# Patient Record
Sex: MALE | Race: WHITE | NOT HISPANIC OR LATINO | Employment: UNEMPLOYED | URBAN - METROPOLITAN AREA
[De-identification: names, ages, dates, MRNs, and addresses within clinical notes are randomized per-mention and may not be internally consistent; named-entity substitution may affect disease eponyms.]

---

## 2017-06-02 ENCOUNTER — LAB (OUTPATIENT)
Dept: LAB | Facility: HOSPITAL | Age: 67
End: 2017-06-02

## 2017-06-02 ENCOUNTER — TRANSCRIBE ORDERS (OUTPATIENT)
Dept: LAB | Facility: HOSPITAL | Age: 67
End: 2017-06-02

## 2017-06-02 DIAGNOSIS — N18.30 CHRONIC KIDNEY DISEASE, STAGE III (MODERATE) (HCC): ICD-10-CM

## 2017-06-02 DIAGNOSIS — I10 ESSENTIAL (PRIMARY) HYPERTENSION: ICD-10-CM

## 2017-06-02 DIAGNOSIS — E11.21 TYPE II DIABETES MELLITUS WITH NEPHROPATHY (HCC): Primary | ICD-10-CM

## 2017-06-02 DIAGNOSIS — E11.21 TYPE II DIABETES MELLITUS WITH NEPHROPATHY (HCC): ICD-10-CM

## 2017-06-02 DIAGNOSIS — R80.1 PERSISTENT PROTEINURIA, UNSPECIFIED: ICD-10-CM

## 2017-06-02 LAB
25(OH)D3 SERPL-MCNC: 31.3 NG/ML
ALBUMIN SERPL-MCNC: 4.4 G/DL (ref 3.2–4.8)
ANION GAP SERPL CALCULATED.3IONS-SCNC: 7 MMOL/L (ref 3–11)
ARTICHOKE IGE QN: 144 MG/DL (ref 0–130)
BASOPHILS # BLD AUTO: 0.07 10*3/MM3 (ref 0–0.2)
BASOPHILS NFR BLD AUTO: 0.8 % (ref 0–1)
BUN BLD-MCNC: 30 MG/DL (ref 9–23)
BUN/CREAT SERPL: 12 (ref 7–25)
CALCIUM SPEC-SCNC: 9.8 MG/DL (ref 8.7–10.4)
CHLORIDE SERPL-SCNC: 106 MMOL/L (ref 99–109)
CHOLEST SERPL-MCNC: 221 MG/DL (ref 0–200)
CO2 SERPL-SCNC: 25 MMOL/L (ref 20–31)
CREAT BLD-MCNC: 2.5 MG/DL (ref 0.6–1.3)
DEPRECATED RDW RBC AUTO: 40.4 FL (ref 37–54)
EOSINOPHIL # BLD AUTO: 0.37 10*3/MM3 (ref 0.1–0.3)
EOSINOPHIL NFR BLD AUTO: 4.5 % (ref 0–3)
ERYTHROCYTE [DISTWIDTH] IN BLOOD BY AUTOMATED COUNT: 12.8 % (ref 11.3–14.5)
GFR SERPL CREATININE-BSD FRML MDRD: 26 ML/MIN/1.73
GLUCOSE BLD-MCNC: 198 MG/DL (ref 70–100)
HBA1C MFR BLD: 8.7 % (ref 4.8–5.6)
HCT VFR BLD AUTO: 40.4 % (ref 38.9–50.9)
HDLC SERPL-MCNC: 44 MG/DL (ref 40–60)
HGB BLD-MCNC: 13.4 G/DL (ref 13.1–17.5)
IMM GRANULOCYTES # BLD: 0.04 10*3/MM3 (ref 0–0.03)
IMM GRANULOCYTES NFR BLD: 0.5 % (ref 0–0.6)
LYMPHOCYTES # BLD AUTO: 2.07 10*3/MM3 (ref 0.6–4.8)
LYMPHOCYTES NFR BLD AUTO: 25 % (ref 24–44)
MCH RBC QN AUTO: 28.8 PG (ref 27–31)
MCHC RBC AUTO-ENTMCNC: 33.2 G/DL (ref 32–36)
MCV RBC AUTO: 86.9 FL (ref 80–99)
MONOCYTES # BLD AUTO: 0.68 10*3/MM3 (ref 0–1)
MONOCYTES NFR BLD AUTO: 8.2 % (ref 0–12)
NEUTROPHILS # BLD AUTO: 5.04 10*3/MM3 (ref 1.5–8.3)
NEUTROPHILS NFR BLD AUTO: 61 % (ref 41–71)
PHOSPHATE SERPL-MCNC: 3.7 MG/DL (ref 2.4–5.1)
PLATELET # BLD AUTO: 203 10*3/MM3 (ref 150–450)
PMV BLD AUTO: 10.7 FL (ref 6–12)
POTASSIUM BLD-SCNC: 4.5 MMOL/L (ref 3.5–5.5)
RBC # BLD AUTO: 4.65 10*6/MM3 (ref 4.2–5.76)
SODIUM BLD-SCNC: 138 MMOL/L (ref 132–146)
TRIGL SERPL-MCNC: 212 MG/DL (ref 0–150)
TSH SERPL DL<=0.05 MIU/L-ACNC: 2.2 MIU/ML (ref 0.35–5.35)
URATE SERPL-MCNC: 6.5 MG/DL (ref 3.7–9.2)
WBC NRBC COR # BLD: 8.27 10*3/MM3 (ref 3.5–10.8)

## 2017-06-02 PROCEDURE — 84443 ASSAY THYROID STIM HORMONE: CPT | Performed by: INTERNAL MEDICINE

## 2017-06-02 PROCEDURE — 82306 VITAMIN D 25 HYDROXY: CPT | Performed by: INTERNAL MEDICINE

## 2017-06-02 PROCEDURE — 84156 ASSAY OF PROTEIN URINE: CPT | Performed by: INTERNAL MEDICINE

## 2017-06-02 PROCEDURE — 80069 RENAL FUNCTION PANEL: CPT | Performed by: INTERNAL MEDICINE

## 2017-06-02 PROCEDURE — 36415 COLL VENOUS BLD VENIPUNCTURE: CPT

## 2017-06-02 PROCEDURE — 82570 ASSAY OF URINE CREATININE: CPT | Performed by: INTERNAL MEDICINE

## 2017-06-02 PROCEDURE — 80061 LIPID PANEL: CPT | Performed by: INTERNAL MEDICINE

## 2017-06-02 PROCEDURE — 83036 HEMOGLOBIN GLYCOSYLATED A1C: CPT | Performed by: INTERNAL MEDICINE

## 2017-06-02 PROCEDURE — 85025 COMPLETE CBC W/AUTO DIFF WBC: CPT | Performed by: INTERNAL MEDICINE

## 2017-06-02 PROCEDURE — 83970 ASSAY OF PARATHORMONE: CPT | Performed by: INTERNAL MEDICINE

## 2017-06-02 PROCEDURE — 84550 ASSAY OF BLOOD/URIC ACID: CPT | Performed by: INTERNAL MEDICINE

## 2017-06-02 PROCEDURE — 82310 ASSAY OF CALCIUM: CPT | Performed by: INTERNAL MEDICINE

## 2017-06-04 LAB
CREAT 24H UR-MCNC: 81.9 MG/DL
PROT UR-MCNC: 402 MG/DL
PROT/CREAT UR: 4908 MG/G CREAT (ref 0–200)

## 2017-06-05 LAB
CALCIUM SERPL-MCNC: 9.5 MG/DL (ref 8.6–10.2)
INTACT PTH: NORMAL
PTH-INTACT SERPL-MCNC: 58 PG/ML (ref 15–65)

## 2017-09-12 ENCOUNTER — TRANSCRIBE ORDERS (OUTPATIENT)
Dept: LAB | Facility: HOSPITAL | Age: 67
End: 2017-09-12

## 2017-09-12 ENCOUNTER — LAB (OUTPATIENT)
Dept: LAB | Facility: HOSPITAL | Age: 67
End: 2017-09-12

## 2017-09-12 DIAGNOSIS — N18.30 CHRONIC KIDNEY DISEASE, STAGE III (MODERATE) (HCC): ICD-10-CM

## 2017-09-12 DIAGNOSIS — N18.30 CHRONIC KIDNEY DISEASE, STAGE III (MODERATE) (HCC): Primary | ICD-10-CM

## 2017-09-12 LAB
ALBUMIN SERPL-MCNC: 4.3 G/DL (ref 3.2–4.8)
ALBUMIN/GLOB SERPL: 1.3 G/DL (ref 1.5–2.5)
ALP SERPL-CCNC: 66 U/L (ref 25–100)
ALT SERPL W P-5'-P-CCNC: 18 U/L (ref 7–40)
ANION GAP SERPL CALCULATED.3IONS-SCNC: 13 MMOL/L (ref 3–11)
AST SERPL-CCNC: 18 U/L (ref 0–33)
BASOPHILS # BLD AUTO: 0.05 10*3/MM3 (ref 0–0.2)
BASOPHILS NFR BLD AUTO: 0.6 % (ref 0–1)
BILIRUB SERPL-MCNC: 0.3 MG/DL (ref 0.3–1.2)
BUN BLD-MCNC: 32 MG/DL (ref 9–23)
BUN/CREAT SERPL: 12.8 (ref 7–25)
CALCIUM SPEC-SCNC: 9.8 MG/DL (ref 8.7–10.4)
CHLORIDE SERPL-SCNC: 101 MMOL/L (ref 99–109)
CO2 SERPL-SCNC: 24 MMOL/L (ref 20–31)
CREAT BLD-MCNC: 2.5 MG/DL (ref 0.6–1.3)
DEPRECATED RDW RBC AUTO: 39.9 FL (ref 37–54)
EOSINOPHIL # BLD AUTO: 0.39 10*3/MM3 (ref 0–0.3)
EOSINOPHIL NFR BLD AUTO: 4.4 % (ref 0–3)
ERYTHROCYTE [DISTWIDTH] IN BLOOD BY AUTOMATED COUNT: 13.1 % (ref 11.3–14.5)
GFR SERPL CREATININE-BSD FRML MDRD: 26 ML/MIN/1.73
GLOBULIN UR ELPH-MCNC: 3.3 GM/DL
GLUCOSE BLD-MCNC: 84 MG/DL (ref 70–100)
HCT VFR BLD AUTO: 39.8 % (ref 38.9–50.9)
HGB BLD-MCNC: 13.2 G/DL (ref 13.1–17.5)
IMM GRANULOCYTES # BLD: 0.03 10*3/MM3 (ref 0–0.03)
IMM GRANULOCYTES NFR BLD: 0.3 % (ref 0–0.6)
LYMPHOCYTES # BLD AUTO: 2.43 10*3/MM3 (ref 0.6–4.8)
LYMPHOCYTES NFR BLD AUTO: 27.2 % (ref 24–44)
MCH RBC QN AUTO: 28.1 PG (ref 27–31)
MCHC RBC AUTO-ENTMCNC: 33.2 G/DL (ref 32–36)
MCV RBC AUTO: 84.9 FL (ref 80–99)
MONOCYTES # BLD AUTO: 0.93 10*3/MM3 (ref 0–1)
MONOCYTES NFR BLD AUTO: 10.4 % (ref 0–12)
NEUTROPHILS # BLD AUTO: 5.09 10*3/MM3 (ref 1.5–8.3)
NEUTROPHILS NFR BLD AUTO: 57.1 % (ref 41–71)
PLATELET # BLD AUTO: 218 10*3/MM3 (ref 150–450)
PMV BLD AUTO: 10.3 FL (ref 6–12)
POTASSIUM BLD-SCNC: 4 MMOL/L (ref 3.5–5.5)
PROT SERPL-MCNC: 7.6 G/DL (ref 5.7–8.2)
RBC # BLD AUTO: 4.69 10*6/MM3 (ref 4.2–5.76)
SODIUM BLD-SCNC: 138 MMOL/L (ref 132–146)
WBC NRBC COR # BLD: 8.92 10*3/MM3 (ref 3.5–10.8)

## 2017-09-12 PROCEDURE — 36415 COLL VENOUS BLD VENIPUNCTURE: CPT

## 2017-09-12 PROCEDURE — 85025 COMPLETE CBC W/AUTO DIFF WBC: CPT | Performed by: INTERNAL MEDICINE

## 2017-09-12 PROCEDURE — 80053 COMPREHEN METABOLIC PANEL: CPT | Performed by: INTERNAL MEDICINE

## 2017-09-25 ENCOUNTER — LAB (OUTPATIENT)
Dept: LAB | Facility: HOSPITAL | Age: 67
End: 2017-09-25

## 2017-09-25 ENCOUNTER — TRANSCRIBE ORDERS (OUTPATIENT)
Dept: LAB | Facility: HOSPITAL | Age: 67
End: 2017-09-25

## 2017-09-25 DIAGNOSIS — E11.21 DIABETIC GLOMERULOPATHY (HCC): ICD-10-CM

## 2017-09-25 DIAGNOSIS — R80.1 PERSISTENT PROTEINURIA: ICD-10-CM

## 2017-09-25 DIAGNOSIS — E11.21 DIABETIC GLOMERULOPATHY (HCC): Primary | ICD-10-CM

## 2017-09-25 DIAGNOSIS — N18.30 CHRONIC KIDNEY DISEASE, STAGE III (MODERATE) (HCC): ICD-10-CM

## 2017-09-25 DIAGNOSIS — I10 ESSENTIAL HYPERTENSION, MALIGNANT: ICD-10-CM

## 2017-09-25 LAB
25(OH)D3 SERPL-MCNC: 20.8 NG/ML
ARTICHOKE IGE QN: 130 MG/DL (ref 0–130)
CHOLEST SERPL-MCNC: 213 MG/DL (ref 0–200)
HBA1C MFR BLD: 8.7 % (ref 4.8–5.6)
HDLC SERPL-MCNC: 47 MG/DL (ref 40–60)
TRIGL SERPL-MCNC: 194 MG/DL (ref 0–150)
URATE SERPL-MCNC: 7.1 MG/DL (ref 3.7–9.2)

## 2017-09-25 PROCEDURE — 83036 HEMOGLOBIN GLYCOSYLATED A1C: CPT | Performed by: INTERNAL MEDICINE

## 2017-09-25 PROCEDURE — 82306 VITAMIN D 25 HYDROXY: CPT | Performed by: INTERNAL MEDICINE

## 2017-09-25 PROCEDURE — 80061 LIPID PANEL: CPT | Performed by: INTERNAL MEDICINE

## 2017-09-25 PROCEDURE — 84550 ASSAY OF BLOOD/URIC ACID: CPT | Performed by: INTERNAL MEDICINE

## 2017-09-25 PROCEDURE — 83970 ASSAY OF PARATHORMONE: CPT | Performed by: INTERNAL MEDICINE

## 2017-09-25 PROCEDURE — 36415 COLL VENOUS BLD VENIPUNCTURE: CPT

## 2017-09-25 PROCEDURE — 82310 ASSAY OF CALCIUM: CPT | Performed by: INTERNAL MEDICINE

## 2017-09-27 LAB
CALCIUM SERPL-MCNC: 9.2 MG/DL (ref 8.6–10.2)
INTACT PTH: ABNORMAL
PTH-INTACT SERPL-MCNC: 73 PG/ML (ref 15–65)

## 2018-02-12 ENCOUNTER — LAB (OUTPATIENT)
Dept: LAB | Facility: HOSPITAL | Age: 68
End: 2018-02-12

## 2018-02-12 ENCOUNTER — TRANSCRIBE ORDERS (OUTPATIENT)
Dept: LAB | Facility: HOSPITAL | Age: 68
End: 2018-02-12

## 2018-02-12 DIAGNOSIS — N18.4 CHRONIC KIDNEY DISEASE, STAGE IV (SEVERE) (HCC): Primary | ICD-10-CM

## 2018-02-12 DIAGNOSIS — N18.4 CHRONIC KIDNEY DISEASE, STAGE IV (SEVERE) (HCC): ICD-10-CM

## 2018-02-12 LAB
ALBUMIN SERPL-MCNC: 4.3 G/DL (ref 3.2–4.8)
ANION GAP SERPL CALCULATED.3IONS-SCNC: 5 MMOL/L (ref 3–11)
BUN BLD-MCNC: 41 MG/DL (ref 9–23)
BUN/CREAT SERPL: 13.7 (ref 7–25)
CALCIUM SPEC-SCNC: 9.2 MG/DL (ref 8.7–10.4)
CHLORIDE SERPL-SCNC: 103 MMOL/L (ref 99–109)
CO2 SERPL-SCNC: 24 MMOL/L (ref 20–31)
CREAT BLD-MCNC: 3 MG/DL (ref 0.6–1.3)
CREAT UR-MCNC: 77.9 MG/DL
GFR SERPL CREATININE-BSD FRML MDRD: 21 ML/MIN/1.73
GLUCOSE BLD-MCNC: 116 MG/DL (ref 70–100)
PHOSPHATE SERPL-MCNC: 3.9 MG/DL (ref 2.4–5.1)
POTASSIUM BLD-SCNC: 4.1 MMOL/L (ref 3.5–5.5)
PROT UR-MCNC: 280 MG/DL (ref 1–14)
SODIUM BLD-SCNC: 132 MMOL/L (ref 132–146)
URATE SERPL-MCNC: 7.1 MG/DL (ref 3.7–9.2)

## 2018-02-12 PROCEDURE — 82570 ASSAY OF URINE CREATININE: CPT | Performed by: INTERNAL MEDICINE

## 2018-02-12 PROCEDURE — 80069 RENAL FUNCTION PANEL: CPT

## 2018-02-12 PROCEDURE — 84156 ASSAY OF PROTEIN URINE: CPT

## 2018-02-12 PROCEDURE — 36415 COLL VENOUS BLD VENIPUNCTURE: CPT

## 2018-02-12 PROCEDURE — 84550 ASSAY OF BLOOD/URIC ACID: CPT

## 2018-04-25 ENCOUNTER — LAB (OUTPATIENT)
Dept: LAB | Facility: HOSPITAL | Age: 68
End: 2018-04-25

## 2018-04-25 ENCOUNTER — TRANSCRIBE ORDERS (OUTPATIENT)
Dept: LAB | Facility: HOSPITAL | Age: 68
End: 2018-04-25

## 2018-04-25 DIAGNOSIS — E55.9 AVITAMINOSIS D: ICD-10-CM

## 2018-04-25 DIAGNOSIS — N18.4 CHRONIC KIDNEY DISEASE, STAGE IV (SEVERE) (HCC): Primary | ICD-10-CM

## 2018-04-25 DIAGNOSIS — N18.4 CHRONIC KIDNEY DISEASE, STAGE IV (SEVERE) (HCC): ICD-10-CM

## 2018-04-25 LAB
ALBUMIN SERPL-MCNC: 4.2 G/DL (ref 3.2–4.8)
ANION GAP SERPL CALCULATED.3IONS-SCNC: 9 MMOL/L (ref 3–11)
BUN BLD-MCNC: 42 MG/DL (ref 9–23)
BUN/CREAT SERPL: 14 (ref 7–25)
CALCIUM SPEC-SCNC: 9.2 MG/DL (ref 8.7–10.4)
CHLORIDE SERPL-SCNC: 103 MMOL/L (ref 99–109)
CO2 SERPL-SCNC: 25 MMOL/L (ref 20–31)
CREAT BLD-MCNC: 3 MG/DL (ref 0.6–1.3)
GFR SERPL CREATININE-BSD FRML MDRD: 21 ML/MIN/1.73
GLUCOSE BLD-MCNC: 133 MG/DL (ref 70–100)
PHOSPHATE SERPL-MCNC: 4.2 MG/DL (ref 2.4–5.1)
POTASSIUM BLD-SCNC: 4.3 MMOL/L (ref 3.5–5.5)
PTH-INTACT SERPL-MCNC: 78.6 PG/ML (ref 11–80)
SODIUM BLD-SCNC: 137 MMOL/L (ref 132–146)

## 2018-04-25 PROCEDURE — 80069 RENAL FUNCTION PANEL: CPT

## 2018-04-25 PROCEDURE — 36415 COLL VENOUS BLD VENIPUNCTURE: CPT

## 2018-04-25 PROCEDURE — 83970 ASSAY OF PARATHORMONE: CPT

## 2018-04-25 PROCEDURE — 84550 ASSAY OF BLOOD/URIC ACID: CPT

## 2018-04-27 LAB — URATE SERPL-MCNC: 7.4 MG/DL (ref 3.7–9.2)

## 2018-09-04 ENCOUNTER — TRANSCRIBE ORDERS (OUTPATIENT)
Dept: LAB | Facility: HOSPITAL | Age: 68
End: 2018-09-04

## 2018-09-04 ENCOUNTER — LAB (OUTPATIENT)
Dept: LAB | Facility: HOSPITAL | Age: 68
End: 2018-09-04

## 2018-09-04 DIAGNOSIS — E13.8 DIABETES MELLITUS OF OTHER TYPE WITH COMPLICATION, UNSPECIFIED LONG TERM INSULIN USE STATUS: ICD-10-CM

## 2018-09-04 DIAGNOSIS — E78.5 HYPERLIPIDEMIA, UNSPECIFIED HYPERLIPIDEMIA TYPE: ICD-10-CM

## 2018-09-04 DIAGNOSIS — E78.5 HYPERLIPIDEMIA, UNSPECIFIED HYPERLIPIDEMIA TYPE: Primary | ICD-10-CM

## 2018-09-04 DIAGNOSIS — N18.4 CHRONIC KIDNEY DISEASE, STAGE IV (SEVERE) (HCC): ICD-10-CM

## 2018-09-04 DIAGNOSIS — E55.9 AVITAMINOSIS D: ICD-10-CM

## 2018-09-04 LAB
ALBUMIN SERPL-MCNC: 4.5 G/DL (ref 3.2–4.8)
ANION GAP SERPL CALCULATED.3IONS-SCNC: 12 MMOL/L (ref 3–11)
ARTICHOKE IGE QN: 196 MG/DL (ref 0–130)
BUN BLD-MCNC: 42 MG/DL (ref 9–23)
BUN/CREAT SERPL: 12.4 (ref 7–25)
CALCIUM SPEC-SCNC: 9 MG/DL (ref 8.7–10.4)
CHLORIDE SERPL-SCNC: 107 MMOL/L (ref 99–109)
CHOLEST SERPL-MCNC: 267 MG/DL (ref 0–200)
CO2 SERPL-SCNC: 20 MMOL/L (ref 20–31)
CREAT BLD-MCNC: 3.39 MG/DL (ref 0.6–1.3)
GFR SERPL CREATININE-BSD FRML MDRD: 18 ML/MIN/1.73
GLUCOSE BLD-MCNC: 102 MG/DL (ref 70–100)
HBA1C MFR BLD: 7.9 % (ref 4.8–5.6)
HDLC SERPL-MCNC: 49 MG/DL (ref 40–60)
PHOSPHATE SERPL-MCNC: 4 MG/DL (ref 2.4–5.1)
POTASSIUM BLD-SCNC: 4.3 MMOL/L (ref 3.5–5.5)
PTH-INTACT SERPL-MCNC: 106.7 PG/ML (ref 11–80)
SODIUM BLD-SCNC: 139 MMOL/L (ref 132–146)
TRIGL SERPL-MCNC: 181 MG/DL (ref 0–150)
URATE SERPL-MCNC: 7.8 MG/DL (ref 3.7–9.2)

## 2018-09-04 PROCEDURE — 80069 RENAL FUNCTION PANEL: CPT

## 2018-09-04 PROCEDURE — 80061 LIPID PANEL: CPT | Performed by: INTERNAL MEDICINE

## 2018-09-04 PROCEDURE — 84550 ASSAY OF BLOOD/URIC ACID: CPT

## 2018-09-04 PROCEDURE — 83036 HEMOGLOBIN GLYCOSYLATED A1C: CPT

## 2018-09-04 PROCEDURE — 36415 COLL VENOUS BLD VENIPUNCTURE: CPT | Performed by: INTERNAL MEDICINE

## 2018-09-04 PROCEDURE — 83970 ASSAY OF PARATHORMONE: CPT

## 2018-12-19 ENCOUNTER — LAB (OUTPATIENT)
Dept: LAB | Facility: HOSPITAL | Age: 68
End: 2018-12-19

## 2018-12-19 ENCOUNTER — TRANSCRIBE ORDERS (OUTPATIENT)
Dept: LAB | Facility: HOSPITAL | Age: 68
End: 2018-12-19

## 2018-12-19 DIAGNOSIS — E11.8 TYPE 2 DIABETES MELLITUS WITH COMPLICATION, UNSPECIFIED WHETHER LONG TERM INSULIN USE: ICD-10-CM

## 2018-12-19 DIAGNOSIS — E55.9 AVITAMINOSIS D: ICD-10-CM

## 2018-12-19 DIAGNOSIS — E78.5 HYPERLIPIDEMIA, UNSPECIFIED HYPERLIPIDEMIA TYPE: ICD-10-CM

## 2018-12-19 DIAGNOSIS — N18.4 CHRONIC KIDNEY DISEASE, STAGE IV (SEVERE) (HCC): ICD-10-CM

## 2018-12-19 DIAGNOSIS — N18.4 CHRONIC KIDNEY DISEASE, STAGE IV (SEVERE) (HCC): Primary | ICD-10-CM

## 2018-12-19 LAB
ALBUMIN SERPL-MCNC: 4.32 G/DL (ref 3.2–4.8)
ANION GAP SERPL CALCULATED.3IONS-SCNC: 11 MMOL/L (ref 3–11)
ARTICHOKE IGE QN: 228 MG/DL (ref 0–130)
BUN BLD-MCNC: 48 MG/DL (ref 9–23)
BUN/CREAT SERPL: 11.8 (ref 7–25)
CALCIUM SPEC-SCNC: 9.5 MG/DL (ref 8.7–10.4)
CHLORIDE SERPL-SCNC: 104 MMOL/L (ref 99–109)
CHOLEST SERPL-MCNC: 292 MG/DL (ref 0–200)
CO2 SERPL-SCNC: 24 MMOL/L (ref 20–31)
CREAT BLD-MCNC: 4.06 MG/DL (ref 0.6–1.3)
GFR SERPL CREATININE-BSD FRML MDRD: 15 ML/MIN/1.73
GLUCOSE BLD-MCNC: 106 MG/DL (ref 70–100)
HBA1C MFR BLD: 7.8 % (ref 4.8–5.6)
HDLC SERPL-MCNC: 51 MG/DL (ref 40–60)
PHOSPHATE SERPL-MCNC: 4.4 MG/DL (ref 2.4–5.1)
POTASSIUM BLD-SCNC: 4.2 MMOL/L (ref 3.5–5.5)
PTH-INTACT SERPL-MCNC: 93.6 PG/ML (ref 11–80)
SODIUM BLD-SCNC: 139 MMOL/L (ref 132–146)
TRIGL SERPL-MCNC: 200 MG/DL (ref 0–150)
URATE SERPL-MCNC: 6.9 MG/DL (ref 3.7–9.2)

## 2018-12-19 PROCEDURE — 83970 ASSAY OF PARATHORMONE: CPT

## 2018-12-19 PROCEDURE — 80061 LIPID PANEL: CPT

## 2018-12-19 PROCEDURE — 83036 HEMOGLOBIN GLYCOSYLATED A1C: CPT

## 2018-12-19 PROCEDURE — 80069 RENAL FUNCTION PANEL: CPT

## 2018-12-19 PROCEDURE — 36415 COLL VENOUS BLD VENIPUNCTURE: CPT

## 2018-12-19 PROCEDURE — 84550 ASSAY OF BLOOD/URIC ACID: CPT

## 2019-03-01 ENCOUNTER — TRANSCRIBE ORDERS (OUTPATIENT)
Dept: LAB | Facility: HOSPITAL | Age: 69
End: 2019-03-01

## 2019-03-01 ENCOUNTER — LAB (OUTPATIENT)
Dept: LAB | Facility: HOSPITAL | Age: 69
End: 2019-03-01

## 2019-03-01 DIAGNOSIS — N18.4 CHRONIC KIDNEY DISEASE, STAGE IV (SEVERE) (HCC): Primary | ICD-10-CM

## 2019-03-01 DIAGNOSIS — N18.4 CHRONIC KIDNEY DISEASE, STAGE IV (SEVERE) (HCC): ICD-10-CM

## 2019-03-01 LAB
ALBUMIN SERPL-MCNC: 4.57 G/DL (ref 3.2–4.8)
ANION GAP SERPL CALCULATED.3IONS-SCNC: 10 MMOL/L (ref 3–11)
BUN BLD-MCNC: 34 MG/DL (ref 9–23)
BUN/CREAT SERPL: 11.6 (ref 7–25)
CALCIUM SPEC-SCNC: 9.3 MG/DL (ref 8.7–10.4)
CHLORIDE SERPL-SCNC: 107 MMOL/L (ref 99–109)
CO2 SERPL-SCNC: 22 MMOL/L (ref 20–31)
CREAT BLD-MCNC: 2.92 MG/DL (ref 0.6–1.3)
DEPRECATED RDW RBC AUTO: 41.1 FL (ref 37–54)
ERYTHROCYTE [DISTWIDTH] IN BLOOD BY AUTOMATED COUNT: 13.3 % (ref 11.3–14.5)
GFR SERPL CREATININE-BSD FRML MDRD: 22 ML/MIN/1.73
GLUCOSE BLD-MCNC: 131 MG/DL (ref 70–100)
HCT VFR BLD AUTO: 41 % (ref 38.9–50.9)
HGB BLD-MCNC: 13.8 G/DL (ref 13.1–17.5)
MCH RBC QN AUTO: 28.6 PG (ref 27–31)
MCHC RBC AUTO-ENTMCNC: 33.7 G/DL (ref 32–36)
MCV RBC AUTO: 85.1 FL (ref 80–99)
PHOSPHATE SERPL-MCNC: 3.5 MG/DL (ref 2.4–5.1)
PLATELET # BLD AUTO: 235 10*3/MM3 (ref 150–450)
PMV BLD AUTO: 10.4 FL (ref 6–12)
POTASSIUM BLD-SCNC: 3.7 MMOL/L (ref 3.5–5.5)
RBC # BLD AUTO: 4.82 10*6/MM3 (ref 4.2–5.76)
SODIUM BLD-SCNC: 139 MMOL/L (ref 132–146)
WBC NRBC COR # BLD: 7.88 10*3/MM3 (ref 3.5–10.8)

## 2019-03-01 PROCEDURE — 85027 COMPLETE CBC AUTOMATED: CPT

## 2019-03-01 PROCEDURE — 83970 ASSAY OF PARATHORMONE: CPT

## 2019-03-01 PROCEDURE — 82310 ASSAY OF CALCIUM: CPT

## 2019-03-01 PROCEDURE — 80069 RENAL FUNCTION PANEL: CPT

## 2019-03-01 PROCEDURE — 36415 COLL VENOUS BLD VENIPUNCTURE: CPT

## 2019-03-01 PROCEDURE — 84156 ASSAY OF PROTEIN URINE: CPT

## 2019-03-01 PROCEDURE — 82570 ASSAY OF URINE CREATININE: CPT

## 2019-03-03 LAB
CREAT 24H UR-MCNC: 62.7 MG/DL
PROT UR-MCNC: 285.9 MG/DL
PROT/CREAT UR: 4560 MG/G CREAT (ref 0–200)

## 2019-03-05 LAB
CALCIUM SERPL-MCNC: 9.9 MG/DL (ref 8.6–10.2)
INTACT PTH: NORMAL
PTH-INTACT SERPL-MCNC: 53 PG/ML (ref 15–65)

## 2019-05-31 ENCOUNTER — LAB (OUTPATIENT)
Dept: LAB | Facility: HOSPITAL | Age: 69
End: 2019-05-31

## 2019-05-31 ENCOUNTER — TRANSCRIBE ORDERS (OUTPATIENT)
Dept: LAB | Facility: HOSPITAL | Age: 69
End: 2019-05-31

## 2019-05-31 DIAGNOSIS — N18.5 CHRONIC KIDNEY DISEASE (CKD) STAGE G5/A3, GLOMERULAR FILTRATION RATE (GFR) LESS THAN OR EQUAL TO 15 ML/MIN/1.73 SQUARE METER AND ALBUMINURIA CREATININE RATIO GREATER THAN 300 MG/G (HCC): ICD-10-CM

## 2019-05-31 DIAGNOSIS — N18.5 CHRONIC KIDNEY DISEASE (CKD) STAGE G5/A3, GLOMERULAR FILTRATION RATE (GFR) LESS THAN OR EQUAL TO 15 ML/MIN/1.73 SQUARE METER AND ALBUMINURIA CREATININE RATIO GREATER THAN 300 MG/G (HCC): Primary | ICD-10-CM

## 2019-05-31 LAB
25(OH)D3 SERPL-MCNC: 21.6 NG/ML (ref 30–100)
ALBUMIN SERPL-MCNC: 4.2 G/DL (ref 3.5–5.2)
ANION GAP SERPL CALCULATED.3IONS-SCNC: 15 MMOL/L
BUN BLD-MCNC: 37 MG/DL (ref 8–23)
BUN/CREAT SERPL: 11.3 (ref 7–25)
CALCIUM SPEC-SCNC: 9.8 MG/DL (ref 8.6–10.5)
CHLORIDE SERPL-SCNC: 100 MMOL/L (ref 98–107)
CHOLEST SERPL-MCNC: 262 MG/DL (ref 0–200)
CO2 SERPL-SCNC: 21 MMOL/L (ref 22–29)
CREAT BLD-MCNC: 3.27 MG/DL (ref 0.76–1.27)
CREAT UR-MCNC: 72.3 MG/DL
DEPRECATED RDW RBC AUTO: 43.7 FL (ref 37–54)
ERYTHROCYTE [DISTWIDTH] IN BLOOD BY AUTOMATED COUNT: 13.2 % (ref 12.3–15.4)
GFR SERPL CREATININE-BSD FRML MDRD: 19 ML/MIN/1.73
GLUCOSE BLD-MCNC: 99 MG/DL (ref 65–99)
HCT VFR BLD AUTO: 43.5 % (ref 37.5–51)
HDLC SERPL-MCNC: 40 MG/DL (ref 40–60)
HGB BLD-MCNC: 13.6 G/DL (ref 13–17.7)
LDLC SERPL CALC-MCNC: 182 MG/DL (ref 0–100)
LDLC/HDLC SERPL: 4.54 {RATIO}
MCH RBC QN AUTO: 28.5 PG (ref 26.6–33)
MCHC RBC AUTO-ENTMCNC: 31.3 G/DL (ref 31.5–35.7)
MCV RBC AUTO: 91 FL (ref 79–97)
PHOSPHATE SERPL-MCNC: 4 MG/DL (ref 2.5–4.5)
PLATELET # BLD AUTO: 193 10*3/MM3 (ref 140–450)
PMV BLD AUTO: 11.1 FL (ref 6–12)
POTASSIUM BLD-SCNC: 4 MMOL/L (ref 3.5–5.2)
PROT UR-MCNC: 281 MG/DL
PROT/CREAT UR: 3886.6 MG/G CREA (ref 0–200)
PTH-INTACT SERPL-MCNC: 59.3 PG/ML (ref 15–65)
RBC # BLD AUTO: 4.78 10*6/MM3 (ref 4.14–5.8)
SODIUM BLD-SCNC: 136 MMOL/L (ref 136–145)
TRIGL SERPL-MCNC: 202 MG/DL (ref 0–150)
URATE SERPL-MCNC: 7.1 MG/DL (ref 3.4–7)
VLDLC SERPL-MCNC: 40.4 MG/DL (ref 5–40)
WBC NRBC COR # BLD: 6.86 10*3/MM3 (ref 3.4–10.8)

## 2019-05-31 PROCEDURE — 80069 RENAL FUNCTION PANEL: CPT

## 2019-05-31 PROCEDURE — 82306 VITAMIN D 25 HYDROXY: CPT

## 2019-05-31 PROCEDURE — 85027 COMPLETE CBC AUTOMATED: CPT

## 2019-05-31 PROCEDURE — 83970 ASSAY OF PARATHORMONE: CPT

## 2019-05-31 PROCEDURE — 80061 LIPID PANEL: CPT

## 2019-05-31 PROCEDURE — 84156 ASSAY OF PROTEIN URINE: CPT

## 2019-05-31 PROCEDURE — 36415 COLL VENOUS BLD VENIPUNCTURE: CPT

## 2019-05-31 PROCEDURE — 84550 ASSAY OF BLOOD/URIC ACID: CPT

## 2019-05-31 PROCEDURE — 82570 ASSAY OF URINE CREATININE: CPT

## 2019-09-11 ENCOUNTER — TRANSCRIBE ORDERS (OUTPATIENT)
Dept: LAB | Facility: HOSPITAL | Age: 69
End: 2019-09-11

## 2019-09-11 ENCOUNTER — LAB (OUTPATIENT)
Dept: LAB | Facility: HOSPITAL | Age: 69
End: 2019-09-11

## 2019-09-11 DIAGNOSIS — N25.81 SECONDARY HYPERPARATHYROIDISM OF RENAL ORIGIN (HCC): ICD-10-CM

## 2019-09-11 DIAGNOSIS — N18.4 CHRONIC KIDNEY DISEASE, STAGE IV (SEVERE) (HCC): Primary | ICD-10-CM

## 2019-09-11 DIAGNOSIS — I12.9 HYPERTENSIVE KIDNEY DISEASE WITH CHRONIC KIDNEY DISEASE STAGE IV (HCC): ICD-10-CM

## 2019-09-11 DIAGNOSIS — N18.4 HYPERTENSIVE KIDNEY DISEASE WITH CHRONIC KIDNEY DISEASE STAGE IV (HCC): ICD-10-CM

## 2019-09-11 DIAGNOSIS — R53.83 OTHER FATIGUE: ICD-10-CM

## 2019-09-11 DIAGNOSIS — T69.9XXS EFFECT OF REDUCED TEMPERATURE, SEQUELA: ICD-10-CM

## 2019-09-11 DIAGNOSIS — N18.4 CHRONIC KIDNEY DISEASE, STAGE IV (SEVERE) (HCC): ICD-10-CM

## 2019-09-11 LAB
ALBUMIN SERPL-MCNC: 4.6 G/DL (ref 3.5–5.2)
ANION GAP SERPL CALCULATED.3IONS-SCNC: 11.9 MMOL/L (ref 5–15)
BUN BLD-MCNC: 31 MG/DL (ref 8–23)
BUN/CREAT SERPL: 8 (ref 7–25)
CALCIUM SPEC-SCNC: 9.8 MG/DL (ref 8.6–10.5)
CHLORIDE SERPL-SCNC: 101 MMOL/L (ref 98–107)
CO2 SERPL-SCNC: 22.1 MMOL/L (ref 22–29)
CREAT BLD-MCNC: 3.86 MG/DL (ref 0.76–1.27)
DEPRECATED RDW RBC AUTO: 42.4 FL (ref 37–54)
ERYTHROCYTE [DISTWIDTH] IN BLOOD BY AUTOMATED COUNT: 12.9 % (ref 12.3–15.4)
GFR SERPL CREATININE-BSD FRML MDRD: 16 ML/MIN/1.73
GLUCOSE BLD-MCNC: 125 MG/DL (ref 65–99)
HCT VFR BLD AUTO: 38.2 % (ref 37.5–51)
HGB BLD-MCNC: 12.2 G/DL (ref 13–17.7)
MCH RBC QN AUTO: 28.7 PG (ref 26.6–33)
MCHC RBC AUTO-ENTMCNC: 31.9 G/DL (ref 31.5–35.7)
MCV RBC AUTO: 89.9 FL (ref 79–97)
PHOSPHATE SERPL-MCNC: 3.5 MG/DL (ref 2.5–4.5)
PLATELET # BLD AUTO: 176 10*3/MM3 (ref 140–450)
PMV BLD AUTO: 11.3 FL (ref 6–12)
POTASSIUM BLD-SCNC: 4.4 MMOL/L (ref 3.5–5.2)
RBC # BLD AUTO: 4.25 10*6/MM3 (ref 4.14–5.8)
SODIUM BLD-SCNC: 135 MMOL/L (ref 136–145)
URATE SERPL-MCNC: 7.2 MG/DL (ref 3.4–7)
WBC NRBC COR # BLD: 7.1 10*3/MM3 (ref 3.4–10.8)

## 2019-09-11 PROCEDURE — 84550 ASSAY OF BLOOD/URIC ACID: CPT

## 2019-09-11 PROCEDURE — 80069 RENAL FUNCTION PANEL: CPT

## 2019-09-11 PROCEDURE — 85027 COMPLETE CBC AUTOMATED: CPT

## 2019-09-11 PROCEDURE — 36415 COLL VENOUS BLD VENIPUNCTURE: CPT

## 2019-11-19 ENCOUNTER — LAB (OUTPATIENT)
Dept: LAB | Facility: HOSPITAL | Age: 69
End: 2019-11-19

## 2019-11-19 ENCOUNTER — TRANSCRIBE ORDERS (OUTPATIENT)
Dept: LAB | Facility: HOSPITAL | Age: 69
End: 2019-11-19

## 2019-11-19 DIAGNOSIS — E78.5 HYPERLIPIDEMIA, UNSPECIFIED HYPERLIPIDEMIA TYPE: Primary | ICD-10-CM

## 2019-11-19 DIAGNOSIS — E78.5 HYPERLIPIDEMIA, UNSPECIFIED HYPERLIPIDEMIA TYPE: ICD-10-CM

## 2019-11-19 DIAGNOSIS — N18.4 CHRONIC KIDNEY DISEASE, STAGE IV (SEVERE) (HCC): Primary | ICD-10-CM

## 2019-11-19 DIAGNOSIS — N18.4 CHRONIC KIDNEY DISEASE, STAGE IV (SEVERE) (HCC): ICD-10-CM

## 2019-11-19 PROCEDURE — 80061 LIPID PANEL: CPT | Performed by: INTERNAL MEDICINE

## 2019-11-19 PROCEDURE — 36415 COLL VENOUS BLD VENIPUNCTURE: CPT

## 2019-11-19 PROCEDURE — 80076 HEPATIC FUNCTION PANEL: CPT

## 2019-11-19 PROCEDURE — 80048 BASIC METABOLIC PNL TOTAL CA: CPT

## 2019-11-19 PROCEDURE — 84100 ASSAY OF PHOSPHORUS: CPT

## 2019-11-19 PROCEDURE — 85027 COMPLETE CBC AUTOMATED: CPT

## 2019-11-19 PROCEDURE — 84550 ASSAY OF BLOOD/URIC ACID: CPT

## 2019-11-19 PROCEDURE — 83970 ASSAY OF PARATHORMONE: CPT

## 2019-11-20 LAB
ALBUMIN SERPL-MCNC: 4 G/DL (ref 3.5–5.2)
ALP SERPL-CCNC: 50 U/L (ref 39–117)
ALT SERPL W P-5'-P-CCNC: 15 U/L (ref 1–41)
ANION GAP SERPL CALCULATED.3IONS-SCNC: 15 MMOL/L (ref 5–15)
AST SERPL-CCNC: 16 U/L (ref 1–40)
BILIRUB CONJ SERPL-MCNC: <0.2 MG/DL (ref 0.2–0.3)
BILIRUB INDIRECT SERPL-MCNC: ABNORMAL MG/DL
BILIRUB SERPL-MCNC: 0.3 MG/DL (ref 0.2–1.2)
BUN BLD-MCNC: 40 MG/DL (ref 8–23)
BUN/CREAT SERPL: 9.7 (ref 7–25)
CALCIUM SPEC-SCNC: 9.6 MG/DL (ref 8.6–10.5)
CALCIUM SPEC-SCNC: 9.7 MG/DL (ref 8.6–10.5)
CHLORIDE SERPL-SCNC: 101 MMOL/L (ref 98–107)
CHOLEST SERPL-MCNC: 257 MG/DL (ref 0–200)
CO2 SERPL-SCNC: 21 MMOL/L (ref 22–29)
CREAT BLD-MCNC: 4.13 MG/DL (ref 0.76–1.27)
DEPRECATED RDW RBC AUTO: 39 FL (ref 37–54)
ERYTHROCYTE [DISTWIDTH] IN BLOOD BY AUTOMATED COUNT: 12.4 % (ref 12.3–15.4)
GFR SERPL CREATININE-BSD FRML MDRD: 15 ML/MIN/1.73
GLUCOSE BLD-MCNC: 122 MG/DL (ref 65–99)
HCT VFR BLD AUTO: 36.9 % (ref 37.5–51)
HDLC SERPL-MCNC: 42 MG/DL (ref 40–60)
HGB BLD-MCNC: 12.8 G/DL (ref 13–17.7)
LDLC SERPL CALC-MCNC: 169 MG/DL (ref 0–100)
LDLC/HDLC SERPL: 4.01 {RATIO}
MCH RBC QN AUTO: 29.7 PG (ref 26.6–33)
MCHC RBC AUTO-ENTMCNC: 34.7 G/DL (ref 31.5–35.7)
MCV RBC AUTO: 85.6 FL (ref 79–97)
PHOSPHATE SERPL-MCNC: 3.8 MG/DL (ref 2.5–4.5)
PLATELET # BLD AUTO: 225 10*3/MM3 (ref 140–450)
PMV BLD AUTO: 10.9 FL (ref 6–12)
POTASSIUM BLD-SCNC: 4.2 MMOL/L (ref 3.5–5.2)
PROT SERPL-MCNC: 8.4 G/DL (ref 6–8.5)
PTH-INTACT SERPL-MCNC: 62.8 PG/ML (ref 15–65)
RBC # BLD AUTO: 4.31 10*6/MM3 (ref 4.14–5.8)
SODIUM BLD-SCNC: 137 MMOL/L (ref 136–145)
TRIGL SERPL-MCNC: 232 MG/DL (ref 0–150)
URATE SERPL-MCNC: 6.8 MG/DL (ref 3.4–7)
VLDLC SERPL-MCNC: 46.4 MG/DL (ref 5–40)
WBC NRBC COR # BLD: 8.63 10*3/MM3 (ref 3.4–10.8)

## 2020-10-06 ENCOUNTER — LAB (OUTPATIENT)
Dept: LAB | Facility: HOSPITAL | Age: 70
End: 2020-10-06

## 2020-10-06 ENCOUNTER — TRANSCRIBE ORDERS (OUTPATIENT)
Dept: LAB | Facility: HOSPITAL | Age: 70
End: 2020-10-06

## 2020-10-06 DIAGNOSIS — N18.5 CHRONIC KIDNEY DISEASE, STAGE V (HCC): Primary | ICD-10-CM

## 2020-10-06 DIAGNOSIS — N18.5 CHRONIC KIDNEY DISEASE, STAGE V (HCC): ICD-10-CM

## 2020-10-06 PROCEDURE — 83970 ASSAY OF PARATHORMONE: CPT

## 2020-10-06 PROCEDURE — 85027 COMPLETE CBC AUTOMATED: CPT

## 2020-10-06 PROCEDURE — 84156 ASSAY OF PROTEIN URINE: CPT

## 2020-10-06 PROCEDURE — 84466 ASSAY OF TRANSFERRIN: CPT

## 2020-10-06 PROCEDURE — 84550 ASSAY OF BLOOD/URIC ACID: CPT

## 2020-10-06 PROCEDURE — 36415 COLL VENOUS BLD VENIPUNCTURE: CPT

## 2020-10-06 PROCEDURE — 83540 ASSAY OF IRON: CPT

## 2020-10-06 PROCEDURE — 80069 RENAL FUNCTION PANEL: CPT

## 2020-10-06 PROCEDURE — 82570 ASSAY OF URINE CREATININE: CPT

## 2020-10-06 PROCEDURE — 82310 ASSAY OF CALCIUM: CPT

## 2020-10-07 LAB
ALBUMIN SERPL-MCNC: 4.3 G/DL (ref 3.5–5.2)
ANION GAP SERPL CALCULATED.3IONS-SCNC: 13.2 MMOL/L (ref 5–15)
BUN SERPL-MCNC: 49 MG/DL (ref 8–23)
BUN/CREAT SERPL: 9.4 (ref 7–25)
CALCIUM SPEC-SCNC: 9 MG/DL (ref 8.6–10.5)
CALCIUM SPEC-SCNC: 9.1 MG/DL (ref 8.6–10.5)
CHLORIDE SERPL-SCNC: 105 MMOL/L (ref 98–107)
CO2 SERPL-SCNC: 18.8 MMOL/L (ref 22–29)
CREAT SERPL-MCNC: 5.23 MG/DL (ref 0.76–1.27)
CREAT UR-MCNC: 90.8 MG/DL
DEPRECATED RDW RBC AUTO: 42 FL (ref 37–54)
ERYTHROCYTE [DISTWIDTH] IN BLOOD BY AUTOMATED COUNT: 13.5 % (ref 12.3–15.4)
GFR SERPL CREATININE-BSD FRML MDRD: 11 ML/MIN/1.73
GFR SERPL CREATININE-BSD FRML MDRD: ABNORMAL ML/MIN/{1.73_M2}
GLUCOSE SERPL-MCNC: 144 MG/DL (ref 65–99)
HCT VFR BLD AUTO: 33.3 % (ref 37.5–51)
HGB BLD-MCNC: 11 G/DL (ref 13–17.7)
IRON 24H UR-MRATE: 56 MCG/DL (ref 59–158)
IRON SATN MFR SERPL: 17 % (ref 20–50)
MCH RBC QN AUTO: 28.4 PG (ref 26.6–33)
MCHC RBC AUTO-ENTMCNC: 33 G/DL (ref 31.5–35.7)
MCV RBC AUTO: 86 FL (ref 79–97)
PHOSPHATE SERPL-MCNC: 5.6 MG/DL (ref 2.5–4.5)
PLATELET # BLD AUTO: 191 10*3/MM3 (ref 140–450)
PMV BLD AUTO: 11.1 FL (ref 6–12)
POTASSIUM SERPL-SCNC: 4.4 MMOL/L (ref 3.5–5.2)
PROT UR-MCNC: 576 MG/DL
PROT/CREAT UR: 6343.6 MG/G CREA (ref 0–200)
PTH-INTACT SERPL-MCNC: 172 PG/ML (ref 15–65)
RBC # BLD AUTO: 3.87 10*6/MM3 (ref 4.14–5.8)
SODIUM SERPL-SCNC: 137 MMOL/L (ref 136–145)
TIBC SERPL-MCNC: 335 MCG/DL (ref 298–536)
TRANSFERRIN SERPL-MCNC: 225 MG/DL (ref 200–360)
URATE SERPL-MCNC: 6.8 MG/DL (ref 3.4–7)
WBC # BLD AUTO: 8.53 10*3/MM3 (ref 3.4–10.8)

## 2020-12-15 ENCOUNTER — LAB (OUTPATIENT)
Dept: LAB | Facility: HOSPITAL | Age: 70
End: 2020-12-15

## 2020-12-15 DIAGNOSIS — N18.5 CHRONIC KIDNEY DISEASE, STAGE V (HCC): Primary | ICD-10-CM

## 2020-12-15 LAB
ALBUMIN SERPL-MCNC: 4 G/DL (ref 3.5–5.2)
ANION GAP SERPL CALCULATED.3IONS-SCNC: 13.7 MMOL/L (ref 5–15)
BASOPHILS # BLD AUTO: 0.07 10*3/MM3 (ref 0–0.2)
BASOPHILS NFR BLD AUTO: 1 % (ref 0–1.5)
BUN SERPL-MCNC: 53 MG/DL (ref 8–23)
BUN/CREAT SERPL: 9.1 (ref 7–25)
CALCIUM SPEC-SCNC: 8.9 MG/DL (ref 8.6–10.5)
CHLORIDE SERPL-SCNC: 105 MMOL/L (ref 98–107)
CO2 SERPL-SCNC: 18.3 MMOL/L (ref 22–29)
CREAT SERPL-MCNC: 5.81 MG/DL (ref 0.76–1.27)
DEPRECATED RDW RBC AUTO: 43.4 FL (ref 37–54)
EOSINOPHIL # BLD AUTO: 0.5 10*3/MM3 (ref 0–0.4)
EOSINOPHIL NFR BLD AUTO: 7.2 % (ref 0.3–6.2)
ERYTHROCYTE [DISTWIDTH] IN BLOOD BY AUTOMATED COUNT: 14 % (ref 12.3–15.4)
GFR SERPL CREATININE-BSD FRML MDRD: 10 ML/MIN/1.73
GFR SERPL CREATININE-BSD FRML MDRD: ABNORMAL ML/MIN/{1.73_M2}
GLUCOSE SERPL-MCNC: 125 MG/DL (ref 65–99)
HCT VFR BLD AUTO: 31.3 % (ref 37.5–51)
HGB BLD-MCNC: 10.2 G/DL (ref 13–17.7)
IMM GRANULOCYTES # BLD AUTO: 0.03 10*3/MM3 (ref 0–0.05)
IMM GRANULOCYTES NFR BLD AUTO: 0.4 % (ref 0–0.5)
LYMPHOCYTES # BLD AUTO: 1.17 10*3/MM3 (ref 0.7–3.1)
LYMPHOCYTES NFR BLD AUTO: 16.8 % (ref 19.6–45.3)
MCH RBC QN AUTO: 28 PG (ref 26.6–33)
MCHC RBC AUTO-ENTMCNC: 32.6 G/DL (ref 31.5–35.7)
MCV RBC AUTO: 86 FL (ref 79–97)
MONOCYTES # BLD AUTO: 0.66 10*3/MM3 (ref 0.1–0.9)
MONOCYTES NFR BLD AUTO: 9.5 % (ref 5–12)
NEUTROPHILS NFR BLD AUTO: 4.55 10*3/MM3 (ref 1.7–7)
NEUTROPHILS NFR BLD AUTO: 65.1 % (ref 42.7–76)
NRBC BLD AUTO-RTO: 0 /100 WBC (ref 0–0.2)
PHOSPHATE SERPL-MCNC: 6.1 MG/DL (ref 2.5–4.5)
PLATELET # BLD AUTO: 167 10*3/MM3 (ref 140–450)
PMV BLD AUTO: 11.5 FL (ref 6–12)
POTASSIUM SERPL-SCNC: 4 MMOL/L (ref 3.5–5.2)
RBC # BLD AUTO: 3.64 10*6/MM3 (ref 4.14–5.8)
SODIUM SERPL-SCNC: 137 MMOL/L (ref 136–145)
URATE SERPL-MCNC: 8.7 MG/DL (ref 3.4–7)
WBC # BLD AUTO: 6.98 10*3/MM3 (ref 3.4–10.8)

## 2020-12-15 PROCEDURE — 80069 RENAL FUNCTION PANEL: CPT

## 2020-12-15 PROCEDURE — 84550 ASSAY OF BLOOD/URIC ACID: CPT

## 2020-12-15 PROCEDURE — 36415 COLL VENOUS BLD VENIPUNCTURE: CPT

## 2020-12-15 PROCEDURE — 85025 COMPLETE CBC W/AUTO DIFF WBC: CPT

## 2021-01-08 ENCOUNTER — LAB (OUTPATIENT)
Dept: LAB | Facility: HOSPITAL | Age: 71
End: 2021-01-08

## 2021-01-08 ENCOUNTER — OFFICE VISIT (OUTPATIENT)
Dept: SURGERY | Facility: CLINIC | Age: 71
End: 2021-01-08

## 2021-01-08 VITALS
DIASTOLIC BLOOD PRESSURE: 90 MMHG | WEIGHT: 172 LBS | BODY MASS INDEX: 24.08 KG/M2 | HEIGHT: 71 IN | SYSTOLIC BLOOD PRESSURE: 142 MMHG | TEMPERATURE: 98.7 F | HEART RATE: 87 BPM | OXYGEN SATURATION: 98 %

## 2021-01-08 DIAGNOSIS — Z01.818 PREOP TESTING: ICD-10-CM

## 2021-01-08 DIAGNOSIS — Z01.818 PREOP TESTING: Primary | ICD-10-CM

## 2021-01-08 DIAGNOSIS — N18.5 STAGE 5 CHRONIC KIDNEY DISEASE NOT ON CHRONIC DIALYSIS (HCC): Primary | ICD-10-CM

## 2021-01-08 PROCEDURE — C9803 HOPD COVID-19 SPEC COLLECT: HCPCS

## 2021-01-08 PROCEDURE — U0004 COV-19 TEST NON-CDC HGH THRU: HCPCS

## 2021-01-08 PROCEDURE — 99204 OFFICE O/P NEW MOD 45 MIN: CPT | Performed by: SURGERY

## 2021-01-08 RX ORDER — AMLODIPINE BESYLATE 5 MG/1
5 TABLET ORAL 2 TIMES DAILY
COMMUNITY
Start: 2020-12-17

## 2021-01-08 RX ORDER — CLONAZEPAM 0.5 MG/1
0.5 TABLET ORAL AS NEEDED
COMMUNITY
Start: 2020-10-13

## 2021-01-08 NOTE — H&P (VIEW-ONLY)
Patient: Erlin Savage    YOB: 1950    Date: 01/08/2021    Primary Care Provider: Dinorah Beasley APRN    Chief Complaint   Patient presents with   • Follow-up     eval peritoneal cath       SUBJECTIVE:    History of present illness: Patient with a 4 to 5-year history of onset of renal failure.  This has been worsening since then and now it is severe and close to requiring dialysis.  Apparently his diabetes and hypertension have worsened his renal failure during that time.  He has had associated swelling.  The following portions of the patient's history were reviewed and updated as appropriate: allergies, current medications, past family history, past medical history, past social history, past surgical history and problem list.      Review of Systems   Constitutional: Positive for fatigue. Negative for chills, fever and unexpected weight change.   HENT: Negative for trouble swallowing and voice change.    Eyes: Negative for visual disturbance.   Respiratory: Negative for apnea, cough, chest tightness, shortness of breath and wheezing.    Cardiovascular: Negative for chest pain, palpitations and leg swelling.   Gastrointestinal: Negative for abdominal distention, abdominal pain, anal bleeding, blood in stool, constipation, diarrhea, nausea, rectal pain and vomiting.   Endocrine: Negative for cold intolerance and heat intolerance.   Genitourinary: Negative for difficulty urinating, dysuria, flank pain and hematuria.   Musculoskeletal: Negative for back pain, gait problem and joint swelling.   Skin: Negative for color change, rash and wound.   Neurological: Negative for dizziness, syncope, speech difficulty, weakness, numbness and headaches.   Hematological: Negative for adenopathy. Does not bruise/bleed easily.   Psychiatric/Behavioral: Negative for confusion. The patient is not nervous/anxious.        Allergies:  Allergies   Allergen Reactions   • Coreg [Carvedilol] Itching   • Crestor [Rosuvastatin  Calcium]      Itching rash   • Lipitor [Atorvastatin]      And Crestor- generalized weakness and chest tightness   • Lisinopril      Cough /weakness, nauseas and dirrhea   • Livalo [Pitavastatin]      Chest tightness   • Morphine And Related    • Pravastatin      Chest , neck and arm discomfort   • Questran [Cholestyramine]    • Augmentin [Amoxicillin-Pot Clavulanate] Palpitations   • Biaxin [Clarithromycin] Palpitations       Medications:    Current Outpatient Medications:   •  amLODIPine (NORVASC) 5 MG tablet, Take 5 mg by mouth 2 (Two) Times a Day., Disp: , Rfl:   •  aspirin 81 MG tablet, Take 81 mg by mouth daily., Disp: , Rfl:   •  clonazePAM (KlonoPIN) 0.5 MG tablet, Take 0.5 mg by mouth As Needed., Disp: , Rfl:   •  hydrALAZINE (APRESOLINE) 50 MG tablet, Take 1 tablet by mouth 3 (Three) Times a Day., Disp: 90 tablet, Rfl: 3  •  insulin glargine (LANTUS) 100 UNIT/ML injection, Inject  under the skin daily., Disp: , Rfl:   •  insulin lispro (HumaLOG) 100 UNIT/ML injection, Inject  under the skin 3 (three) times a day before meals., Disp: , Rfl:   •  losartan (COZAAR) 100 MG tablet, Take 100 mg by mouth 2 (two) times a day., Disp: , Rfl:   •  nitroglycerin (NITROSTAT) 0.4 MG SL tablet, Place 0.4 mg under the tongue every 5 (five) minutes as needed for chest pain. Take no more than 3 doses in 15 minutes., Disp: , Rfl:     History:  Past Medical History:   Diagnosis Date   • Anxiety    • Anxiety disorder    • Back pain    • Chronic back pain     work related injury   • CKD (chronic kidney disease)     most recent creatinine 2.7 on 01/26/16   • Coronary artery disease    • Diabetes mellitus (CMS/[facility])     insulin dependent diabetes Onset 2010   • Dyslipidemia    • Hyperlipidemia    • Hypertension    • Ischemic heart disease    • Renal failure    • Stroke (CMS/HCC)     TIA / transient right vision loss age 42   • TIA (transient ischemic attack)    • Vertigo        Past Surgical History:   Procedure Laterality Date  "  • CARDIAC CATHETERIZATION     • CHOLECYSTECTOMY  2012   • CORONARY ARTERY BYPASS GRAFT         History reviewed. No pertinent family history.    Social History     Tobacco Use   • Smoking status: Former Smoker     Types: Cigarettes     Quit date: 1986     Years since quittin.4   • Smokeless tobacco: Never Used   Substance Use Topics   • Alcohol use: No   • Drug use: No        OBJECTIVE:    Vital Signs:   Vitals:    21 1004   BP: 142/90   Pulse: 87   Temp: 98.7 °F (37.1 °C)   SpO2: 98%   Weight: 78 kg (172 lb)   Height: 180.3 cm (71\")       Physical Exam:   General Appearance:    Alert, cooperative, in no acute distress   Head:    Normocephalic, without obvious abnormality, atraumatic   Eyes:            Normal.  No scleral icterus.  PERRLA    Lungs:     Clear to auscultation,respirations regular, even and                  unlabored    Heart:    Regular rhythm and normal rate, normal S1 and S2, no            murmur   Abdomen:     Normal bowel sounds, no masses, no organomegaly, soft        non-tender, non-distended, no guarding,    Extremities:   Moves all extremities well, no edema, no cyanosis, no             redness   Skin:   No bleeding, bruising or rash   Neurologic:   Normal without gross deficits.   Psychiatric: No evidence of depression or anxiety        Results Review:   None    Review of Systems was reviewed and confirmed as accurate as documented by the MA.    ASSESSMENT/PLAN:    1. Stage 5 chronic kidney disease not on chronic dialysis (CMS/HCC)        Patient with renal failure now worsening and requiring dialysis.  He wishes to proceed with peritoneal dialysis.  I explained to him the procedure as well as the risks of bleeding, infection as well as intestinal injury.  He also understand that these catheters may require repositioning if they are not draining well.  He wishes to proceed.      Electronically signed by Fadi Garner MD  21          "

## 2021-01-08 NOTE — PROGRESS NOTES
Patient: Erlin Savage    YOB: 1950    Date: 01/08/2021    Primary Care Provider: Dinorah Beasley APRN    Chief Complaint   Patient presents with   • Follow-up     eval peritoneal cath       SUBJECTIVE:    History of present illness: Patient with a 4 to 5-year history of onset of renal failure.  This has been worsening since then and now it is severe and close to requiring dialysis.  Apparently his diabetes and hypertension have worsened his renal failure during that time.  He has had associated swelling.  The following portions of the patient's history were reviewed and updated as appropriate: allergies, current medications, past family history, past medical history, past social history, past surgical history and problem list.      Review of Systems   Constitutional: Positive for fatigue. Negative for chills, fever and unexpected weight change.   HENT: Negative for trouble swallowing and voice change.    Eyes: Negative for visual disturbance.   Respiratory: Negative for apnea, cough, chest tightness, shortness of breath and wheezing.    Cardiovascular: Negative for chest pain, palpitations and leg swelling.   Gastrointestinal: Negative for abdominal distention, abdominal pain, anal bleeding, blood in stool, constipation, diarrhea, nausea, rectal pain and vomiting.   Endocrine: Negative for cold intolerance and heat intolerance.   Genitourinary: Negative for difficulty urinating, dysuria, flank pain and hematuria.   Musculoskeletal: Negative for back pain, gait problem and joint swelling.   Skin: Negative for color change, rash and wound.   Neurological: Negative for dizziness, syncope, speech difficulty, weakness, numbness and headaches.   Hematological: Negative for adenopathy. Does not bruise/bleed easily.   Psychiatric/Behavioral: Negative for confusion. The patient is not nervous/anxious.        Allergies:  Allergies   Allergen Reactions   • Coreg [Carvedilol] Itching   • Crestor [Rosuvastatin  Calcium]      Itching rash   • Lipitor [Atorvastatin]      And Crestor- generalized weakness and chest tightness   • Lisinopril      Cough /weakness, nauseas and dirrhea   • Livalo [Pitavastatin]      Chest tightness   • Morphine And Related    • Pravastatin      Chest , neck and arm discomfort   • Questran [Cholestyramine]    • Augmentin [Amoxicillin-Pot Clavulanate] Palpitations   • Biaxin [Clarithromycin] Palpitations       Medications:    Current Outpatient Medications:   •  amLODIPine (NORVASC) 5 MG tablet, Take 5 mg by mouth 2 (Two) Times a Day., Disp: , Rfl:   •  aspirin 81 MG tablet, Take 81 mg by mouth daily., Disp: , Rfl:   •  clonazePAM (KlonoPIN) 0.5 MG tablet, Take 0.5 mg by mouth As Needed., Disp: , Rfl:   •  hydrALAZINE (APRESOLINE) 50 MG tablet, Take 1 tablet by mouth 3 (Three) Times a Day., Disp: 90 tablet, Rfl: 3  •  insulin glargine (LANTUS) 100 UNIT/ML injection, Inject  under the skin daily., Disp: , Rfl:   •  insulin lispro (HumaLOG) 100 UNIT/ML injection, Inject  under the skin 3 (three) times a day before meals., Disp: , Rfl:   •  losartan (COZAAR) 100 MG tablet, Take 100 mg by mouth 2 (two) times a day., Disp: , Rfl:   •  nitroglycerin (NITROSTAT) 0.4 MG SL tablet, Place 0.4 mg under the tongue every 5 (five) minutes as needed for chest pain. Take no more than 3 doses in 15 minutes., Disp: , Rfl:     History:  Past Medical History:   Diagnosis Date   • Anxiety    • Anxiety disorder    • Back pain    • Chronic back pain     work related injury   • CKD (chronic kidney disease)     most recent creatinine 2.7 on 01/26/16   • Coronary artery disease    • Diabetes mellitus (CMS/[facility])     insulin dependent diabetes Onset 2010   • Dyslipidemia    • Hyperlipidemia    • Hypertension    • Ischemic heart disease    • Renal failure    • Stroke (CMS/HCC)     TIA / transient right vision loss age 42   • TIA (transient ischemic attack)    • Vertigo        Past Surgical History:   Procedure Laterality Date  "  • CARDIAC CATHETERIZATION     • CHOLECYSTECTOMY  2012   • CORONARY ARTERY BYPASS GRAFT         History reviewed. No pertinent family history.    Social History     Tobacco Use   • Smoking status: Former Smoker     Types: Cigarettes     Quit date: 1986     Years since quittin.4   • Smokeless tobacco: Never Used   Substance Use Topics   • Alcohol use: No   • Drug use: No        OBJECTIVE:    Vital Signs:   Vitals:    21 1004   BP: 142/90   Pulse: 87   Temp: 98.7 °F (37.1 °C)   SpO2: 98%   Weight: 78 kg (172 lb)   Height: 180.3 cm (71\")       Physical Exam:   General Appearance:    Alert, cooperative, in no acute distress   Head:    Normocephalic, without obvious abnormality, atraumatic   Eyes:            Normal.  No scleral icterus.  PERRLA    Lungs:     Clear to auscultation,respirations regular, even and                  unlabored    Heart:    Regular rhythm and normal rate, normal S1 and S2, no            murmur   Abdomen:     Normal bowel sounds, no masses, no organomegaly, soft        non-tender, non-distended, no guarding,    Extremities:   Moves all extremities well, no edema, no cyanosis, no             redness   Skin:   No bleeding, bruising or rash   Neurologic:   Normal without gross deficits.   Psychiatric: No evidence of depression or anxiety        Results Review:   None    Review of Systems was reviewed and confirmed as accurate as documented by the MA.    ASSESSMENT/PLAN:    1. Stage 5 chronic kidney disease not on chronic dialysis (CMS/HCC)        Patient with renal failure now worsening and requiring dialysis.  He wishes to proceed with peritoneal dialysis.  I explained to him the procedure as well as the risks of bleeding, infection as well as intestinal injury.  He also understand that these catheters may require repositioning if they are not draining well.  He wishes to proceed.      Electronically signed by Fadi Garner MD  21          "

## 2021-01-09 LAB — SARS-COV-2 RNA RESP QL NAA+PROBE: NOT DETECTED

## 2021-01-11 RX ORDER — MULTIPLE VITAMINS W/ MINERALS TAB 9MG-400MCG
1 TAB ORAL DAILY
COMMUNITY

## 2021-01-11 RX ORDER — MELATONIN
1000 DAILY
COMMUNITY

## 2021-01-11 RX ORDER — FERROUS SULFATE TAB EC 324 MG (65 MG FE EQUIVALENT) 324 (65 FE) MG
324 TABLET DELAYED RESPONSE ORAL
COMMUNITY

## 2021-01-11 RX ORDER — ASCORBIC ACID 500 MG
500 TABLET ORAL DAILY
COMMUNITY

## 2021-01-11 NOTE — PRE-PROCEDURE INSTRUCTIONS
PAT phone history completed with pt for upcoming procedure on 1/12/21, with Dr Garner.     PAT PASS GIVEN/REVIEWED WITH PT.  VERBALIZED UNDERSTANDING OF THE FOLLOWING:  DO NOT EAT, DRINK, SMOKE, USE SMOKELESS TOBACCO OR CHEW GUM AFTER MIDNIGHT THE NIGHT BEFORE SURGERY.  THIS ALSO INCLUDES HARD CANDIES AND MINTS.    DO NOT SHAVE THE AREA TO BE OPERATED ON AT LEAST 48 HOURS PRIOR TO THE PROCEDURE.  DO NOT WEAR MAKE UP OR NAIL POLISH.  DO NOT LEAVE IN ANY PIERCING OR WEAR JEWELRY THE DAY OF SURGERY.      DO NOT USE ADHESIVES IF YOU WEAR DENTURES.    DO NOT WEAR EYE CONTACTS; BRING IN YOUR GLASSES.    ONLY TAKE MEDICATION THE MORNING OF YOUR PROCEDURE IF INSTRUCTED BY YOUR SURGEON WITH ENOUGH WATER TO SWALLOW THE MEDICATION.  IF YOUR SURGEON DID NOT SPECIFY WHICH MEDICATIONS TO TAKE, YOU WILL NEED TO CALL THEIR OFFICE FOR FURTHER INSTRUCTIONS AND DO AS THEY INSTRUCT.    LEAVE ANYTHING YOU CONSIDER VALUABLE AT HOME.    YOU WILL NEED TO ARRANGE FOR SOMEONE TO DRIVE YOU HOME AFTER SURGERY.  IT IS RECOMMENDED THAT YOU DO NOT DRIVE, WORK, DRINK ALCOHOL OR MAKE MAJOR DECISIONS FOR AT LEAST 24 HOURS AFTER YOUR PROCEDURE IS COMPLETE.      THE DAY OF YOUR PROCEDURE, BRING IN THE FOLLOWING IF APPLICABLE:   PICTURE ID AND INSURANCE/MEDICARE OR MEDICAID CARDS/ANY CO-PAY THAT MAY BE DUE   COPY OF ADVANCED DIRECTIVE/LIVING WILL/POWER OR    CPAP/BIPAP/INHALERS   SKIN PREP SHEET   YOUR PREADMISSION TESTING PASS (IF NOT A PHONE HISTORY)           COVID self-quarantine instructions reviewed with the pt.  Verbalized understanding.

## 2021-01-11 NOTE — PAT
Phone history obtained with patient for upcoming procedure wit Dr rich for 1/12/21. During interview, patient reported Coronary artery disease, with 3 vessel  Bypass in 2012. Patient reports no chest pain for years, Dr. Celeste in [place] is his Cardiologist, but has not seen cardiologist in over a year.     Review of medical record revealed last cardiologist visit with a [facility] provider was 2016, at that time his cardiac status was stable.    RN consulted anesthesia services and spoke with Julio Ellison CRNA. RN discussed above noted information and scheduled procedure, CRNA verbalized patient was o for procedure.

## 2021-01-12 ENCOUNTER — ANESTHESIA EVENT (OUTPATIENT)
Dept: PERIOP | Facility: HOSPITAL | Age: 71
End: 2021-01-12

## 2021-01-12 ENCOUNTER — HOSPITAL ENCOUNTER (OUTPATIENT)
Facility: HOSPITAL | Age: 71
Setting detail: HOSPITAL OUTPATIENT SURGERY
Discharge: HOME | End: 2021-01-12
Attending: SURGERY | Admitting: SURGERY

## 2021-01-12 ENCOUNTER — ANESTHESIA (OUTPATIENT)
Dept: PERIOP | Facility: HOSPITAL | Age: 71
End: 2021-01-12

## 2021-01-12 VITALS
OXYGEN SATURATION: 96 % | SYSTOLIC BLOOD PRESSURE: 115 MMHG | HEART RATE: 74 BPM | WEIGHT: 178 LBS | TEMPERATURE: 97 F | DIASTOLIC BLOOD PRESSURE: 59 MMHG | RESPIRATION RATE: 16 BRPM | HEIGHT: 71 IN | BODY MASS INDEX: 24.92 KG/M2

## 2021-01-12 DIAGNOSIS — N18.5 STAGE 5 CHRONIC KIDNEY DISEASE NOT ON CHRONIC DIALYSIS (HCC): ICD-10-CM

## 2021-01-12 LAB — GLUCOSE BLDC GLUCOMTR-MCNC: 163 MG/DL (ref 70–130)

## 2021-01-12 PROCEDURE — 25010000002 FENTANYL CITRATE (PF) 100 MCG/2ML SOLUTION: Performed by: NURSE ANESTHETIST, CERTIFIED REGISTERED

## 2021-01-12 PROCEDURE — 25010000003 CEFAZOLIN SODIUM-DEXTROSE 2-3 GM-%(50ML) RECONSTITUTED SOLUTION: Performed by: SURGERY

## 2021-01-12 PROCEDURE — 25010000002 MIDAZOLAM PER 1MG: Performed by: NURSE ANESTHETIST, CERTIFIED REGISTERED

## 2021-01-12 PROCEDURE — 49324 LAP INSERT TUNNEL IP CATH: CPT | Performed by: SURGERY

## 2021-01-12 PROCEDURE — C1750 CATH, HEMODIALYSIS,LONG-TERM: HCPCS | Performed by: SURGERY

## 2021-01-12 PROCEDURE — 82962 GLUCOSE BLOOD TEST: CPT

## 2021-01-12 PROCEDURE — 25010000002 ONDANSETRON PER 1 MG: Performed by: NURSE ANESTHETIST, CERTIFIED REGISTERED

## 2021-01-12 PROCEDURE — 25010000002 DEXAMETHASONE PER 1 MG: Performed by: NURSE ANESTHETIST, CERTIFIED REGISTERED

## 2021-01-12 PROCEDURE — 25010000002 PROPOFOL 10 MG/ML EMULSION: Performed by: NURSE ANESTHETIST, CERTIFIED REGISTERED

## 2021-01-12 PROCEDURE — 25010000003 LIDOCAINE 1 % SOLUTION: Performed by: SURGERY

## 2021-01-12 DEVICE — KT CATH DIAL PERITONEAL CURL 2CUFF 62CM: Type: IMPLANTABLE DEVICE | Site: ABDOMEN | Status: FUNCTIONAL

## 2021-01-12 RX ORDER — PROPOFOL 10 MG/ML
VIAL (ML) INTRAVENOUS AS NEEDED
Status: DISCONTINUED | OUTPATIENT
Start: 2021-01-12 | End: 2021-01-12 | Stop reason: SURG

## 2021-01-12 RX ORDER — FENTANYL CITRATE 50 UG/ML
INJECTION, SOLUTION INTRAMUSCULAR; INTRAVENOUS AS NEEDED
Status: DISCONTINUED | OUTPATIENT
Start: 2021-01-12 | End: 2021-01-12 | Stop reason: SURG

## 2021-01-12 RX ORDER — ONDANSETRON 2 MG/ML
INJECTION INTRAMUSCULAR; INTRAVENOUS AS NEEDED
Status: DISCONTINUED | OUTPATIENT
Start: 2021-01-12 | End: 2021-01-12 | Stop reason: SURG

## 2021-01-12 RX ORDER — LIDOCAINE HYDROCHLORIDE 20 MG/ML
INJECTION, SOLUTION INTRAVENOUS AS NEEDED
Status: DISCONTINUED | OUTPATIENT
Start: 2021-01-12 | End: 2021-01-12 | Stop reason: SURG

## 2021-01-12 RX ORDER — SODIUM CHLORIDE 0.9 % (FLUSH) 0.9 %
10 SYRINGE (ML) INJECTION AS NEEDED
Status: DISCONTINUED | OUTPATIENT
Start: 2021-01-12 | End: 2021-01-12 | Stop reason: HOSPADM

## 2021-01-12 RX ORDER — SODIUM CHLORIDE 9 MG/ML
500 INJECTION, SOLUTION INTRAVENOUS CONTINUOUS
Status: DISCONTINUED | OUTPATIENT
Start: 2021-01-12 | End: 2021-01-12 | Stop reason: HOSPADM

## 2021-01-12 RX ORDER — BUPIVACAINE HYDROCHLORIDE 5 MG/ML
INJECTION, SOLUTION EPIDURAL; INTRACAUDAL AS NEEDED
Status: DISCONTINUED | OUTPATIENT
Start: 2021-01-12 | End: 2021-01-12 | Stop reason: HOSPADM

## 2021-01-12 RX ORDER — HYDROCODONE BITARTRATE AND ACETAMINOPHEN 5; 325 MG/1; MG/1
2 TABLET ORAL EVERY 4 HOURS PRN
Qty: 8 TABLET | Refills: 0 | Status: SHIPPED | OUTPATIENT
Start: 2021-01-12

## 2021-01-12 RX ORDER — DEXAMETHASONE SODIUM PHOSPHATE 4 MG/ML
INJECTION, SOLUTION INTRA-ARTICULAR; INTRALESIONAL; INTRAMUSCULAR; INTRAVENOUS; SOFT TISSUE AS NEEDED
Status: DISCONTINUED | OUTPATIENT
Start: 2021-01-12 | End: 2021-01-12 | Stop reason: SURG

## 2021-01-12 RX ORDER — LIDOCAINE HYDROCHLORIDE 10 MG/ML
INJECTION, SOLUTION INFILTRATION; PERINEURAL AS NEEDED
Status: DISCONTINUED | OUTPATIENT
Start: 2021-01-12 | End: 2021-01-12 | Stop reason: HOSPADM

## 2021-01-12 RX ORDER — CEFAZOLIN SODIUM 2 G/50ML
2 SOLUTION INTRAVENOUS ONCE
Status: COMPLETED | OUTPATIENT
Start: 2021-01-12 | End: 2021-01-12

## 2021-01-12 RX ORDER — KETAMINE HCL IN NACL, ISO-OSM 100MG/10ML
SYRINGE (ML) INJECTION AS NEEDED
Status: DISCONTINUED | OUTPATIENT
Start: 2021-01-12 | End: 2021-01-12 | Stop reason: SURG

## 2021-01-12 RX ORDER — MIDAZOLAM HYDROCHLORIDE 2 MG/2ML
INJECTION, SOLUTION INTRAMUSCULAR; INTRAVENOUS AS NEEDED
Status: DISCONTINUED | OUTPATIENT
Start: 2021-01-12 | End: 2021-01-12 | Stop reason: SURG

## 2021-01-12 RX ADMIN — LIDOCAINE HYDROCHLORIDE 60 MG: 20 INJECTION, SOLUTION INTRAVENOUS at 13:34

## 2021-01-12 RX ADMIN — SODIUM CHLORIDE 500 ML: 9 INJECTION, SOLUTION INTRAVENOUS at 12:00

## 2021-01-12 RX ADMIN — FENTANYL CITRATE 50 MCG: 50 INJECTION INTRAMUSCULAR; INTRAVENOUS at 13:30

## 2021-01-12 RX ADMIN — PROPOFOL 100 MCG/KG/MIN: 10 INJECTION, EMULSION INTRAVENOUS at 13:34

## 2021-01-12 RX ADMIN — MIDAZOLAM HYDROCHLORIDE 2 MG: 1 INJECTION, SOLUTION INTRAMUSCULAR; INTRAVENOUS at 13:30

## 2021-01-12 RX ADMIN — CEFAZOLIN SODIUM 2 G: 2 SOLUTION INTRAVENOUS at 13:35

## 2021-01-12 RX ADMIN — DEXAMETHASONE SODIUM PHOSPHATE 4 MG: 4 INJECTION, SOLUTION INTRAMUSCULAR; INTRAVENOUS at 13:37

## 2021-01-12 RX ADMIN — Medication 10 MG: at 13:44

## 2021-01-12 RX ADMIN — Medication 20 MG: at 13:30

## 2021-01-12 RX ADMIN — PROPOFOL 50 MG: 10 INJECTION, EMULSION INTRAVENOUS at 13:44

## 2021-01-12 RX ADMIN — ONDANSETRON 4 MG: 2 INJECTION INTRAMUSCULAR; INTRAVENOUS at 13:37

## 2021-01-12 RX ADMIN — FENTANYL CITRATE 50 MCG: 50 INJECTION INTRAMUSCULAR; INTRAVENOUS at 13:44

## 2021-01-12 NOTE — DISCHARGE INSTRUCTIONS
Please follow all post op instructions and follow up appointment time from your physician's office included in your discharge packet.    REST TODAY    No pushing, pulling, tugging,  heavy lifting, or strenuous activity.  No major decision making, driving, or drinking alcoholic beverages for 24 hours. ( due to the medications you have  received)  Always use good hand hygiene/washing techniques.  NO driving while taking pain medications.    * if you have an incision:  Check your incision area every day for signs of infection.   Check for:  * more redness, swelling, or pain  *more fluid or blood  *warmth  *pus or bad smell    To assist you in voiding:  Drink plenty of fluids  Listen to running water while attempting to void.    If you are unable to urinate and you have an uncomfortable urge to void or it has been   6 hours since you were discharged, return to the Emergency Room

## 2021-01-12 NOTE — ANESTHESIA PREPROCEDURE EVALUATION
Anesthesia Evaluation     Patient summary reviewed and Nursing notes reviewed   no history of anesthetic complications:  NPO Solid Status: > 8 hours  NPO Liquid Status: > 8 hours           Airway   Mallampati: II  TM distance: >3 FB  Neck ROM: full  Dental - normal exam     Pulmonary - normal exam   (+) a smoker (quit in 1986) Former,   Cardiovascular - normal exam    (+) hypertension well controlled 2 medications or greater, CAD, CABG, hyperlipidemia,       Neuro/Psych  (+) TIA, dizziness/light headedness (vertigo ), psychiatric history Anxiety,     GI/Hepatic/Renal/Endo    (+)   renal disease ESRD, diabetes mellitus type 2 well controlled using insulin,     Musculoskeletal     (+) back pain,   Abdominal    Substance History      OB/GYN          Other - negative ROS                       Anesthesia Plan    ASA 4     MAC   (Risks and benefits discussed including risk of aspiration, recall and dental damage. All patient questions answered. Will continue with POC.)  intravenous induction     Anesthetic plan, all risks, benefits, and alternatives have been provided, discussed and informed consent has been obtained with: patient.    Plan discussed with CRNA.

## 2021-01-12 NOTE — OP NOTE
.PATIENT:    Erlin Savage    DATE OF SURGERY:  1/12/2021    PHYSICIAN:    Fadi Garner MD    REFERRING PHYSICIAN:  Fadi Garner MD    YOB: 1950    PREOPERATIVE DIAGNOSIS:  Renal failure    POSTOPERATIVE DIAGNOSIS:  Renal failure    PROCEDURE:  Peritoneal dialysis catheter placement laparoscopically    EBL: Less than 30 mL    Specimen: None    OPERATIVE PROCEDURE:  The patient was taken to the operating room in the normal manner.  An appropriate timeout was performed by the nursing staff intraoperatively prior to the incision.  Preoperative IV antibiotics were given.  The patient was prepped and draped in a normal sterile fashion after being placed in the supine position.     Infraumbilical incision was made.  5 mm Optiview was placed in the peritoneal cavity.  Through this guidewire is placed and over this introducer sheath was advanced.  Through this the catheter was then advanced into the pelvis.  I reintroduced the trocar and the catheter was  laying nicely on top of the small bowel.  First cuff was then anchored in place to the fascia.  Ports were removed and the catheter was tunneled to the port site.  The distal cuff was 2 cm proximal to this exit site.  The catheter flushed nicely.  The wound was closed with interrupted deep dermal Vicryl closure and the skin was closed with a running subcuticular PDS closure.  Dressing was applied.  There was scant blood loss throughout the case and no evidence of bleeding at conclusion.  Patient tolerated the procedure well and was transferred in stable condition to recovery.          Fadi Garner MD  1/12/2021  14:10 EST

## 2021-01-12 NOTE — ANESTHESIA POSTPROCEDURE EVALUATION
Patient: Erlin Savage    Procedure Summary     Date: 01/12/21 Room / Location:  [facility]    Anesthesia Start: 1325 Anesthesia Stop: 1418    Procedure: LAPAROSCOPIC  PERITONEAL DIALYSIS CATHETER PLACEMENT (N/A ) Diagnosis:       Stage 5 chronic kidney disease not on chronic dialysis (CMS/HCC)      (Stage 5 chronic kidney disease not on chronic dialysis (CMS/HCC) [N18.5])    Surgeon: Fadi Garner MD Provider: Argelia Amaro CRNA    Anesthesia Type: MAC ASA Status: 4          Anesthesia Type: MAC    Vitals  Vitals Value Taken Time   BP 96/47 01/12/21 1417   Temp 97 °F (36.1 °C) 01/12/21 1417   Pulse 61 01/12/21 1417   Resp 16 01/12/21 1417   SpO2 92 % 01/12/21 1417           Post Anesthesia Care and Evaluation    Patient location during evaluation: PHASE II  Patient participation: complete - patient participated  Level of consciousness: awake and alert  Pain score: 0  Pain management: satisfactory to patient  Airway patency: patent  Anesthetic complications: No anesthetic complications  PONV Status: none  Cardiovascular status: acceptable and stable  Respiratory status: acceptable  Hydration status: acceptable

## 2021-01-14 ENCOUNTER — LAB (OUTPATIENT)
Dept: LAB | Facility: HOSPITAL | Age: 71
End: 2021-01-14

## 2021-01-14 DIAGNOSIS — Z00.00 ROUTINE GENERAL MEDICAL EXAMINATION AT A HEALTH CARE FACILITY: ICD-10-CM

## 2021-01-14 DIAGNOSIS — N18.5 CHRONIC KIDNEY DISEASE, STAGE V (HCC): Primary | ICD-10-CM

## 2021-01-14 LAB
HBV CORE IGM SERPL QL IA: NORMAL
HBV SURFACE AB SER RIA-ACNC: NORMAL
HBV SURFACE AG SERPL QL IA: NORMAL

## 2021-01-14 PROCEDURE — 86705 HEP B CORE ANTIBODY IGM: CPT

## 2021-01-14 PROCEDURE — 86706 HEP B SURFACE ANTIBODY: CPT

## 2021-01-14 PROCEDURE — 87340 HEPATITIS B SURFACE AG IA: CPT

## 2021-01-14 PROCEDURE — 36415 COLL VENOUS BLD VENIPUNCTURE: CPT

## 2023-11-06 ENCOUNTER — TELEPHONE (OUTPATIENT)
Dept: FAMILY MEDICINE CLINIC | Facility: CLINIC | Age: 73
End: 2023-11-06

## 2023-11-06 ENCOUNTER — OFFICE VISIT (OUTPATIENT)
Dept: FAMILY MEDICINE CLINIC | Facility: CLINIC | Age: 73
End: 2023-11-06
Payer: MEDICARE

## 2023-11-06 VITALS
OXYGEN SATURATION: 98 % | SYSTOLIC BLOOD PRESSURE: 138 MMHG | HEIGHT: 71 IN | RESPIRATION RATE: 24 BRPM | HEART RATE: 88 BPM | BODY MASS INDEX: 24.56 KG/M2 | WEIGHT: 175.4 LBS | TEMPERATURE: 97.7 F | DIASTOLIC BLOOD PRESSURE: 80 MMHG

## 2023-11-06 DIAGNOSIS — D50.8 OTHER IRON DEFICIENCY ANEMIA: ICD-10-CM

## 2023-11-06 DIAGNOSIS — M79.2 NEURALGIA: Primary | ICD-10-CM

## 2023-11-06 DIAGNOSIS — Z99.2 TYPE 2 DIABETES MELLITUS WITH CHRONIC KIDNEY DISEASE ON CHRONIC DIALYSIS, WITH LONG-TERM CURRENT USE OF INSULIN: ICD-10-CM

## 2023-11-06 DIAGNOSIS — N18.6 TYPE 2 DIABETES MELLITUS WITH CHRONIC KIDNEY DISEASE ON CHRONIC DIALYSIS, WITH LONG-TERM CURRENT USE OF INSULIN: ICD-10-CM

## 2023-11-06 DIAGNOSIS — N18.6 STAGE 5 CHRONIC KIDNEY DISEASE ON CHRONIC DIALYSIS: ICD-10-CM

## 2023-11-06 DIAGNOSIS — Z79.4 TYPE 2 DIABETES MELLITUS WITH CHRONIC KIDNEY DISEASE ON CHRONIC DIALYSIS, WITH LONG-TERM CURRENT USE OF INSULIN: ICD-10-CM

## 2023-11-06 DIAGNOSIS — E11.22 TYPE 2 DIABETES MELLITUS WITH CHRONIC KIDNEY DISEASE ON CHRONIC DIALYSIS, WITH LONG-TERM CURRENT USE OF INSULIN: ICD-10-CM

## 2023-11-06 DIAGNOSIS — Z99.2 STAGE 5 CHRONIC KIDNEY DISEASE ON CHRONIC DIALYSIS: ICD-10-CM

## 2023-11-06 PROCEDURE — 3075F SYST BP GE 130 - 139MM HG: CPT | Performed by: FAMILY MEDICINE

## 2023-11-06 PROCEDURE — 99204 OFFICE O/P NEW MOD 45 MIN: CPT | Performed by: FAMILY MEDICINE

## 2023-11-06 PROCEDURE — 3079F DIAST BP 80-89 MM HG: CPT | Performed by: FAMILY MEDICINE

## 2023-11-06 RX ORDER — OXYCODONE HYDROCHLORIDE 5 MG/1
5 TABLET ORAL EVERY 6 HOURS PRN
COMMUNITY

## 2023-11-06 RX ORDER — ASPIRIN 81 MG/1
81 TABLET ORAL DAILY
COMMUNITY

## 2023-11-06 RX ORDER — TRIAMCINOLONE ACETONIDE 1 MG/G
CREAM TOPICAL
COMMUNITY
Start: 2023-10-31

## 2023-11-06 RX ORDER — ROSUVASTATIN CALCIUM 20 MG/1
1 TABLET, COATED ORAL EVERY OTHER DAY
COMMUNITY
Start: 2023-09-06

## 2023-11-06 RX ORDER — BUDESONIDE AND FORMOTEROL FUMARATE DIHYDRATE 160; 4.5 UG/1; UG/1
2 AEROSOL RESPIRATORY (INHALATION)
COMMUNITY

## 2023-11-06 RX ORDER — SEVELAMER CARBONATE 800 MG/1
800 TABLET, FILM COATED ORAL
COMMUNITY
Start: 2023-11-03 | End: 2024-11-02

## 2023-11-06 RX ORDER — ALBUTEROL SULFATE 90 UG/1
2 AEROSOL, METERED RESPIRATORY (INHALATION) 4 TIMES DAILY
COMMUNITY
Start: 2023-09-06

## 2023-11-06 RX ORDER — GABAPENTIN 100 MG/1
100 CAPSULE ORAL DAILY
Qty: 30 CAPSULE | Refills: 0 | Status: SHIPPED | OUTPATIENT
Start: 2023-11-06 | End: 2023-11-07

## 2023-11-06 RX ORDER — LOSARTAN POTASSIUM 100 MG/1
100 TABLET ORAL DAILY
COMMUNITY
Start: 2023-05-22

## 2023-11-06 RX ORDER — SODIUM BICARBONATE 650 MG/1
1300 TABLET ORAL 3 TIMES DAILY
COMMUNITY
Start: 2023-05-22

## 2023-11-06 RX ORDER — AMOXICILLIN AND CLAVULANATE POTASSIUM 875; 125 MG/1; MG/1
1 TABLET, FILM COATED ORAL EVERY 12 HOURS SCHEDULED
COMMUNITY
Start: 2023-10-26

## 2023-11-06 RX ORDER — PANTOPRAZOLE SODIUM 40 MG/1
40 TABLET, DELAYED RELEASE ORAL DAILY
COMMUNITY

## 2023-11-06 RX ORDER — TORSEMIDE 100 MG/1
100 TABLET ORAL DAILY
COMMUNITY

## 2023-11-06 RX ORDER — CALCITRIOL 0.5 UG/1
0.5 CAPSULE, LIQUID FILLED ORAL DAILY
COMMUNITY

## 2023-11-06 RX ORDER — CLONAZEPAM 0.5 MG/1
1 TABLET ORAL DAILY
COMMUNITY
Start: 2023-02-16

## 2023-11-06 RX ORDER — FOLIC ACID/VIT B COMPLEX AND C 0.8 MG
1 TABLET ORAL DAILY
COMMUNITY

## 2023-11-06 RX ORDER — RANOLAZINE 500 MG/1
500 TABLET, EXTENDED RELEASE ORAL DAILY
COMMUNITY
Start: 2023-10-30

## 2023-11-06 NOTE — TELEPHONE ENCOUNTER
Caller: [facility]    Relationship: Home Health    Best call back number: [phone]     What was the call regarding: SULEMAN FROM [facility] WOULD LIKE TO GET VERBAL ORDERS FOR START OF CARE FOR THE PATIENT.

## 2023-11-06 NOTE — PROGRESS NOTES
"Chief Complaint   Patient presents with    NP / establish PCP      Last seen by a Dinorah Beasley in [place].     Congestive Heart Failure     en    end stage renal disease     Back Pain     Low back pain for the past 2 months. (Discuss neuropathy) was told he may need to see a neurologist.        Subjective      Erlin Savage is a 73 y.o. who presents for pain in his lower back that he describes as burning, tingling, itching.  Pain is in the midline of the lower back beginning at L5 and radiates down into the rectum and anteriorly to the scrotum.  There is no rash.  Discomfort has been present for 5 months and patient was told he had neuralgia from shingles.  Patient has had 3 prior episodes of shingles so is aware of what the rash appears like.  He was previously placed on gabapentin 300 mg 3 times daily which caused delirium and sedation and his wife ultimately chose to stop this.  He has been using a low-dose oxycodone 5 mg tablet which is cut into quarters at night periodically.    Patient has had multiple hospitalizations in the last 2 weeks first for a non-STEMI and second for hyperkalemia requiring additional peritoneal dialysis.  Patient plans on establishing care with Dr. Novoa.  Additional specialists are listed below.    Nephrologist--Dr. Novoa  Pulmonologist--Dr. Moya  Cardiologist--Dr. Celeste  Dermatolimilena--DR. Harmon    In addition to the above patient has a diagnosis of diabetes.  He admits to an affinity for sweets.  He takes his medicines as prescribed.  Last A1c was 7.3 in September of this year.      The following portions of the patient's history were reviewed and updated as appropriate: allergies, current medications, past family history, past medical history, past social history, past surgical history, and problem list.    Review of Systems    Objective   Vital Signs:  /80   Pulse 88   Temp 97.7 °F (36.5 °C)   Resp 24   Ht 180.3 cm (71\")   Wt 79.6 kg (175 lb 6.4 oz)   SpO2 98% " Comment: on 3L/min  BMI 24.46 kg/m²     BMI is within normal parameters. No other follow-up for BMI required.        Physical Exam  Vitals reviewed.   Constitutional:       Appearance: Normal appearance.   Cardiovascular:      Rate and Rhythm: Normal rate and regular rhythm.      Pulses: Normal pulses.      Heart sounds: Normal heart sounds.   Pulmonary:      Effort: Pulmonary effort is normal.      Breath sounds: Normal breath sounds.   Skin:     General: Skin is warm and dry.      Findings: No bruising, erythema or rash.      Comments: Patient has exquisite sensitivity to light touch across the lumbosacral area in the midline and at the top of the gluteal cleft.  There is no rash or dry skin   Neurological:      Mental Status: He is alert.          Result Review                     Assessment and Plan  Diagnoses and all orders for this visit:    1. Neuralgia (Primary)  -     gabapentin (NEURONTIN) 100 MG capsule; Take 1 capsule by mouth Daily.  Dispense: 30 capsule; Refill: 0    2. Stage 5 chronic kidney disease on chronic dialysis  Overview:  Added automatically from request for surgery [id number]      3. Type 2 diabetes mellitus with chronic kidney disease on chronic dialysis, with long-term current use of insulin  Overview:  insulin dependent diabetes Onset 2010      4. Other iron deficiency anemia    Plan  1.  Patient will start gabapentin 100 mg daily.  Wife will monitor closely for recurrence of side effects.  If this is ineffective we will transition to other options which may include TCAs, Lidoderm patches, duloxetine, tramadol or a combination of these.  Plan was explained to the patient.  Patient's stage V kidney disease does complicate dosing of neuromodulating agents  2.  Diabetes is at reasonable control considering his age and comorbidities.  We will reassess A1c at follow-up  3.  Patient will be establishing care with Dr. Noova.  We will perform iron studies at follow-up if needed    I spent 55  minutes caring for Erlin on this date of service. This time includes time spent by me in the following activities:preparing for the visit, reviewing tests, performing a medically appropriate examination and/or evaluation , counseling and educating the patient/family/caregiver, ordering medications, tests, or procedures, and documenting information in the medical record    Follow Up  Return in about 4 weeks (around 12/4/2023) for Next scheduled follow up for Neuralgia.  Patient was given instructions and counseling regarding his condition or for health maintenance advice. Please see specific information pulled into the AVS if appropriate.

## 2023-11-07 ENCOUNTER — TELEPHONE (OUTPATIENT)
Dept: FAMILY MEDICINE CLINIC | Facility: CLINIC | Age: 73
End: 2023-11-07
Payer: MEDICARE

## 2023-11-07 DIAGNOSIS — M79.2 NEURALGIA: Primary | ICD-10-CM

## 2023-11-07 DIAGNOSIS — M79.2 NEURALGIA: ICD-10-CM

## 2023-11-07 RX ORDER — NORTRIPTYLINE HYDROCHLORIDE 10 MG/1
10 CAPSULE ORAL NIGHTLY
Qty: 30 CAPSULE | Refills: 0 | Status: SHIPPED | OUTPATIENT
Start: 2023-11-07 | End: 2023-11-07 | Stop reason: SDUPTHER

## 2023-11-07 RX ORDER — NORTRIPTYLINE HYDROCHLORIDE 10 MG/1
10 CAPSULE ORAL NIGHTLY
Qty: 30 CAPSULE | Refills: 0 | Status: SHIPPED | OUTPATIENT
Start: 2023-11-07

## 2023-11-07 NOTE — TELEPHONE ENCOUNTER
Medication was sent to [facility] in [place]. They need it sent to [facility] in [place].

## 2023-11-07 NOTE — TELEPHONE ENCOUNTER
Princess Cardona (Friend) called in regards to patient. Stated he was having trouble breathing last night and believes it is the gabapentin. Wants to know if Yoel suggests another medication or alternative. Please call him at [phone]

## 2023-11-13 ENCOUNTER — TELEPHONE (OUTPATIENT)
Dept: FAMILY MEDICINE CLINIC | Facility: CLINIC | Age: 73
End: 2023-11-13
Payer: MEDICARE

## 2023-11-13 NOTE — TELEPHONE ENCOUNTER
Caller: Erlin Savage    Relationship: Self    Best call back number     Which medication are you concerned about: nortriptyline (Pamelor) 10 MG capsule     Who prescribed you this medication:DR HART    When did you start taking this medication:  A WEEK AGO, MEDICATION FILLED ON 000059    What are your concerns: PATIENT WAS GIVEN THIS MEDICATION FOR ITCHING, HOWEVER IT IS CAUSING HIM SEVERE JOINT PAIN    PLEASE CALL PATIENT TO ADVISE    [facility] IS HIS [facility]REFERRED [facility]

## 2023-11-13 NOTE — TELEPHONE ENCOUNTER
I would like him to stop taking the medication for 5 days.  After 5 days without medicine he can restart the medication.  I am having him do this because joint pains is not a listed side effect of this medication.  If the medicine is causing his joint pains they should improve while he is off medicine and return or worsen when he restarts the medication.  If his joint pains return after restarting the medication we will move onto another medication

## 2023-11-14 ENCOUNTER — TELEPHONE (OUTPATIENT)
Dept: FAMILY MEDICINE CLINIC | Facility: CLINIC | Age: 73
End: 2023-11-14
Payer: MEDICARE

## 2023-11-14 DIAGNOSIS — N18.6 TYPE 2 DIABETES MELLITUS WITH CHRONIC KIDNEY DISEASE ON CHRONIC DIALYSIS, WITH LONG-TERM CURRENT USE OF INSULIN: Primary | ICD-10-CM

## 2023-11-14 DIAGNOSIS — Z99.2 TYPE 2 DIABETES MELLITUS WITH CHRONIC KIDNEY DISEASE ON CHRONIC DIALYSIS, WITH LONG-TERM CURRENT USE OF INSULIN: Primary | ICD-10-CM

## 2023-11-14 DIAGNOSIS — Z79.4 TYPE 2 DIABETES MELLITUS WITH CHRONIC KIDNEY DISEASE ON CHRONIC DIALYSIS, WITH LONG-TERM CURRENT USE OF INSULIN: Primary | ICD-10-CM

## 2023-11-14 DIAGNOSIS — E11.22 TYPE 2 DIABETES MELLITUS WITH CHRONIC KIDNEY DISEASE ON CHRONIC DIALYSIS, WITH LONG-TERM CURRENT USE OF INSULIN: Primary | ICD-10-CM

## 2023-11-14 NOTE — TELEPHONE ENCOUNTER
Caller: Erlin Savage    Relationship: Self    Best call back number: [phone]     Requested Prescriptions:   Requested Prescriptions      No prescriptions requested or ordered in this encounter    [facility] TEST STRIPS    Pharmacy where request should be sent: [facility] [address] [place] [address] [phone] [facility] [phone] FX     Last office visit with prescribing clinician: 11/6/2023   Last telemedicine visit with prescribing clinician: Visit date not found   Next office visit with prescribing clinician: 12/5/2023     Additional details provided by patient: PATIENT HAS REQUESTED TEST STRIPS THAT ARE NOT ON HIS MEDICATION LIST.    Does the patient have less than a 3 day supply:  [x] Yes  [] No    Would you like a call back once the refill request has been completed: [x] Yes [] No    If the office needs to give you a call back, can they leave a voicemail: [x] Yes [] No    Cleo Banks Rep   11/14/23 16:03 EST

## 2023-11-14 NOTE — TELEPHONE ENCOUNTER
Caller: DulcePrincess    Relationship: Emergency Contact    Best call back number: [phone]     What medication are you requesting: SLIDING SCALE INSULIN PEN     What are your current symptoms: DIABETES     How long have you been experiencing symptoms:     Have you had these symptoms before:    [x] Yes  [] No    Have you been treated for these symptoms before:   [x] Yes  [] No    If a prescription is needed, what is your preferred pharmacy and phone number: [address] [phone] [facility] [phone]      Additional notes: PRINCESS STATES WHILE BEING SEEN BY THE NEPHROLOGIST THE PROVIDER SUGGESTED THE PATIENT REACH OUT TO HIS PCP AND REQUEST A SLIDING SCALE INSULIN PEN.

## 2023-11-16 ENCOUNTER — OFFICE VISIT (OUTPATIENT)
Dept: FAMILY MEDICINE CLINIC | Facility: CLINIC | Age: 73
End: 2023-11-16
Payer: MEDICARE

## 2023-11-16 VITALS
BODY MASS INDEX: 24.64 KG/M2 | HEART RATE: 97 BPM | WEIGHT: 176 LBS | RESPIRATION RATE: 20 BRPM | HEIGHT: 71 IN | TEMPERATURE: 97.8 F | SYSTOLIC BLOOD PRESSURE: 120 MMHG | DIASTOLIC BLOOD PRESSURE: 72 MMHG | OXYGEN SATURATION: 98 %

## 2023-11-16 DIAGNOSIS — M79.2 NEURALGIA: ICD-10-CM

## 2023-11-16 DIAGNOSIS — R39.11 BENIGN PROSTATIC HYPERPLASIA WITH URINARY HESITANCY: ICD-10-CM

## 2023-11-16 DIAGNOSIS — E11.22 TYPE 2 DIABETES MELLITUS WITH CHRONIC KIDNEY DISEASE ON CHRONIC DIALYSIS, WITH LONG-TERM CURRENT USE OF INSULIN: Primary | ICD-10-CM

## 2023-11-16 DIAGNOSIS — Z99.2 TYPE 2 DIABETES MELLITUS WITH CHRONIC KIDNEY DISEASE ON CHRONIC DIALYSIS, WITH LONG-TERM CURRENT USE OF INSULIN: Primary | ICD-10-CM

## 2023-11-16 DIAGNOSIS — Z79.4 TYPE 2 DIABETES MELLITUS WITH CHRONIC KIDNEY DISEASE ON CHRONIC DIALYSIS, WITH LONG-TERM CURRENT USE OF INSULIN: Primary | ICD-10-CM

## 2023-11-16 DIAGNOSIS — N18.6 TYPE 2 DIABETES MELLITUS WITH CHRONIC KIDNEY DISEASE ON CHRONIC DIALYSIS, WITH LONG-TERM CURRENT USE OF INSULIN: Primary | ICD-10-CM

## 2023-11-16 DIAGNOSIS — N40.1 BENIGN PROSTATIC HYPERPLASIA WITH URINARY HESITANCY: ICD-10-CM

## 2023-11-16 LAB
EXPIRATION DATE: ABNORMAL
GLUCOSE BLDC GLUCOMTR-MCNC: 268 MG/DL (ref 70–130)
HBA1C MFR BLD: 7.4 % (ref 4.5–5.7)
Lab: ABNORMAL

## 2023-11-16 RX ORDER — CALCIUM CITRATE/VITAMIN D3 200MG-6.25
1 TABLET ORAL
COMMUNITY
End: 2023-11-16

## 2023-11-16 RX ORDER — INSULIN ASPART 100 [IU]/ML
12 INJECTION, SOLUTION INTRAVENOUS; SUBCUTANEOUS
COMMUNITY
Start: 2023-11-16 | End: 2023-11-17 | Stop reason: ALTCHOICE

## 2023-11-16 RX ORDER — CALCIUM CITRATE/VITAMIN D3 200MG-6.25
TABLET ORAL
Qty: 400 EACH | Refills: 3 | Status: SHIPPED | OUTPATIENT
Start: 2023-11-16

## 2023-11-16 RX ORDER — INSULIN LISPRO 100 [IU]/ML
12 INJECTION, SOLUTION INTRAVENOUS; SUBCUTANEOUS
Qty: 9 ML | Refills: 5 | Status: SHIPPED | OUTPATIENT
Start: 2023-11-16 | End: 2023-11-16 | Stop reason: CLARIF

## 2023-11-16 RX ORDER — DUTASTERIDE 0.5 MG/1
0.5 CAPSULE, LIQUID FILLED ORAL DAILY
Qty: 30 CAPSULE | Refills: 5 | Status: SHIPPED | OUTPATIENT
Start: 2023-11-16

## 2023-11-16 NOTE — PROGRESS NOTES
"Chief Complaint   Patient presents with    FU to discuss insulin       Subjective      Erlin Savage is a 73 y.o. who presents for DM with hyperglycemia/hypoglycemia complicated by ESRD on dialysis. He would like to start using his short acting insulin again due to rising blood sugars. His blood sugar has reached as high as 300.    In addition he has urinary hesitancy. Flomax did not work and Dutasteride was suggested by previous physician.    Neuropathic pain of back. Gabapentin 100 mg caused side effects. He thinks Nortriptyline caused wrist joint pain and stiffness.     The following portions of the patient's history were reviewed and updated as appropriate: allergies, current medications, past family history, past medical history, past social history, past surgical history, and problem list.    Review of Systems    Objective   Vital Signs:  /72   Pulse 97   Temp 97.8 °F (36.6 °C)   Resp 20   Ht 180.3 cm (70.98\")   Wt 79.8 kg (176 lb)   SpO2 98% Comment: 3 L/min  BMI 24.56 kg/m²     BMI is within normal parameters. No other follow-up for BMI required.        Physical Exam  Vitals reviewed.   Constitutional:       Appearance: Normal appearance.   Neurological:      Mental Status: He is alert.          Result Review   The following data was reviewed by: Joe Diallo MD on 11/16/2023:  Most Recent A1C          11/16/2023    11:44   HGBA1C Most Recent   Hemoglobin A1C 7.4      Data reviewed : POC glucose of 268               Assessment and Plan  Diagnoses and all orders for this visit:    1. Type 2 diabetes mellitus with chronic kidney disease on chronic dialysis, with long-term current use of insulin (Primary)  Comments:  Restart Short acting insulin utilizing sliding scale he has at home. Contact office for [facility]  Overview:  insulin dependent diabetes Onset 2010    Orders:  -     POC Glycosylated Hemoglobin (Hb A1C)  -     POC Glucose  -     glucose blood (True Metrix Blood Glucose Test) test " strip; Test five times daily  Dispense: 400 each; Refill: 3  -     Insulin Lispro, 1 Unit Dial, (HumaLOG KwikPen) 100 UNIT/ML solution pen-injector; Inject 12 Units under the skin into the appropriate area as directed 3 (Three) Times a Day Before Meals.  Dispense: 9 mL; Refill: 5  -     Insulin Pen Needle 32G X 6 MM misc; Use 1 Needle 3 (Three) Times a Day Before Meals.  Dispense: 100 each; Refill: 11    2. Benign prostatic hyperplasia with urinary hesitancy  Comments:  Start Proscar 5 mg daily  Orders:  -     dutasteride (Avodart) 0.5 MG capsule; Take 1 capsule by mouth Daily.  Dispense: 30 capsule; Refill: 5    3. Neuralgia  Comments:  Will proceed with repeat trial of Nortryp. if side effects recur will try Amitryp            Follow Up  No follow-ups on file.  Patient was given instructions and counseling regarding his condition or for health maintenance advice. Please see specific information pulled into the AVS if appropriate.

## 2023-11-17 ENCOUNTER — TELEPHONE (OUTPATIENT)
Dept: FAMILY MEDICINE CLINIC | Facility: CLINIC | Age: 73
End: 2023-11-17
Payer: MEDICARE

## 2023-11-17 RX ORDER — INSULIN LISPRO 100 [IU]/ML
12 INJECTION, SOLUTION INTRAVENOUS; SUBCUTANEOUS
Qty: 15 ML | Refills: 5 | Status: SHIPPED | OUTPATIENT
Start: 2023-11-17

## 2023-11-17 NOTE — TELEPHONE ENCOUNTER
Patient went to [facility], [facility] and [facility] and all said the Novalog was on backorder and unavailable. They all 3 have the Humalog, can we send in the Humalog or something else to [facility]?

## 2023-11-20 ENCOUNTER — TELEPHONE (OUTPATIENT)
Dept: FAMILY MEDICINE CLINIC | Facility: CLINIC | Age: 73
End: 2023-11-20
Payer: MEDICARE

## 2023-11-20 RX ORDER — AMITRIPTYLINE HYDROCHLORIDE 10 MG/1
10 TABLET, FILM COATED ORAL NIGHTLY
Qty: 30 TABLET | Refills: 0 | Status: SHIPPED | OUTPATIENT
Start: 2023-11-20

## 2023-11-28 DIAGNOSIS — E11.22 TYPE 2 DIABETES MELLITUS WITH CHRONIC KIDNEY DISEASE ON CHRONIC DIALYSIS, WITH LONG-TERM CURRENT USE OF INSULIN: ICD-10-CM

## 2023-11-28 DIAGNOSIS — Z79.4 TYPE 2 DIABETES MELLITUS WITH CHRONIC KIDNEY DISEASE ON CHRONIC DIALYSIS, WITH LONG-TERM CURRENT USE OF INSULIN: ICD-10-CM

## 2023-11-28 DIAGNOSIS — N18.6 TYPE 2 DIABETES MELLITUS WITH CHRONIC KIDNEY DISEASE ON CHRONIC DIALYSIS, WITH LONG-TERM CURRENT USE OF INSULIN: ICD-10-CM

## 2023-11-28 DIAGNOSIS — Z99.2 TYPE 2 DIABETES MELLITUS WITH CHRONIC KIDNEY DISEASE ON CHRONIC DIALYSIS, WITH LONG-TERM CURRENT USE OF INSULIN: ICD-10-CM

## 2023-11-28 NOTE — TELEPHONE ENCOUNTER
Caller: [facility] [address] [place] [address] CORBIN [facility] [phone] [facility] [phone] FX    Relationship: Pharmacy        Requested Prescriptions:   Requested Prescriptions     Pending Prescriptions Disp Refills    glucose blood (True Metrix Blood Glucose Test) test strip 400 each 3     Sig: Test five times daily        Pharmacy where request should be sent:  [facility]        Last office visit with prescribing clinician: 11/16/2023   Last telemedicine visit with prescribing clinician: Visit date not found   Next office visit with prescribing clinician: 12/5/2023     Additional details provided by patient: STATED THAT PT INSURANCE MEDICARE WILL NOT COVER FOR 5X A DAY FOR TEST STRIPS, PT IS CURRENTLY OUT OF TEST STRIPS AND REQUEST TEST STRIPS FOR 3X A DAY TO BE SENT IN TO PHARMACY(INSURANCE WILL COVER). ALSO GATO AT PHARMACY STATED THAT SHE WILL SUBMIT OVER TO OFFICE A FORM TO REQUEST TEST STRIPS FOR 5X A DAY TO BE COVERED    Does the patient have less than a 3 day supply:  [x] Yes  [] No    Would you like a call back once the refill request has been completed: [] Yes [x] No    If the office needs to give you a call back, can they leave a voicemail: [] Yes [x] No    Cleo Blanchard Rep   11/28/23 13:35 EST

## 2023-11-29 DIAGNOSIS — Z99.2 TYPE 2 DIABETES MELLITUS WITH CHRONIC KIDNEY DISEASE ON CHRONIC DIALYSIS, WITH LONG-TERM CURRENT USE OF INSULIN: ICD-10-CM

## 2023-11-29 DIAGNOSIS — N18.6 TYPE 2 DIABETES MELLITUS WITH CHRONIC KIDNEY DISEASE ON CHRONIC DIALYSIS, WITH LONG-TERM CURRENT USE OF INSULIN: ICD-10-CM

## 2023-11-29 DIAGNOSIS — E11.22 TYPE 2 DIABETES MELLITUS WITH CHRONIC KIDNEY DISEASE ON CHRONIC DIALYSIS, WITH LONG-TERM CURRENT USE OF INSULIN: ICD-10-CM

## 2023-11-29 DIAGNOSIS — Z79.4 TYPE 2 DIABETES MELLITUS WITH CHRONIC KIDNEY DISEASE ON CHRONIC DIALYSIS, WITH LONG-TERM CURRENT USE OF INSULIN: ICD-10-CM

## 2023-11-29 RX ORDER — CALCIUM CITRATE/VITAMIN D3 200MG-6.25
TABLET ORAL
Qty: 400 EACH | Refills: 3 | OUTPATIENT
Start: 2023-11-29

## 2023-11-29 RX ORDER — CALCIUM CITRATE/VITAMIN D3 200MG-6.25
TABLET ORAL
Qty: 100 EACH | Refills: 6 | Status: SHIPPED | OUTPATIENT
Start: 2023-11-29

## 2023-12-05 ENCOUNTER — OFFICE VISIT (OUTPATIENT)
Dept: FAMILY MEDICINE CLINIC | Facility: CLINIC | Age: 73
End: 2023-12-05
Payer: MEDICARE

## 2023-12-05 VITALS
DIASTOLIC BLOOD PRESSURE: 76 MMHG | SYSTOLIC BLOOD PRESSURE: 154 MMHG | TEMPERATURE: 97.1 F | OXYGEN SATURATION: 99 % | WEIGHT: 182.6 LBS | RESPIRATION RATE: 22 BRPM | BODY MASS INDEX: 25.56 KG/M2 | HEART RATE: 84 BPM | HEIGHT: 71 IN

## 2023-12-05 DIAGNOSIS — Z79.4 TYPE 2 DIABETES MELLITUS WITH CHRONIC KIDNEY DISEASE ON CHRONIC DIALYSIS, WITH LONG-TERM CURRENT USE OF INSULIN: Primary | ICD-10-CM

## 2023-12-05 DIAGNOSIS — Z99.2 TYPE 2 DIABETES MELLITUS WITH CHRONIC KIDNEY DISEASE ON CHRONIC DIALYSIS, WITH LONG-TERM CURRENT USE OF INSULIN: Primary | ICD-10-CM

## 2023-12-05 DIAGNOSIS — M79.2 NEURALGIA: ICD-10-CM

## 2023-12-05 DIAGNOSIS — I25.10 CORONARY ARTERY DISEASE INVOLVING NATIVE CORONARY ARTERY OF NATIVE HEART WITHOUT ANGINA PECTORIS: ICD-10-CM

## 2023-12-05 DIAGNOSIS — N18.6 TYPE 2 DIABETES MELLITUS WITH CHRONIC KIDNEY DISEASE ON CHRONIC DIALYSIS, WITH LONG-TERM CURRENT USE OF INSULIN: Primary | ICD-10-CM

## 2023-12-05 DIAGNOSIS — E11.22 TYPE 2 DIABETES MELLITUS WITH CHRONIC KIDNEY DISEASE ON CHRONIC DIALYSIS, WITH LONG-TERM CURRENT USE OF INSULIN: Primary | ICD-10-CM

## 2023-12-05 PROCEDURE — 3077F SYST BP >= 140 MM HG: CPT | Performed by: FAMILY MEDICINE

## 2023-12-05 PROCEDURE — 99214 OFFICE O/P EST MOD 30 MIN: CPT | Performed by: FAMILY MEDICINE

## 2023-12-05 PROCEDURE — 3051F HG A1C>EQUAL 7.0%<8.0%: CPT | Performed by: FAMILY MEDICINE

## 2023-12-05 PROCEDURE — 3078F DIAST BP <80 MM HG: CPT | Performed by: FAMILY MEDICINE

## 2023-12-05 RX ORDER — LACTULOSE 10 G/15ML
10 SOLUTION ORAL 2 TIMES DAILY
COMMUNITY
Start: 2023-11-20

## 2023-12-05 RX ORDER — VIT B COMP NO.3/FOLIC/C/BIOTIN 1 MG-60 MG
1 TABLET ORAL DAILY
COMMUNITY
Start: 2023-10-11

## 2023-12-05 RX ORDER — AMITRIPTYLINE HYDROCHLORIDE 10 MG/1
20 TABLET, FILM COATED ORAL NIGHTLY
Qty: 60 TABLET | Refills: 2 | Status: SHIPPED | OUTPATIENT
Start: 2023-12-05

## 2023-12-05 RX ORDER — INSULIN LISPRO 100 [IU]/ML
12 INJECTION, SOLUTION INTRAVENOUS; SUBCUTANEOUS
Qty: 15 ML | Refills: 5 | Status: SHIPPED | OUTPATIENT
Start: 2023-12-05 | End: 2023-12-06

## 2023-12-05 RX ORDER — SUCROFERRIC OXYHYDROXIDE 500 MG/1
1 TABLET, CHEWABLE ORAL 3 TIMES DAILY
COMMUNITY
Start: 2023-11-29

## 2023-12-05 RX ORDER — RANOLAZINE 500 MG/1
500 TABLET, EXTENDED RELEASE ORAL DAILY
Qty: 30 TABLET | Refills: 2 | Status: SHIPPED | OUTPATIENT
Start: 2023-12-05 | End: 2023-12-11

## 2023-12-05 NOTE — PROGRESS NOTES
"Chief Complaint   Patient presents with    4 wk f/u     DM       Subjective      Erlin Savage is a 73 y.o. who presents for diabetes and neuralgia.    Diabetes.  Patient has been using his sliding scale insulin to try and control blood sugars.  He also takes Lantus, 35 to 40 units each morning.  There has been some difficulty with the pharmacy obtaining his insulin.  Lowest recorded glucose over the last month is 76    Neuralgia.  We have been cycling through medicines to try to help the nerve related pain in his tailbone that radiates through to the scrotum.  He is tolerating amitriptyline 10 mg without side effects although has not noticed any impact on the nerve related pain.    Coronary artery disease.  At the end of the visit patient's wife has requested a refill on his Ranexa which he takes for chronic angina.  Medicine remains effective    The following portions of the patient's history were reviewed and updated as appropriate: allergies, current medications, past family history, past medical history, past social history, past surgical history, and problem list.    Review of Systems    Objective   Vital Signs:  /76   Pulse 84   Temp 97.1 °F (36.2 °C)   Resp 22   Ht 180.3 cm (71\")   Wt 82.8 kg (182 lb 9.6 oz)   SpO2 99%   BMI 25.47 kg/m²               Physical Exam  Vitals reviewed.   Constitutional:       Appearance: He is ill-appearing.   Neurological:      Mental Status: He is alert.          Result Review                We will     Assessment and Plan  Diagnoses and all orders for this visit:    1. Type 2 diabetes mellitus with chronic kidney disease on chronic dialysis, with long-term current use of insulin (Primary)  Overview:  insulin dependent diabetes Onset 2010    Assessment & Plan:  1. Humalog resent to pharmacy  2. Sliding scale written for patient  3. CGM will be ordered from [employer]     Patient continues on basal bolus regimen of insulin with 5 insulin injections per day.  " Hypoglycemia has been present with lowest known glucose of 76 1 morning    Orders:  -     Discontinue: Insulin Lispro, 1 Unit Dial, (HumaLOG KwikPen) 100 UNIT/ML solution pen-injector; Inject 12 Units under the skin into the appropriate area as directed 3 (Three) Times a Day Before Meals.  Dispense: 15 mL; Refill: 5    2. Neuralgia  Comments:  Unchanged.  Increase amitriptyline to 20 mg daily.  If no improvement transition to duloxetine  Overview:  October 2023, intolerant of gabapentin  November 2023, intolerant of nortriptyline (arthralgias)    Orders:  -     amitriptyline (ELAVIL) 10 MG tablet; Take 2 tablets by mouth Every Night.  Dispense: 60 tablet; Refill: 2    3. Coronary artery disease involving native coronary artery of native heart without angina pectoris  Comments:  Stable.  Continue current medicines.  Refill Ranexa.  Cardiologist is Dr. Celeste  Overview:  CABG x 3 by Dr. Padilla, 03/18/2012,   Cardiac catheterization for NSTEMI (12/30/2015):  3/3 grafts patent, culprit lesion occlusion of distal branches at 1st OM medical therapy recommended    Orders:  -     ranolazine (RANEXA) 500 MG 12 hr tablet; Take 1 tablet by mouth Daily.  Dispense: 30 tablet; Refill: 2            Follow Up  Return in about 6 weeks (around 1/16/2024) for Recheck.  Patient was given instructions and counseling regarding his condition or for health maintenance advice. Please see specific information pulled into the AVS if appropriate.

## 2023-12-06 RX ORDER — INSULIN ASPART 100 [IU]/ML
12 INJECTION, SOLUTION INTRAVENOUS; SUBCUTANEOUS
Qty: 15 ML | Refills: 5 | Status: SHIPPED | OUTPATIENT
Start: 2023-12-06

## 2023-12-07 PROBLEM — Z79.4 LONG TERM (CURRENT) USE OF INSULIN: Status: ACTIVE | Noted: 2023-12-07

## 2023-12-07 NOTE — ASSESSMENT & PLAN NOTE
1. Humalog resent to pharmacy  2. Sliding scale written for patient  3. CGM will be ordered from [facility] [facility]     Patient continues on basal bolus regimen of insulin with 5 insulin injections per day.  Hypoglycemia has been present with lowest known glucose of 76 1 morning

## 2023-12-08 NOTE — TELEPHONE ENCOUNTER
Caller: Princess Cardona    Relationship: Emergency Contact    Best call back number: [phone]     Requested Prescriptions:   Requested Prescriptions     Pending Prescriptions Disp Refills    losartan (COZAAR) 100 MG tablet       Sig: Take 1 tablet by mouth Daily.        Pharmacy where request should be sent: [facility] [address] [place] [address] [phone] [facility] [phone]      Last office visit with prescribing clinician: 12/5/2023   Last telemedicine visit with prescribing clinician: Visit date not found   Next office visit with prescribing clinician: 1/16/2024       Does the patient have less than a 3 day supply:  [x] Yes  [] No    Would you like a call back once the refill request has been completed: [] Yes [x] No    If the office needs to give you a call back, can they leave a voicemail: [] Yes [x] No    Cleo Juarez Rep   12/08/23 09:00 EST

## 2023-12-11 ENCOUNTER — TELEPHONE (OUTPATIENT)
Dept: FAMILY MEDICINE CLINIC | Facility: CLINIC | Age: 73
End: 2023-12-11
Payer: MEDICARE

## 2023-12-11 RX ORDER — LOSARTAN POTASSIUM 100 MG/1
100 TABLET ORAL DAILY
Qty: 90 TABLET | Refills: 0 | Status: SHIPPED | OUTPATIENT
Start: 2023-12-11

## 2023-12-13 ENCOUNTER — TELEPHONE (OUTPATIENT)
Dept: FAMILY MEDICINE CLINIC | Facility: CLINIC | Age: 73
End: 2023-12-13
Payer: MEDICARE

## 2023-12-13 NOTE — TELEPHONE ENCOUNTER
Hub staff attempted to follow warm transfer process and was unsuccessful     Caller: Erlin Savage    Relationship to patient: Self    Best call back number: [phone]    UPDATE ON ORDERS GLUCOSE METER

## 2023-12-13 NOTE — TELEPHONE ENCOUNTER
Informed pt this was faxed to [facility] with notes on 12-7-23 gave him their number [phone] to see if there is anything they need on his side of things.

## 2023-12-27 ENCOUNTER — READMISSION MANAGEMENT (OUTPATIENT)
Dept: CALL CENTER | Facility: HOSPITAL | Age: 73
End: 2023-12-27
Payer: MEDICARE

## 2023-12-27 NOTE — OUTREACH NOTE
Prep Survey      Flowsheet Row Responses   [facility] facility patient discharged from? Non-BH   Is LACE score < 7 ? Non-BH Discharge   Eligibility [facility]   Date of Admission 12/22/23   Date of Discharge 12/26/23   Discharge Disposition Home or Self Care   Discharge diagnosis Abdominal pain, generalized   Does the patient have one of the following disease processes/diagnoses(primary or secondary)? Other   Prep survey completed? Yes            Irena VASQUEZ - Registered Nurse

## 2023-12-28 ENCOUNTER — TRANSITIONAL CARE MANAGEMENT TELEPHONE ENCOUNTER (OUTPATIENT)
Dept: CALL CENTER | Facility: HOSPITAL | Age: 73
End: 2023-12-28
Payer: MEDICARE

## 2023-12-28 NOTE — OUTREACH NOTE
Call Center TCM Note      Flowsheet Row Responses   [facility] patient discharged from? Non-   Does the patient have one of the following disease processes/diagnoses(primary or secondary)? Other   TCM attempt successful? Yes   Call start time 1220   Revoked Reason Other  [Patient is leaving appt and does not feel like talking.]   Call end time 1220   Discharge diagnosis Abdominal pain, generalized   Call end time 1220            Tyler Odell RN    12/28/2023, 12:20 EST

## 2024-01-04 ENCOUNTER — READMISSION MANAGEMENT (OUTPATIENT)
Dept: CALL CENTER | Facility: HOSPITAL | Age: 74
End: 2024-01-04
Payer: MEDICARE

## 2024-01-04 NOTE — OUTREACH NOTE
Prep Survey      Flowsheet Row Responses   [facility] facility patient discharged from? Non-BH   Is LACE score < 7 ? Non-BH Discharge   Eligibility [facility]   Date of Admission 12/31/23   Date of Discharge 01/03/24   Discharge Disposition Home or Self Care   Discharge diagnosis Generalized abdominal pain   Does the patient have one of the following disease processes/diagnoses(primary or secondary)? Other   Prep survey completed? Yes            Krystle MILLER - Registered Nurse

## 2024-01-05 ENCOUNTER — TRANSITIONAL CARE MANAGEMENT TELEPHONE ENCOUNTER (OUTPATIENT)
Dept: CALL CENTER | Facility: HOSPITAL | Age: 74
End: 2024-01-05
Payer: MEDICARE

## 2024-01-05 NOTE — OUTREACH NOTE
Call Center TCM Note      Flowsheet Row Responses   [facility] patient discharged from? Non-  []   Does the patient have one of the following disease processes/diagnoses(primary or secondary)? Other   TCM attempt successful? No   Unsuccessful attempts Attempt 2            Melissa Celaya RN    1/5/2024, 16:12 EST

## 2024-01-05 NOTE — OUTREACH NOTE
Call Center TCM Note      Flowsheet Row Responses   [facility] facility patient discharged from? Non-  []   Does the patient have one of the following disease processes/diagnoses(primary or secondary)? Other   TCM attempt successful? No   Unsuccessful attempts Attempt 1            Melissa Celaya RN    1/5/2024, 13:13 EST

## 2024-01-06 ENCOUNTER — TRANSITIONAL CARE MANAGEMENT TELEPHONE ENCOUNTER (OUTPATIENT)
Dept: CALL CENTER | Facility: HOSPITAL | Age: 74
End: 2024-01-06
Payer: MEDICARE

## 2024-01-06 NOTE — OUTREACH NOTE
Call Center TCM Note      Flowsheet Row Responses   [facility] patient discharged from? Non-  [[place]]   Does the patient have one of the following disease processes/diagnoses(primary or secondary)? Other   TCM attempt successful? Yes   Call start time 1032   Call end time 1033   Discharge diagnosis Generalized abdominal pain- peritonitis   Person spoke with today (if not patient) and relationship patient   Meds reviewed with patient/caregiver? Yes   Does the patient have all medications ordered at discharge? Yes   Prescription comments new abx.   Is the patient taking all medications as directed (includes completed medication regime)? Yes   Comments 1/16/2024  4:30 PM  OFFICE VISIT 15 min [facility] FAMILY MEDICINE Joe Diallo MD   Does the patient have an appointment with their PCP within 7-14 days of discharge? Yes   Has home health visited the patient within 72 hours of discharge? N/A   Psychosocial issues? No   What is the patient's perception of their health status since discharge? Improving   Is the patient/caregiver able to teach back signs and symptoms related to disease process for when to call PCP? Yes   Is the patient/caregiver able to teach back signs and symptoms related to disease process for when to call 911? Yes   Is the patient/caregiver able to teach back the hierarchy of who to call/visit for symptoms/problems? PCP, Specialist, Home health nurse, Urgent Care, ED, 911 Yes   TCM call completed? Yes   Wrap up additional comments Patient states he is doing well. on abx for peritonitis. no concerns or questions noted.   Call end time 1033   Would this patient benefit from a Referral to Amb Social Work? No   Is the patient interested in additional calls from an ambulatory ? No            Luanne Cornejo RN    1/6/2024, 10:34 EST

## 2024-01-24 ENCOUNTER — TELEPHONE (OUTPATIENT)
Dept: FAMILY MEDICINE CLINIC | Facility: CLINIC | Age: 74
End: 2024-01-24
Payer: MEDICARE

## 2024-01-24 NOTE — TELEPHONE ENCOUNTER
Caller: Princess Cardona    Relationship to patient: Emergency Contact    Best call back number: [phone]     Chief complaint: HOSPITAL FOLLOW  UP    Type of visit: HOSPITAL FOLLOW UP    Requested date: ANY     If rescheduling, when is the original appointment: 01/25/23     Additional notes:THE PATIENT HAS A CONFLICTING APPOINTMENT AND NEEDS TO RESCHEDULE THIS APPOINTMENT TO ANOTHER DAY. DISCHARGE DATE WAS 01/03/24.

## 2024-01-29 ENCOUNTER — OFFICE VISIT (OUTPATIENT)
Dept: FAMILY MEDICINE CLINIC | Facility: CLINIC | Age: 74
End: 2024-01-29
Payer: MEDICARE

## 2024-01-29 VITALS
HEART RATE: 96 BPM | HEIGHT: 71 IN | SYSTOLIC BLOOD PRESSURE: 130 MMHG | BODY MASS INDEX: 23.44 KG/M2 | DIASTOLIC BLOOD PRESSURE: 68 MMHG | WEIGHT: 167.4 LBS | RESPIRATION RATE: 20 BRPM | OXYGEN SATURATION: 94 % | TEMPERATURE: 97.3 F

## 2024-01-29 DIAGNOSIS — Z99.2 STAGE 5 CHRONIC KIDNEY DISEASE ON CHRONIC DIALYSIS: ICD-10-CM

## 2024-01-29 DIAGNOSIS — N18.6 STAGE 5 CHRONIC KIDNEY DISEASE ON CHRONIC DIALYSIS: ICD-10-CM

## 2024-01-29 DIAGNOSIS — M25.50 POLYARTHRALGIA: Primary | ICD-10-CM

## 2024-01-29 LAB
EXPIRATION DATE: NORMAL
FLUAV AG UPPER RESP QL IA.RAPID: NOT DETECTED
FLUBV AG UPPER RESP QL IA.RAPID: NOT DETECTED
INTERNAL CONTROL: NORMAL
Lab: NORMAL
SARS-COV-2 AG UPPER RESP QL IA.RAPID: NOT DETECTED

## 2024-01-29 RX ORDER — OXYCODONE HYDROCHLORIDE 5 MG/1
5 TABLET ORAL EVERY 8 HOURS PRN
Qty: 30 TABLET | Refills: 0 | Status: SHIPPED | OUTPATIENT
Start: 2024-01-29

## 2024-01-29 NOTE — PROGRESS NOTES
"Chief Complaint   Patient presents with    FU on [facility] discharge / 1-3-24 peritonitis     Generalized Body Aches     Started 3 days ago     L side facial / ear pain      Started a week ago        Subjective      Erlin Savage is a 73 y.o. who presents for 3 days of multiple joint pains with at least 1 swollen joint.  Patient is currently being treated for pneumonia with Augmentin.  Her record indicates an  intolerance to Augmentin causing palpitations.  Patient denies chest pains.  Joints involved include the left TMJ, left wrist, right elbow, left hip, right knee.  He has resorted to wearing a brace on the left wrist.  The knee pain is made it difficult to ambulate.    The following portions of the patient's history were reviewed and updated as appropriate: allergies, current medications, past family history, past medical history, past social history, past surgical history, and problem list.    Review of Systems    Objective   Vital Signs:  /68   Pulse 96   Temp 97.3 °F (36.3 °C)   Resp 20   Ht 180.3 cm (71\")   Wt 75.9 kg (167 lb 6.4 oz)   SpO2 94%   BMI 23.35 kg/m²     BMI is within normal parameters. No other follow-up for BMI required.        Physical Exam  Vitals reviewed.   Constitutional:       Appearance: Normal appearance.   Cardiovascular:      Rate and Rhythm: Normal rate and regular rhythm.      Pulses: Normal pulses.      Heart sounds: Normal heart sounds.   Pulmonary:      Effort: Pulmonary effort is normal.      Breath sounds: Normal breath sounds.   Musculoskeletal:      Comments: Patient has tenderness and swelling without erythema over the left TMJ.  Patient has tenderness to palpation of the right elbow joint.  Patient has tenderness to palpation with small effusion of the right knee joint without overlying erythema.  Patient has tenderness to palpation of the left wrist joint.   Neurological:      Mental Status: He is alert.          Result Review                     Assessment and " Plan  Diagnoses and all orders for this visit:    1. Polyarthralgia (Primary)  -     oxyCODONE (ROXICODONE) 5 MG immediate release tablet; Take 1 tablet by mouth Every 8 (Eight) Hours As Needed for Severe Pain. for pain  Dispense: 30 tablet; Refill: 0    2. Stage 5 chronic kidney disease on chronic dialysis  Overview:  Added automatically from request for surgery [id number]      Plan: Patient is experiencing side effects of medications I believe  polyarthralgias are due to his amoxicillin and the Augmentin.  Patient been on antibiotics for several months.  He has completed 4 days of Augmentin for the presumed right lower lobe pneumonia.  I have asked him to take 1 more dose of antibiotic and he may stop the medication.  Patient was also given a refill on oxycodone which she has been prescribed intermittently for various pains due to his inability to use NSAIDs because of his end-stage renal disease        Follow Up  No follow-ups on file.  Patient was given instructions and counseling regarding his condition or for health maintenance advice. Please see specific information pulled into the AVS if appropriate.

## 2024-01-31 ENCOUNTER — TELEPHONE (OUTPATIENT)
Dept: FAMILY MEDICINE CLINIC | Facility: CLINIC | Age: 74
End: 2024-01-31
Payer: MEDICARE

## 2024-01-31 NOTE — TELEPHONE ENCOUNTER
Caller: ASH-DR COVARRUBIAS    Relationship: Other    Best call back number: [phone]     What is the best time to reach you: ANYTIME    Who are you requesting to speak with (clinical staff, provider,  specific staff member): DR HART    Do you know the name of the person who called: [facility]    What was the call regarding: DR COVARRUBIAS (THE PATIENT'S NEPHROLOGIST) WOULD LIKE TO DISCUSS SOME MEDICATION CHANGES FOR THE PATIENT. PLEASE FOLLOW UP AS SOON AS POSSIBLE.    Is it okay if the provider responds through MyChart: NO

## 2024-02-01 RX ORDER — TRAZODONE HYDROCHLORIDE 50 MG/1
50 TABLET ORAL NIGHTLY
Start: 2024-02-01

## 2024-02-01 NOTE — TELEPHONE ENCOUNTER
Returned call to Dr. Gaming. He placed the patient on Trazodone for sleep and wanted to make sure this did not cause a problem with patient's other medications. Also, because Mr. Savage is a dialysis patient he can take Motrin for pain, 200-400 mg daily to start.

## 2024-02-21 ENCOUNTER — READMISSION MANAGEMENT (OUTPATIENT)
Dept: CALL CENTER | Facility: HOSPITAL | Age: 74
End: 2024-02-21
Payer: MEDICARE

## 2024-02-21 NOTE — OUTREACH NOTE
Prep Survey      Flowsheet Row Responses   S[facility] facility patient discharged from? Non-BH   Is LACE score < 7 ? Non-BH Discharge   Eligibility [facility]   H[facility]   Date of Admission 02/17/24   Date of Discharge 02/20/24   Discharge Disposition Home or Self Care   Discharge diagnosis NSTEMI (non-ST elevated myocardial infarction), aflutter   Does the patient have one of the following disease processes/diagnoses(primary or secondary)? Acute MI (STEMI,NSTEMI)   Prep survey completed? Yes            Irena VASQUEZ - Registered Nurse

## 2024-02-22 ENCOUNTER — TRANSITIONAL CARE MANAGEMENT TELEPHONE ENCOUNTER (OUTPATIENT)
Dept: CALL CENTER | Facility: HOSPITAL | Age: 74
End: 2024-02-22
Payer: MEDICARE

## 2024-02-22 NOTE — OUTREACH NOTE
Call Center TCM Note      Flowsheet Row Responses   [facility] facility patient discharged from? Non-BH   Does the patient have one of the following disease processes/diagnoses(primary or secondary)? Acute MI (STEMI,NSTEMI)   TCM attempt successful? No   Unsuccessful attempts Attempt 1   Call Status Left message            Christi Calvert RN    2/22/2024, 09:58 EST

## 2024-02-22 NOTE — OUTREACH NOTE
Call Center TCM Note      Flowsheet Row Responses   [facility], [place] operated by [facility] patient discharged from? Non-   Does the patient have one of the following disease processes/diagnoses(primary or secondary)? Acute MI (STEMI,NSTEMI)   TCM attempt successful? Yes   Call start time 1240   Call end time 1317   Discharge diagnosis NSTEMI (non-ST elevated myocardial infarction), aflutter   Meds reviewed with patient/caregiver? Yes   Is the patient having any side effects they believe may be caused by any medication additions or changes? Yes   Side effects comments  Isosorbide causing facial flushing/LE swelling   Does the patient have all medications ordered at discharge? Yes   Is the patient taking all medications as directed (includes completed medication regime)? No   What is preventing the patient from taking all medications as directed? Side effects   Medication comments Holding Isosorbide today r/t facial swelling/LE sweilling until Dr Celeste/DARREN Estrada calls pt back   Comments Needs cardiology fu appt   Does the patient have an appointment with their PCP within 7-14 days of discharge? Yes  [2/27/2024 11:15 AM]   Psychosocial issues? No   Did the patient receive a copy of their discharge instructions? Yes   Nursing interventions Reviewed instructions with patient   What is the patient's perception of their health status since discharge? Improving   Is the patient/caregiver able to teach back signs and symptoms related to disease process for when to call PCP? Yes   Is the patient/caregiver able to teach back signs and symptoms related to disease process for when to call 911? Yes   Is the patient/caregiver able to teach back the hierarchy of who to call/visit for symptoms/problems? PCP, Specialist, Home health nurse, Urgent Care, ED, 911 Yes   If the patient is a current smoker, are they able to teach back resources for cessation? Not a smoker   TCM call completed? Yes   Wrap up additional comments Pt denies any chest pain/SOB.  Reviewed AVS/meds with pt,  pt is holding Isosorbide r/t facial swelling/LE swelling. Pt is requesting to be put back on ramipril as this helps the swelling in legs greatly. This RN called Dr Celeste, cardiology office and spoke to DARREN Estrada about the 4 new BP medications, Isosorbide, metoprolol, hydralazine, amiodarone that [facility] added per [facility]. Reported to DARREN Estrada that pt is having LE swelling/facial flushing possibly r/t isosorbide so pt is deciding to hold until he speaks with Dr Celeste, cardiology, and that pt would like to be put back on ramipril as this has helped greatly with LE swelling. Pt has dialysis M, W, F. Pt does have a [facility] hospital fu appt with PCP on 2/27/24.   Call end time 1317   Would this patient benefit from a Referral to [facility] Social Work? No   Is the patient interested in additional calls from an ambulatory ? No            Christi Calvert RN    2/22/2024, 13:37 EST

## 2024-02-24 ENCOUNTER — APPOINTMENT (OUTPATIENT)
Dept: GENERAL RADIOLOGY | Facility: HOSPITAL | Age: 74
End: 2024-02-24
Payer: MEDICARE

## 2024-02-24 ENCOUNTER — HOSPITAL ENCOUNTER (INPATIENT)
Facility: HOSPITAL | Age: 74
LOS: 3 days | Discharge: HOME | End: 2024-02-27
Attending: STUDENT IN AN ORGANIZED HEALTH CARE EDUCATION/TRAINING PROGRAM | Admitting: INTERNAL MEDICINE
Payer: MEDICARE

## 2024-02-24 DIAGNOSIS — I10 ESSENTIAL HYPERTENSION: Chronic | ICD-10-CM

## 2024-02-24 DIAGNOSIS — I21.4 NSTEMI (NON-ST ELEVATED MYOCARDIAL INFARCTION): Chronic | ICD-10-CM

## 2024-02-24 DIAGNOSIS — Z99.2 ESRD ON DIALYSIS: ICD-10-CM

## 2024-02-24 DIAGNOSIS — I12.9 HYPERTENSIVE CHRONIC KIDNEY DISEASE WITH STAGE 1 THROUGH STAGE 4 CHRONIC KIDNEY DISEASE, OR UNSPECIFIED CHRONIC KIDNEY DISEASE: ICD-10-CM

## 2024-02-24 DIAGNOSIS — E11.65 HYPERGLYCEMIA DUE TO DIABETES MELLITUS: ICD-10-CM

## 2024-02-24 DIAGNOSIS — I27.21 SECONDARY PULMONARY ARTERIAL HYPERTENSION: ICD-10-CM

## 2024-02-24 DIAGNOSIS — Z99.81 ON HOME OXYGEN THERAPY: ICD-10-CM

## 2024-02-24 DIAGNOSIS — J90 PLEURAL EFFUSION: ICD-10-CM

## 2024-02-24 DIAGNOSIS — K65.9 PERITONITIS, UNSPECIFIED: ICD-10-CM

## 2024-02-24 DIAGNOSIS — J81.0 ACUTE PULMONARY EDEMA: ICD-10-CM

## 2024-02-24 DIAGNOSIS — N18.6 ESRD (END STAGE RENAL DISEASE): Chronic | ICD-10-CM

## 2024-02-24 DIAGNOSIS — E87.70 HYPERVOLEMIA, UNSPECIFIED HYPERVOLEMIA TYPE: Primary | ICD-10-CM

## 2024-02-24 DIAGNOSIS — I25.9 ISCHEMIC HEART DISEASE: ICD-10-CM

## 2024-02-24 DIAGNOSIS — D53.9 MACROCYTIC ANEMIA: ICD-10-CM

## 2024-02-24 DIAGNOSIS — I25.10 CORONARY ARTERY DISEASE INVOLVING NATIVE CORONARY ARTERY OF NATIVE HEART WITHOUT ANGINA PECTORIS: ICD-10-CM

## 2024-02-24 DIAGNOSIS — R06.02 SHORTNESS OF BREATH: ICD-10-CM

## 2024-02-24 DIAGNOSIS — E78.5 HYPERLIPIDEMIA LDL GOAL <70: ICD-10-CM

## 2024-02-24 DIAGNOSIS — I48.92 ATRIAL FIBRILLATION AND FLUTTER: ICD-10-CM

## 2024-02-24 DIAGNOSIS — G45.9 TIA (TRANSIENT ISCHEMIC ATTACK): ICD-10-CM

## 2024-02-24 DIAGNOSIS — N18.6 ESRD ON DIALYSIS: ICD-10-CM

## 2024-02-24 DIAGNOSIS — E43 SEVERE MALNUTRITION: ICD-10-CM

## 2024-02-24 DIAGNOSIS — I48.91 ATRIAL FIBRILLATION AND FLUTTER: ICD-10-CM

## 2024-02-24 DIAGNOSIS — R79.89 ELEVATED TROPONIN: ICD-10-CM

## 2024-02-24 DIAGNOSIS — D69.6 THROMBOCYTOPENIA: ICD-10-CM

## 2024-02-24 PROBLEM — D50.9 IRON DEFICIENCY ANEMIA: Status: ACTIVE | Noted: 2021-02-02

## 2024-02-24 PROBLEM — E11.22 TYPE 2 DIABETES MELLITUS WITH DIABETIC CHRONIC KIDNEY DISEASE: Status: ACTIVE | Noted: 2023-11-01

## 2024-02-24 PROBLEM — J44.9 COPD (CHRONIC OBSTRUCTIVE PULMONARY DISEASE): Status: ACTIVE | Noted: 2024-02-24

## 2024-02-24 PROBLEM — I34.0 MITRAL VALVE REGURGITATION: Status: ACTIVE | Noted: 2023-08-22

## 2024-02-24 LAB
ALBUMIN SERPL-MCNC: 3.6 G/DL (ref 3.5–5.2)
ALBUMIN/GLOB SERPL: 1.3 G/DL
ALP SERPL-CCNC: 84 U/L (ref 39–117)
ALT SERPL W P-5'-P-CCNC: 19 U/L (ref 1–41)
ANION GAP SERPL CALCULATED.3IONS-SCNC: 15 MMOL/L (ref 5–15)
AST SERPL-CCNC: 16 U/L (ref 1–40)
BASOPHILS # BLD AUTO: 0.05 10*3/MM3 (ref 0–0.2)
BASOPHILS NFR BLD AUTO: 0.5 % (ref 0–1.5)
BILIRUB SERPL-MCNC: 0.4 MG/DL (ref 0–1.2)
BUN SERPL-MCNC: 35 MG/DL (ref 8–23)
BUN/CREAT SERPL: 5.3 (ref 7–25)
CALCIUM SPEC-SCNC: 9.5 MG/DL (ref 8.6–10.5)
CHLORIDE SERPL-SCNC: 94 MMOL/L (ref 98–107)
CHOLEST SERPL-MCNC: 145 MG/DL (ref 0–200)
CO2 SERPL-SCNC: 23 MMOL/L (ref 22–29)
CREAT SERPL-MCNC: 6.59 MG/DL (ref 0.76–1.27)
DEPRECATED RDW RBC AUTO: 76.2 FL (ref 37–54)
EGFRCR SERPLBLD CKD-EPI 2021: 8.4 ML/MIN/1.73
EOSINOPHIL # BLD AUTO: 0.24 10*3/MM3 (ref 0–0.4)
EOSINOPHIL NFR BLD AUTO: 2.4 % (ref 0.3–6.2)
ERYTHROCYTE [DISTWIDTH] IN BLOOD BY AUTOMATED COUNT: 21.2 % (ref 12.3–15.4)
FLUAV SUBTYP SPEC NAA+PROBE: NOT DETECTED
FLUBV RNA ISLT QL NAA+PROBE: NOT DETECTED
GEN 5 2HR TROPONIN T REFLEX: 403 NG/L
GLOBULIN UR ELPH-MCNC: 2.7 GM/DL
GLUCOSE BLDC GLUCOMTR-MCNC: 248 MG/DL (ref 70–130)
GLUCOSE SERPL-MCNC: 323 MG/DL (ref 65–99)
HBA1C MFR BLD: 7.5 % (ref 4.8–5.6)
HCT VFR BLD AUTO: 31.9 % (ref 37.5–51)
HDLC SERPL-MCNC: 49 MG/DL (ref 40–60)
HGB BLD-MCNC: 9.9 G/DL (ref 13–17.7)
HOLD SPECIMEN: NORMAL
IMM GRANULOCYTES # BLD AUTO: 0.15 10*3/MM3 (ref 0–0.05)
IMM GRANULOCYTES NFR BLD AUTO: 1.5 % (ref 0–0.5)
LDLC SERPL CALC-MCNC: 76 MG/DL (ref 0–100)
LDLC/HDLC SERPL: 1.51 {RATIO}
LYMPHOCYTES # BLD AUTO: 0.81 10*3/MM3 (ref 0.7–3.1)
LYMPHOCYTES NFR BLD AUTO: 8 % (ref 19.6–45.3)
MACROCYTES BLD QL SMEAR: NORMAL
MAGNESIUM SERPL-MCNC: 2.1 MG/DL (ref 1.6–2.4)
MCH RBC QN AUTO: 31.9 PG (ref 26.6–33)
MCHC RBC AUTO-ENTMCNC: 31 G/DL (ref 31.5–35.7)
MCV RBC AUTO: 102.9 FL (ref 79–97)
MONOCYTES # BLD AUTO: 0.99 10*3/MM3 (ref 0.1–0.9)
MONOCYTES NFR BLD AUTO: 9.7 % (ref 5–12)
NEUTROPHILS NFR BLD AUTO: 7.94 10*3/MM3 (ref 1.7–7)
NEUTROPHILS NFR BLD AUTO: 77.9 % (ref 42.7–76)
NRBC BLD AUTO-RTO: 0.4 /100 WBC (ref 0–0.2)
NT-PROBNP SERPL-MCNC: ABNORMAL PG/ML (ref 0–900)
PLAT MORPH BLD: NORMAL
PLATELET # BLD AUTO: 130 10*3/MM3 (ref 140–450)
PMV BLD AUTO: 12.1 FL (ref 6–12)
POTASSIUM SERPL-SCNC: 4.3 MMOL/L (ref 3.5–5.2)
PROT SERPL-MCNC: 6.3 G/DL (ref 6–8.5)
RBC # BLD AUTO: 3.1 10*6/MM3 (ref 4.14–5.8)
SARS-COV-2 RNA RESP QL NAA+PROBE: NOT DETECTED
SODIUM SERPL-SCNC: 132 MMOL/L (ref 136–145)
TRIGL SERPL-MCNC: 109 MG/DL (ref 0–150)
TROPONIN T DELTA: -12 NG/L
TROPONIN T SERPL HS-MCNC: 415 NG/L
TSH SERPL DL<=0.05 MIU/L-ACNC: 3.33 UIU/ML (ref 0.27–4.2)
VLDLC SERPL-MCNC: 20 MG/DL (ref 5–40)
WBC MORPH BLD: NORMAL
WBC NRBC COR # BLD AUTO: 10.18 10*3/MM3 (ref 3.4–10.8)
WHOLE BLOOD HOLD COAG: NORMAL
WHOLE BLOOD HOLD SPECIMEN: NORMAL

## 2024-02-24 PROCEDURE — 73620 X-RAY EXAM OF FOOT: CPT

## 2024-02-24 PROCEDURE — 25010000002 FUROSEMIDE PER 20 MG: Performed by: PHYSICIAN ASSISTANT

## 2024-02-24 PROCEDURE — 80061 LIPID PANEL: CPT | Performed by: NURSE PRACTITIONER

## 2024-02-24 PROCEDURE — 99223 1ST HOSP IP/OBS HIGH 75: CPT | Performed by: INTERNAL MEDICINE

## 2024-02-24 PROCEDURE — 85025 COMPLETE CBC W/AUTO DIFF WBC: CPT | Performed by: STUDENT IN AN ORGANIZED HEALTH CARE EDUCATION/TRAINING PROGRAM

## 2024-02-24 PROCEDURE — 71045 X-RAY EXAM CHEST 1 VIEW: CPT

## 2024-02-24 PROCEDURE — 85007 BL SMEAR W/DIFF WBC COUNT: CPT | Performed by: STUDENT IN AN ORGANIZED HEALTH CARE EDUCATION/TRAINING PROGRAM

## 2024-02-24 PROCEDURE — 84443 ASSAY THYROID STIM HORMONE: CPT | Performed by: NURSE PRACTITIONER

## 2024-02-24 PROCEDURE — 99285 EMERGENCY DEPT VISIT HI MDM: CPT

## 2024-02-24 PROCEDURE — 63710000001 INSULIN DETEMIR PER 5 UNITS: Performed by: NURSE PRACTITIONER

## 2024-02-24 PROCEDURE — 87636 SARSCOV2 & INF A&B AMP PRB: CPT | Performed by: PHYSICIAN ASSISTANT

## 2024-02-24 PROCEDURE — 83735 ASSAY OF MAGNESIUM: CPT | Performed by: STUDENT IN AN ORGANIZED HEALTH CARE EDUCATION/TRAINING PROGRAM

## 2024-02-24 PROCEDURE — 83880 ASSAY OF NATRIURETIC PEPTIDE: CPT | Performed by: STUDENT IN AN ORGANIZED HEALTH CARE EDUCATION/TRAINING PROGRAM

## 2024-02-24 PROCEDURE — 84484 ASSAY OF TROPONIN QUANT: CPT | Performed by: STUDENT IN AN ORGANIZED HEALTH CARE EDUCATION/TRAINING PROGRAM

## 2024-02-24 PROCEDURE — 80053 COMPREHEN METABOLIC PANEL: CPT | Performed by: STUDENT IN AN ORGANIZED HEALTH CARE EDUCATION/TRAINING PROGRAM

## 2024-02-24 PROCEDURE — 82948 REAGENT STRIP/BLOOD GLUCOSE: CPT

## 2024-02-24 PROCEDURE — G0316 PR PROLONG INPT EVAL ADD15 M: HCPCS | Performed by: INTERNAL MEDICINE

## 2024-02-24 PROCEDURE — 83036 HEMOGLOBIN GLYCOSYLATED A1C: CPT | Performed by: NURSE PRACTITIONER

## 2024-02-24 PROCEDURE — 93005 ELECTROCARDIOGRAM TRACING: CPT | Performed by: STUDENT IN AN ORGANIZED HEALTH CARE EDUCATION/TRAINING PROGRAM

## 2024-02-24 PROCEDURE — 36415 COLL VENOUS BLD VENIPUNCTURE: CPT

## 2024-02-24 RX ORDER — CLONAZEPAM 0.5 MG/1
0.5 TABLET ORAL AS NEEDED
Status: DISCONTINUED | OUTPATIENT
Start: 2024-02-24 | End: 2024-02-24

## 2024-02-24 RX ORDER — CALCIUM ACETATE 667 MG/1
667 CAPSULE ORAL
Status: DISCONTINUED | OUTPATIENT
Start: 2024-02-25 | End: 2024-02-27 | Stop reason: HOSPADM

## 2024-02-24 RX ORDER — AMOXICILLIN 250 MG
2 CAPSULE ORAL 2 TIMES DAILY
Status: DISCONTINUED | OUTPATIENT
Start: 2024-02-24 | End: 2024-02-27 | Stop reason: HOSPADM

## 2024-02-24 RX ORDER — ACETAMINOPHEN 160 MG/5ML
650 SOLUTION ORAL EVERY 4 HOURS PRN
Status: DISCONTINUED | OUTPATIENT
Start: 2024-02-24 | End: 2024-02-27 | Stop reason: HOSPADM

## 2024-02-24 RX ORDER — SODIUM CHLORIDE 0.9 % (FLUSH) 0.9 %
10 SYRINGE (ML) INJECTION EVERY 12 HOURS SCHEDULED
Status: DISCONTINUED | OUTPATIENT
Start: 2024-02-24 | End: 2024-02-27 | Stop reason: HOSPADM

## 2024-02-24 RX ORDER — NICOTINE POLACRILEX 4 MG
15 LOZENGE BUCCAL
Status: DISCONTINUED | OUTPATIENT
Start: 2024-02-24 | End: 2024-02-27 | Stop reason: HOSPADM

## 2024-02-24 RX ORDER — ASPIRIN 81 MG/1
81 TABLET ORAL DAILY
Status: DISCONTINUED | OUTPATIENT
Start: 2024-02-25 | End: 2024-02-27 | Stop reason: HOSPADM

## 2024-02-24 RX ORDER — IBUPROFEN 600 MG/1
1 TABLET ORAL
Status: DISCONTINUED | OUTPATIENT
Start: 2024-02-24 | End: 2024-02-27 | Stop reason: HOSPADM

## 2024-02-24 RX ORDER — ACETAMINOPHEN 650 MG/1
650 SUPPOSITORY RECTAL EVERY 4 HOURS PRN
Status: DISCONTINUED | OUTPATIENT
Start: 2024-02-24 | End: 2024-02-27 | Stop reason: HOSPADM

## 2024-02-24 RX ORDER — BISACODYL 10 MG
10 SUPPOSITORY, RECTAL RECTAL DAILY PRN
Status: DISCONTINUED | OUTPATIENT
Start: 2024-02-24 | End: 2024-02-27 | Stop reason: HOSPADM

## 2024-02-24 RX ORDER — DEXTROSE MONOHYDRATE 25 G/50ML
25 INJECTION, SOLUTION INTRAVENOUS
Status: DISCONTINUED | OUTPATIENT
Start: 2024-02-24 | End: 2024-02-27 | Stop reason: HOSPADM

## 2024-02-24 RX ORDER — BISACODYL 5 MG/1
5 TABLET, DELAYED RELEASE ORAL DAILY PRN
Status: DISCONTINUED | OUTPATIENT
Start: 2024-02-24 | End: 2024-02-27 | Stop reason: HOSPADM

## 2024-02-24 RX ORDER — PANTOPRAZOLE SODIUM 40 MG/1
40 TABLET, DELAYED RELEASE ORAL DAILY
Status: DISCONTINUED | OUTPATIENT
Start: 2024-02-25 | End: 2024-02-27 | Stop reason: HOSPADM

## 2024-02-24 RX ORDER — SODIUM CHLORIDE 9 MG/ML
40 INJECTION, SOLUTION INTRAVENOUS AS NEEDED
Status: DISCONTINUED | OUTPATIENT
Start: 2024-02-24 | End: 2024-02-27 | Stop reason: HOSPADM

## 2024-02-24 RX ORDER — TORSEMIDE 100 MG/1
100 TABLET ORAL DAILY
Status: DISCONTINUED | OUTPATIENT
Start: 2024-02-25 | End: 2024-02-27 | Stop reason: HOSPADM

## 2024-02-24 RX ORDER — METOPROLOL SUCCINATE 25 MG/1
25 TABLET, EXTENDED RELEASE ORAL 2 TIMES DAILY
COMMUNITY

## 2024-02-24 RX ORDER — ACETAMINOPHEN 325 MG/1
650 TABLET ORAL EVERY 4 HOURS PRN
Status: DISCONTINUED | OUTPATIENT
Start: 2024-02-24 | End: 2024-02-27 | Stop reason: HOSPADM

## 2024-02-24 RX ORDER — POLYETHYLENE GLYCOL 3350 17 G/17G
17 POWDER, FOR SOLUTION ORAL DAILY PRN
Status: DISCONTINUED | OUTPATIENT
Start: 2024-02-24 | End: 2024-02-27 | Stop reason: HOSPADM

## 2024-02-24 RX ORDER — SODIUM CHLORIDE 0.9 % (FLUSH) 0.9 %
10 SYRINGE (ML) INJECTION AS NEEDED
Status: DISCONTINUED | OUTPATIENT
Start: 2024-02-24 | End: 2024-02-27 | Stop reason: HOSPADM

## 2024-02-24 RX ORDER — CLONAZEPAM 0.5 MG/1
0.5 TABLET ORAL DAILY PRN
Status: DISCONTINUED | OUTPATIENT
Start: 2024-02-24 | End: 2024-02-27 | Stop reason: HOSPADM

## 2024-02-24 RX ORDER — CHOLECALCIFEROL (VITAMIN D3) 125 MCG
5 CAPSULE ORAL NIGHTLY PRN
Status: DISCONTINUED | OUTPATIENT
Start: 2024-02-24 | End: 2024-02-27 | Stop reason: HOSPADM

## 2024-02-24 RX ORDER — MELATONIN
1000 DAILY
Status: DISCONTINUED | OUTPATIENT
Start: 2024-02-25 | End: 2024-02-27 | Stop reason: HOSPADM

## 2024-02-24 RX ORDER — HYDRALAZINE HYDROCHLORIDE 10 MG/1
10 TABLET, FILM COATED ORAL 3 TIMES DAILY
COMMUNITY

## 2024-02-24 RX ORDER — FINASTERIDE 5 MG/1
5 TABLET, FILM COATED ORAL DAILY
Status: DISCONTINUED | OUTPATIENT
Start: 2024-02-25 | End: 2024-02-27 | Stop reason: HOSPADM

## 2024-02-24 RX ORDER — CALCITRIOL 0.25 UG/1
0.5 CAPSULE, LIQUID FILLED ORAL DAILY
Status: DISCONTINUED | OUTPATIENT
Start: 2024-02-25 | End: 2024-02-27 | Stop reason: HOSPADM

## 2024-02-24 RX ORDER — ONDANSETRON 2 MG/ML
4 INJECTION INTRAMUSCULAR; INTRAVENOUS EVERY 6 HOURS PRN
Status: DISCONTINUED | OUTPATIENT
Start: 2024-02-24 | End: 2024-02-27 | Stop reason: HOSPADM

## 2024-02-24 RX ORDER — METOPROLOL SUCCINATE 25 MG/1
25 TABLET, EXTENDED RELEASE ORAL 2 TIMES DAILY
Status: DISCONTINUED | OUTPATIENT
Start: 2024-02-24 | End: 2024-02-27 | Stop reason: HOSPADM

## 2024-02-24 RX ORDER — FOLIC ACID/VIT B COMPLEX AND C 0.8 MG
1 TABLET ORAL DAILY
Status: DISCONTINUED | OUTPATIENT
Start: 2024-02-25 | End: 2024-02-27 | Stop reason: HOSPADM

## 2024-02-24 RX ORDER — NITROGLYCERIN 0.4 MG/1
0.4 TABLET SUBLINGUAL
Status: DISCONTINUED | OUTPATIENT
Start: 2024-02-24 | End: 2024-02-27 | Stop reason: HOSPADM

## 2024-02-24 RX ORDER — FUROSEMIDE 10 MG/ML
40 INJECTION INTRAMUSCULAR; INTRAVENOUS ONCE
Status: COMPLETED | OUTPATIENT
Start: 2024-02-24 | End: 2024-02-24

## 2024-02-24 RX ORDER — AMIODARONE HYDROCHLORIDE 200 MG/1
200 TABLET ORAL DAILY
Status: DISCONTINUED | OUTPATIENT
Start: 2024-02-25 | End: 2024-02-27 | Stop reason: HOSPADM

## 2024-02-24 RX ORDER — INSULIN LISPRO 100 [IU]/ML
2-9 INJECTION, SOLUTION INTRAVENOUS; SUBCUTANEOUS
Status: DISCONTINUED | OUTPATIENT
Start: 2024-02-25 | End: 2024-02-27 | Stop reason: HOSPADM

## 2024-02-24 RX ORDER — AMIODARONE HYDROCHLORIDE 200 MG/1
200 TABLET ORAL DAILY
COMMUNITY

## 2024-02-24 RX ADMIN — METOPROLOL SUCCINATE 25 MG: 25 TABLET, EXTENDED RELEASE ORAL at 23:07

## 2024-02-24 RX ADMIN — APIXABAN 5 MG: 5 TABLET, FILM COATED ORAL at 23:06

## 2024-02-24 RX ADMIN — INSULIN DETEMIR 15 UNITS: 100 INJECTION, SOLUTION SUBCUTANEOUS at 23:07

## 2024-02-24 RX ADMIN — FUROSEMIDE 40 MG: 10 INJECTION, SOLUTION INTRAMUSCULAR; INTRAVENOUS at 19:49

## 2024-02-24 RX ADMIN — Medication 10 ML: at 23:08

## 2024-02-24 RX ADMIN — CLONAZEPAM 0.5 MG: 0.5 TABLET ORAL at 23:15

## 2024-02-24 NOTE — Clinical Note
Level of Care: Telemetry [5]   Diagnosis: Volume overload [143760]   Admitting Physician: DAVID CHARLES [[id number]]   refused physical by ED MD

## 2024-02-24 NOTE — ED PROVIDER NOTES
Subjective   History of Present Illness  Pt is a 74 yo male presenting to ED with complaints of SOB and swelling. PMHx significant for CAD hx of CABG, HTN, HLD, DM (insulin), CVA, CKD and Anxiety. Pt with hx of infected peritoneal dialysis cath and recently had Left AVF and dialysis cath placed at [facility] 2-16-24. During procedure he developed hypotension and Afib with RVR. Pt sent to [facility] for cardiology consult and was cardioverted 2-19 to NSR. Pt started on hemodialysis on 2-16-24 per records. Patient still receiving IV Vancomycin on dialysis days Monday, Wednesday and Friday for reported 2 more weeks. Pt came to  ED due to worsening SOB, dry cough and edema to arms and legs. He denies fever, CP, N/V/D or abdominal pain. He does still procedure some urine about 1-2 times daily. He recently was started on Amlodipine, Metoprolol, Hydralazin and Eliquis with last admission. He quit using tobacco in 1986 and denies ETOH or drug use.    History provided by:  Patient, medical records and spouse      Review of Systems   Constitutional:  Positive for fatigue. Negative for fever.   Respiratory:  Positive for cough and shortness of breath.    Cardiovascular:  Positive for leg swelling. Negative for chest pain.   Gastrointestinal:  Negative for abdominal pain, diarrhea, nausea and vomiting.   Genitourinary:  Negative for flank pain.   Musculoskeletal:  Negative for back pain.   Neurological:  Positive for weakness (generalized). Negative for dizziness and headaches.   Psychiatric/Behavioral:  Negative for confusion.        Past Medical History:   Diagnosis Date    Anxiety     Anxiety disorder     Back pain     Chronic back pain     work related injury    CKD (chronic kidney disease)     most recent creatinine 2.7 on 01/26/16    Coronary artery disease     Diabetes mellitus     insulin dependent diabetes Onset 2010    Dyslipidemia     Hyperlipidemia     Hypertension     Ischemic heart disease     Renal failure  "    Stroke     TIA / transient right vision loss age 42    TIA (transient ischemic attack)     Vertigo        Allergies   Allergen Reactions    Doxycycline Other (See Comments)     Joint pain, tongue swelling    \"Body locks up\"    Levofloxacin Shortness Of Breath    Gabapentin Confusion    Augmentin [Amoxicillin-Pot Clavulanate] Palpitations    Biaxin [Clarithromycin] Palpitations    Coreg [Carvedilol] Itching    Crestor [Rosuvastatin Calcium] Itching     Itching rash    Iodine Rash     \"pineda skin\"    Lipitor [Atorvastatin] Itching     And Crestor- generalized weakness and chest tightness    Lisinopril Cough     Cough /weakness, nauseas and dirrhea    Livalo [Pitavastatin] Unknown - Low Severity     Chest tightness    Nortriptyline Hcl Other (See Comments)     Arthralgias    Pravastatin Unknown - Low Severity     Chest , neck and arm discomfort    Questran [Cholestyramine] Unknown - Low Severity       Past Surgical History:   Procedure Laterality Date    APPENDECTOMY      CARDIAC CATHETERIZATION      CHOLECYSTECTOMY  2012    COLONOSCOPY      CORONARY ARTERY BYPASS GRAFT      ENDOSCOPY         No family history on file.    Social History     Socioeconomic History    Marital status: Single   Tobacco Use    Smoking status: Former     Packs/day: 2.00     Years: 35.00     Additional pack years: 0.00     Total pack years: 70.00     Types: Cigarettes     Quit date: 1986     Years since quittin.5    Smokeless tobacco: Never   Vaping Use    Vaping Use: Never used   Substance and Sexual Activity    Alcohol use: No    Drug use: No    Sexual activity: Defer           Objective   Physical Exam  Vitals and nursing note reviewed.   Constitutional:       General: He is not in acute distress.     Comments: Ill appearing     HENT:      Head: Atraumatic.   Cardiovascular:      Rate and Rhythm: Normal rate.   Pulmonary:      Effort: Pulmonary effort is normal. No tachypnea.      Breath sounds: Normal breath sounds. No " decreased breath sounds.   Abdominal:      Palpations: Abdomen is soft.      Tenderness: There is no abdominal tenderness.   Musculoskeletal:         General: Normal range of motion.      Cervical back: Normal range of motion.      Right lower leg: Edema present.      Left lower leg: Edema present.   Skin:     General: Skin is warm.   Neurological:      Mental Status: He is alert.   Psychiatric:         Mood and Affect: Mood normal.         Behavior: Behavior normal.         Procedures           ED Course      Recent Results (from the past 24 hour(s))   ECG 12 Lead ED Triage Standing Order; SOA    Collection Time: 02/24/24  5:18 PM   Result Value Ref Range    QT Interval 384 ms    QTC Interval 451 ms   Comprehensive Metabolic Panel    Collection Time: 02/24/24  5:27 PM    Specimen: Blood   Result Value Ref Range    Glucose 323 (H) 65 - 99 mg/dL    BUN 35 (H) 8 - 23 mg/dL    Creatinine 6.59 (H) 0.76 - 1.27 mg/dL    Sodium 132 (L) 136 - 145 mmol/L    Potassium 4.3 3.5 - 5.2 mmol/L    Chloride 94 (L) 98 - 107 mmol/L    CO2 23.0 22.0 - 29.0 mmol/L    Calcium 9.5 8.6 - 10.5 mg/dL    Total Protein 6.3 6.0 - 8.5 g/dL    Albumin 3.6 3.5 - 5.2 g/dL    ALT (SGPT) 19 1 - 41 U/L    AST (SGOT) 16 1 - 40 U/L    Alkaline Phosphatase 84 39 - 117 U/L    Total Bilirubin 0.4 0.0 - 1.2 mg/dL    Globulin 2.7 gm/dL    A/G Ratio 1.3 g/dL    BUN/Creatinine Ratio 5.3 (L) 7.0 - 25.0    Anion Gap 15.0 5.0 - 15.0 mmol/L    eGFR 8.4 (L) >60.0 mL/min/1.73   BNP    Collection Time: 02/24/24  5:27 PM    Specimen: Blood   Result Value Ref Range    proBNP >70,000.0 (H) 0.0 - 900.0 pg/mL   Single High Sensitivity Troponin T    Collection Time: 02/24/24  5:27 PM    Specimen: Blood   Result Value Ref Range    HS Troponin T 415 (C) <22 ng/L   Green Top (Gel)    Collection Time: 02/24/24  5:27 PM   Result Value Ref Range    Extra Tube Hold for add-ons.    Lavender Top    Collection Time: 02/24/24  5:27 PM   Result Value Ref Range    Extra Tube hold for  add-on    Gold Top - SST    Collection Time: 02/24/24  5:27 PM   Result Value Ref Range    Extra Tube Hold for add-ons.    Light Blue Top    Collection Time: 02/24/24  5:27 PM   Result Value Ref Range    Extra Tube Hold for add-ons.    CBC Auto Differential    Collection Time: 02/24/24  5:27 PM    Specimen: Blood   Result Value Ref Range    WBC 10.18 3.40 - 10.80 10*3/mm3    RBC 3.10 (L) 4.14 - 5.80 10*6/mm3    Hemoglobin 9.9 (L) 13.0 - 17.7 g/dL    Hematocrit 31.9 (L) 37.5 - 51.0 %    .9 (H) 79.0 - 97.0 fL    MCH 31.9 26.6 - 33.0 pg    MCHC 31.0 (L) 31.5 - 35.7 g/dL    RDW 21.2 (H) 12.3 - 15.4 %    RDW-SD 76.2 (H) 37.0 - 54.0 fl    MPV 12.1 (H) 6.0 - 12.0 fL    Platelets 130 (L) 140 - 450 10*3/mm3    Neutrophil % 77.9 (H) 42.7 - 76.0 %    Lymphocyte % 8.0 (L) 19.6 - 45.3 %    Monocyte % 9.7 5.0 - 12.0 %    Eosinophil % 2.4 0.3 - 6.2 %    Basophil % 0.5 0.0 - 1.5 %    Immature Grans % 1.5 (H) 0.0 - 0.5 %    Neutrophils, Absolute 7.94 (H) 1.70 - 7.00 10*3/mm3    Lymphocytes, Absolute 0.81 0.70 - 3.10 10*3/mm3    Monocytes, Absolute 0.99 (H) 0.10 - 0.90 10*3/mm3    Eosinophils, Absolute 0.24 0.00 - 0.40 10*3/mm3    Basophils, Absolute 0.05 0.00 - 0.20 10*3/mm3    Immature Grans, Absolute 0.15 (H) 0.00 - 0.05 10*3/mm3    nRBC 0.4 (H) 0.0 - 0.2 /100 WBC   Scan Slide    Collection Time: 02/24/24  5:27 PM    Specimen: Blood   Result Value Ref Range    Macrocytes Slight/1+ None Seen    WBC Morphology Normal Normal    Platelet Morphology Normal Normal   Magnesium    Collection Time: 02/24/24  5:27 PM    Specimen: Blood   Result Value Ref Range    Magnesium 2.1 1.6 - 2.4 mg/dL   COVID-19 and FLU A/B PCR, 1 HR TAT - Swab, Nasopharynx    Collection Time: 02/24/24  5:28 PM    Specimen: Nasopharynx; Swab   Result Value Ref Range    COVID19 Not Detected Not Detected - Ref. Range    Influenza A PCR Not Detected Not Detected    Influenza B PCR Not Detected Not Detected     Note: In addition to lab results from this visit,  the labs listed above may include labs taken at another facility or during a different encounter within the last 24 hours. Please correlate lab times with ED admission and discharge times for further clarification of the services performed during this visit.    XR Chest 1 View   Final Result   Impression:   Mild pulmonary edema and small bilateral pleural effusions.         Electronically Signed: Kobe Phoenix MD     2/24/2024 5:21 PM EST     Workstation ID: [id number]        Vitals:    02/24/24 1729 02/24/24 1730 02/24/24 1829 02/24/24 1830   BP:    148/79   BP Location:       Patient Position:       Pulse: 83   82   Resp:  18     Temp:       TempSrc:       SpO2: 100%  100%    Weight:       Height:         Medications   sodium chloride 0.9 % flush 10 mL (has no administration in time range)   furosemide (LASIX) injection 40 mg (has no administration in time range)     ECG/EMG Results (last 24 hours)       Procedure Component Value Units Date/Time    ECG 12 Lead ED Triage Standing Order; SOA [[id number]] Collected: 02/24/24 1718     Updated: 02/24/24 1719     QT Interval 384 ms      QTC Interval 451 ms     Narrative:      Test Reason : ED Triage Standing Order~  Blood Pressure :   */*   mmHG  Vent. Rate :  83 BPM     Atrial Rate :  83 BPM     P-R Int : 162 ms          QRS Dur :  96 ms      QT Int : 384 ms       P-R-T Axes :  28  63 212 degrees     QTc Int : 451 ms    Normal sinus rhythm  Low voltage QRS  Cannot rule out Anterior infarct , age undetermined  T wave abnormality, consider inferolateral ischemia  Abnormal ECG  When compared with ECG of 30-DEC-2015 03:26,  Minimal criteria for Anterior infarct are now present    Referred By: EDMD           Confirmed By:           ECG 12 Lead ED Triage Standing Order; SOA   Preliminary Result   Test Reason : ED Triage Standing Order~   Blood Pressure :   */*   mmHG   Vent. Rate :  83 BPM     Atrial Rate :  83 BPM      P-R Int : 162 ms          QRS Dur :  96 ms       QT Int :  384 ms       P-R-T Axes :  28  63 212 degrees      QTc Int : 451 ms      Normal sinus rhythm   Low voltage QRS   Cannot rule out Anterior infarct , age undetermined   T wave abnormality, consider inferolateral ischemia   Abnormal ECG   When compared with ECG of 30-DEC-2015 03:26,   Minimal criteria for Anterior infarct are now present      Referred By: EDMD           Confirmed By:                                                  Medical Decision Making  Pt is a 74 yo male presenting to ED with complaints of SOB and edema. Labs in ED notable for WBC 10.18, Cr 6.59, Glucose 323, K 4.3, Na 132, HS Trop 418 and BNP 70,000. EKG NSR. CXR with pulmonary edema and pleural effusions. Discussed results and tx plan with patient and family. Agreeable with plan for admission. Ordered dose of Bumex but shortage so changed to Lasix in ED since he still does procedure urine 1-2 times daily.     Discussed patient with Dr. Rosas who is agreeable with ED course and tx plan.   Discussed admission with hospitalist Dr. Rowe.     DDx  Pulm edema, Pneumonia, Electrolyte abnormality, Resp distress, ACS, NSTEMI      Problems Addressed:  Acute pulmonary edema: complicated acute illness or injury  ESRD on dialysis: complicated acute illness or injury  Hyperglycemia due to diabetes mellitus: complicated acute illness or injury  Hypervolemia, unspecified hypervolemia type: complicated acute illness or injury  Pleural effusion: complicated acute illness or injury    Amount and/or Complexity of Data Reviewed  Independent Historian: spouse  External Data Reviewed: notes.     Details: Reviewed previous non ED visits including prior labs, imaging, available notes, medications, allergies and surgical hx.     Labs: ordered. Decision-making details documented in ED Course.  Radiology: ordered. Decision-making details documented in ED Course.  ECG/medicine tests: ordered. Decision-making details documented in ED Course.    Risk  Prescription drug  management.  Decision regarding hospitalization.        Final diagnoses:   Hypervolemia, unspecified hypervolemia type   Acute pulmonary edema   Pleural effusion   ESRD on dialysis   Hyperglycemia due to diabetes mellitus       ED Disposition  ED Disposition       ED Disposition   Decision to Admit    Condition   --    Comment   Level of Care: Telemetry [5]   Diagnosis: Volume overload [000930]   Admitting Physician: DAVID CHARLES [[id number]]   Certification: I Certify That Inpatient Hospital Services Are Medically Necessary For Greater Than 2 Midnights                 No follow-up provider specified.       Medication List      No changes were made to your prescriptions during this visit.            Malorie Jacobson PA  02/24/24 1945

## 2024-02-25 ENCOUNTER — APPOINTMENT (OUTPATIENT)
Dept: NEPHROLOGY | Facility: HOSPITAL | Age: 74
End: 2024-02-25
Payer: MEDICARE

## 2024-02-25 LAB
ANION GAP SERPL CALCULATED.3IONS-SCNC: 15 MMOL/L (ref 5–15)
BASOPHILS # BLD AUTO: 0.06 10*3/MM3 (ref 0–0.2)
BASOPHILS NFR BLD AUTO: 0.6 % (ref 0–1.5)
BUN SERPL-MCNC: 41 MG/DL (ref 8–23)
BUN/CREAT SERPL: 5.6 (ref 7–25)
CALCIUM SPEC-SCNC: 9.4 MG/DL (ref 8.6–10.5)
CHLORIDE SERPL-SCNC: 98 MMOL/L (ref 98–107)
CO2 SERPL-SCNC: 23 MMOL/L (ref 22–29)
CREAT SERPL-MCNC: 7.28 MG/DL (ref 0.76–1.27)
DEPRECATED RDW RBC AUTO: 74.8 FL (ref 37–54)
EGFRCR SERPLBLD CKD-EPI 2021: 7.4 ML/MIN/1.73
EOSINOPHIL # BLD AUTO: 0.29 10*3/MM3 (ref 0–0.4)
EOSINOPHIL NFR BLD AUTO: 3 % (ref 0.3–6.2)
ERYTHROCYTE [DISTWIDTH] IN BLOOD BY AUTOMATED COUNT: 20.9 % (ref 12.3–15.4)
GLUCOSE BLDC GLUCOMTR-MCNC: 119 MG/DL (ref 70–130)
GLUCOSE BLDC GLUCOMTR-MCNC: 133 MG/DL (ref 70–130)
GLUCOSE SERPL-MCNC: 154 MG/DL (ref 65–99)
HCT VFR BLD AUTO: 30.9 % (ref 37.5–51)
HGB BLD-MCNC: 9.3 G/DL (ref 13–17.7)
IMM GRANULOCYTES # BLD AUTO: 0.14 10*3/MM3 (ref 0–0.05)
IMM GRANULOCYTES NFR BLD AUTO: 1.4 % (ref 0–0.5)
LYMPHOCYTES # BLD AUTO: 1.01 10*3/MM3 (ref 0.7–3.1)
LYMPHOCYTES NFR BLD AUTO: 10.3 % (ref 19.6–45.3)
MAGNESIUM SERPL-MCNC: 2.1 MG/DL (ref 1.6–2.4)
MCH RBC QN AUTO: 30.5 PG (ref 26.6–33)
MCHC RBC AUTO-ENTMCNC: 30.1 G/DL (ref 31.5–35.7)
MCV RBC AUTO: 101.3 FL (ref 79–97)
MONOCYTES # BLD AUTO: 1.13 10*3/MM3 (ref 0.1–0.9)
MONOCYTES NFR BLD AUTO: 11.6 % (ref 5–12)
NEUTROPHILS NFR BLD AUTO: 7.14 10*3/MM3 (ref 1.7–7)
NEUTROPHILS NFR BLD AUTO: 73.1 % (ref 42.7–76)
NRBC BLD AUTO-RTO: 0.3 /100 WBC (ref 0–0.2)
PHOSPHATE SERPL-MCNC: 7.7 MG/DL (ref 2.5–4.5)
PLATELET # BLD AUTO: 135 10*3/MM3 (ref 140–450)
PMV BLD AUTO: 11.3 FL (ref 6–12)
POTASSIUM SERPL-SCNC: 4.4 MMOL/L (ref 3.5–5.2)
RBC # BLD AUTO: 3.05 10*6/MM3 (ref 4.14–5.8)
SODIUM SERPL-SCNC: 136 MMOL/L (ref 136–145)
VANCOMYCIN SERPL-MCNC: 14.7 MCG/ML (ref 5–40)
WBC NRBC COR # BLD AUTO: 9.77 10*3/MM3 (ref 3.4–10.8)

## 2024-02-25 PROCEDURE — 99232 SBSQ HOSP IP/OBS MODERATE 35: CPT | Performed by: INTERNAL MEDICINE

## 2024-02-25 PROCEDURE — 84100 ASSAY OF PHOSPHORUS: CPT | Performed by: NURSE PRACTITIONER

## 2024-02-25 PROCEDURE — 97165 OT EVAL LOW COMPLEX 30 MIN: CPT

## 2024-02-25 PROCEDURE — 5A1D70Z PERFORMANCE OF URINARY FILTRATION, INTERMITTENT, LESS THAN 6 HOURS PER DAY: ICD-10-PCS | Performed by: INTERNAL MEDICINE

## 2024-02-25 PROCEDURE — 63710000001 INSULIN DETEMIR PER 5 UNITS: Performed by: NURSE PRACTITIONER

## 2024-02-25 PROCEDURE — 80202 ASSAY OF VANCOMYCIN: CPT | Performed by: NURSE PRACTITIONER

## 2024-02-25 PROCEDURE — 92610 EVALUATE SWALLOWING FUNCTION: CPT

## 2024-02-25 PROCEDURE — 80048 BASIC METABOLIC PNL TOTAL CA: CPT | Performed by: INTERNAL MEDICINE

## 2024-02-25 PROCEDURE — 25010000002 ONDANSETRON PER 1 MG: Performed by: NURSE PRACTITIONER

## 2024-02-25 PROCEDURE — 25010000002 HEPARIN (PORCINE) PER 1000 UNITS: Performed by: INTERNAL MEDICINE

## 2024-02-25 PROCEDURE — 82948 REAGENT STRIP/BLOOD GLUCOSE: CPT

## 2024-02-25 PROCEDURE — 97161 PT EVAL LOW COMPLEX 20 MIN: CPT | Performed by: PHYSICAL THERAPIST

## 2024-02-25 PROCEDURE — 85025 COMPLETE CBC W/AUTO DIFF WBC: CPT | Performed by: NURSE PRACTITIONER

## 2024-02-25 PROCEDURE — 83735 ASSAY OF MAGNESIUM: CPT | Performed by: NURSE PRACTITIONER

## 2024-02-25 RX ORDER — ALBUMIN (HUMAN) 12.5 G/50ML
12.5 SOLUTION INTRAVENOUS AS NEEDED
Status: CANCELLED | OUTPATIENT
Start: 2024-02-25 | End: 2024-02-26

## 2024-02-25 RX ORDER — HEPARIN SODIUM 1000 [USP'U]/ML
2000 INJECTION, SOLUTION INTRAVENOUS; SUBCUTANEOUS AS NEEDED
Status: DISCONTINUED | OUTPATIENT
Start: 2024-02-25 | End: 2024-02-27 | Stop reason: HOSPADM

## 2024-02-25 RX ORDER — DIPHENHYDRAMINE HCL 25 MG
25 CAPSULE ORAL EVERY 6 HOURS PRN
COMMUNITY

## 2024-02-25 RX ORDER — CETIRIZINE HYDROCHLORIDE 10 MG/1
10 TABLET ORAL DAILY
Status: DISCONTINUED | OUTPATIENT
Start: 2024-02-25 | End: 2024-02-27 | Stop reason: HOSPADM

## 2024-02-25 RX ADMIN — CLONAZEPAM 0.5 MG: 0.5 TABLET ORAL at 21:50

## 2024-02-25 RX ADMIN — HEPARIN SODIUM 2000 UNITS: 1000 INJECTION INTRAVENOUS; SUBCUTANEOUS at 20:56

## 2024-02-25 RX ADMIN — AMIODARONE HYDROCHLORIDE 200 MG: 200 TABLET ORAL at 09:54

## 2024-02-25 RX ADMIN — TORSEMIDE 100 MG: 100 TABLET ORAL at 09:54

## 2024-02-25 RX ADMIN — ONDANSETRON 4 MG: 2 INJECTION INTRAMUSCULAR; INTRAVENOUS at 21:50

## 2024-02-25 RX ADMIN — APIXABAN 5 MG: 5 TABLET, FILM COATED ORAL at 09:53

## 2024-02-25 RX ADMIN — PANTOPRAZOLE SODIUM 40 MG: 40 TABLET, DELAYED RELEASE ORAL at 09:53

## 2024-02-25 RX ADMIN — Medication 1000 UNITS: at 09:54

## 2024-02-25 RX ADMIN — DOCUSATE SODIUM 50MG AND SENNOSIDES 8.6MG 2 TABLET: 8.6; 5 TABLET, FILM COATED ORAL at 09:53

## 2024-02-25 RX ADMIN — CALCIUM ACETATE 667 MG: 667 CAPSULE ORAL at 09:54

## 2024-02-25 RX ADMIN — Medication 10 ML: at 21:45

## 2024-02-25 RX ADMIN — Medication 1 TABLET: at 09:53

## 2024-02-25 RX ADMIN — ASPIRIN 81 MG: 81 TABLET, COATED ORAL at 09:53

## 2024-02-25 RX ADMIN — Medication 5 MG: at 21:50

## 2024-02-25 RX ADMIN — CETIRIZINE HYDROCHLORIDE 10 MG: 10 TABLET, FILM COATED ORAL at 02:02

## 2024-02-25 RX ADMIN — APIXABAN 5 MG: 5 TABLET, FILM COATED ORAL at 21:43

## 2024-02-25 RX ADMIN — METOPROLOL SUCCINATE 25 MG: 25 TABLET, EXTENDED RELEASE ORAL at 09:53

## 2024-02-25 RX ADMIN — METOPROLOL SUCCINATE 25 MG: 25 TABLET, EXTENDED RELEASE ORAL at 21:43

## 2024-02-25 RX ADMIN — INSULIN DETEMIR 15 UNITS: 100 INJECTION, SOLUTION SUBCUTANEOUS at 21:43

## 2024-02-25 RX ADMIN — ACETAMINOPHEN 650 MG: 325 TABLET ORAL at 07:29

## 2024-02-25 RX ADMIN — INSULIN DETEMIR 15 UNITS: 100 INJECTION, SOLUTION SUBCUTANEOUS at 09:52

## 2024-02-25 RX ADMIN — FINASTERIDE 5 MG: 5 TABLET, FILM COATED ORAL at 09:53

## 2024-02-25 RX ADMIN — CALCITRIOL CAPSULES 0.25 MCG 0.5 MCG: 0.25 CAPSULE ORAL at 09:54

## 2024-02-25 RX ADMIN — ACETAMINOPHEN 650 MG: 325 TABLET ORAL at 21:50

## 2024-02-25 RX ADMIN — Medication 1 APPLICATION: at 09:55

## 2024-02-25 RX ADMIN — CALCIUM ACETATE 667 MG: 667 CAPSULE ORAL at 12:10

## 2024-02-25 NOTE — THERAPY EVALUATION
Acute Care - Speech Language Pathology   Swallow Initial Evaluation [facility]  Clinical Swallow Evaluation       Patient Name: Erlin Savage  : 1950  MRN: [id number]  Today's Date: 2024               Admit Date: 2024    Visit Dx:     ICD-10-CM ICD-9-CM   1. Hypervolemia, unspecified hypervolemia type  E87.70 276.69   2. Acute pulmonary edema  J81.0 518.4   3. Pleural effusion  J90 511.9   4. ESRD on dialysis  N18.6 585.6    Z99.2 V45.11   5. Hyperglycemia due to diabetes mellitus  E11.65 250.02     Patient Active Problem List   Diagnosis    Coronary artery disease involving native coronary artery of native heart without angina pectoris    Essential hypertension    CKD (chronic kidney disease)    Hyperlipidemia LDL goal <70    Type 2 diabetes mellitus with kidney complication, with long-term current use of insulin    TIA (transient ischemic attack)    Ischemic heart disease    Chronic back pain    Dyslipidemia    Anxiety disorder    Stage 5 chronic kidney disease on chronic dialysis    Neuralgia    Long term (current) use of insulin    Volume overload    Secondary pulmonary arterial hypertension    Peritonitis, unspecified    ESRD (end stage renal disease)    Iron deficiency anemia    Mitral valve regurgitation    Shortness of breath    Hypertensive chronic kidney disease with stage 1 through stage 4 chronic kidney disease, or unspecified chronic kidney disease    CAD (coronary artery disease)    NSTEMI (non-ST elevated myocardial infarction)    Hyperlipidemia, unspecified    Type 2 diabetes mellitus with diabetic chronic kidney disease    Elevated troponin    Atrial fibrillation and flutter    COPD (chronic obstructive pulmonary disease)    On home oxygen therapy    Macrocytic anemia    Thrombocytopenia     Past Medical History:   Diagnosis Date    Anxiety     Anxiety disorder     Back pain     Chronic back pain     work related injury    CKD (chronic kidney disease)     most recent creatinine  2.7 on 01/26/16    Coronary artery disease     Diabetes mellitus     insulin dependent diabetes Onset 2010    Dyslipidemia     Hyperlipidemia     Hypertension     Ischemic heart disease     Renal failure     Stroke     TIA / transient right vision loss age 42    TIA (transient ischemic attack)     Vertigo      Past Surgical History:   Procedure Laterality Date    APPENDECTOMY      CARDIAC CATHETERIZATION      CHOLECYSTECTOMY  03/2012    COLONOSCOPY      CORONARY ARTERY BYPASS GRAFT      ENDOSCOPY         SLP Recommendation and Plan  SLP Swallowing Diagnosis: swallow WFL/no suspected pharyngeal impairment, suspected esophageal dysphagia (02/25/24 1500)  SLP Diet Recommendation: regular textures, thin liquids (02/25/24 1500)  Recommended Precautions and Strategies: upright posture during/after eating, general aspiration precautions, alternate between small bites of food and sips of liquid (small meals, slow rate) (02/25/24 1500)  SLP Rec. for Method of Medication Administration: as tolerated (02/25/24 1500)     Monitor for Signs of Aspiration: yes, notify SLP if any concerns (02/25/24 1500)  Recommended Diagnostics: No further SLP services recommended (02/25/24 1500)  Swallow Criteria for Skilled Therapeutic Interventions Met: no problems identified which require skilled intervention (02/25/24 1500)  Anticipated Discharge Disposition (SLP): home (02/25/24 1500)     Therapy Frequency (Swallow): evaluation only (02/25/24 1500)     Oral Care Recommendations: Oral Care BID/PRN (02/25/24 1500)  Demonstrates Need for Referral to Another Service: gastroenterology (02/25/24 1500)                                     Plan of Care Reviewed With: patient      SWALLOW EVALUATION (last 72 hours)       SLP Adult Swallow Evaluation       Row Name 02/25/24 1500                   General Information    Current Method of Nutrition regular textures;thin liquids  -GA        Prior Level of Function-Communication WFL  -GA        Prior Level  "of Function-Swallowing no diet consistency restrictions  -GA           Oral Motor Structure and Function    Dentition Assessment missing teeth;teeth are in poor condition  -GA        Secretion Management WNL/WFL  -GA        Mucosal Quality moist, healthy  -GA           Oral Musculature and Cranial Nerve Assessment    Oral Motor General Assessment WFL  -GA           General Eating/Swallowing Observations    Respiratory Support Currently in Use nasal cannula  -GA        O2 Liters 3L  -GA        Eating/Swallowing Skills self-fed  -GA        Positioning During Eating other (see comments)  edge of bed  -GA        Utensils Used spoon;straw;cup  -GA        Consistencies Trialed regular textures;soft to chew textures;thin liquids  refused puree  -GA           Respiratory    Respiratory Status WFL  -GA           Clinical Swallow Eval    Oral Prep Phase WFL  -GA        Oral Transit WFL  -GA        Oral Residue WFL  -GA        Pharyngeal Phase no overt signs/symptoms of pharyngeal impairment  -GA        Esophageal Phase suspected esophageal impairment  -GA        Clinical Swallow Evaluation Summary Patient without overt s/sx of aspiration or penetration. However patient with complaints of \"food getting stuck and moving slow\" while pointing to his chest. No wet voice quality or evidence of regurgitation. Recommend patient follow GI precautions of small meals, slow rate, alternating liquids and solids. F/u with GI if persists.  -GA           Esophageal Phase Concerns    Esophageal Phase Concerns globus  -GA        Globus thin;mechanical soft;regular consistencies  -GA           SLP Evaluation Clinical Impression    SLP Swallowing Diagnosis swallow WFL/no suspected pharyngeal impairment;suspected esophageal dysphagia  -GA        Functional Impact risk of aspiration/pneumonia  -GA        Swallow Criteria for Skilled Therapeutic Interventions Met no problems identified which require skilled intervention  -GA           " Recommendations    Therapy Frequency (Swallow) evaluation only  -GA        SLP Diet Recommendation regular textures;thin liquids  -GA        Recommended Diagnostics No further SLP services recommended  -GA        Recommended Precautions and Strategies upright posture during/after eating;general aspiration precautions;alternate between small bites of food and sips of liquid  small meals, slow rate  -GA        Oral Care Recommendations Oral Care BID/PRN  -GA        SLP Rec. for Method of Medication Administration as tolerated  -GA        Monitor for Signs of Aspiration yes;notify SLP if any concerns  -GA        Anticipated Discharge Disposition (SLP) home  -GA        Demonstrates Need for Referral to Another Service gastroenterology  -GA                  User Key  (r) = Recorded By, (t) = Taken By, (c) = Cosigned By      Initials Name Effective Dates    GA Sarita Flores MS CCC-SLP 09/14/23 -                     EDUCATION  The patient has been educated in the following areas:   Dysphagia (Swallowing Impairment).              Time Calculation:    Time Calculation- SLP       Row Name 02/25/24 1703             Time Calculation- SLP    SLP Start Time 1500  -GA      SLP Received On 02/25/24  -GA         Untimed Charges    18862-RB Eval Oral Pharyng Swallow Minutes 60  -GA         Total Minutes    Untimed Charges Total Minutes 60  -GA       Total Minutes 60  -GA                User Key  (r) = Recorded By, (t) = Taken By, (c) = Cosigned By      Initials Name Provider Type    Sarita Engel MS CCC-SLP Speech and Language Pathologist                    Therapy Charges for Today       Code Description Service Date Service Provider Modifiers Qty    [id number]  ST EVAL ORAL PHARYNG SWALLOW 4 2/25/2024 Sarita Flores MS CCC-SLP GN 1                 Sarita Flores MS CCC-VANGIE  2/25/2024

## 2024-02-25 NOTE — PLAN OF CARE
Goal Outcome Evaluation:  Plan of Care Reviewed With: patient    SLP evaluation completed. Will sign-off on oral and pharyngeal swallow fx. Consider further f/u with GI if continued globus sensation in chest continues. Recommend regular/thin diet with strategies of small meals multiple times a day, alternating liquids and solids, slow pace, etc. Please see note for further details and recommendations.      Anticipated Discharge Disposition (SLP): home          SLP Swallowing Diagnosis: swallow WFL/no suspected pharyngeal impairment, suspected esophageal dysphagia (02/25/24 1500)

## 2024-02-25 NOTE — CONSULTS
Patient Care Team:  Joe Diallo MD as PCP - General (Family Medicine)  Fadi Garner MD as Consulting Physician (General Surgery)  Amor Celeste MD (Cardiology)  Praveen Novoa MD as Consulting Physician (Nephrology)  Kristin Moya MD as Consulting Physician (Internal Medicine)    Chief complaint: Shortness of breath     History of Present Illness: Mr Kern is a 74 yo gentleman with past medical hx of ESRD on MWF hailey, Afib, HTN, recent hx of peritonitis resulting in swtiching from PD to HD. Patient was admitted today for worsening shortness of breath and volume overload. Patient did go for HD on Friday unable to tell how much fluid was removed. Patient recently started HD 2 weeks ago following persistent infection w enterococci peritonitis resulting in removal of PD catheter. Patient underwent TDC placement AV fistula placement 2 weeks ago. He is currently on 3 liter of supp o2. C/o SOB and orthopnea.     Review of Systems   Constitutional: Negative.    HENT: Negative.     Respiratory:  Positive for shortness of breath.    Cardiovascular:  Positive for leg swelling.   Gastrointestinal: Negative.    Genitourinary: Negative.    Musculoskeletal: Negative.    Neurological: Negative.    Hematological: Negative.    Psychiatric/Behavioral: Negative.          Past Medical History:   Diagnosis Date    Anxiety     Anxiety disorder     Back pain     Chronic back pain     work related injury    CKD (chronic kidney disease)     most recent creatinine 2.7 on 01/26/16    Coronary artery disease     Diabetes mellitus     insulin dependent diabetes Onset 2010    Dyslipidemia     Hyperlipidemia     Hypertension     Ischemic heart disease     Renal failure     Stroke     TIA / transient right vision loss age 42    TIA (transient ischemic attack)     Vertigo    ,   Past Surgical History:   Procedure Laterality Date    APPENDECTOMY      CARDIAC CATHETERIZATION      CHOLECYSTECTOMY  03/2012    COLONOSCOPY       CORONARY ARTERY BYPASS GRAFT      ENDOSCOPY     , History reviewed. No pertinent family history.,   Social History     Socioeconomic History    Marital status: Single   Tobacco Use    Smoking status: Former     Packs/day: 2.00     Years: 35.00     Additional pack years: 0.00     Total pack years: 70.00     Types: Cigarettes     Quit date: 1986     Years since quittin.5    Smokeless tobacco: Never   Vaping Use    Vaping Use: Never used   Substance and Sexual Activity    Alcohol use: No    Drug use: No    Sexual activity: Defer     E-cigarette/Vaping    E-cigarette/Vaping Use Never User      E-cigarette/Vaping Substances    Nicotine No     THC No     CBD No     Flavoring No      E-cigarette/Vaping Devices    Disposable No     Pre-filled or Refillable Cartridge No     Refillable Tank No     Pre-filled Pod No          ,   Medications Prior to Admission   Medication Sig Dispense Refill Last Dose    amiodarone (PACERONE) 200 MG tablet Take 1 tablet by mouth Daily.   2024    apixaban (ELIQUIS) 5 MG tablet tablet Take 1 tablet by mouth 2 (Two) Times a Day.   2024    aspirin 81 MG EC tablet Take 1 tablet by mouth Daily.   2024    B Complex-C-Folic Acid (Carrol-Do Rx) 1 MG tablet Take 1 tablet by mouth Daily.   2024    calcitriol (ROCALTROL) 0.5 MCG capsule Take 1 capsule by mouth Daily.   2024    cholecalciferol (VITAMIN D3) 25 MCG (1000 UT) tablet Take 1 tablet by mouth Daily.   2024    clonazePAM (KlonoPIN) 0.5 MG tablet Take 1 tablet by mouth As Needed.   2024    diphenhydrAMINE (BENADRYL) 25 mg capsule Take 1 capsule by mouth Every 6 (Six) Hours As Needed for Itching. Between dialysis treatments   2024    dutasteride (Avodart) 0.5 MG capsule Take 1 capsule by mouth Daily. 30 capsule 5 2024    glucose blood (True Metrix Blood Glucose Test) test strip Test TID     /  Dx: E11.65 100 each 6 Past Week    hydrALAZINE (APRESOLINE) 10 MG tablet Take 1 tablet by mouth 3  (Three) Times a Day.   2/24/2024    insulin aspart (NovoLOG FlexPen) 100 UNIT/ML solution pen-injector sc pen Inject 12 Units under the skin into the appropriate area as directed 3 (Three) Times a Day With Meals. 15 mL 5 Past Week    insulin glargine (LANTUS) 100 UNIT/ML injection Inject 35 Units under the skin into the appropriate area as directed Daily. Take if BS is greater than 150   Patient Taking Differently    Insulin Pen Needle 32G X 6 MM misc Use 1 Needle 3 (Three) Times a Day Before Meals. 100 each 11 Past Week    metoprolol succinate XL (TOPROL-XL) 25 MG 24 hr tablet Take 1 tablet by mouth 2 (Two) Times a Day.   2/24/2024    pantoprazole (PROTONIX) 40 MG EC tablet Take 1 tablet by mouth Daily.   2/24/2024    Sucroferric Oxyhydroxide (Velphoro) 500 MG chewable tablet Chew 1 tablet 3 (Three) Times a Day.   2/24/2024    torsemide (DEMADEX) 100 MG tablet Take 1 tablet by mouth Daily.   2/24/2024    triamcinolone (KENALOG) 0.1 % cream APPLY TWICE A DAY TO ITCHY SKIN FOR UP TO 2 WEEKS PER MONTH AS NEEDED   Past Week    multivitamin with minerals tablet tablet Take 1 tablet by mouth Daily.      , and Scheduled Meds:  amiodarone, 200 mg, Oral, Daily  apixaban, 5 mg, Oral, BID  aspirin, 81 mg, Oral, Daily  b complex-vitamin c-folic acid, 1 tablet, Oral, Daily  calcitriol, 0.5 mcg, Oral, Daily  calcium acetate, 667 mg, Oral, TID With Meals  cetirizine, 10 mg, Oral, Daily  cholecalciferol, 1,000 Units, Oral, Daily  Dimethicone, 1 Application, Apply externally, Daily  finasteride, 5 mg, Oral, Daily  insulin detemir, 15 Units, Subcutaneous, Q12H  insulin lispro, 2-9 Units, Subcutaneous, 4x Daily AC & at Bedtime  metoprolol succinate XL, 25 mg, Oral, BID  pantoprazole, 40 mg, Oral, Daily  senna-docusate sodium, 2 tablet, Oral, BID  sodium chloride, 10 mL, Intravenous, Q12H  torsemide, 100 mg, Oral, Daily       Objective     Vital Signs  Temp:  [96 °F (35.6 °C)-96.8 °F (36 °C)] 96.8 °F (36 °C)  Heart Rate:  [73-85]  "73  Resp:  [18-20] 20  BP: (102-155)/(65-87) 155/87    I/O this shift:  In: 600 [P.O.:600]  Out: -   I/O last 3 completed shifts:  In: -   Out: 50 [Urine:50]    Physical Exam  Constitutional:       Appearance: Normal appearance.   HENT:      Head: Normocephalic.      Nose: Nose normal.      Mouth/Throat:      Mouth: Mucous membranes are moist.   Cardiovascular:      Pulses: Normal pulses.      Heart sounds: No murmur heard.  Pulmonary:      Effort: Pulmonary effort is normal.      Breath sounds: Rales present.   Abdominal:      General: Abdomen is flat.   Musculoskeletal:         General: Swelling present. Normal range of motion.      Right lower leg: Edema present.      Left lower leg: Edema present.      Comments: AV fistula left arm. Weak thrill. Not mature    Neurological:      General: No focal deficit present.      Mental Status: He is alert and oriented to person, place, and time. Mental status is at baseline.         Results Review:    I reviewed the patient's new clinical results.    WBC WBC   Date Value Ref Range Status   02/25/2024 9.77 3.40 - 10.80 10*3/mm3 Final   02/24/2024 10.18 3.40 - 10.80 10*3/mm3 Final      HGB Hemoglobin   Date Value Ref Range Status   02/25/2024 9.3 (L) 13.0 - 17.7 g/dL Final   02/24/2024 9.9 (L) 13.0 - 17.7 g/dL Final      HCT Hematocrit   Date Value Ref Range Status   02/25/2024 30.9 (L) 37.5 - 51.0 % Final   02/24/2024 31.9 (L) 37.5 - 51.0 % Final      Platlets No results found for: \"LABPLAT\"   MCV MCV   Date Value Ref Range Status   02/25/2024 101.3 (H) 79.0 - 97.0 fL Final   02/24/2024 102.9 (H) 79.0 - 97.0 fL Final          Sodium Sodium   Date Value Ref Range Status   02/25/2024 136 136 - 145 mmol/L Final   02/24/2024 132 (L) 136 - 145 mmol/L Final      Potassium Potassium   Date Value Ref Range Status   02/25/2024 4.4 3.5 - 5.2 mmol/L Final   02/24/2024 4.3 3.5 - 5.2 mmol/L Final      Chloride Chloride   Date Value Ref Range Status   02/25/2024 98 98 - 107 mmol/L Final " "  02/24/2024 94 (L) 98 - 107 mmol/L Final      CO2 CO2   Date Value Ref Range Status   02/25/2024 23.0 22.0 - 29.0 mmol/L Final   02/24/2024 23.0 22.0 - 29.0 mmol/L Final      BUN BUN   Date Value Ref Range Status   02/25/2024 41 (H) 8 - 23 mg/dL Final   02/24/2024 35 (H) 8 - 23 mg/dL Final      Creatinine Creatinine   Date Value Ref Range Status   02/25/2024 7.28 (H) 0.76 - 1.27 mg/dL Final   02/24/2024 6.59 (H) 0.76 - 1.27 mg/dL Final      Calcium Calcium   Date Value Ref Range Status   02/25/2024 9.4 8.6 - 10.5 mg/dL Final   02/24/2024 9.5 8.6 - 10.5 mg/dL Final      PO4 No results found for: \"CAPO4\"   Albumin Albumin   Date Value Ref Range Status   02/24/2024 3.6 3.5 - 5.2 g/dL Final      Magnesium Magnesium   Date Value Ref Range Status   02/25/2024 2.1 1.6 - 2.4 mg/dL Final   02/24/2024 2.1 1.6 - 2.4 mg/dL Final      Uric Acid No results found for: \"URICACID\"         Assessment & Plan       Volume overload    Hyperlipidemia LDL goal <70    Type 2 diabetes mellitus with kidney complication, with long-term current use of insulin    Dyslipidemia    Secondary pulmonary arterial hypertension    Peritonitis, unspecified    ESRD (end stage renal disease)    Hypertensive chronic kidney disease with stage 1 through stage 4 chronic kidney disease, or unspecified chronic kidney disease    Elevated troponin    Atrial fibrillation and flutter    COPD (chronic obstructive pulmonary disease)    On home oxygen therapy    Macrocytic anemia    Thrombocytopenia      Assessment & Plan    [place] [facility].Previously on PD stopped due to persistent enterococci peritonitis. PD cath removed. Getting 2 weeks of vancomycin post HD.    Anemia: AMANDA with HD     BMD: Continue home meds.     Volume status : Depedent edema noted. Admitted with SOB on supp o2.       Recs  Plan for HD today for optimization of volume status. 3hr treatment. 3 liter UF.   [place] [facility]  AMANDA on HD   Renal Diet   Resume home meds       I " discussed the patients findings and my recommendations with patient    Praveen Novoa MD  02/25/24  17:16 EST

## 2024-02-25 NOTE — PROGRESS NOTES
[facility] Medicine Services  PROGRESS NOTE    Patient Name: Erlin Savage  : 1950  MRN: [id number]    Date of Admission: 2024  Primary Care Physician: Joe Diallo MD    Subjective   Subjective     CC:  SOA    HPI:  Still feeling very SOA, unable to rest due to orthopnea.  Family at bedside this am.       Objective   Objective     Vital Signs:   Temp:  [96 °F (35.6 °C)-97.5 °F (36.4 °C)] 96.3 °F (35.7 °C)  Heart Rate:  [81-85] 81  Resp:  [18-22] 20  BP: (102-148)/(65-79) 102/65  Flow (L/min):  [2-3] 2     Physical Exam:  Constitutional: No acute distress, awake, alert  HENT: NCAT, mucous membranes moist  Respiratory: Crackles at bases, respiratory effort normal   Cardiovascular: RRR, no murmurs, rubs, or gallops  Gastrointestinal: Positive bowel sounds, soft, nontender, nondistended  Musculoskeletal: 2+pitting edema BLEs  Psychiatric: Appropriate affect, cooperative  Neurologic: Oriented x 3, strength symmetric in all extremities, Cranial Nerves grossly intact to confrontation, speech clear  Skin: No rashes   R sided tunneled HD catheter     Results Reviewed:  LAB RESULTS:      Lab 24  1727   WBC 9.77 10.18   HEMOGLOBIN 9.3* 9.9*   HEMATOCRIT 30.9* 31.9*   PLATELETS 135* 130*   NEUTROS ABS 7.14* 7.94*   IMMATURE GRANS (ABS) 0.14* 0.15*   LYMPHS ABS 1.01 0.81   MONOS ABS 1.13* 0.99*   EOS ABS 0.29 0.24   .3* 102.9*         Lab 24  04424  1935 24  1727   SODIUM 136  --  132*   POTASSIUM 4.4  --  4.3   CHLORIDE 98  --  94*   CO2 23.0  --  23.0   ANION GAP 15.0  --  15.0   BUN 41*  --  35*   CREATININE 7.28*  --  6.59*   EGFR 7.4*  --  8.4*   GLUCOSE 154*  --  323*   CALCIUM 9.4  --  9.5   MAGNESIUM 2.1  --  2.1   PHOSPHORUS 7.7*  --   --    HEMOGLOBIN A1C  --   --  7.50*   TSH  --  3.330  --          Lab 24  1727   TOTAL PROTEIN 6.3   ALBUMIN 3.6   GLOBULIN 2.7   ALT (SGPT) 19   AST (SGOT) 16   BILIRUBIN 0.4   ALK  PHOS 84         Lab 02/24/24 1935 02/24/24 1727   PROBNP  --  >70,000.0*   HSTROP T 403* 415*         Lab 02/24/24 1935   CHOLESTEROL 145   LDL CHOL 76   HDL CHOL 49   TRIGLYCERIDES 109             Brief Urine Lab Results  (Last result in the past 365 days)        Color   Clarity   Blood   Leuk Est   Nitrite   Protein   CREAT   Urine HCG        01/01/24 0550 Yellow   Clear     Negative                       Microbiology Results Abnormal       Procedure Component Value - Date/Time    COVID PRE-OP / PRE-PROCEDURE SCREENING ORDER (NO ISOLATION) - Swab, Nasopharynx [[id number]]  (Normal) Collected: 02/24/24 1728    Lab Status: Final result Specimen: Swab from Nasopharynx Updated: 02/24/24 1808    Narrative:      The following orders were created for panel order COVID PRE-OP / PRE-PROCEDURE SCREENING ORDER (NO ISOLATION) - Swab, Nasopharynx.  Procedure                               Abnormality         Status                     ---------                               -----------         ------                     COVID-19 and FLU A/B PCR...[[id number]]  Normal              Final result                 Please view results for these tests on the individual orders.    COVID-19 and FLU A/B PCR, 1 HR TAT - Swab, Nasopharynx [[id number]]  (Normal) Collected: 02/24/24 1728    Lab Status: Final result Specimen: Swab from Nasopharynx Updated: 02/24/24 1808     COVID19 Not Detected     Influenza A PCR Not Detected     Influenza B PCR Not Detected    Narrative:      Fact sheet for providers: https://www.fda.gov/media/121113/download    Fact sheet for patients: https://www.fda.gov/media/379996/download    Test performed by PCR.            XR Foot 2 View Bilateral    Result Date: 2/24/2024  XR FOOT 2 VW BILATERAL Date of Exam: 2/24/2024 10:00 PM EST Indication: pain, r/o osteo Comparison: None available. Findings: There is no evidence of an acute fracture. There is bilateral calcaneal spurring. There is soft tissue swelling over the  dorsal aspect of both feet. There is no conventional radiographic evidence of osteomyelitis. Both the right and left midfoot areas appear intact.     Impression: Impression: Soft tissue swelling. No conventional radiographic evidence of osteomyelitis. Electronically Signed: Dameon Pope MD  2/24/2024 10:24 PM EST  Workstation ID: [id number]    XR Chest 1 View    Result Date: 2/24/2024  XR CHEST 1 VW Date of Exam: 2/24/2024 5:05 PM EST Indication: SOA triage protocol Comparison: Chest radiograph 12/29/2015. Findings: Tunneled right IJ approach central venous catheter tip overlies the superior cavoatrial junction. Sternotomy and CABG. Unchanged enlarged cardiac silhouette. Pulmonary vascular congestion with diffuse interstitial thickening. Small bilateral pleural effusions. No pneumothorax. No acute osseous abnormality.     Impression: Impression: Mild pulmonary edema and small bilateral pleural effusions. Electronically Signed: Kobe Phoenix MD  2/24/2024 5:21 PM EST  Workstation ID: [id number]         Current medications:  Scheduled Meds:amiodarone, 200 mg, Oral, Daily  apixaban, 5 mg, Oral, BID  aspirin, 81 mg, Oral, Daily  b complex-vitamin c-folic acid, 1 tablet, Oral, Daily  calcitriol, 0.5 mcg, Oral, Daily  calcium acetate, 667 mg, Oral, TID With Meals  cetirizine, 10 mg, Oral, Daily  cholecalciferol, 1,000 Units, Oral, Daily  Dimethicone, 1 Application, Apply externally, Daily  finasteride, 5 mg, Oral, Daily  insulin detemir, 15 Units, Subcutaneous, Q12H  insulin lispro, 2-9 Units, Subcutaneous, 4x Daily AC & at Bedtime  metoprolol succinate XL, 25 mg, Oral, BID  pantoprazole, 40 mg, Oral, Daily  senna-docusate sodium, 2 tablet, Oral, BID  sodium chloride, 10 mL, Intravenous, Q12H  torsemide, 100 mg, Oral, Daily      Continuous Infusions:   PRN Meds:.  acetaminophen **OR** acetaminophen **OR** acetaminophen    senna-docusate sodium **AND** polyethylene glycol **AND** bisacodyl **AND** bisacodyl    clonazePAM     dextrose    dextrose    glucagon (human recombinant)    melatonin    nitroglycerin    ondansetron    sodium chloride    sodium chloride    sodium chloride    Assessment & Plan   Assessment & Plan     Active Hospital Problems    Diagnosis  POA    **Volume overload [E87.70]  Yes    Elevated troponin [R79.89]  Unknown    Atrial fibrillation and flutter [I48.91, I48.92]  Unknown    COPD (chronic obstructive pulmonary disease) [J44.9]  Unknown    On home oxygen therapy [Z99.81]  Not Applicable    Macrocytic anemia [D53.9]  Unknown    Thrombocytopenia [D69.6]  Unknown    Peritonitis, unspecified [K65.9]  Yes    Secondary pulmonary arterial hypertension [I27.21]  Yes    Hypertensive chronic kidney disease with stage 1 through stage 4 chronic kidney disease, or unspecified chronic kidney disease [I12.9]  Yes    ESRD (end stage renal disease) [N18.6]  Yes    Dyslipidemia [E78.5]  Yes    Type 2 diabetes mellitus with kidney complication, with long-term current use of insulin [E11.29, Z79.4]  Not Applicable    Hyperlipidemia LDL goal <70 [E78.5]  Yes      Resolved Hospital Problems   No resolved problems to display.        Brief Hospital Course to date:  Erlin Savage is a 73 y.o. male with a PMH significant for ESRD recently initiated on HD (M/W/F), prior was on PD but had recent diagnosis of SBP (12/2023) with E faecalis from cultures treated with IP Vancomycin, HTN, diabetes mellitus type 2, HLD, CAD S/P CABG, atrial fibrillation, severe pulmonary hypertension, moderate mitral and tricuspid regurgitation, systolic CHF with an EF 40-45%, grade 2 diastolic CHF, COPD with chronic hypoxic respiratory failure on 2-3 L nasal cannula who presented with volume overload.     Volume overload  ESRD on HD MWF  - on PD for ~ 5 years, recent recurrent peritonitis s/p PD cath removal and start of HD 2/16, current on HD MWF w/ last session 2/23/2024, reports tolerating well  -Typically removed 1200 cc - 2000 cc fluid nightly with PD  - dry  weight is 165 pounds  - has L AVF that is maturing, currently using right chest access for HD  - given lasix 40 mg IV x 1 in ED, on torsemide at home. Net negative only 50 cc/ 24 hours (still makes some urine)  - continue torsemide, calcitrol, phos binder, nephrovite  - consult nephrology for dialysis      A/C heart failure  Severe pulmonary HTN / VHD  -- proBNP >70K on admission   - from OSH records, TTE 11/3/2023 w/ EF 43%, grade 3 diastolic dysfunction, moderate MR/TR, RV mildly dilated, RV systolic pressure severely elevated; from 2/18/2024, EF 40%, severe pulmonary HTN  - daily weights, strict intake output  - dry weight is 165 pounds     Recurrent peritonitis  - initially dx at  12/23/23, cx positive for E.faecalis, treated w/ IV vanco; return to OSH 2/10 w/ cloudy PD fluid, cx grew E.facecalis sensitive to vanc/pcn; PD cath removed 2/16 w/ placement of L AVF and R chest HD temporary access  - currently on IV vancomycin 500 mg following HD treatments (being infused at the end of his HD treatments, last dose Friday 2/23/2024)  - per EMR started Vanc 2/14, to complete 3/6/2024  - pharmacy to dose vancomycin     Elevated troponin  CAD s/p CABG  HTN / HLD  - likely r/t chronic renal disease, trending down  - recently ~1500 at OSH and treated with solely heparin gtt during that hospitalization (ie no intervention)  - denies chest pain, chronically elevated  - on Eliquis for afib/flutter  - on aspirin/statin     Afib/flutter  - s/p ADRIANAN w/ cardioversion 2/19/2024 at [facility]  - continue Eliquis  - continue amiodarone and Metoprolol per home meds     Chronic COPD  - duo nebs scheduled / prn  - on 2-3L home O2     T2DM w/ peripheral neuropathy  - levemir  - fsbg achs w/ moderate dose ssi  - A1c 7.5%     Bilateral toe redness/pain  - WOC consult  - xray bilateral feet d/t pain- no evidence of osteomyelitis. May need to consider MRI if ongoing concerns for osteo, however, no leukocytosis and currently on ABX (Vanc) for  above      Macrocytic anemia  Thrombocytopenia  - chronic, at baseline       Total time spent: Time Spent: Time Spent: 25 minutes  Time spent includes time reviewing chart, face-to-face time, counseling patient/family/caregiver, ordering medications/tests/procedures, communicating with other health care professionals, documenting clinical information in the electronic health record, and coordination of care.      Expected Discharge Location and Transportation: TBD  Expected Discharge   Expected Discharge Date: 2/27/2024; Expected Discharge Time:      DVT prophylaxis:  Medical DVT prophylaxis orders are present.         AM-PAC 6 Clicks Score (PT): 18 (02/24/24 0210)    CODE STATUS:   Code Status and Medical Interventions:   Ordered at: 02/24/24 2125     Code Status (Patient has no pulse and is not breathing):    CPR (Attempt to Resuscitate)     Medical Interventions (Patient has pulse or is breathing):    Full Support       Zayra Guajardo MD  02/25/24

## 2024-02-25 NOTE — THERAPY EVALUATION
Patient Name: Erlin Savage  : 1950    MRN: [id number]                              Today's Date: 2024       Admit Date: 2024    Visit Dx:     ICD-10-CM ICD-9-CM   1. Hypervolemia, unspecified hypervolemia type  E87.70 276.69   2. Acute pulmonary edema  J81.0 518.4   3. Pleural effusion  J90 511.9   4. ESRD on dialysis  N18.6 585.6    Z99.2 V45.11   5. Hyperglycemia due to diabetes mellitus  E11.65 250.02     Patient Active Problem List   Diagnosis    Coronary artery disease involving native coronary artery of native heart without angina pectoris    Essential hypertension    CKD (chronic kidney disease)    Hyperlipidemia LDL goal <70    Type 2 diabetes mellitus with kidney complication, with long-term current use of insulin    TIA (transient ischemic attack)    Ischemic heart disease    Chronic back pain    Dyslipidemia    Anxiety disorder    Stage 5 chronic kidney disease on chronic dialysis    Neuralgia    Long term (current) use of insulin    Volume overload    Secondary pulmonary arterial hypertension    Peritonitis, unspecified    ESRD (end stage renal disease)    Iron deficiency anemia    Mitral valve regurgitation    Shortness of breath    Hypertensive chronic kidney disease with stage 1 through stage 4 chronic kidney disease, or unspecified chronic kidney disease    CAD (coronary artery disease)    NSTEMI (non-ST elevated myocardial infarction)    Hyperlipidemia, unspecified    Type 2 diabetes mellitus with diabetic chronic kidney disease    Elevated troponin    Atrial fibrillation and flutter    COPD (chronic obstructive pulmonary disease)    On home oxygen therapy    Macrocytic anemia    Thrombocytopenia     Past Medical History:   Diagnosis Date    Anxiety     Anxiety disorder     Back pain     Chronic back pain     work related injury    CKD (chronic kidney disease)     most recent creatinine 2.7 on 16    Coronary artery disease     Diabetes mellitus     insulin dependent diabetes  Onset 2010    Dyslipidemia     Hyperlipidemia     Hypertension     Ischemic heart disease     Renal failure     Stroke     TIA / transient right vision loss age 42    TIA (transient ischemic attack)     Vertigo      Past Surgical History:   Procedure Laterality Date    APPENDECTOMY      CARDIAC CATHETERIZATION      CHOLECYSTECTOMY  03/2012    COLONOSCOPY      CORONARY ARTERY BYPASS GRAFT      ENDOSCOPY        General Information       Row Name 02/25/24 1143          Physical Therapy Time and Intention    Document Type evaluation  -LM     Mode of Treatment physical therapy  -LM       Row Name 02/25/24 1143          General Information    Patient Profile Reviewed yes  -LM     Prior Level of Function independent:;all household mobility;gait;feeding;grooming;bathing;cooking;mod assist:;dressing  -LM     Existing Precautions/Restrictions fall;oxygen therapy device and L/min  -LM     Barriers to Rehab medically complex  -LM       Row Name 02/25/24 1143          Living Environment    People in Home significant other  -LM       Row Name 02/25/24 1143          Home Main Entrance    Number of Stairs, Main Entrance two  -LM     Stair Railings, Main Entrance none  -LM       Row Name 02/25/24 1143          Stairs Within Home, Primary    Number of Stairs, Within Home, Primary none  -LM       Row Name 02/25/24 1143          Cognition    Orientation Status (Cognition) oriented x 4  -LM       Row Name 02/25/24 1143          Safety Issues, Functional Mobility    Safety Issues Affecting Function (Mobility) insight into deficits/self-awareness;safety precaution awareness  -LM     Impairments Affecting Function (Mobility) balance;endurance/activity tolerance;shortness of breath;strength;pain  -LM               User Key  (r) = Recorded By, (t) = Taken By, (c) = Cosigned By      Initials Name Provider Type    LM Tiffany Stallings PT Physical Therapist                   Mobility       Row Name 02/25/24 1144          Bed Mobility    Bed  Mobility supine-sit  -LM     Supine-Sit [place] (Bed Mobility) modified independence  -LM     Assistive Device (Bed Mobility) bed rails;head of bed elevated  -LM       Row Name 02/25/24 1144          Sit-Stand Transfer    Sit-Stand [place] (Transfers) contact guard;1 person assist  -LM       Row Name 02/25/24 1144          Gait/Stairs (Locomotion)    [place] Level (Gait) minimum assist (75% patient effort);1 person assist;verbal cues  -LM     Assistive Device (Gait) other (see comments)  HHA  -LM     Distance in Feet (Gait) 120  -LM     Deviations/Abnormal Patterns (Gait) raad decreased;stride length decreased;gait speed decreased  -LM     Bilateral Gait Deviations heel strike decreased  -LM     Comment, (Gait/Stairs) Pt declined using assistive device but requested to hold therapist hand during gait.  One prolonged standing rest break needed.  Ambulated on 3L O2 with O2 at 94% post gait.  Pt educated on PLB.  -LM               User Key  (r) = Recorded By, (t) = Taken By, (c) = Cosigned By      Initials Name Provider Type    LM Tiffany Stallings, PT Physical Therapist                   Obj/Interventions       Row Name 02/25/24 1146          Range of Motion Comprehensive    General Range of Motion bilateral lower extremity ROM WFL  -LM       Row Name 02/25/24 1146          Strength Comprehensive (MMT)    General Manual Muscle Testing (MMT) Assessment lower extremity strength deficits identified  -LM     Comment, General Manual Muscle Testing (MMT) Assessment BLEs grossly 4/5 throughout with exception of not testing B feet d/t pain  -LM       Row Name 02/25/24 1146          Balance    Balance Assessment sitting static balance;standing static balance;standing dynamic balance  -LM     Static Sitting Balance independent  -LM     Position, Sitting Balance unsupported;sitting edge of bed  -LM     Static Standing Balance contact guard  -LM     Dynamic Standing Balance minimal assist  -LM     Position/Device  Used, Standing Balance supported  -LM       Row Name 02/25/24 1146          Sensory Assessment (Somatosensory)    Bilateral UE Sensory Assessment light touch awareness;light touch localization;impaired  impaired to B feet  -LM               User Key  (r) = Recorded By, (t) = Taken By, (c) = Cosigned By      Initials Name Provider Type    LM Tiffany Stallings, PT Physical Therapist                   Goals/Plan       Row Name 02/25/24 1148          Transfer Goal 1 (PT)    Activity/Assistive Device (Transfer Goal 1, PT) bed-to-chair/chair-to-bed  -LM     [place] Level/Cues Needed (Transfer Goal 1, PT) modified independence;independent  -LM     Time Frame (Transfer Goal 1, PT) long term goal (LTG);10 days  -LM       Row Name 02/25/24 1148          Gait Training Goal 1 (PT)    Activity/Assistive Device (Gait Training Goal 1, PT) gait (walking locomotion);assistive device use  -LM     [place] Level (Gait Training Goal 1, PT) independent;modified independence  -LM     Distance (Gait Training Goal 1, PT) 200 feet  -LM     Time Frame (Gait Training Goal 1, PT) long term goal (LTG);10 days  -LM       Row Name 02/25/24 1148          Stairs Goal 1 (PT)    Activity/Assistive Device (Stairs Goal 1, PT) ascending stairs;descending stairs  -LM     [place] Level/Cues Needed (Stairs Goal 1, PT) standby assist  -LM     Number of Stairs (Stairs Goal 1, PT) 2  -LM     Time Frame (Stairs Goal 1, PT) long term goal (LTG);10 days  -LM       Row Name 02/25/24 1148          Therapy Assessment/Plan (PT)    Planned Therapy Interventions (PT) balance training;bed mobility training;gait training;home exercise program;motor coordination training;neuromuscular re-education;patient/family education;postural re-education;ROM (range of motion);stair training;strengthening;stretching;transfer training  -LM               User Key  (r) = Recorded By, (t) = Taken By, (c) = Cosigned By      Initials Name Provider Type    LM Tiffany Stallings, PT  Physical Therapist                   Clinical Impression       Row Name 02/25/24 1147          Pain    Pretreatment Pain Rating 8/10  -LM     Posttreatment Pain Rating 8/10  -LM     Pain Location - Side/Orientation Bilateral  -LM     Pain Location - foot  -LM     Pain Intervention(s) Repositioned;Ambulation/increased activity  -LM       Row Name 02/25/24 1147          Plan of Care Review    Plan of Care Reviewed With patient  -LM     Outcome Evaluation PT evaluation completed.  Pt stood with CGA and ambulated 120 feet with HHA & MinAx1.  Pt presents below baseline function d/t weakness, decreased activity tolerance, and gait instability.  Recommend home with assist and HH PT at d/c.  -LM       Row Name 02/25/24 1147          Therapy Assessment/Plan (PT)    Rehab Potential (PT) good, to achieve stated therapy goals  -LM     Criteria for Skilled Interventions Met (PT) yes;meets criteria;skilled treatment is necessary  -     Therapy Frequency (PT) daily  -LM       Row Name 02/25/24 1147          Vital Signs    Pre Systolic BP Rehab 129  -LM     Pre Treatment Diastolic BP 69  -LM     Pretreatment Heart Rate (beats/min) 73  -LM     Posttreatment Heart Rate (beats/min) 73  -LM     Pre SpO2 (%) 99  -LM     O2 Delivery Pre Treatment supplemental O2  -LM     Intra SpO2 (%) 94  -LM     O2 Delivery Intra Treatment supplemental O2  -LM     Post SpO2 (%) 96  -LM     O2 Delivery Post Treatment supplemental O2  -LM     Pre Patient Position Supine  -LM     Intra Patient Position Sitting  -LM     Post Patient Position Sitting  -LM       Row Name 02/25/24 1147          Positioning and Restraints    Pre-Treatment Position in bed  -LM     Post Treatment Position chair  -LM     In Chair reclined;call light within reach;encouraged to call for assist;exit alarm on;waffle cushion;notified nsg  -LM               User Key  (r) = Recorded By, (t) = Taken By, (c) = Cosigned By      Initials Name Provider Type    LM Tiffany Stallings, PT Physical  Therapist                   Outcome Measures       Row Name 02/25/24 1149          How much help from another person do you currently need...    Turning from your back to your side while in flat bed without using bedrails? 4  -LM     Moving from lying on back to sitting on the side of a flat bed without bedrails? 4  -LM     Moving to and from a bed to a chair (including a wheelchair)? 3  -LM     Standing up from a chair using your arms (e.g., wheelchair, bedside chair)? 3  -LM     Climbing 3-5 steps with a railing? 3  -LM     To walk in hospital room? 3  -LM     AM-PAC 6 Clicks Score (PT) 20  -LM     Highest Level of Mobility Goal 6 --> Walk 10 steps or more  -       Row Name 02/25/24 1149          Functional Assessment    Outcome Measure Options AM-PAC 6 Clicks Basic Mobility (PT)  -               User Key  (r) = Recorded By, (t) = Taken By, (c) = Cosigned By      Initials Name Provider Type    LM Tiffany Stallings, ADRIAN Physical Therapist                                 Physical Therapy Education       Title: PT OT SLP Therapies (In Progress)       Topic: Physical Therapy (In Progress)       Point: Mobility training (Done)       Learning Progress Summary             Patient Acceptance, E, VU,DU by  at 2/25/2024 1149                         Point: Home exercise program (Not Started)       Learner Progress:  Not documented in this visit.              Point: Precautions (Done)       Learning Progress Summary             Patient Acceptance, E, VU,DU by  at 2/25/2024 1149                                         User Key       Initials Effective Dates Name Provider Type Discipline     07/11/23 -  Tiffany Stallings, ADRIAN Physical Therapist PT                  PT Recommendation and Plan  Planned Therapy Interventions (PT): balance training, bed mobility training, gait training, home exercise program, motor coordination training, neuromuscular re-education, patient/family education, postural re-education, ROM (range of  motion), stair training, strengthening, stretching, transfer training  Plan of Care Reviewed With: patient  Outcome Evaluation: PT evaluation completed.  Pt stood with CGA and ambulated 120 feet with HHA & MinAx1.  Pt presents below baseline function d/t weakness, decreased activity tolerance, and gait instability.  Recommend home with assist and HH PT at d/c.     Time Calculation:   PT Evaluation Complexity  History, PT Evaluation Complexity: 3 or more personal factors and/or comorbidities  Examination of Body Systems (PT Eval Complexity): total of 3 or more elements  Clinical Presentation (PT Evaluation Complexity): evolving  Clinical Decision Making (PT Evaluation Complexity): low complexity  Overall Complexity (PT Evaluation Complexity): low complexity     PT Charges       Row Name 02/25/24 1149             Time Calculation    Start Time 1120  -LM      PT Received On 02/25/24  -LM      PT Goal Re-Cert Due Date 03/06/24  -LM         Untimed Charges    PT Eval/Re-eval Minutes 35  -LM         Total Minutes    Untimed Charges Total Minutes 35  -LM       Total Minutes 35  -LM                User Key  (r) = Recorded By, (t) = Taken By, (c) = Cosigned By      Initials Name Provider Type    LM Tiffany Stallings, PT Physical Therapist                  Therapy Charges for Today       Code Description Service Date Service Provider Modifiers Qty    31708366369 HC PT EVAL LOW COMPLEXITY 3 2/25/2024 Tiffany Stallings, PT GP 1            PT G-Codes  Outcome Measure Options: AM-PAC 6 Clicks Basic Mobility (PT)  AM-PAC 6 Clicks Score (PT): 20  PT Discharge Summary  Anticipated Discharge Disposition (PT): home with assist, home with home health    Tiffany Stallings PT  2/25/2024

## 2024-02-25 NOTE — CONSULTS
Malnutrition Severity Assessment    Patient Name:  Erlin Savage  YOB: 1950  MRN: [id number]  Admit Date:  2/24/2024    Patient meets criteria for : Severe Malnutrition    Comments:  Pt meets criteria for acute, severe malnutrition with the indicators of moderate muscle wasting and subcutaneous fat loss, & 2-3+ pitting edema to BLE. Of note, pt with recent transition from PD to HD with associated hospitalizations.    Malnutrition Severity Assessment  Malnutrition Type: Acute Disease or Injury - Related Malnutrition  Malnutrition Type (last 8 hours)       Malnutrition Severity Assessment       Row Name 02/25/24 1044       Malnutrition Severity Assessment    Malnutrition Type Acute Disease or Injury - Related Malnutrition      Row Name 02/25/24 1044       Muscle Loss    Loss of Muscle Mass Findings Moderate    Gnosticist Region Moderate - slight depression    Clavicle Bone Region Moderate - some protrusion in females, visible in males    Acromion Bone Region Moderate - acromion may slightly protrude    Patellar Region Moderate - patella more prominent, less muscle definition around patella    Anterior Thigh Region Moderate - mild depression on inner thigh      Row Name 02/25/24 1044       Fat Loss    Subcutaneous Fat Loss Findings Moderate    Orbital Region  Moderate -  somewhat hollowness, slightly dark circles    Upper Arm Region Severe - mostly skin, very little space between folds, fingers touch      Row Name 02/25/24 1044       Fluid Accumulation (Edema)    Fluid Accumulation  Moderate equals 2+ pitting edema  BLE edema (2-3+)      Row Name 02/25/24 1044       Criteria Met (Must meet criteria for severity in at least 2 of these categories: M Wasting, Fat Loss, Fluid, Secondary Signs, Wt. Status, Intake)    Patient meets criteria for  Severe Malnutrition                    Electronically signed by:  Henny Ceballos, MS,RD,LD  02/25/24 10:54 EST

## 2024-02-25 NOTE — PLAN OF CARE
Goal Outcome Evaluation:  Plan of Care Reviewed With: patient           Outcome Evaluation: Pt presents below baseline with self care/mobility d/t weakness and decr activity tolerance.  Pt setup to comb hair, dep LBD and has assist at baseline,  min A to ambulate with HHA.  OT will follow to advance pt toward PLOF.  Recommend home with assist and HHOT.      Anticipated Discharge Disposition (OT): home with assist, home with home health

## 2024-02-25 NOTE — PLAN OF CARE
Goal Outcome Evaluation:  Plan of Care Reviewed With: patient           Outcome Evaluation: PT evaluation completed.  Pt stood with CGA and ambulated 120 feet with HHA & MinAx1.  Pt presents below baseline function d/t weakness, decreased activity tolerance, and gait instability.  Recommend home with assist and  PT at d/c.      Anticipated Discharge Disposition (PT): home with assist, home with home health

## 2024-02-25 NOTE — PLAN OF CARE
Problem: Adult Inpatient Plan of Care  Goal: Plan of Care Review  Outcome: Ongoing, Progressing  Goal: Patient-Specific Goal (Individualized)  Outcome: Ongoing, Progressing  Goal: Absence of Hospital-Acquired Illness or Injury  Outcome: Ongoing, Progressing  Intervention: Identify and Manage Fall Risk  Description: Perform standard risk assessment on admission using a validated tool or comprehensive approach appropriate to the patient; reassess fall risk frequently, with change in status or transfer to another level of care.  Communicate fall injury risk to interprofessional healthcare team.  Determine need for increased observation, equipment and environmental modification, such as low bed, signage and supportive, nonskid footwear.  Adjust safety measures to individual developmental age, stage and identified risk factors.  Reinforce the importance of safety and physical activity with patient and family.  Perform regular intentional rounding to assess need for position change, pain assessment and personal needs, including assistance with toileting.  Recent Flowsheet Documentation  Taken 2/25/2024 0600 by Pat Boswell RN  Safety Promotion/Fall Prevention:   activity supervised   assistive device/personal items within reach   safety round/check completed   nonskid shoes/slippers when out of bed   lighting adjusted   fall prevention program maintained  Taken 2/25/2024 0400 by Pat Boswell RN  Safety Promotion/Fall Prevention:   activity supervised   assistive device/personal items within reach   safety round/check completed   nonskid shoes/slippers when out of bed   lighting adjusted   fall prevention program maintained  Taken 2/25/2024 0200 by Pat Boswell RN  Safety Promotion/Fall Prevention:   activity supervised   assistive device/personal items within reach   safety round/check completed   nonskid shoes/slippers when out of bed   lighting adjusted   fall prevention program maintained  Taken 2/24/2024  2236 by Pat Boswell RN  Safety Promotion/Fall Prevention:   activity supervised   assistive device/personal items within reach   clutter free environment maintained   fall prevention program maintained   lighting adjusted   nonskid shoes/slippers when out of bed   safety round/check completed   room organization consistent  Intervention: Prevent Skin Injury  Description: Perform a screening for skin injury risk, such as pressure or moisture associated skin damage on admission and at regular intervals throughout hospital stay.  Keep all areas of skin (especially folds) clean and dry.  Maintain adequate skin hydration.  Relieve and redistribute pressure and protect bony prominences; implement measures based on patient-specific risk factors.  Match turning and repositioning schedule to clinical condition.  Encourage weight shift frequently; assist with reposition if unable to complete independently.  Float heels off bed; avoid pressure on the Achilles tendon.  Keep skin free from extended contact with medical devices.  Encourage functional activity and mobility, as early as tolerated.  Use aids (e.g., slide boards, mechanical lift) during transfer.  Recent Flowsheet Documentation  Taken 2/24/2024 2236 by Pat Boswell RN  Body Position: position changed independently  Skin Protection:   adhesive use limited   incontinence pads utilized   tubing/devices free from skin contact   transparent dressing maintained  Intervention: Prevent and Manage VTE (Venous Thromboembolism) Risk  Description: Assess for VTE (venous thromboembolism) risk.  Encourage and assist with early ambulation.  Initiate and maintain compression or other therapy, as indicated, based on identified risk in accordance with organizational protocol and provider order.  Encourage both active and passive leg exercises while in bed, if unable to ambulate.  Recent Flowsheet Documentation  Taken 2/24/2024 2236 by Pat Boswell RN  Activity Management:  sitting, edge of bed  VTE Prevention/Management: (no orders received) other (see comments)  Range of Motion: active ROM (range of motion) encouraged  Intervention: Prevent Infection  Description: Maintain skin and mucous membrane integrity; promote hand, oral and pulmonary hygiene.  Optimize fluid balance, nutrition, sleep and glycemic control to maximize infection resistance.  Identify potential sources of infection early to prevent or mitigate progression of infection (e.g., wound, lines, devices).  Evaluate ongoing need for invasive devices; remove promptly when no longer indicated.  Recent Flowsheet Documentation  Taken 2/24/2024 2236 by Pat Boswell RN  Infection Prevention:   environmental surveillance performed   hand hygiene promoted   rest/sleep promoted   single patient room provided  Goal: Optimal Comfort and Wellbeing  Outcome: Ongoing, Progressing  Intervention: Monitor Pain and Promote Comfort  Description: Assess pain level, treatment efficacy and patient response at regular intervals using a consistent pain scale.  Consider the presence and impact of preexisting chronic pain.  Encourage patient and caregiver involvement in pain assessment, interventions and safety measures.  Recent Flowsheet Documentation  Taken 2/24/2024 2236 by Pat Boswell RN  Pain Management Interventions:   position adjusted   see MAR  Intervention: Provide Person-Centered Care  Description: Use a family-focused approach to care.  Develop trust and rapport by proactively providing information, encouraging questions, addressing concerns and offering reassurance.  Acknowledge emotional response to hospitalization.  Recognize and utilize personal coping strategies.  Honor spiritual and cultural preferences.  Recent Flowsheet Documentation  Taken 2/24/2024 2236 by Pat Boswell RN  Trust Relationship/Rapport:   questions answered   reassurance provided   thoughts/feelings acknowledged   care explained  Goal: Readiness for  Transition of Care  Outcome: Ongoing, Progressing  Intervention: Mutually Develop Transition Plan  Description: Identify available resources for support (e.g., family, friends, community).  Identify and address barriers to ongoing treatment and home management (e.g., environmental, financial).  Provide opportunities to practice self-management skills.  Assess and monitor emotional readiness for transition.  Establish or reconnect linkage with outpatient providers or community-based services.  Recent Flowsheet Documentation  Taken 2/24/2024 2242 by Pat Boswell RN  Equipment Currently Used at Home: none  Taken 2/24/2024 2241 by Pat Boswell, RN  Transportation Anticipated: car, drives self  Patient/Family Anticipated Services at Transition: none  Patient/Family Anticipates Transition to: home with family     Problem: Skin Injury Risk Increased  Goal: Skin Health and Integrity  Outcome: Ongoing, Progressing  Intervention: Optimize Skin Protection  Description: Perform a full pressure injury risk assessment, as indicated by screening, upon admission to care unit.  Reassess skin (injury risk, full inspection) frequently (e.g., scheduled interval, with change in condition) to provide optimal early detection and prevention.  Maintain adequate tissue perfusion (e.g., encourage fluid balance; avoid crossing legs, constrictive clothing or devices) to promote tissue oxygenation.  Maintain head of bed at lowest degree of elevation tolerated, considering medical condition and other restrictions.  Avoid positioning onto an area that remains reddened.  Minimize incontinence and moisture (e.g., toileting schedule; moisture-wicking pad, diaper or incontinence collection device; skin moisture barrier).  Cleanse skin promptly and gently when soiled utilizing a pH-balanced cleanser.  Relieve and redistribute pressure (e.g., scheduled position changes, weight shifts, use of support surface, medical device repositioning,  protective dressing application, use of positioning device, microclimate control, use of pressure-injury-monitor  Encourage increased activity, such as sitting in a chair at the bedside or early mobilization, when able to tolerate.  Recent Flowsheet Documentation  Taken 2/24/2024 2236 by Pat Boswell RN  Pressure Reduction Techniques:   frequent weight shift encouraged   weight shift assistance provided  Head of Bed (HOB) Positioning: HOB elevated  Pressure Reduction Devices:   specialty bed utilized   pressure-redistributing mattress utilized   foam padding utilized  Skin Protection:   adhesive use limited   incontinence pads utilized   tubing/devices free from skin contact   transparent dressing maintained     Problem: Diabetes Comorbidity  Goal: Blood Glucose Level Within Targeted Range  Outcome: Ongoing, Progressing  Intervention: Monitor and Manage Glycemia  Description: Establish target blood glucose levels based on patient-specific factors, such as age, diabetes-related complications and illness severity.  Document blood glucose levels and monitor trend; advocate for adjustment to keep within targeted range.  Provide pharmacologic therapy to maintain blood glucose levels within targeted range.  Check blood glucose level if there is a change in mental or cognitive status.  Recognize, treat and document hypoglycemia event and potential cause.  Avoid hypoglycemic episodes by advocating for insulin dose adjustment when there is a change in condition, such as illness severity, decreased oral intake, missed or refused meals and snacks, as well as medication change that may include steroid tape  Recent Flowsheet Documentation  Taken 2/24/2024 2236 by Pat Boswell RN  Glycemic Management:   blood glucose monitored   supplemental insulin given     Problem: Heart Failure Comorbidity  Goal: Maintenance of Heart Failure Symptom Control  Outcome: Ongoing, Progressing  Intervention: Maintain Heart  Failure-Management  Description: Evaluate adherence to home heart failure self-care regimen (e.g., medication, fluid balance, sodium intake, daily weight, physical activity, telemonitoring, support).  Advocate continuation of home medication and schedule.  Consider pharmacologic therapy administration time and effects (e.g., avoid giving diuretic prior to bedtime or nitrates on empty stomach).  Monitor response to pharmacologic therapy, including weight fluctuations, blood pressure and electrolyte levels.  Monitor for signs and symptoms of anxiety and depression, including severity and duration; if present, provide psychosocial support.  Consider need for heart failure clinic or palliative care consult.  Recent Flowsheet Documentation  Taken 2/24/2024 2236 by Pat Boswell RN  Medication Review/Management:   medications reviewed   high-risk medications identified     Problem: Hypertension Comorbidity  Goal: Blood Pressure in Desired Range  Outcome: Ongoing, Progressing  Intervention: Maintain Blood Pressure Management  Description: Evaluate adherence to home antihypertensive regimen (e.g., exercise and activity, diet modification, medication).  Provide scheduled antihypertensive medication; consider administration time and effects (e.g., avoid giving diuretic prior to bedtime).  Monitor response to antihypertensive medication therapy (e.g., blood pressure, electrolyte levels, medication effects).  Minimize risk of orthostatic hypotension; encourage caution with position changes, particularly if elderly.  Recent Flowsheet Documentation  Taken 2/24/2024 2236 by Pat Boswell RN  Syncope Management:   legs elevated   position changed slowly  Medication Review/Management:   medications reviewed   high-risk medications identified     Problem: Fall Injury Risk  Goal: Absence of Fall and Fall-Related Injury  Outcome: Ongoing, Progressing  Intervention: Identify and Manage Contributors  Description: Develop a fall  prevention plan with the patient and caregiver/family.  Provide reorientation, appropriate sensory stimulation and routines with changes in mental status to decrease risk of fall.  Promote use of personal vision and auditory aids.  Assess assistance level required for safe and effective self-care; provide support as needed, such as toileting, mobilization. For age 65 and older, implement timed toileting with assistance.  Encourage physical activity, such as performance of mobility and self-care at highest level of patient ability, multicomponent exercise program and provision of appropriate assistive devices.  If fall occurs, assess the severity of injury; implement fall injury protocol. Determine the cause and revise fall injury prevention plan.  Regularly review medication contribution to fall risk; adjust medication administration times to minimize risk of falling.  Consider risk related to polypharmacy and age.  Balance adequate pain management with potential for oversedation.  Recent Flowsheet Documentation  Taken 2/24/2024 2236 by Pat Boswell RN  Medication Review/Management:   medications reviewed   high-risk medications identified  Intervention: Promote Injury-Free Environment  Description: Provide a safe, barrier-free environment that encourages independent activity.  Keep care area uncluttered and well-lighted.  Determine need for increased observation or monitoring.  Avoid use of devices that minimize mobility, such as restraints or indwelling urinary catheter.  Recent Flowsheet Documentation  Taken 2/25/2024 0600 by Pat Boswell RN  Safety Promotion/Fall Prevention:   activity supervised   assistive device/personal items within reach   safety round/check completed   nonskid shoes/slippers when out of bed   lighting adjusted   fall prevention program maintained  Taken 2/25/2024 0400 by Pat Boswell RN  Safety Promotion/Fall Prevention:   activity supervised   assistive device/personal items  within reach   safety round/check completed   nonskid shoes/slippers when out of bed   lighting adjusted   fall prevention program maintained  Taken 2/25/2024 0200 by Pat Boswell RN  Safety Promotion/Fall Prevention:   activity supervised   assistive device/personal items within reach   safety round/check completed   nonskid shoes/slippers when out of bed   lighting adjusted   fall prevention program maintained  Taken 2/24/2024 2236 by Pat Boswell RN  Safety Promotion/Fall Prevention:   activity supervised   assistive device/personal items within reach   clutter free environment maintained   fall prevention program maintained   lighting adjusted   nonskid shoes/slippers when out of bed   safety round/check completed   room organization consistent   Goal Outcome Evaluation:

## 2024-02-25 NOTE — CONSULTS
Clinical Nutrition   Nutrition Assessment  Reason for Visit: Malnutrition Severity Assessment, Consult    Patient Name: Erlin Savage  YOB: 1950  MRN: [id number]  Date of Encounter: 02/25/24 10:43 EST  Admission date: 2/24/2024    Comments:    Pt meets criteria for acute, severe malnutrition with the indicators of moderate muscle wasting and subcutaneous fat loss, & 2-3+ pitting edema to BLE. Of note, pt with recent transition from PD to HD with associated hospitalizations.      ? Need for fluid restriction vs able to correct with dialysis  Encouraged PO intake on current diet  Reviewed foods high in phos - pt on phos binder    Nutrition Assessment   Admission Diagnosis:  Volume overload [E87.70]    Problem List:    Volume overload    Hyperlipidemia LDL goal <70    Type 2 diabetes mellitus with kidney complication, with long-term current use of insulin    Dyslipidemia    Secondary pulmonary arterial hypertension    Peritonitis, unspecified    ESRD (end stage renal disease)    Hypertensive chronic kidney disease with stage 1 through stage 4 chronic kidney disease, or unspecified chronic kidney disease    Elevated troponin    Atrial fibrillation and flutter    COPD (chronic obstructive pulmonary disease)    On home oxygen therapy    Macrocytic anemia    Thrombocytopenia  Severe Malnutrition    PMH:   He  has a past medical history of Anxiety, Anxiety disorder, Back pain, Chronic back pain, CKD (chronic kidney disease), Coronary artery disease, Diabetes mellitus, Dyslipidemia, Hyperlipidemia, Hypertension, Ischemic heart disease, Renal failure, Stroke, TIA (transient ischemic attack), and Vertigo.    PSH:  He  has a past surgical history that includes Coronary artery bypass graft; Cardiac catheterization; Cholecystectomy (03/2012); Appendectomy; Colonoscopy; and Esophagogastroduodenoscopy.    Applicable Nutrition Concerns:   Skin:  Oral: partially edentulous  GI:    Applicable Interval  "History:       Reported/Observed/Food/Nutrition Related History:     Pt up in bed, on NC. Able to provide some weight/nutrition hx. Endorsed moderate appetite - eats mostly fast foods. Recent change from PD to HD - pt had previously been on PD for ~5yrs prior. Appears to have knowledge of ESRD nutrition. Had never been placed on fluid restriction in the past. Pt unsure of volume pulled off at dialysis (MWF at [facility]) as this is still relatively new. Endorsed esophageal dysphagia - stated food/liquids feel like there is difficulty passing through the LES. SLP consult in place. Denies N/V/D. [facility]     Anthropometrics     Flowsheet Rows      Flowsheet Row First Filed Value   Admission Height 180.3 cm (71\") Documented at 02/24/2024 1700   Admission Weight 77.1 kg (170 lb) Documented at 02/24/2024 1700     Height: Height: 180.3 cm (71\")  Last Filed Weight: Weight: 84.6 kg (186 lb 9.6 oz) (02/25/24 0500)  Method: Weight Method: Standing scale  BMI: BMI (Calculated): 26  BMI classification: Overweight: 25.0-29.9kg/m2   IBW:   166lbs    **Dry Wt = 165lb (75kg)    UBW:     Weight      Weight (kg) Weight (lbs) Weight Method   11/6/2023 79.561 kg  175 lb 6.4 oz     11/16/2023 79.833 kg  176 lb     12/5/2023 82.827 kg  182 lb 9.6 oz     1/29/2024 75.932 kg  167 lb 6.4 oz     2/24/2024 85.73 kg  189 lb  Bed scale     77.111 kg  170 lb  Stated    2/25/2024 84.641 kg  186 lb 9.6 oz  Standing scale      Weight change:  difficult to assess 2/2 fluid balance    Nutrition Focused Physical Exam     Date:  2/25       Patient meets criteria for malnutrition diagnosis, see MSA note.     Current Nutrition Prescription   PO: Diet: Cardiac Diets, Diabetic Diets, Renal Diets; Healthy Heart (2-3 Na+); Consistent Carbohydrate; Low Sodium (2-3g), Low Potassium, Low Phosphorus; Texture: Regular Texture (IDDSI 7); Fluid Consistency: Thin (IDDSI 0)  Oral Nutrition Supplement: N/A  Intake: Insufficient data    Nutrition Diagnosis   Date: "  2/25            Updated:    Problem Malnutrition  acute, severe   Etiology ESRD on HD, COPD   Signs/Symptoms moderate muscle wasting and subcutaneous fat loss, & 2-3+ pitting edema to BLE   Status: New    Goal:   General: Nutrition to support treatment  PO: Tolerate PO  EN/PN: N/A    Nutrition Intervention      Follow treatment progress, Care plan reviewed, Encourage intake, Education provided     ? Need for fluid restriction vs able to correct with dialysis  Encouraged PO intake on current diet  Reviewed foods high in phos - pt on phos binder    Monitoring/Evaluation:   Per protocol, I&O, PO intake, Pertinent labs, Weight, Skin status, Symptoms, POC/GOC, Swallow function      Henny Ceballos, MS,RD,LD  Time Spent: 25min

## 2024-02-25 NOTE — PROGRESS NOTES
"Pharmacy Consult-Vancomycin Dosing  Erlin Savage is a  73 y.o. male receiving vancomycin therapy.     Indication: Recurrent Peritonitis   Consulting Provider: Dr. Guajardo  ID Consult: No    Goal Trough:    Current Antimicrobial Therapy  Anti-Infectives (From admission, onward)      Ordered     Dose/Rate Route Frequency Start Stop    02/25/24 0839  Pharmacy to dose vancomycin        Ordering Provider: Zayra Guajardo MD     Does not apply Continuous PRN 02/25/24 0839 03/26/24 0938            Allergies  Allergies as of 02/24/2024 - Reviewed 02/24/2024   Allergen Reaction Noted    Doxycycline Other (See Comments) 06/08/2023    Levofloxacin Shortness Of Breath 11/02/2023    Gabapentin Confusion 11/16/2023    Augmentin [amoxicillin-pot clavulanate] Palpitations 08/16/2016    Biaxin [clarithromycin] Palpitations 08/16/2016    Coreg [carvedilol] Itching 08/16/2016    Crestor [rosuvastatin calcium] Itching 01/09/2017    Iodine Rash 10/02/2023    Lipitor [atorvastatin] Itching 08/16/2016    Lisinopril Cough 08/16/2016    Livalo [pitavastatin] Unknown - Low Severity 08/16/2016    Nortriptyline hcl Other (See Comments) 12/05/2023    Pravastatin Unknown - Low Severity 08/16/2016    Questran [cholestyramine] Unknown - Low Severity 08/16/2016       Labs    Results from last 7 days   Lab Units 02/25/24  0449 02/24/24  1727   BUN mg/dL 41* 35*   CREATININE mg/dL 7.28* 6.59*       Results from last 7 days   Lab Units 02/25/24  0449 02/24/24  1727   WBC 10*3/mm3 9.77 10.18       Evaluation of Dosing     Last Dose Received in the ED/Outside Facility: Yes, vancomycin 500 mg IV after HD on 2/23  Is Patient on Dialysis or Renal Replacement: Yes- HD    Ht - 180.3 cm (71\")  Wt - 84.6 kg (186 lb 9.6 oz)    Estimated Creatinine Clearance: 11.1 mL/min (A) (by C-G formula based on SCr of 7.28 mg/dL (H)).    Intake & Output (last 3 days)         02/22 0701 02/23 0700 02/23 0701  02/24 0700 02/24 0701 02/25 0700 02/25 0701 02/26 0700    " A1C 6 1 in May 2021  Continue metformin 1000 mg bid and decrease  glipizide to 5 mg bid  Last eye exam was in July 2020 and pt advised to schedule visit  Foot exam done in Feb 2021  Had flu shot  In Oct 2020 and COVID vaccines      Lab Results   Component Value Date    HGBA1C 6 1 (H) 05/29/2021 Urine (mL/kg/hr)   50     Total Output   50     Net   -50                     Microbiology and Radiology  Microbiology Results (last 10 days)       Procedure Component Value - Date/Time    COVID PRE-OP / PRE-PROCEDURE SCREENING ORDER (NO ISOLATION) - Swab, Nasopharynx [[id number]]  (Normal) Collected: 02/24/24 1728    Lab Status: Final result Specimen: Swab from Nasopharynx Updated: 02/24/24 1808    Narrative:      The following orders were created for panel order COVID PRE-OP / PRE-PROCEDURE SCREENING ORDER (NO ISOLATION) - Swab, Nasopharynx.  Procedure                               Abnormality         Status                     ---------                               -----------         ------                     COVID-19 and FLU A/B PCR...[[id number]]  Normal              Final result                 Please view results for these tests on the individual orders.    COVID-19 and FLU A/B PCR, 1 HR TAT - Swab, Nasopharynx [[id number]]  (Normal) Collected: 02/24/24 1728    Lab Status: Final result Specimen: Swab from Nasopharynx Updated: 02/24/24 1808     COVID19 Not Detected     Influenza A PCR Not Detected     Influenza B PCR Not Detected    Narrative:      Fact sheet for providers: https://www.fda.gov/media/358252/download    Fact sheet for patients: https://www.fda.gov/media/312220/download    Test performed by PCR.            Vancomycin Levels:    Results from last 7 days   Lab Units 02/25/24  0449   VANCOMYCIN RM mcg/mL 14.70                     Assessment/Plan:    Pharmacy to dose vancomycin for recurrent peritonitis, initially diagnosed 12/23/23 with positive cultures growing E.faecalis and treated with vancomycin. Returned to OSH 2/10 with E.faecalis in PD fluid.  Patient receives vancomycin 500 mg IV after each HD session. Vancomycin treatment began 2/24 and is planned to complete 3/6.   Vancomycin random returned therapeutic at 14.7 mcg/mL on 2/25 at 0449. Plan to continue current regimen.  Plan to assess  clearance by obtaining vancomycin random prior to next HD session, will schedule once seen by nephrology.  Will monitor renal function, culture and sensitivities, and clinical status and adjust regimen as needed. Pharmacy will continue to follow     Yamile Christianson, PharmD  Pharmacy Resident  2/25/2024  08:52 EST

## 2024-02-25 NOTE — THERAPY EVALUATION
Patient Name: Erlin Savage  : 1950    MRN: [id number]                              Today's Date: 2024       Admit Date: 2024    Visit Dx:     ICD-10-CM ICD-9-CM   1. Hypervolemia, unspecified hypervolemia type  E87.70 276.69   2. Acute pulmonary edema  J81.0 518.4   3. Pleural effusion  J90 511.9   4. ESRD on dialysis  N18.6 585.6    Z99.2 V45.11   5. Hyperglycemia due to diabetes mellitus  E11.65 250.02     Patient Active Problem List   Diagnosis    Coronary artery disease involving native coronary artery of native heart without angina pectoris    Essential hypertension    CKD (chronic kidney disease)    Hyperlipidemia LDL goal <70    Type 2 diabetes mellitus with kidney complication, with long-term current use of insulin    TIA (transient ischemic attack)    Ischemic heart disease    Chronic back pain    Dyslipidemia    Anxiety disorder    Stage 5 chronic kidney disease on chronic dialysis    Neuralgia    Long term (current) use of insulin    Volume overload    Secondary pulmonary arterial hypertension    Peritonitis, unspecified    ESRD (end stage renal disease)    Iron deficiency anemia    Mitral valve regurgitation    Shortness of breath    Hypertensive chronic kidney disease with stage 1 through stage 4 chronic kidney disease, or unspecified chronic kidney disease    CAD (coronary artery disease)    NSTEMI (non-ST elevated myocardial infarction)    Hyperlipidemia, unspecified    Type 2 diabetes mellitus with diabetic chronic kidney disease    Elevated troponin    Atrial fibrillation and flutter    COPD (chronic obstructive pulmonary disease)    On home oxygen therapy    Macrocytic anemia    Thrombocytopenia     Past Medical History:   Diagnosis Date    Anxiety     Anxiety disorder     Back pain     Chronic back pain     work related injury    CKD (chronic kidney disease)     most recent creatinine 2.7 on 16    Coronary artery disease     Diabetes mellitus     insulin dependent diabetes  Onset 2010    Dyslipidemia     Hyperlipidemia     Hypertension     Ischemic heart disease     Renal failure     Stroke     TIA / transient right vision loss age 42    TIA (transient ischemic attack)     Vertigo      Past Surgical History:   Procedure Laterality Date    APPENDECTOMY      CARDIAC CATHETERIZATION      CHOLECYSTECTOMY  03/2012    COLONOSCOPY      CORONARY ARTERY BYPASS GRAFT      ENDOSCOPY        General Information       Row Name 02/25/24 1122          OT Time and Intention    Document Type evaluation  -     Mode of Treatment occupational therapy  -AC       Row Name 02/25/24 1122          General Information    Patient Profile Reviewed yes  -AC     Prior Level of Function independent:;all household mobility;feeding;grooming;bathing;cooking;mod assist:;dressing;dependent:;home management  -     Existing Precautions/Restrictions fall;oxygen therapy device and L/min  -     Barriers to Rehab medically complex  -AC       Row Name 02/25/24 1122          Occupational Profile    Environmental Supports and Barriers (Occupational Profile) walk in shower with shower seat, has RW and SPC, but was not using  -       Row Name 02/25/24 1122          Living Environment    People in Home significant other  -       Row Name 02/25/24 1122          Home Main Entrance    Number of Stairs, Main Entrance two  -AC     Stair Railings, Main Entrance none  -AC       Row Name 02/25/24 1122          Stairs Within Home, Primary    Stairs, Within Home, Primary 1 story  -AC       Row Name 02/25/24 1122          Cognition    Orientation Status (Cognition) oriented x 4  -AC       Row Name 02/25/24 1122          Safety Issues, Functional Mobility    Safety Issues Affecting Function (Mobility) insight into deficits/self-awareness;safety precaution awareness  -AC     Impairments Affecting Function (Mobility) balance;endurance/activity tolerance;shortness of breath;strength;pain  -               User Key  (r) = Recorded By, (t)  = Taken By, (c) = Cosigned By      Initials Name Provider Type    AC Nancy Gardner, OT Occupational Therapist                     Mobility/ADL's       Row Name 02/25/24 1142          Bed Mobility    Bed Mobility supine-sit  -     Supine-Sit [place] (Bed Mobility) modified independence  -     Assistive Device (Bed Mobility) head of bed elevated  -       Row Name 02/25/24 1142          Transfers    Transfers sit-stand transfer  -       Row Name 02/25/24 1142          Sit-Stand Transfer    Sit-Stand [place] (Transfers) contact guard  -       Row Name 02/25/24 1142          Functional Mobility    Functional Mobility- Ind. Level verbal cues required;minimum assist (75% patient effort)  -     Functional Mobility- Device other (see comments)  HHA  -     Functional Mobility-Distance (Feet) --  in hallway  -       Row Name 02/25/24 1142          Activities of Daily Living    BADL Assessment/Intervention grooming;lower body dressing  -       Row Name 02/25/24 1142          Grooming Assessment/Training    [place] Level (Grooming) hair care, combing/brushing;set up  -     Position (Grooming) edge of bed sitting  -       Row Name 02/25/24 1142          Lower Body Dressing Assessment/Training    [place] Level (Lower Body Dressing) don;socks;dependent (less than 25% patient effort)  -     Position (Lower Body Dressing) edge of bed sitting  -     Comment, (Lower Body Dressing) has assist at baseline  -               User Key  (r) = Recorded By, (t) = Taken By, (c) = Cosigned By      Initials Name Provider Type    Nancy Roche, OT Occupational Therapist                   Obj/Interventions       Row Name 02/25/24 1145          Sensory Assessment (Somatosensory)    Sensory Assessment (Somatosensory) UE sensation intact  -       Row Name 02/25/24 1145          Vision Assessment/Intervention    Visual Impairment/Limitations corrective lenses for reading  -       Row Name 02/25/24  1145          Range of Motion Comprehensive    General Range of Motion bilateral upper extremity ROM WNL  -AC       Row Name 02/25/24 1145          Strength Comprehensive (MMT)    Comment, General Manual Muscle Testing (MMT) Assessment BUE grossly 4/5  -               User Key  (r) = Recorded By, (t) = Taken By, (c) = Cosigned By      Initials Name Provider Type    AC Nancy Gardner, OT Occupational Therapist                   Goals/Plan       Row Name 02/25/24 1148          Transfer Goal 1 (OT)    Activity/Assistive Device (Transfer Goal 1, OT) bed-to-chair/chair-to-bed;toilet  -AC     [place] Level/Cues Needed (Transfer Goal 1, OT) standby assist  -AC     Time Frame (Transfer Goal 1, OT) by discharge  -AC     Progress/Outcome (Transfer Goal 1, OT) new goal;goal ongoing  -       Row Name 02/25/24 1148          Toileting Goal 1 (OT)    Activity/Device (Toileting Goal 1, OT) adjust/manage clothing;perform perineal hygiene  -AC     [place] Level/Cues Needed (Toileting Goal 1, OT) standby assist  -AC     Time Frame (Toileting Goal 1, OT) by discharge  -AC     Progress/Outcome (Toileting Goal 1, OT) new goal;goal ongoing  -       Row Name 02/25/24 1148          Grooming Goal 1 (OT)    Activity/Device (Grooming Goal 1, OT) oral care  -AC     [place] (Grooming Goal 1, OT) supervision required  -AC     Time Frame (Grooming Goal 1, OT) by discharge  -AC     Strategies/Barriers (Grooming Goal 1, OT) standing sink side  -AC     Progress/Outcome (Grooming Goal 1, OT) new goal;goal ongoing  -       Row Name 02/25/24 1148          Therapy Assessment/Plan (OT)    Planned Therapy Interventions (OT) activity tolerance training;BADL retraining;functional balance retraining;occupation/activity based interventions;patient/caregiver education/training;strengthening exercise;transfer/mobility retraining  -AC               User Key  (r) = Recorded By, (t) = Taken By, (c) = Cosigned By      Initials Name Provider  Type    AC Nancy Gardner, OT Occupational Therapist                   Clinical Impression       Row Name 02/25/24 1122          Pain Assessment    Pretreatment Pain Rating 8/10  -AC     Posttreatment Pain Rating 8/10  -AC     Pain Location - Side/Orientation Bilateral  -AC     Pain Location - foot  -AC     Pain Intervention(s) Ambulation/increased activity;Repositioned  -       Row Name 02/25/24 1122          Plan of Care Review    Plan of Care Reviewed With patient  -AC     Outcome Evaluation Pt presents below baseline with self care/mobility d/t weakness and decr activity tolerance.  Pt setup to comb hair, dep LBD and has assist at baseline,  min A to ambulate with HHA.  OT will follow to advance pt toward PLOF.  Recommend home with assist and HHOT.  -       Row Name 02/25/24 1122          Therapy Assessment/Plan (OT)    Rehab Potential (OT) good, to achieve stated therapy goals  -     Criteria for Skilled Therapeutic Interventions Met (OT) yes;skilled treatment is necessary  -     Therapy Frequency (OT) daily  -       Row Name 02/25/24 1122          Therapy Plan Review/Discharge Plan (OT)    Anticipated Discharge Disposition (OT) home with assist;home with home health  -       Row Name 02/25/24 1122          Vital Signs    Pre Systolic BP Rehab 129  -AC     Pre Treatment Diastolic BP 69  -AC     Pretreatment Heart Rate (beats/min) 71  -AC     Posttreatment Heart Rate (beats/min) 73  -AC     Pre SpO2 (%) 98  -AC     O2 Delivery Pre Treatment supplemental O2  -AC     Intra SpO2 (%) 94  -AC     O2 Delivery Intra Treatment supplemental O2  -AC     Post SpO2 (%) 96  -AC     O2 Delivery Post Treatment supplemental O2  -AC     Pre Patient Position Supine  -AC     Post Patient Position Sitting  -       Row Name 02/25/24 1122          Positioning and Restraints    Pre-Treatment Position in bed  -AC     Post Treatment Position chair  -AC     In Chair notified nsg;reclined;call light within reach;encouraged  to call for assist;exit alarm on;waffle cushion  -               User Key  (r) = Recorded By, (t) = Taken By, (c) = Cosigned By      Initials Name Provider Type    Nancy Roche, OT Occupational Therapist                   Outcome Measures       Row Name 02/25/24 1150          How much help from another is currently needed...    Putting on and taking off regular lower body clothing? 2  -AC     Bathing (including washing, rinsing, and drying) 3  -AC     Toileting (which includes using toilet bed pan or urinal) 3  -AC     Putting on and taking off regular upper body clothing 3  -AC     Taking care of personal grooming (such as brushing teeth) 3  -AC     Eating meals 4  -AC     AM-PAC 6 Clicks Score (OT) 18  -AC       Row Name 02/25/24 1149          How much help from another person do you currently need...    Turning from your back to your side while in flat bed without using bedrails? 4  -LM     Moving from lying on back to sitting on the side of a flat bed without bedrails? 4  -LM     Moving to and from a bed to a chair (including a wheelchair)? 3  -LM     Standing up from a chair using your arms (e.g., wheelchair, bedside chair)? 3  -LM     Climbing 3-5 steps with a railing? 3  -LM     To walk in hospital room? 3  -LM     AM-PAC 6 Clicks Score (PT) 20  -LM     Highest Level of Mobility Goal 6 --> Walk 10 steps or more  -LM       Row Name 02/25/24 1150 02/25/24 1149       Functional Assessment    Outcome Measure Options AM-PAC 6 Clicks Daily Activity (OT)  - AM-PAC 6 Clicks Basic Mobility (PT)  -LM              User Key  (r) = Recorded By, (t) = Taken By, (c) = Cosigned By      Initials Name Provider Type    Nancy Roche, OT Occupational Therapist    LM Tiffany Stallings, ADRIAN Physical Therapist                    Occupational Therapy Education       Title: PT OT SLP Therapies (In Progress)       Topic: Occupational Therapy (In Progress)       Point: ADL training (In Progress)       Description:   Instruct  learner(s) on proper safety adaptation and remediation techniques during self care or transfers.   Instruct in proper use of assistive devices.                  Learning Progress Summary             Patient Acceptance, E, NR by  at 2/25/2024 1150                         Point: Home exercise program (Not Started)       Description:   Instruct learner(s) on appropriate technique for monitoring, assisting and/or progressing therapeutic exercises/activities.                  Learner Progress:  Not documented in this visit.              Point: Precautions (Not Started)       Description:   Instruct learner(s) on prescribed precautions during self-care and functional transfers.                  Learner Progress:  Not documented in this visit.              Point: Body mechanics (Not Started)       Description:   Instruct learner(s) on proper positioning and spine alignment during self-care, functional mobility activities and/or exercises.                  Learner Progress:  Not documented in this visit.                              User Key       Initials Effective Dates Name Provider Type Discipline     02/03/23 -  Nancy Gardner, OT Occupational Therapist OT                  OT Recommendation and Plan  Planned Therapy Interventions (OT): activity tolerance training, BADL retraining, functional balance retraining, occupation/activity based interventions, patient/caregiver education/training, strengthening exercise, transfer/mobility retraining  Therapy Frequency (OT): daily  Plan of Care Review  Plan of Care Reviewed With: patient  Outcome Evaluation: Pt presents below baseline with self care/mobility d/t weakness and decr activity tolerance.  Pt setup to comb hair, dep LBD and has assist at baseline,  min A to ambulate with HHA.  OT will follow to advance pt toward PLOF.  Recommend home with assist and HHOT.     Time Calculation:   Evaluation Complexity (OT)  Review Occupational Profile/Medical/Therapy History  Complexity: brief/low complexity  Assessment, Occupational Performance/Identification of Deficit Complexity: 1-3 performance deficits  Clinical Decision Making Complexity (OT): problem focused assessment/low complexity  Overall Complexity of Evaluation (OT): low complexity     Time Calculation- OT       Row Name 02/25/24 1122 02/25/24 0122          Time Calculation- OT    OT Start Time 1122  -AC --     OT Received On 02/25/24  -AC --     OT Goal Re-Cert Due Date 03/06/24  -AC --        Untimed Charges    OT Eval/Re-eval Minutes 48  -AC 38  -AC        Total Minutes    Untimed Charges Total Minutes 48  -AC 38  -AC      Total Minutes 48  -AC 38  -AC               User Key  (r) = Recorded By, (t) = Taken By, (c) = Cosigned By      Initials Name Provider Type    AC Nancy Gardner, OT Occupational Therapist                  Therapy Charges for Today       Code Description Service Date Service Provider Modifiers Qty    [id number] HC OT EVAL LOW COMPLEXITY 3 2/25/2024 Nancy Gardner OT GO 1                 Nancy Gardner OT  2/25/2024

## 2024-02-25 NOTE — ED NOTES
Erlin Savage    Nursing Report ED to Floor:  Mental status:A&O x4. GCS 15  Ambulatory status: Weak but can walk, probably needs assistance  Oxygen Therapy:  2L NC  Cardiac Rhythm: NSR with T wave abnormality. Cannot exclude poss MI.  Admitted from: Home  Safety Concerns:  Shob, fluid overloaded. Needs dialysis  Social Issues: NA  ED Room #:  2    ED Nurse Phone Extension - [phone] or may call [phone].      HPI:   Chief Complaint   Patient presents with    Shortness of Breath       Past Medical History:  Past Medical History:   Diagnosis Date    Anxiety     Anxiety disorder     Back pain     Chronic back pain     work related injury    CKD (chronic kidney disease)     most recent creatinine 2.7 on 01/26/16    Coronary artery disease     Diabetes mellitus     insulin dependent diabetes Onset 2010    Dyslipidemia     Hyperlipidemia     Hypertension     Ischemic heart disease     Renal failure     Stroke     TIA / transient right vision loss age 42    TIA (transient ischemic attack)     Vertigo         Past Surgical History:  Past Surgical History:   Procedure Laterality Date    APPENDECTOMY      CARDIAC CATHETERIZATION      CHOLECYSTECTOMY  03/2012    COLONOSCOPY      CORONARY ARTERY BYPASS GRAFT      ENDOSCOPY          Admitting Doctor:   Lisa Rowe DO    Consulting Provider(s):  Consults       No orders found from 1/26/2024 to 2/25/2024.             Admitting Diagnosis:   The primary encounter diagnosis was Hypervolemia, unspecified hypervolemia type. Diagnoses of Acute pulmonary edema, Pleural effusion, ESRD on dialysis, and Hyperglycemia due to diabetes mellitus were also pertinent to this visit.    Most Recent Vitals:   Vitals:    02/24/24 1730 02/24/24 1829 02/24/24 1830 02/24/24 2000   BP:   148/79    BP Location:       Patient Position:       Pulse:   82 85   Resp: 18      Temp:       TempSrc:       SpO2:  100%  93%   Weight:       Height:           Active LDAs/IV Access:   Lines, Drains & Airways        Active LDAs       Name Placement date Placement time Site Days    Peripheral IV 02/24/24 1729 Anterior;Right Forearm 02/24/24  1729  Forearm  less than 1    Peritoneal Dialysis Catheter Mid lower abdomen 01/12/21  1346  Mid lower abdomen  1138                    Labs (abnormal labs have a star):   Labs Reviewed   COMPREHENSIVE METABOLIC PANEL - Abnormal; Notable for the following components:       Result Value    Glucose 323 (*)     BUN 35 (*)     Creatinine 6.59 (*)     Sodium 132 (*)     Chloride 94 (*)     BUN/Creatinine Ratio 5.3 (*)     eGFR 8.4 (*)     All other components within normal limits    Narrative:     GFR Normal >60  Chronic Kidney Disease <60  Kidney Failure <15    The GFR formula is only valid for adults with stable renal function between ages 18 and 70.   BNP (IN-HOUSE) - Abnormal; Notable for the following components:    proBNP >70,000.0 (*)     All other components within normal limits    Narrative:     This assay is used as an aid in the diagnosis of individuals suspected of having heart failure. It can be used as an aid in the diagnosis of acute decompensated heart failure (ADHF) in patients presenting with signs and symptoms of ADHF to the emergency department (ED). In addition, NT-proBNP of <300 pg/mL indicates ADHF is not likely.    Age Range Result Interpretation  NT-proBNP Concentration (pg/mL:      <50             Positive            >450                   Gray                 300-450                    Negative             <300    50-75           Positive            >900                  Gray                300-900                  Negative            <300      >75             Positive            >1800                  Gray                300-1800                  Negative            <300   SINGLE HSTROPONIN T - Abnormal; Notable for the following components:    HS Troponin T 415 (*)     All other components within normal limits    Narrative:     High Sensitive Troponin T Reference  Range:  <14.0 ng/L- Negative Female for AMI  <22.0 ng/L- Negative Male for AMI  >=14 - Abnormal Female indicating possible myocardial injury.  >=22 - Abnormal Male indicating possible myocardial injury.   Clinicians would have to utilize clinical acumen, EKG, Troponin, and serial changes to determine if it is an Acute Myocardial Infarction or myocardial injury due to an underlying chronic condition.        CBC WITH AUTO DIFFERENTIAL - Abnormal; Notable for the following components:    RBC 3.10 (*)     Hemoglobin 9.9 (*)     Hematocrit 31.9 (*)     .9 (*)     MCHC 31.0 (*)     RDW 21.2 (*)     RDW-SD 76.2 (*)     MPV 12.1 (*)     Platelets 130 (*)     Neutrophil % 77.9 (*)     Lymphocyte % 8.0 (*)     Immature Grans % 1.5 (*)     Neutrophils, Absolute 7.94 (*)     Monocytes, Absolute 0.99 (*)     Immature Grans, Absolute 0.15 (*)     nRBC 0.4 (*)     All other components within normal limits    Narrative:     Appended report. These results have been appended to a previously verified report.   HIGH SENSITIVITIY TROPONIN T 2HR - Abnormal; Notable for the following components:    HS Troponin T 403 (*)     Troponin T Delta -12 (*)     All other components within normal limits    Narrative:     High Sensitive Troponin T Reference Range:  <14.0 ng/L- Negative Female for AMI  <22.0 ng/L- Negative Male for AMI  >=14 - Abnormal Female indicating possible myocardial injury.  >=22 - Abnormal Male indicating possible myocardial injury.   Clinicians would have to utilize clinical acumen, EKG, Troponin, and serial changes to determine if it is an Acute Myocardial Infarction or myocardial injury due to an underlying chronic condition.        COVID-19 AND FLU A/B, NP SWAB IN TRANSPORT MEDIA 1 HR TAT - Normal    Narrative:     Fact sheet for providers: https://www.fda.gov/media/847127/download    Fact sheet for patients: https://www.fda.gov/media/450059/download    Test performed by PCR.   MAGNESIUM - Normal   COVID PRE-OP /  PRE-PROCEDURE SCREENING ORDER (NO ISOLATION)    Narrative:     The following orders were created for panel order COVID PRE-OP / PRE-PROCEDURE SCREENING ORDER (NO ISOLATION) - Swab, Nasopharynx.  Procedure                               Abnormality         Status                     ---------                               -----------         ------                     COVID-19 and FLU A/B PCR...[[id number]]  Normal              Final result                 Please view results for these tests on the individual orders.   SCAN SLIDE   RAINBOW DRAW    Narrative:     The following orders were created for panel order [place] Draw.  Procedure                               Abnormality         Status                     ---------                               -----------         ------                     Green Top (Gel)[[id number]]                                  Final result               Lavender Top[[id number]]                                     Final result               Gold Top - SST[[id number]]                                   Final result               Gray Top[[id number]]                                         In process                 Light Blue Top[[id number]]                                   Final result                 Please view results for these tests on the individual orders.   CBC AND DIFFERENTIAL    Narrative:     The following orders were created for panel order CBC & Differential.  Procedure                               Abnormality         Status                     ---------                               -----------         ------                     CBC Auto Differential[[id number]]        Abnormal            Final result               Scan Slide[[id number]]                                       Final result                 Please view results for these tests on the individual orders.   GREEN TOP   LAVENDER TOP   GOLD TOP - SST   LIGHT BLUE TOP   GRAY TOP       Meds Given in ED:   Medications   sodium  chloride 0.9 % flush 10 mL (has no administration in time range)   furosemide (LASIX) injection 40 mg (40 mg Intravenous Given 2/24/24 1949)

## 2024-02-25 NOTE — H&P
[facility]  HISTORY AND PHYSICAL    Patient Name: Erlin Savage  : 1950  MRN: [id number]  Primary Care Physician: Joe Diallo MD  Date of admission: 2024    Subjective   Subjective     Chief Complaint:  Shortness of breath, weakness    HPI:  Erlin Savage is a 73 y.o. male with hx Afib/flutter s/p ADRIANNA w/ cardioversion (2024), CAD s/p CABG, VHD (ECHO 11/3/2023 w/ EF 43%, grade 3 diastolic dysfunction, moderate MR/TR, severe pulmonary HTN), HTN, ESRD on HD MWF, COPD w/ home oxygen 2-3 liters NC, T2DM, diabetic neuropathy, peritonitis s/p PD cath removal ( cx grew E. Faecalis, tx w/ IV vanco; recurrent infx 2/10 w/ ultimate discontinuation of PD catheter and placement of temporary HD access right chest and L AVF; started HD ) who presents to the ED for evaluation of increased shortness of breath, swelling and weeping in his L forearm.     Pt hospitalized at  -2024 (initially at [facility] for PD removal and HD cath placement, found to have afib/flutter RVR after procedure and transferred to [facility] s/p cardioversion, now on amiodarone/Eliquis and started HD  MWF schedule, receiving IV vancomycin on HD days for peritonitis). Last HD tx / vanco infusion 2024.    Pt reports since d/c from OSH he has had progressive shortness of breath, increased swelling BLE and extreme fatigue w/ generalized weakness. He is not able to lie flat d/t smothering. He is having difficulty going from sitting to standing d/t leg weakness. He is having pain and swelling in his legs with discoloration to both feet  and wounds on his toes. He is having weeping from his left forearm. Denies fever, chills, productive cough, abdominal pain, diarrhea. Voiding minimal amounts, usually small amount once daily. He is frustrated with his current health status and reports he has been sick for an entire year, was hospitalized for corona virus and  had double pneumonia and since that time has been sick and in/out of the hospital.     Chest xray in the ED w/ mild pulmonary edema and small bilateral pleural effusions. Pertinent labs: HS troponin 415 w/ reflex 403; proBNP >70,000; Na 132, creatinine 6.59; H/H 9.9/31.9.    Review of Systems   Constitutional:  Positive for activity change, appetite change and fatigue. Negative for chills and fever.   Respiratory:  Positive for shortness of breath.    Cardiovascular:  Positive for leg swelling.   Gastrointestinal:  Positive for abdominal distention. Negative for abdominal pain, diarrhea, nausea and vomiting.   Musculoskeletal:  Positive for arthralgias and gait problem.   Skin:  Positive for color change (bilateral feet/toes).   Neurological:  Positive for weakness.   Hematological:  Bruises/bleeds easily.   All other systems reviewed and are negative.               Personal History     Past Medical History:   Diagnosis Date    Anxiety     Anxiety disorder     Back pain     Chronic back pain     work related injury    CKD (chronic kidney disease)     most recent creatinine 2.7 on 01/26/16    Coronary artery disease     Diabetes mellitus     insulin dependent diabetes Onset 2010    Dyslipidemia     Hyperlipidemia     Hypertension     Ischemic heart disease     Renal failure     Stroke     TIA / transient right vision loss age 42    TIA (transient ischemic attack)     Vertigo              Past Surgical History:   Procedure Laterality Date    APPENDECTOMY      CARDIAC CATHETERIZATION      CHOLECYSTECTOMY  03/2012    COLONOSCOPY      CORONARY ARTERY BYPASS GRAFT      ENDOSCOPY         Family History:  family history is not on file.     Social History:  reports that he quit smoking about 37 years ago. His smoking use included cigarettes. He has a 70.00 pack-year smoking history. He has never used smokeless tobacco. He reports that he does not drink alcohol and does not use drugs.  Social History     Social History  "Narrative    Not on file       Medications:  Insulin Pen Needle, Carrol-Do Rx, Sucroferric Oxyhydroxide, amiodarone, apixaban, aspirin, calcitriol, cholecalciferol, clonazePAM, dutasteride, glucose blood, hydrALAZINE, insulin aspart, insulin glargine, metoprolol succinate XL, multivitamin with minerals, pantoprazole, torsemide, and triamcinolone    Allergies   Allergen Reactions    Doxycycline Other (See Comments)     Joint pain, tongue swelling    \"Body locks up\"    Levofloxacin Shortness Of Breath    Gabapentin Confusion    Augmentin [Amoxicillin-Pot Clavulanate] Palpitations    Biaxin [Clarithromycin] Palpitations    Coreg [Carvedilol] Itching    Crestor [Rosuvastatin Calcium] Itching     Itching rash    Iodine Rash     \"pineda skin\"    Lipitor [Atorvastatin] Itching     And Crestor- generalized weakness and chest tightness    Lisinopril Cough     Cough /weakness, nauseas and dirrhea    Livalo [Pitavastatin] Unknown - Low Severity     Chest tightness    Nortriptyline Hcl Other (See Comments)     Arthralgias    Pravastatin Unknown - Low Severity     Chest , neck and arm discomfort    Questran [Cholestyramine] Unknown - Low Severity       Objective   Objective     Vital Signs:   Temp:  [97.5 °F (36.4 °C)] 97.5 °F (36.4 °C)  Heart Rate:  [81-85] 81  Resp:  [18-22] 18  BP: (128-148)/(72-79) 136/77  Flow (L/min):  [3] 3    Physical Exam  Vitals reviewed.   Constitutional:       General: He is not in acute distress.     Appearance: He is well-developed. He is ill-appearing. He is not toxic-appearing.   HENT:      Head: Normocephalic and atraumatic.      Nose: Nose normal.      Mouth/Throat:      Mouth: Mucous membranes are dry.      Pharynx: Oropharynx is clear.   Eyes:      Extraocular Movements: Extraocular movements intact.      Conjunctiva/sclera: Conjunctivae normal.      Pupils: Pupils are equal, round, and reactive to light.   Cardiovascular:      Rate and Rhythm: Normal rate and regular rhythm.      Pulses: " Normal pulses.      Heart sounds: Murmur heard.   Pulmonary:      Effort: Pulmonary effort is normal.      Breath sounds: Rales present.   Abdominal:      General: Bowel sounds are normal. There is no distension.      Palpations: Abdomen is soft.      Tenderness: There is no abdominal tenderness.   Musculoskeletal:         General: Tenderness (bilateral feet / toes) present. Normal range of motion.      Cervical back: Normal range of motion and neck supple.      Right lower le+ Pitting Edema present.      Left lower le+ Pitting Edema present.   Skin:     General: Skin is warm and dry.      Capillary Refill: Capillary refill takes 2 to 3 seconds.      Findings: Bruising and erythema (toes) present.      Comments: Left brachial incision - AVF; right chest HD line     Neurological:      Mental Status: He is alert and oriented to person, place, and time.      Cranial Nerves: No cranial nerve deficit.   Psychiatric:         Mood and Affect: Mood normal.         Behavior: Behavior normal.        Result Review:  I have personally reviewed the results from the time of this admission to 2024 21:27 EST and agree with these findings:  [x]  Laboratory list / accordion  []  Microbiology  [x]  Radiology  [x]  EKG/Telemetry   []  Cardiology/Vascular   []  Pathology  []  Old records  []  Other:  Most notable findings include:     LAB RESULTS:      Lab 24  1727 24  0524 24  0015   WBC 10.18  --   --    HEMOGLOBIN 9.9*  --   --    HEMATOCRIT 31.9*  --   --    PLATELETS 130*  --   --    NEUTROS ABS 7.94*  --   --    IMMATURE GRANS (ABS) 0.15*  --   --    LYMPHS ABS 0.81  --   --    MONOS ABS 0.99*  --   --    EOS ABS 0.24  --   --    .9*  --   --    APTT  --   --  45.3*   CK TOTAL  --  75  --          Lab 24  1727 24  0524   SODIUM 132*  --    POTASSIUM 4.3  --    CHLORIDE 94*  --    CO2 23.0  --    ANION GAP 15.0  --    BUN 35*  --    CREATININE 6.59*  --    EGFR 8.4*  --    GLUCOSE  323*  --    CALCIUM 9.5  --    MAGNESIUM 2.1  --    TSH  --  1.020         Lab 02/24/24 1727   TOTAL PROTEIN 6.3   ALBUMIN 3.6   GLOBULIN 2.7   ALT (SGPT) 19   AST (SGOT) 16   BILIRUBIN 0.4   ALK PHOS 84         Lab 02/24/24  1935 02/24/24 1727   PROBNP  --  >70,000.0*   HSTROP T 403* 415*                 Brief Urine Lab Results  (Last result in the past 365 days)        Color   Clarity   Blood   Leuk Est   Nitrite   Protein   CREAT   Urine HCG        01/01/24 0550 Yellow   Clear     Negative                     Microbiology Results (last 10 days)       Procedure Component Value - Date/Time    COVID PRE-OP / PRE-PROCEDURE SCREENING ORDER (NO ISOLATION) - Swab, Nasopharynx [[id number]]  (Normal) Collected: 02/24/24 1728    Lab Status: Final result Specimen: Swab from Nasopharynx Updated: 02/24/24 1808    Narrative:      The following orders were created for panel order COVID PRE-OP / PRE-PROCEDURE SCREENING ORDER (NO ISOLATION) - Swab, Nasopharynx.  Procedure                               Abnormality         Status                     ---------                               -----------         ------                     COVID-19 and FLU A/B PCR...[[id number]]  Normal              Final result                 Please view results for these tests on the individual orders.    COVID-19 and FLU A/B PCR, 1 HR TAT - Swab, Nasopharynx [[id number]]  (Normal) Collected: 02/24/24 1728    Lab Status: Final result Specimen: Swab from Nasopharynx Updated: 02/24/24 1808     COVID19 Not Detected     Influenza A PCR Not Detected     Influenza B PCR Not Detected    Narrative:      Fact sheet for providers: https://www.fda.gov/media/392193/download    Fact sheet for patients: https://www.fda.gov/media/242799/download    Test performed by PCR.            XR Chest 1 View    Result Date: 2/24/2024  XR CHEST 1 VW Date of Exam: 2/24/2024 5:05 PM EST Indication: SOA triage protocol Comparison: Chest radiograph 12/29/2015. Findings: Tunneled  right IJ approach central venous catheter tip overlies the superior cavoatrial junction. Sternotomy and CABG. Unchanged enlarged cardiac silhouette. Pulmonary vascular congestion with diffuse interstitial thickening. Small bilateral pleural effusions. No pneumothorax. No acute osseous abnormality.     Impression: Impression: Mild pulmonary edema and small bilateral pleural effusions. Electronically Signed: Kobe Phoenix MD  2/24/2024 5:21 PM EST  Workstation ID: [id number]         Assessment & Plan   Assessment & Plan       Volume overload    Hyperlipidemia LDL goal <70    Type 2 diabetes mellitus with kidney complication, with long-term current use of insulin    Dyslipidemia    Secondary pulmonary arterial hypertension    Peritonitis, unspecified    ESRD (end stage renal disease)    Hypertensive chronic kidney disease with stage 1 through stage 4 chronic kidney disease, or unspecified chronic kidney disease    Elevated troponin    Atrial fibrillation and flutter    COPD (chronic obstructive pulmonary disease)    On home oxygen therapy      Volume overload  ESRD on HD MWF  - on PD for ~ 5 years, recent recurrent peritonitis s/p PD cath removal and start of HD 2/16, current on HD MWF w/ last session 2/23/2024, reports tolerating well  -Typically removed 1200 cc - 2000 cc fluid nightly with PD  - dry weight is 165 pounds  - has L AVF that is maturing, currently using right chest access for HD  - given lasix 40 mg IV x 1 in ED, on torsemide at home  - continue torsemide, calcitrol, phos binder, nephrovite  - consult nephrology for dialysis     A/C heart failure  Severe pulmonary HTN / VHD  - from OSH records, TTE 11/3/2023 w/ EF 43%, grade 3 diastolic dysfunction, moderate MR/TR, RV mildly dilated, RV systolic pressure severely elevated; from 2/18/2024, EF 40%, severe pulmonary HTN  - daily weights, strict intake output  - dry weight is 165 pounds    Recurrent peritonitis  - initially dx at  12/23/23, cx positive for  E.faecalis, treated w/ IV vanco; return to OSH 2/10 w/ cloudy PD fluid, cx grew E.facecalis sensitive to vanc/pcn; PD cath removed 2/16 w/ placement of L AVF and R chest HD temporary access  - currently on IV vancomycin 500 mg following HD treatments (being infused at the end of his HD treatments, last dose Friday 2/23/2024)  - per EMR started Vanco 2/14, to complete 3/6/2024  - pharmacy to dose vancomycin    Elevated troponin  CAD s/p CABG  HTN / HLD  - likely r/t chronic renal disease, trending down  - recently ~1500 at OSH  - denies chest pain, chronically elevated  - on Eliquis for afib/flutter  - on aspirin/statin    Afib/flutter  - s/p ADRIANNA w/ cardioversion 2/19/2024 at [facility]  - continue Eliquis  - continue amiodarone    Chronic COPD  - duo nebs scheduled / prn  - on 2-3L home O2    T2DM w/ peripheral neuropathy  - levemir  - fsbg achs w/ moderate dose ssi  - A1c 7.4 11/16/23    Bilateral toe redness/pain  - WOC consult  - xray bilateral feet d/t pain / r/o osteo    Macrocytic anemia  Thrombocytopenia  - chronic, at baseline  - a.m. labs    DVT prophylaxis:  Eliquis    CODE STATUS:    Code Status (Patient has no pulse and is not breathing): CPR (Attempt to Resuscitate)  Medical Interventions (Patient has pulse or is breathing): Full Support      Expected Discharge    Expected discharge date/ time has not been documented.      This note has been completed as part of a split-shared workflow.     Signature: Electronically signed by JUSTIN Prescott, 02/24/24, 9:28 PM EST      Total time spent: 95 min  Time spent includes time reviewing chart, face-to-face time, counseling patient/family/caregiver, ordering medications/tests/procedures, communicating with other health care professionals, documenting clinical information in the electronic health record, and coordination of care.      Attending   Admission Attestation       I have performed an independent face-to-face diagnostic evaluation including performing an  independent physical examination.  I approve of the documented plan of care above that was reviewed and developed with the advanced practice clinician (APC) and take responsibility for that plan along with its associated risks.  I have updated the HPI as appropriate.    Brief HPI    This is a 73-year-old male patient with a PMH significant for ESRD recently initiated on HD (M/W/F), HTN, diabetes mellitus type 2, HLD, CAD S/P CABG, atrial fibrillation, severe pulmonary hypertension, moderate mitral and tricuspid regurgitation, systolic CHF with an EF 40-45%, grade 2 diastolic CHF, COPD with chronic hypoxic respiratory failure on 2-3 L nasal cannula, who comes to the ED due to shortness of breath.  Patient had a recent diagnosis of SBP (12/2023) w/ cx + for E. Faecalis ultimatelty treated with IP Vancomycin with PD catheter removed on 2/16.  Patient was initiated on HD at that time.  Since then, he has had progressive shortness of breath and worsening diffuse body edema, orthopnea and cough.  He says with PD he would take off 2015-3561 cc fluid daily.  He is unsure how much fluid has been removed with HD currently.  He says his dry weight is around 165 pounds.  He denies fever, chest pain, nausea, vomiting.    Patient was hospitalized at [facility] on 2/16/2024 for initiation of hemodialysis with placement of tunneled HD catheter and creation of left brachiocephalic fistula.  Postoperatively, he had atrial fibrillation with RVR, hypotension, requiring IV pressor therapy and hypercapnic respiratory failure requiring BiPAP in the ICU.  He also had non-STEMI with troponins trending up to around 1500.  He was placed on a heparin drip, given full dose aspirin without further intervention.      Attending Physical Exam:  Temp:  [97.5 °F (36.4 °C)] 97.5 °F (36.4 °C)  Heart Rate:  [81-85] 81  Resp:  [18-22] 18  BP: (128-148)/(72-79) 136/77  Flow (L/min):  [3] 3    Constitutional: Awake, alert  Eyes: PERRLA, sclerae  anicteric, no conjunctival injection  HENT: NCAT, mucous membranes moist  Neck: Supple, no thyromegaly, no lymphadenopathy, trachea midline  Respiratory: Moderate air movement, bilateral inspiratory crackles  Cardiovascular: RRR, no murmurs, rubs, or gallops, palpable pedal pulses bilaterally  Gastrointestinal: Positive bowel sounds, firm, nontender, distended  Musculoskeletal: 3+ pitting bilateral ankle edema, no clubbing or cyanosis to extremities  Psychiatric: Appropriate affect, cooperative  Neurologic: Oriented x 3, strength symmetric in all extremities, Cranial Nerves grossly intact to confrontation, speech clear  Skin: No rashes      Result Review:  I have personally reviewed the results from the time of this admission to 2/24/2024 22:06 EST and agree with these findings:  [x]  Laboratory list / accordion  []  Microbiology  [x]  Radiology  [x]  EKG/Telemetry   [x]  Cardiology/Vascular   []  Pathology  [x]  Old records    Assessment and Plan:    See assessment and plan documented by APC above and updated/edited by me as appropriate.    Richa Nuno,   02/24/24                 0

## 2024-02-26 ENCOUNTER — APPOINTMENT (OUTPATIENT)
Dept: CARDIOLOGY | Facility: HOSPITAL | Age: 74
End: 2024-02-26
Payer: MEDICARE

## 2024-02-26 ENCOUNTER — APPOINTMENT (OUTPATIENT)
Dept: NEPHROLOGY | Facility: HOSPITAL | Age: 74
End: 2024-02-26
Payer: MEDICARE

## 2024-02-26 PROBLEM — E43 SEVERE MALNUTRITION: Status: ACTIVE | Noted: 2024-02-26

## 2024-02-26 LAB
ALBUMIN SERPL-MCNC: 3.3 G/DL (ref 3.5–5.2)
ALBUMIN/GLOB SERPL: 1.2 G/DL
ALP SERPL-CCNC: 65 U/L (ref 39–117)
ALT SERPL W P-5'-P-CCNC: 16 U/L (ref 1–41)
ANION GAP SERPL CALCULATED.3IONS-SCNC: 12 MMOL/L (ref 5–15)
AST SERPL-CCNC: 14 U/L (ref 1–40)
BASOPHILS # BLD AUTO: 0.07 10*3/MM3 (ref 0–0.2)
BASOPHILS NFR BLD AUTO: 0.8 % (ref 0–1.5)
BILIRUB SERPL-MCNC: 0.6 MG/DL (ref 0–1.2)
BUN SERPL-MCNC: 32 MG/DL (ref 8–23)
BUN/CREAT SERPL: 5.4 (ref 7–25)
CALCIUM SPEC-SCNC: 9.1 MG/DL (ref 8.6–10.5)
CHLORIDE SERPL-SCNC: 97 MMOL/L (ref 98–107)
CO2 SERPL-SCNC: 24 MMOL/L (ref 22–29)
CREAT SERPL-MCNC: 5.97 MG/DL (ref 0.76–1.27)
DEPRECATED RDW RBC AUTO: 76 FL (ref 37–54)
EGFRCR SERPLBLD CKD-EPI 2021: 9.4 ML/MIN/1.73
EOSINOPHIL # BLD AUTO: 0.42 10*3/MM3 (ref 0–0.4)
EOSINOPHIL NFR BLD AUTO: 4.7 % (ref 0.3–6.2)
ERYTHROCYTE [DISTWIDTH] IN BLOOD BY AUTOMATED COUNT: 20.7 % (ref 12.3–15.4)
GLOBULIN UR ELPH-MCNC: 2.7 GM/DL
GLUCOSE BLDC GLUCOMTR-MCNC: 105 MG/DL (ref 70–130)
GLUCOSE SERPL-MCNC: 68 MG/DL (ref 65–99)
HAV IGM SERPL QL IA: NORMAL
HBV CORE IGM SERPL QL IA: NORMAL
HBV SURFACE AG SERPL QL IA: NORMAL
HCT VFR BLD AUTO: 31.4 % (ref 37.5–51)
HCV AB SER DONR QL: NORMAL
HGB BLD-MCNC: 9.4 G/DL (ref 13–17.7)
IMM GRANULOCYTES # BLD AUTO: 0.13 10*3/MM3 (ref 0–0.05)
IMM GRANULOCYTES NFR BLD AUTO: 1.5 % (ref 0–0.5)
LYMPHOCYTES # BLD AUTO: 1.04 10*3/MM3 (ref 0.7–3.1)
LYMPHOCYTES NFR BLD AUTO: 11.7 % (ref 19.6–45.3)
MAGNESIUM SERPL-MCNC: 2.4 MG/DL (ref 1.6–2.4)
MCH RBC QN AUTO: 30.4 PG (ref 26.6–33)
MCHC RBC AUTO-ENTMCNC: 29.9 G/DL (ref 31.5–35.7)
MCV RBC AUTO: 101.6 FL (ref 79–97)
MONOCYTES # BLD AUTO: 1.13 10*3/MM3 (ref 0.1–0.9)
MONOCYTES NFR BLD AUTO: 12.7 % (ref 5–12)
NEUTROPHILS NFR BLD AUTO: 6.11 10*3/MM3 (ref 1.7–7)
NEUTROPHILS NFR BLD AUTO: 68.6 % (ref 42.7–76)
NRBC BLD AUTO-RTO: 0 /100 WBC (ref 0–0.2)
PHOSPHATE SERPL-MCNC: 6.4 MG/DL (ref 2.5–4.5)
PLATELET # BLD AUTO: 127 10*3/MM3 (ref 140–450)
PMV BLD AUTO: 10.7 FL (ref 6–12)
POTASSIUM SERPL-SCNC: 4.5 MMOL/L (ref 3.5–5.2)
PROT SERPL-MCNC: 6 G/DL (ref 6–8.5)
QT INTERVAL: 384 MS
QTC INTERVAL: 451 MS
RBC # BLD AUTO: 3.09 10*6/MM3 (ref 4.14–5.8)
SODIUM SERPL-SCNC: 133 MMOL/L (ref 136–145)
VANCOMYCIN SERPL-MCNC: 11.5 MCG/ML (ref 5–40)
WBC NRBC COR # BLD AUTO: 8.9 10*3/MM3 (ref 3.4–10.8)

## 2024-02-26 PROCEDURE — 80074 ACUTE HEPATITIS PANEL: CPT | Performed by: INTERNAL MEDICINE

## 2024-02-26 PROCEDURE — 99233 SBSQ HOSP IP/OBS HIGH 50: CPT | Performed by: INTERNAL MEDICINE

## 2024-02-26 PROCEDURE — 80053 COMPREHEN METABOLIC PANEL: CPT | Performed by: INTERNAL MEDICINE

## 2024-02-26 PROCEDURE — 85025 COMPLETE CBC W/AUTO DIFF WBC: CPT | Performed by: NURSE PRACTITIONER

## 2024-02-26 PROCEDURE — 84100 ASSAY OF PHOSPHORUS: CPT | Performed by: NURSE PRACTITIONER

## 2024-02-26 PROCEDURE — 63710000001 INSULIN DETEMIR PER 5 UNITS: Performed by: NURSE PRACTITIONER

## 2024-02-26 PROCEDURE — 82948 REAGENT STRIP/BLOOD GLUCOSE: CPT

## 2024-02-26 PROCEDURE — 80202 ASSAY OF VANCOMYCIN: CPT

## 2024-02-26 PROCEDURE — 25010000002 VANCOMYCIN PER 500 MG

## 2024-02-26 PROCEDURE — 83735 ASSAY OF MAGNESIUM: CPT | Performed by: NURSE PRACTITIONER

## 2024-02-26 PROCEDURE — 25010000002 HEPARIN (PORCINE) PER 1000 UNITS: Performed by: INTERNAL MEDICINE

## 2024-02-26 RX ORDER — VANCOMYCIN HYDROCHLORIDE 750 MG/150ML
750 INJECTION, SOLUTION INTRAVENOUS ONCE
Status: COMPLETED | OUTPATIENT
Start: 2024-02-26 | End: 2024-02-26

## 2024-02-26 RX ADMIN — CALCITRIOL CAPSULES 0.25 MCG 0.5 MCG: 0.25 CAPSULE ORAL at 08:13

## 2024-02-26 RX ADMIN — FINASTERIDE 5 MG: 5 TABLET, FILM COATED ORAL at 08:13

## 2024-02-26 RX ADMIN — Medication 10 ML: at 21:46

## 2024-02-26 RX ADMIN — VANCOMYCIN HYDROCHLORIDE 750 MG: 750 INJECTION, SOLUTION INTRAVENOUS at 16:24

## 2024-02-26 RX ADMIN — TORSEMIDE 100 MG: 100 TABLET ORAL at 08:13

## 2024-02-26 RX ADMIN — CALCIUM ACETATE 667 MG: 667 CAPSULE ORAL at 08:13

## 2024-02-26 RX ADMIN — APIXABAN 5 MG: 5 TABLET, FILM COATED ORAL at 21:46

## 2024-02-26 RX ADMIN — CETIRIZINE HYDROCHLORIDE 10 MG: 10 TABLET, FILM COATED ORAL at 08:13

## 2024-02-26 RX ADMIN — Medication 1 TABLET: at 08:13

## 2024-02-26 RX ADMIN — CALCIUM ACETATE 667 MG: 667 CAPSULE ORAL at 18:39

## 2024-02-26 RX ADMIN — INSULIN DETEMIR 15 UNITS: 100 INJECTION, SOLUTION SUBCUTANEOUS at 08:12

## 2024-02-26 RX ADMIN — CLONAZEPAM 0.5 MG: 0.5 TABLET ORAL at 21:50

## 2024-02-26 RX ADMIN — METOPROLOL SUCCINATE 25 MG: 25 TABLET, EXTENDED RELEASE ORAL at 08:13

## 2024-02-26 RX ADMIN — HEPARIN SODIUM 2000 UNITS: 1000 INJECTION INTRAVENOUS; SUBCUTANEOUS at 15:43

## 2024-02-26 RX ADMIN — Medication 1 APPLICATION: at 08:14

## 2024-02-26 RX ADMIN — APIXABAN 5 MG: 5 TABLET, FILM COATED ORAL at 08:12

## 2024-02-26 RX ADMIN — PANTOPRAZOLE SODIUM 40 MG: 40 TABLET, DELAYED RELEASE ORAL at 08:12

## 2024-02-26 RX ADMIN — AMIODARONE HYDROCHLORIDE 200 MG: 200 TABLET ORAL at 08:13

## 2024-02-26 RX ADMIN — ASPIRIN 81 MG: 81 TABLET, COATED ORAL at 08:13

## 2024-02-26 RX ADMIN — ACETAMINOPHEN 650 MG: 325 TABLET ORAL at 21:49

## 2024-02-26 NOTE — PROGRESS NOTES
[facility] Medicine Services  PROGRESS NOTE    Patient Name: Erlin Savage  : 1950  MRN: [id number]    Date of Admission: 2024  Primary Care Physician: Joe Diallo MD    Subjective   Subjective     CC:  SOA    HPI:  Pt got very nauseated this am due to FSBS of 72- states that this happens to him a lot at home.  Otherwise, SOA has improved.       Objective   Objective     Vital Signs:   Temp:  [96.8 °F (36 °C)-98.2 °F (36.8 °C)] 97.1 °F (36.2 °C)  Heart Rate:  [64-73] 69  Resp:  [17-20] 20  BP: (107-155)/(63-87) 107/64  Flow (L/min):  [2-3] 2     Physical Exam:  Constitutional: No acute distress, awake, alert  HENT: NCAT, mucous membranes moist  Respiratory: Crackles at bases, respiratory effort normal   Cardiovascular: RRR, no murmurs, rubs, or gallops  Gastrointestinal: Positive bowel sounds, soft, nontender, nondistended  Musculoskeletal: 2+pitting edema BLEs  Psychiatric: Appropriate affect, cooperative  Neurologic: Oriented x 3, strength symmetric in all extremities, Cranial Nerves grossly intact to confrontation, speech clear  Skin: No rashes   R sided tunneled HD catheter     Results Reviewed:  LAB RESULTS:      Lab 24  1727   WBC 8.90 9.77 10.18   HEMOGLOBIN 9.4* 9.3* 9.9*   HEMATOCRIT 31.4* 30.9* 31.9*   PLATELETS 127* 135* 130*   NEUTROS ABS 6.11 7.14* 7.94*   IMMATURE GRANS (ABS) 0.13* 0.14* 0.15*   LYMPHS ABS 1.04 1.01 0.81   MONOS ABS 1.13* 1.13* 0.99*   EOS ABS 0.42* 0.29 0.24   .6* 101.3* 102.9*         Lab 24  1727   SODIUM 133* 136  --  132*   POTASSIUM 4.5 4.4  --  4.3   CHLORIDE 97* 98  --  94*   CO2 24.0 23.0  --  23.0   ANION GAP 12.0 15.0  --  15.0   BUN 32* 41*  --  35*   CREATININE 5.97* 7.28*  --  6.59*   EGFR 9.4* 7.4*  --  8.4*   GLUCOSE 68 154*  --  323*   CALCIUM 9.1 9.4  --  9.5   MAGNESIUM 2.4 2.1  --  2.1   PHOSPHORUS 6.4* 7.7*  --   --     HEMOGLOBIN A1C  --   --   --  7.50*   TSH  --   --  3.330  --          Lab 02/26/24  0417 02/24/24 1727   TOTAL PROTEIN 6.0 6.3   ALBUMIN 3.3* 3.6   GLOBULIN 2.7 2.7   ALT (SGPT) 16 19   AST (SGOT) 14 16   BILIRUBIN 0.6 0.4   ALK PHOS 65 84         Lab 02/24/24 1935 02/24/24 1727   PROBNP  --  >70,000.0*   HSTROP T 403* 415*         Lab 02/24/24 1935   CHOLESTEROL 145   LDL CHOL 76   HDL CHOL 49   TRIGLYCERIDES 109             Brief Urine Lab Results  (Last result in the past 365 days)        Color   Clarity   Blood   Leuk Est   Nitrite   Protein   CREAT   Urine HCG        01/01/24 0550 Yellow   Clear     Negative                       Microbiology Results Abnormal       Procedure Component Value - Date/Time    COVID PRE-OP / PRE-PROCEDURE SCREENING ORDER (NO ISOLATION) - Swab, Nasopharynx [[id number]]  (Normal) Collected: 02/24/24 1728    Lab Status: Final result Specimen: Swab from Nasopharynx Updated: 02/24/24 1808    Narrative:      The following orders were created for panel order COVID PRE-OP / PRE-PROCEDURE SCREENING ORDER (NO ISOLATION) - Swab, Nasopharynx.  Procedure                               Abnormality         Status                     ---------                               -----------         ------                     COVID-19 and FLU A/B PCR...[[id number]]  Normal              Final result                 Please view results for these tests on the individual orders.    COVID-19 and FLU A/B PCR, 1 HR TAT - Swab, Nasopharynx [[id number]]  (Normal) Collected: 02/24/24 1728    Lab Status: Final result Specimen: Swab from Nasopharynx Updated: 02/24/24 1808     COVID19 Not Detected     Influenza A PCR Not Detected     Influenza B PCR Not Detected    Narrative:      Fact sheet for providers: https://www.fda.gov/media/385057/download    Fact sheet for patients: https://www.fda.gov/media/355958/download    Test performed by PCR.            XR Foot 2 View Bilateral    Result Date: 2/24/2024  XR FOOT 2  VW BILATERAL Date of Exam: 2/24/2024 10:00 PM EST Indication: pain, r/o osteo Comparison: None available. Findings: There is no evidence of an acute fracture. There is bilateral calcaneal spurring. There is soft tissue swelling over the dorsal aspect of both feet. There is no conventional radiographic evidence of osteomyelitis. Both the right and left midfoot areas appear intact.     Impression: Impression: Soft tissue swelling. No conventional radiographic evidence of osteomyelitis. Electronically Signed: Dameon Pope MD  2/24/2024 10:24 PM EST  Workstation ID: [id number]    XR Chest 1 View    Result Date: 2/24/2024  XR CHEST 1 VW Date of Exam: 2/24/2024 5:05 PM EST Indication: SOA triage protocol Comparison: Chest radiograph 12/29/2015. Findings: Tunneled right IJ approach central venous catheter tip overlies the superior cavoatrial junction. Sternotomy and CABG. Unchanged enlarged cardiac silhouette. Pulmonary vascular congestion with diffuse interstitial thickening. Small bilateral pleural effusions. No pneumothorax. No acute osseous abnormality.     Impression: Impression: Mild pulmonary edema and small bilateral pleural effusions. Electronically Signed: Kobe Phoenix MD  2/24/2024 5:21 PM EST  Workstation ID: [id number]         Current medications:  Scheduled Meds:amiodarone, 200 mg, Oral, Daily  apixaban, 5 mg, Oral, BID  aspirin, 81 mg, Oral, Daily  b complex-vitamin c-folic acid, 1 tablet, Oral, Daily  calcitriol, 0.5 mcg, Oral, Daily  calcium acetate, 667 mg, Oral, TID With Meals  cetirizine, 10 mg, Oral, Daily  cholecalciferol, 1,000 Units, Oral, Daily  Dimethicone, 1 Application, Apply externally, Daily  finasteride, 5 mg, Oral, Daily  insulin detemir, 15 Units, Subcutaneous, Q12H  insulin lispro, 2-9 Units, Subcutaneous, 4x Daily AC & at Bedtime  metoprolol succinate XL, 25 mg, Oral, BID  pantoprazole, 40 mg, Oral, Daily  senna-docusate sodium, 2 tablet, Oral, BID  sodium chloride, 10 mL, Intravenous,  Q12H  torsemide, 100 mg, Oral, Daily  vancomycin, 750 mg, Intravenous, Once      Continuous Infusions:Pharmacy to dose vancomycin,       PRN Meds:.  acetaminophen **OR** acetaminophen **OR** acetaminophen    senna-docusate sodium **AND** polyethylene glycol **AND** bisacodyl **AND** bisacodyl    clonazePAM    dextrose    dextrose    glucagon (human recombinant)    heparin (porcine)    melatonin    nitroglycerin    ondansetron    Pharmacy to dose vancomycin    sodium chloride    sodium chloride    sodium chloride    Assessment & Plan   Assessment & Plan     Active Hospital Problems    Diagnosis  POA    **Volume overload [E87.70]  Yes    Elevated troponin [R79.89]  Unknown    Atrial fibrillation and flutter [I48.91, I48.92]  Unknown    COPD (chronic obstructive pulmonary disease) [J44.9]  Unknown    On home oxygen therapy [Z99.81]  Not Applicable    Macrocytic anemia [D53.9]  Unknown    Thrombocytopenia [D69.6]  Unknown    Peritonitis, unspecified [K65.9]  Yes    Secondary pulmonary arterial hypertension [I27.21]  Yes    Hypertensive chronic kidney disease with stage 1 through stage 4 chronic kidney disease, or unspecified chronic kidney disease [I12.9]  Yes    ESRD (end stage renal disease) [N18.6]  Yes    Dyslipidemia [E78.5]  Yes    Type 2 diabetes mellitus with kidney complication, with long-term current use of insulin [E11.29, Z79.4]  Not Applicable    Hyperlipidemia LDL goal <70 [E78.5]  Yes      Resolved Hospital Problems   No resolved problems to display.        Brief Hospital Course to date:  Erlin Savage is a 73 y.o. male with a PMH significant for ESRD recently initiated on HD (M/W/F), prior was on PD but had recent diagnosis of SBP (12/2023) with E faecalis from cultures treated with IP Vancomycin, HTN, diabetes mellitus type 2, HLD, CAD S/P CABG, atrial fibrillation, severe pulmonary hypertension, moderate mitral and tricuspid regurgitation, systolic CHF with an EF 40-45%, grade 2 diastolic CHF, COPD with  chronic hypoxic respiratory failure on 2-3 L nasal cannula who presented with volume overload.     Volume overload  ESRD on HD MWF  - on PD for ~ 5 years, recent recurrent peritonitis s/p PD cath removal and start of HD 2/16, current on HD MWF w/ last session 2/23/2024, reports tolerating well  -Typically removed 1200 cc - 2000 cc fluid nightly with PD  - dry weight is 165 pounds  - has L AVF that is maturing, currently using right chest access for HD  - given lasix 40 mg IV x 1 in ED, on torsemide at home. Net negative only 50 cc/ 24 hours (still makes some urine)  - continue torsemide, calcitrol, phos binder, nephrovite  - consult nephrology for dialysis      A/C heart failure  Severe pulmonary HTN / VHD  -- proBNP >70K on admission   - from OSH records, TTE 11/3/2023 w/ EF 43%, grade 3 diastolic dysfunction, moderate MR/TR, RV mildly dilated, RV systolic pressure severely elevated; from 2/18/2024, EF 40%, severe pulmonary HTN  - daily weights, strict intake output  - dry weight is 165 pounds     Recurrent peritonitis  - initially dx at  12/23/23, cx positive for E.faecalis, treated w/ IV vanco; return to OSH 2/10 w/ cloudy PD fluid, cx grew E.facecalis sensitive to vanc/pcn; PD cath removed 2/16 w/ placement of L AVF and R chest HD temporary access  - currently on IV vancomycin 500 mg following HD treatments (being infused at the end of his HD treatments, last dose Friday 2/23/2024)  - per EMR started Vanc 2/14, to complete 3/6/2024  - pharmacy to dose vancomycin     Elevated troponin  CAD s/p CABG  HTN / HLD  - likely r/t chronic renal disease, trending down  - recently ~1500 at OSH and treated with solely heparin gtt during that hospitalization (ie no intervention)  - denies chest pain, chronically elevated  - on Eliquis for afib/flutter  - on aspirin/statin     Afib/flutter  - s/p ADRIANNA w/ cardioversion 2/19/2024 at [facility]  - continue Eliquis  - continue amiodarone and Metoprolol per home meds     Chronic COPD  -  duo nebs scheduled / prn  - on 2-3L home O2     T2DM w/ peripheral neuropathy  - levemir- will decrease dose from BID to once daily (and none at night) due to hypoglycemia in am.   - fsbg achs w/ moderate dose ssi  - A1c 7.5%     Bilateral toe redness/pain  - [facility] consulted, reviewed imaging with them today   - xray bilateral feet d/t pain- no evidence of osteomyelitis. May need to consider MRI if ongoing concerns for osteo, however, no leukocytosis and currently on ABX (Vanc) for above   -- will get ABIs today     Macrocytic anemia  Thrombocytopenia  - chronic, at baseline       Total time spent: Time Spent: Time Spent: 35 minutes  Time spent includes time reviewing chart, face-to-face time, counseling patient/family/caregiver, ordering medications/tests/procedures, communicating with other health care professionals, documenting clinical information in the electronic health record, and coordination of care.      Expected Discharge Location and Transportation: home per PT/OT recs  Expected Discharge   Expected Discharge Date: 2/27/2024; Expected Discharge Time:      DVT prophylaxis:  Medical DVT prophylaxis orders are present.         AM-PAC 6 Clicks Score (PT): 20 (02/25/24 2200)    CODE STATUS:   Code Status and Medical Interventions:   Ordered at: 02/24/24 2125     Code Status (Patient has no pulse and is not breathing):    CPR (Attempt to Resuscitate)     Medical Interventions (Patient has pulse or is breathing):    Full Support       Zayra Guajardo MD  02/26/24

## 2024-02-26 NOTE — PROGRESS NOTES
" LOS: 2 days   Patient Care Team:  Joe Diallo MD as PCP - General (Family Medicine)  Fadi Garner MD as Consulting Physician (General Surgery)  Amor Celeste MD (Cardiology)  Praveen Novoa MD as Consulting Physician (Nephrology)  Kristin Moya MD as Consulting Physician (Internal Medicine)    Chief Complaint: ESRD    Subjective     Seen on HD tolerating well. UF target 3.5 liter. BP stable.     Subjective:  Symptoms:  Stable.  No shortness of breath.        History taken from: patient    Objective     Vital Sign Min/Max for last 24 hours  Temp  Min: 97.1 °F (36.2 °C)  Max: 98.2 °F (36.8 °C)   BP  Min: 107/64  Max: 140/77   Pulse  Min: 62  Max: 73   Resp  Min: 17  Max: 24   SpO2  Min: 91 %  Max: 100 %   Flow (L/min)  Min: 2  Max: 3   Weight  Min: 79.7 kg (175 lb 9.6 oz)  Max: 79.7 kg (175 lb 9.6 oz)     Flowsheet Rows      Flowsheet Row First Filed Value   Admission Height 180.3 cm (71\") Documented at 02/24/2024 1700   Admission Weight 77.1 kg (170 lb) Documented at 02/24/2024 1700            No intake/output data recorded.  I/O last 3 completed shifts:  In: 600 [P.O.:600]  Out: 3100 [Urine:100]    Objective:  General Appearance:  Comfortable.    Vital signs: (most recent): Blood pressure 111/62, pulse 62, temperature 97.1 °F (36.2 °C), temperature source Oral, resp. rate 24, height 180.3 cm (71\"), weight 79.7 kg (175 lb 9.6 oz), SpO2 95%.  Vital signs are normal.    Output: Minimal urine output.    HEENT: Normal HEENT exam.    Lungs:  Normal effort and normal respiratory rate.  There are rales.    Heart: Normal rate.  Regular rhythm.  S1 normal and S2 normal.    Abdomen: Abdomen is soft.    Extremities: There is dependent edema.    Neurological: Patient is alert and oriented to person, place and time.  Normal strength.    Pupils:  Pupils are equal, round, and reactive to light.                Results Review:     I reviewed the patient's new clinical results.    WBC WBC   Date Value Ref " "Range Status   02/26/2024 8.90 3.40 - 10.80 10*3/mm3 Final   02/25/2024 9.77 3.40 - 10.80 10*3/mm3 Final   02/24/2024 10.18 3.40 - 10.80 10*3/mm3 Final      HGB Hemoglobin   Date Value Ref Range Status   02/26/2024 9.4 (L) 13.0 - 17.7 g/dL Final   02/25/2024 9.3 (L) 13.0 - 17.7 g/dL Final   02/24/2024 9.9 (L) 13.0 - 17.7 g/dL Final      HCT Hematocrit   Date Value Ref Range Status   02/26/2024 31.4 (L) 37.5 - 51.0 % Final   02/25/2024 30.9 (L) 37.5 - 51.0 % Final   02/24/2024 31.9 (L) 37.5 - 51.0 % Final      Platlets No results found for: \"LABPLAT\"   MCV MCV   Date Value Ref Range Status   02/26/2024 101.6 (H) 79.0 - 97.0 fL Final   02/25/2024 101.3 (H) 79.0 - 97.0 fL Final   02/24/2024 102.9 (H) 79.0 - 97.0 fL Final          Sodium Sodium   Date Value Ref Range Status   02/26/2024 133 (L) 136 - 145 mmol/L Final   02/25/2024 136 136 - 145 mmol/L Final   02/24/2024 132 (L) 136 - 145 mmol/L Final      Potassium Potassium   Date Value Ref Range Status   02/26/2024 4.5 3.5 - 5.2 mmol/L Final   02/25/2024 4.4 3.5 - 5.2 mmol/L Final   02/24/2024 4.3 3.5 - 5.2 mmol/L Final      Chloride Chloride   Date Value Ref Range Status   02/26/2024 97 (L) 98 - 107 mmol/L Final   02/25/2024 98 98 - 107 mmol/L Final   02/24/2024 94 (L) 98 - 107 mmol/L Final      CO2 CO2   Date Value Ref Range Status   02/26/2024 24.0 22.0 - 29.0 mmol/L Final   02/25/2024 23.0 22.0 - 29.0 mmol/L Final   02/24/2024 23.0 22.0 - 29.0 mmol/L Final      BUN BUN   Date Value Ref Range Status   02/26/2024 32 (H) 8 - 23 mg/dL Final   02/25/2024 41 (H) 8 - 23 mg/dL Final   02/24/2024 35 (H) 8 - 23 mg/dL Final      Creatinine Creatinine   Date Value Ref Range Status   02/26/2024 5.97 (H) 0.76 - 1.27 mg/dL Final   02/25/2024 7.28 (H) 0.76 - 1.27 mg/dL Final   02/24/2024 6.59 (H) 0.76 - 1.27 mg/dL Final      Calcium Calcium   Date Value Ref Range Status   02/26/2024 9.1 8.6 - 10.5 mg/dL Final   02/25/2024 9.4 8.6 - 10.5 mg/dL Final   02/24/2024 9.5 8.6 - 10.5 " "mg/dL Final      PO4 No results found for: \"CAPO4\"   Albumin Albumin   Date Value Ref Range Status   02/26/2024 3.3 (L) 3.5 - 5.2 g/dL Final   02/24/2024 3.6 3.5 - 5.2 g/dL Final      Magnesium Magnesium   Date Value Ref Range Status   02/26/2024 2.4 1.6 - 2.4 mg/dL Final   02/25/2024 2.1 1.6 - 2.4 mg/dL Final   02/24/2024 2.1 1.6 - 2.4 mg/dL Final      Uric Acid No results found for: \"URICACID\"     Medication Review: Yes    Assessment & Plan       Volume overload    Hyperlipidemia LDL goal <70    Type 2 diabetes mellitus with kidney complication, with long-term current use of insulin    Dyslipidemia    Secondary pulmonary arterial hypertension    Peritonitis, unspecified    ESRD (end stage renal disease)    Hypertensive chronic kidney disease with stage 1 through stage 4 chronic kidney disease, or unspecified chronic kidney disease    Elevated troponin    Atrial fibrillation and flutter    COPD (chronic obstructive pulmonary disease)    On home oxygen therapy    Macrocytic anemia    Thrombocytopenia    Severe malnutrition      Assessment & Plan    [place] [facility].Previously on PD stopped due to persistent enterococci peritonitis. PD cath removed. Getting 2 weeks of vancomycin post HD.     Anemia: AMANDA with HD      BMD: Continue home meds.      Volume status : Depedent edema noted. Admitted with SOB on supp o2.         Recs  Extra session of HD yesterday. SOB improved. Doesn't want to do another treatment tomorrow. He can be discharge with f/u HD in dialysis clinic on Wednesday   [place] [facility]  AMANDA on HD   Renal Diet     Praveen Novoa MD  02/26/24  15:48 EST            "

## 2024-02-26 NOTE — PLAN OF CARE
Problem: Device-Related Complication Risk (Hemodialysis)  Goal: Safe, Effective Therapy Delivery  2/26/2024 1656 by Autumn Lennon RN  Outcome: Ongoing, Progressing  2/26/2024 1634 by Autumn Lennon RN  Outcome: Ongoing, Progressing  Intervention: Optimize Device Care and Function  Recent Flowsheet Documentation  Taken 2/26/2024 1545 by Autumn Lennon RN  Medication Review/Management:   medications reviewed   high-risk medications identified  Taken 2/26/2024 1215 by Autumn Lennon RN  Medication Review/Management:   medications reviewed   high-risk medications identified     Problem: Hemodynamic Instability (Hemodialysis)  Goal: Effective Tissue Perfusion  2/26/2024 1656 by Autumn Lennon RN  Outcome: Ongoing, Progressing  2/26/2024 1634 by Autumn Lennon RN  Outcome: Ongoing, Progressing     Problem: Infection (Hemodialysis)  Goal: Absence of Infection Signs and Symptoms  2/26/2024 1656 by Autumn Lennon RN  Outcome: Ongoing, Progressing  2/26/2024 1634 by Autumn Lennon RN  Outcome: Ongoing, Progressing   Goal Outcome Evaluation:      HD complete, blood reinfused, report given to primary RN Timoteo

## 2024-02-26 NOTE — PROGRESS NOTES
"Pharmacy Consult-Vancomycin Dosing  Erlin Savage is a  73 y.o. male receiving vancomycin therapy.     Indication: Recurrent Peritonitis   Consulting Provider: Dr. Guajardo  ID Consult: No    Goal Trough:    Current Antimicrobial Therapy  Anti-Infectives (From admission, onward)      Ordered     Dose/Rate Route Frequency Start Stop    02/25/24 0839  Pharmacy to dose vancomycin        Ordering Provider: Zayra Guajardo MD     Does not apply Continuous PRN 02/25/24 0839 03/26/24 0938            Allergies  Allergies as of 02/24/2024 - Reviewed 02/24/2024   Allergen Reaction Noted    Doxycycline Other (See Comments) 06/08/2023    Levofloxacin Shortness Of Breath 11/02/2023    Gabapentin Confusion 11/16/2023    Augmentin [amoxicillin-pot clavulanate] Palpitations 08/16/2016    Biaxin [clarithromycin] Palpitations 08/16/2016    Coreg [carvedilol] Itching 08/16/2016    Crestor [rosuvastatin calcium] Itching 01/09/2017    Iodine Rash 10/02/2023    Lipitor [atorvastatin] Itching 08/16/2016    Lisinopril Cough 08/16/2016    Livalo [pitavastatin] Unknown - Low Severity 08/16/2016    Nortriptyline hcl Other (See Comments) 12/05/2023    Pravastatin Unknown - Low Severity 08/16/2016    Questran [cholestyramine] Unknown - Low Severity 08/16/2016       Labs    Results from last 7 days   Lab Units 02/26/24  0417 02/25/24  0449 02/24/24  1727   BUN mg/dL 32* 41* 35*   CREATININE mg/dL 5.97* 7.28* 6.59*       Results from last 7 days   Lab Units 02/26/24  0417 02/25/24  0449 02/24/24  1727   WBC 10*3/mm3 8.90 9.77 10.18       Evaluation of Dosing     Last Dose Received in the ED/Outside Facility: Yes, vancomycin 500 mg IV after HD on 2/23  Is Patient on Dialysis or Renal Replacement: Yes- HD    Ht - 180.3 cm (71\")  Wt - 79.7 kg (175 lb 9.6 oz)    Estimated Creatinine Clearance: 12.8 mL/min (A) (by C-G formula based on SCr of 5.97 mg/dL (H)).    Intake & Output (last 3 days)         02/22 0701  02/23 0700 02/23 0701 02/24 0700 " 02/24 0701  02/25 0700 02/25 0701  02/26 0700    Urine (mL/kg/hr)   50     Total Output   50     Net   -50                     Microbiology and Radiology  Microbiology Results (last 10 days)       Procedure Component Value - Date/Time    COVID PRE-OP / PRE-PROCEDURE SCREENING ORDER (NO ISOLATION) - Swab, Nasopharynx [[id number]]  (Normal) Collected: 02/24/24 1728    Lab Status: Final result Specimen: Swab from Nasopharynx Updated: 02/24/24 1808    Narrative:      The following orders were created for panel order COVID PRE-OP / PRE-PROCEDURE SCREENING ORDER (NO ISOLATION) - Swab, Nasopharynx.  Procedure                               Abnormality         Status                     ---------                               -----------         ------                     COVID-19 and FLU A/B PCR...[[id number]]  Normal              Final result                 Please view results for these tests on the individual orders.    COVID-19 and FLU A/B PCR, 1 HR TAT - Swab, Nasopharynx [[id number]]  (Normal) Collected: 02/24/24 1728    Lab Status: Final result Specimen: Swab from Nasopharynx Updated: 02/24/24 1808     COVID19 Not Detected     Influenza A PCR Not Detected     Influenza B PCR Not Detected    Narrative:      Fact sheet for providers: https://www.fda.gov/media/935077/download    Fact sheet for patients: https://www.fda.gov/media/898397/download    Test performed by PCR.            Vancomycin Levels:    Results from last 7 days   Lab Units 02/26/24  0417 02/25/24  0449   VANCOMYCIN RM mcg/mL 11.50 14.70                     Assessment/Plan:    Pharmacy to dose vancomycin for recurrent peritonitis, initially diagnosed 12/23/23 with positive cultures growing E.faecalis and treated with vancomycin. Returned to OSH 2/10 with E.faecalis in PD fluid.  Patient receives vancomycin 500 mg IV after each HD session. Vancomycin treatment began 2/24 and is planned to complete 3/6.  Vancomycin random on 2/26 was 11.5 mcg/mL. With patient  receiving dialysis on 2/25 and 2/26 will give vancomycin 750 mg IV x 1 post dialysis session.  Will continue to follow renal plan/dialysis sessions and give vancomycin doses post sessions.   Will order vancomycin random levels as clinically appropriate to assess current regimen.   Will continue to monitor renal function, culture and sensitivities, and clinical status and adjust regimen as needed. Pharmacy will continue to follow     Thanks  Kyrie Walsh PharmD, BCPS  2/26/2024  10:15 EST

## 2024-02-26 NOTE — NURSING NOTE
"Reason for Wound, Ostomy and Continence (WOC) Nursing Consultation: \"Bilateral Feet\"    Patient sitting on EOB.  Family/support person not present.     Wounds noted by WOC:bilateral toes    Wound Assessment  Wound Type:  undetermined   Location: Toes of both feet  Drainage Amount: none  Pain: Yes   Notes: Toes of both feet swollen, red, purple, warm and painful per patient. Pitting edema to both feet. He reports his toes have been extremely painful and red/purple for 3 weeks or so. His wife had mixed up a soak for him out of baking soda, he would soak in the warm water for fifteen minutes each day, when soaking pain would subside but when removing his feet the pain would be quite intense. He does report much more pain with elevation and decrease in pain when dependent. Would consider ANA for status of circulation and possible MRI for further imaging.  Wound Image:       Recommendation(s) for management of wound:   As noted above.  -Practice pressure injury prevention protocol.    Most recent Josue Scale score:  Sensory Perception: 3-->slightly limited  Moisture: 3-->occasionally moist  Activity: 3-->walks occasionally  Mobility: 3-->slightly limited  Nutrition: 3-->adequate  Friction and Shear: 2-->potential problem  Josue Score: 17 (02/25/24 2200)     Specialty support surface: Waffle mattress    Pressure Injury Prevention Protocol (initiate for Josue Score of 18 or less):   *Keep skin dry, turn q 2 hr, keep heels elevated and offloaded with offloading heel boots.    *Apply z-guard to bottom and bony prominences daily and as needed with incontinence episodes.  *Follow C.A.R.E protocol if medical devices (Bipap, du, Ng tube, etc) are being used.  *Reduce layers under patient (one sheet as drawsheet and two incontinence pads) to allow waffle or [place] to improve microclimate   *Clean skin gently with no-rinse PH-balanced cleanser and soft, disposable cloth (barrier wipes-blue pack).   *Raise knee-gatch before " elevating HOB to reduce shearing      WOC Team will continue to follow.  Please re-consult if the wound(s) worsens.

## 2024-02-26 NOTE — CASE MANAGEMENT/SOCIAL WORK
Discharge Planning Assessment  [facility]     Patient Name: Erlin Savage  MRN: [id number]  Today's Date: 2/26/2024    Admit Date: 2/24/2024    Plan: IDP   Discharge Needs Assessment       Row Name 02/26/24 1625       Living Environment    People in Home significant other    Current Living Arrangements home    Potentially Unsafe Housing Conditions none    In the past 12 months has the electric, gas, oil, or water company threatened to shut off services in your home? No    Primary Care Provided by self;spouse/significant other    Provides Primary Care For no one    Family Caregiver if Needed significant other    Quality of Family Relationships involved;supportive    Able to Return to Prior Arrangements yes       Resource/Environmental Concerns    Resource/Environmental Concerns none    Transportation Concerns none       Transportation Needs    In the past 12 months, has lack of transportation kept you from medical appointments or from getting medications? no    In the past 12 months, has lack of transportation kept you from meetings, work, or from getting things needed for daily living? No       Food Insecurity    Within the past 12 months, you worried that your food would run out before you got the money to buy more. Never true    Within the past 12 months, the food you bought just didn't last and you didn't have money to get more. Never true       Transition Planning    Patient/Family Anticipates Transition to home with family    Patient/Family Anticipated Services at Transition none    Transportation Anticipated family or friend will provide       Discharge Needs Assessment    Readmission Within the Last 30 Days no previous admission in last 30 days    Equipment Currently Used at Home oxygen;walker, rolling;other (see comments)  lift chair    Concerns to be Addressed discharge planning    Anticipated Changes Related to Illness none    Equipment Needed After Discharge none                   Discharge Plan        Row Name 02/26/24 1627       Plan    Plan IDP    Patient/Family in Agreement with Plan yes    Plan Comments CM attempted bedside visit to initiate discharge planning, but patient was off unit in dialysis. Called significant other, Princess, and she was able to provide information. Mr. Savage and Princess share a home in [place]. He is independent with activities of daily living and uses a walker. He also has a lift chair and home oxygen from [facility]. He has dialysis at [facility] on MWF at 1020. Patient's primary care provider is Dr. Joe Diallo. Significant other denied problems with accessing medical care or obtaining medications. Significant other anticipates home at discharge. CM will continue to follow plan of care and assist with discharge planning needs as indicated.    Final Discharge Disposition Code 30 - still a patient                  Continued Care and Services - Admitted Since 2/24/2024    Coordination has not been started for this encounter.       Expected Discharge Date and Time       Expected Discharge Date Expected Discharge Time    Feb 27, 2024            Demographic Summary       Row Name 02/26/24 1624       General Information    Admission Type inpatient    Arrived From home    Referral Source admission list    Reason for Consult discharge planning    Preferred Language English       Contact Information    Permission Granted to Share Info With     Contact Information Obtained for     Contact Information Comments Princess Cardona Friend [phone]                   Functional Status       Row Name 02/26/24 1625       Functional Status    Usual Activity Tolerance moderate    Current Activity Tolerance other (see comments)  troy       Physical Activity    On average, how many days per week do you engage in moderate to strenuous exercise (like a brisk walk)? 0 days    On average, how many minutes do you engage in exercise at this level? 0 min    Number of minutes  of exercise per week 0       Assessment of Health Literacy    How often do you have someone help you read hospital materials? Never    How often do you have problems learning about your medical condition because of difficulty understanding written information? Never    How often do you have a problem understanding what is told to you about your medical condition? Never    How confident are you filling out medical forms by yourself? Quite a bit    Health Literacy Good       Functional Status, IADL    Medications assistive person    Meal Preparation independent    Housekeeping independent    Laundry independent    Shopping independent       Mental Status Summary    Recent Changes in Mental Status/Cognitive Functioning unable to assess                   Psychosocial    No documentation.                  Abuse/Neglect    No documentation.                  Legal    No documentation.                  Substance Abuse    No documentation.                  Patient Forms    No documentation.                     Brook Fulton RN

## 2024-02-26 NOTE — PLAN OF CARE
Problem: Adult Inpatient Plan of Care  Goal: Plan of Care Review  Outcome: Ongoing, Progressing  Goal: Patient-Specific Goal (Individualized)  Outcome: Ongoing, Progressing  Goal: Absence of Hospital-Acquired Illness or Injury  Outcome: Ongoing, Progressing  Intervention: Identify and Manage Fall Risk  Description: Perform standard risk assessment on admission using a validated tool or comprehensive approach appropriate to the patient; reassess fall risk frequently, with change in status or transfer to another level of care.  Communicate fall injury risk to interprofessional healthcare team.  Determine need for increased observation, equipment and environmental modification, such as low bed, signage and supportive, nonskid footwear.  Adjust safety measures to individual developmental age, stage and identified risk factors.  Reinforce the importance of safety and physical activity with patient and family.  Perform regular intentional rounding to assess need for position change, pain assessment and personal needs, including assistance with toileting.  Recent Flowsheet Documentation  Taken 2/26/2024 0400 by Pat Boswell RN  Safety Promotion/Fall Prevention:   activity supervised   assistive device/personal items within reach   safety round/check completed   nonskid shoes/slippers when out of bed   lighting adjusted   fall prevention program maintained  Taken 2/26/2024 0200 by Pat Boswell RN  Safety Promotion/Fall Prevention:   activity supervised   assistive device/personal items within reach   safety round/check completed   nonskid shoes/slippers when out of bed   lighting adjusted   fall prevention program maintained  Taken 2/26/2024 0000 by Pat Boswell RN  Safety Promotion/Fall Prevention:   activity supervised   assistive device/personal items within reach   safety round/check completed   nonskid shoes/slippers when out of bed   lighting adjusted   fall prevention program maintained  Taken 2/25/2024  2200 by Pat Boswell RN  Safety Promotion/Fall Prevention:   safety round/check completed   room organization consistent   nonskid shoes/slippers when out of bed   lighting adjusted   fall prevention program maintained   clutter free environment maintained   assistive device/personal items within reach   activity supervised  Taken 2/25/2024 2100 by Pat Boswell RN  Safety Promotion/Fall Prevention: patient off unit  Taken 2/25/2024 2000 by Pat Boswell RN  Safety Promotion/Fall Prevention: patient off unit  Intervention: Prevent Skin Injury  Description: Perform a screening for skin injury risk, such as pressure or moisture associated skin damage on admission and at regular intervals throughout hospital stay.  Keep all areas of skin (especially folds) clean and dry.  Maintain adequate skin hydration.  Relieve and redistribute pressure and protect bony prominences; implement measures based on patient-specific risk factors.  Match turning and repositioning schedule to clinical condition.  Encourage weight shift frequently; assist with reposition if unable to complete independently.  Float heels off bed; avoid pressure on the Achilles tendon.  Keep skin free from extended contact with medical devices.  Encourage functional activity and mobility, as early as tolerated.  Use aids (e.g., slide boards, mechanical lift) during transfer.  Recent Flowsheet Documentation  Taken 2/25/2024 2200 by Pat Boswell RN  Body Position: dangle, side of bed  Intervention: Prevent and Manage VTE (Venous Thromboembolism) Risk  Description: Assess for VTE (venous thromboembolism) risk.  Encourage and assist with early ambulation.  Initiate and maintain compression or other therapy, as indicated, based on identified risk in accordance with organizational protocol and provider order.  Encourage both active and passive leg exercises while in bed, if unable to ambulate.  Recent Flowsheet Documentation  Taken 2/25/2024 2200 by  Elbert, Brentlee, RN  Activity Management: activity encouraged  VTE Prevention/Management: (See MAR) other (see comments)  Intervention: Prevent Infection  Description: Maintain skin and mucous membrane integrity; promote hand, oral and pulmonary hygiene.  Optimize fluid balance, nutrition, sleep and glycemic control to maximize infection resistance.  Identify potential sources of infection early to prevent or mitigate progression of infection (e.g., wound, lines, devices).  Evaluate ongoing need for invasive devices; remove promptly when no longer indicated.  Recent Flowsheet Documentation  Taken 2/25/2024 2200 by Pat Boswell RN  Infection Prevention:   environmental surveillance performed   hand hygiene promoted   rest/sleep promoted   single patient room provided  Goal: Optimal Comfort and Wellbeing  Outcome: Ongoing, Progressing  Intervention: Monitor Pain and Promote Comfort  Description: Assess pain level, treatment efficacy and patient response at regular intervals using a consistent pain scale.  Consider the presence and impact of preexisting chronic pain.  Encourage patient and caregiver involvement in pain assessment, interventions and safety measures.  Recent Flowsheet Documentation  Taken 2/25/2024 2200 by Pat Boswell RN  Pain Management Interventions:   see MAR   position adjusted  Intervention: Provide Person-Centered Care  Description: Use a family-focused approach to care.  Develop trust and rapport by proactively providing information, encouraging questions, addressing concerns and offering reassurance.  Acknowledge emotional response to hospitalization.  Recognize and utilize personal coping strategies.  Honor spiritual and cultural preferences.  Recent Flowsheet Documentation  Taken 2/25/2024 2200 by Pat Boswell RN  Trust Relationship/Rapport:   care explained   choices provided   thoughts/feelings acknowledged   questions answered  Goal: Readiness for Transition of Care  Outcome:  Ongoing, Progressing     Problem: Skin Injury Risk Increased  Goal: Skin Health and Integrity  Outcome: Ongoing, Progressing  Intervention: Optimize Skin Protection  Description: Perform a full pressure injury risk assessment, as indicated by screening, upon admission to care unit.  Reassess skin (injury risk, full inspection) frequently (e.g., scheduled interval, with change in condition) to provide optimal early detection and prevention.  Maintain adequate tissue perfusion (e.g., encourage fluid balance; avoid crossing legs, constrictive clothing or devices) to promote tissue oxygenation.  Maintain head of bed at lowest degree of elevation tolerated, considering medical condition and other restrictions.  Avoid positioning onto an area that remains reddened.  Minimize incontinence and moisture (e.g., toileting schedule; moisture-wicking pad, diaper or incontinence collection device; skin moisture barrier).  Cleanse skin promptly and gently when soiled utilizing a pH-balanced cleanser.  Relieve and redistribute pressure (e.g., scheduled position changes, weight shifts, use of support surface, medical device repositioning, protective dressing application, use of positioning device, microclimate control, use of pressure-injury-monitor  Encourage increased activity, such as sitting in a chair at the bedside or early mobilization, when able to tolerate.  Recent Flowsheet Documentation  Taken 2/25/2024 2200 by Pat Boswell RN  Head of Bed (HOB) Positioning: HOB elevated     Problem: Diabetes Comorbidity  Goal: Blood Glucose Level Within Targeted Range  Outcome: Ongoing, Progressing     Problem: Heart Failure Comorbidity  Goal: Maintenance of Heart Failure Symptom Control  Outcome: Ongoing, Progressing  Intervention: Maintain Heart Failure-Management  Description: Evaluate adherence to home heart failure self-care regimen (e.g., medication, fluid balance, sodium intake, daily weight, physical activity, telemonitoring,  support).  Advocate continuation of home medication and schedule.  Consider pharmacologic therapy administration time and effects (e.g., avoid giving diuretic prior to bedtime or nitrates on empty stomach).  Monitor response to pharmacologic therapy, including weight fluctuations, blood pressure and electrolyte levels.  Monitor for signs and symptoms of anxiety and depression, including severity and duration; if present, provide psychosocial support.  Consider need for heart failure clinic or palliative care consult.  Recent Flowsheet Documentation  Taken 2/25/2024 2200 by Pat Boswell RN  Medication Review/Management:   medications reviewed   high-risk medications identified     Problem: Hypertension Comorbidity  Goal: Blood Pressure in Desired Range  Outcome: Ongoing, Progressing  Intervention: Maintain Blood Pressure Management  Description: Evaluate adherence to home antihypertensive regimen (e.g., exercise and activity, diet modification, medication).  Provide scheduled antihypertensive medication; consider administration time and effects (e.g., avoid giving diuretic prior to bedtime).  Monitor response to antihypertensive medication therapy (e.g., blood pressure, electrolyte levels, medication effects).  Minimize risk of orthostatic hypotension; encourage caution with position changes, particularly if elderly.  Recent Flowsheet Documentation  Taken 2/25/2024 2200 by Pat Boswell RN  Medication Review/Management:   medications reviewed   high-risk medications identified     Problem: Fall Injury Risk  Goal: Absence of Fall and Fall-Related Injury  Outcome: Ongoing, Progressing  Intervention: Identify and Manage Contributors  Description: Develop a fall prevention plan with the patient and caregiver/family.  Provide reorientation, appropriate sensory stimulation and routines with changes in mental status to decrease risk of fall.  Promote use of personal vision and auditory aids.  Assess assistance level  required for safe and effective self-care; provide support as needed, such as toileting, mobilization. For age 65 and older, implement timed toileting with assistance.  Encourage physical activity, such as performance of mobility and self-care at highest level of patient ability, multicomponent exercise program and provision of appropriate assistive devices.  If fall occurs, assess the severity of injury; implement fall injury protocol. Determine the cause and revise fall injury prevention plan.  Regularly review medication contribution to fall risk; adjust medication administration times to minimize risk of falling.  Consider risk related to polypharmacy and age.  Balance adequate pain management with potential for oversedation.  Recent Flowsheet Documentation  Taken 2/25/2024 2200 by Pat Boswell RN  Medication Review/Management:   medications reviewed   high-risk medications identified  Intervention: Promote Injury-Free Environment  Description: Provide a safe, barrier-free environment that encourages independent activity.  Keep care area uncluttered and well-lighted.  Determine need for increased observation or monitoring.  Avoid use of devices that minimize mobility, such as restraints or indwelling urinary catheter.  Recent Flowsheet Documentation  Taken 2/26/2024 0400 by Pat Boswell RN  Safety Promotion/Fall Prevention:   activity supervised   assistive device/personal items within reach   safety round/check completed   nonskid shoes/slippers when out of bed   lighting adjusted   fall prevention program maintained  Taken 2/26/2024 0200 by Pat Boswell RN  Safety Promotion/Fall Prevention:   activity supervised   assistive device/personal items within reach   safety round/check completed   nonskid shoes/slippers when out of bed   lighting adjusted   fall prevention program maintained  Taken 2/26/2024 0000 by Pat Boswell RN  Safety Promotion/Fall Prevention:   activity supervised   assistive  device/personal items within reach   safety round/check completed   nonskid shoes/slippers when out of bed   lighting adjusted   fall prevention program maintained  Taken 2/25/2024 2200 by Pat Boswell RN  Safety Promotion/Fall Prevention:   safety round/check completed   room organization consistent   nonskid shoes/slippers when out of bed   lighting adjusted   fall prevention program maintained   clutter free environment maintained   assistive device/personal items within reach   activity supervised  Taken 2/25/2024 2100 by Pat Boswell RN  Safety Promotion/Fall Prevention: patient off unit  Taken 2/25/2024 2000 by Pat Boswell RN  Safety Promotion/Fall Prevention: patient off unit     Problem: Device-Related Complication Risk (Hemodialysis)  Goal: Safe, Effective Therapy Delivery  Outcome: Ongoing, Progressing  Intervention: Optimize Device Care and Function  Description: Maintain flow rate, anticoagulation parameters, pressure ranges and prescribed fluid balance with gradual adjustments to maintain hemodynamic stability and achieve therapy goals.  Monitor laboratory results (e.g., electrolytes, glucose, albumin, coagulation studies, hemoglobin, hematocrit) and clinical status (e.g., cramping; nausea, vomiting, blood pressure fluctuations) for response to therapy; advocate for treatment or adju  Assess vascular access site for patency, securement and position to meet flow demands. Assess perfusion distal to access site to ensure adequate tissue oxygenation.  For arteriovenous fistula, assess presence of thrill to ensure presence of blood flow. Avoid blood pressure readings, lab draws, tight clothing or jewelry on the AV (arteriovenous) fistula extremity to prevent trauma.  Maintain circuit monitoring (e.g., arterial, venous and transmembrane pressure; ultrafiltrate removal; dialysate flow, conductivity and temperature); address alarms promptly to decrease risk to patient and preserve circuit  function.  Evaluate circuit for disconnections, cracks, clotting, malfunction, leaks or rupture of hemofilter; intervene promptly to prevent risk to patient.  Consider distal vascular access for antibiotic or vasoactive medication administration to prevent filtration of medication effect prior to being delivered systemically to the patient.  Maintain infusion of anticoagulation; adjust to keep laboratory values within ordered range.  Provide emergency equipment, such as clamps, replacement devices and resuscitative supplies, if malfunction, tubing rupture, clot formation or migration occur.  Recent Flowsheet Documentation  Taken 2/25/2024 2200 by Pat Boswell RN  Medication Review/Management:   medications reviewed   high-risk medications identified     Problem: Hemodynamic Instability (Hemodialysis)  Goal: Effective Tissue Perfusion  Outcome: Ongoing, Progressing     Problem: Infection (Hemodialysis)  Goal: Absence of Infection Signs and Symptoms  Outcome: Ongoing, Progressing   Goal Outcome Evaluation:

## 2024-02-26 NOTE — PLAN OF CARE
Problem: Device-Related Complication Risk (Hemodialysis)  Goal: Safe, Effective Therapy Delivery  Outcome: Ongoing, Progressing  Intervention: Optimize Device Care and Function  Recent Flowsheet Documentation  Taken 2/26/2024 1215 by Autumn Lennon RN  Medication Review/Management:   medications reviewed   high-risk medications identified     Problem: Hemodynamic Instability (Hemodialysis)  Goal: Effective Tissue Perfusion  Outcome: Ongoing, Progressing     Problem: Infection (Hemodialysis)  Goal: Absence of Infection Signs and Symptoms  Outcome: Ongoing, Progressing   Goal Outcome Evaluation:            HD complete, blood reinfused, report given to primary AMOS Saeed

## 2024-02-26 NOTE — PLAN OF CARE
Goal Outcome Evaluation: On call treatment completed. Pt tolerated well. Goal reached. VSS during tx. Blood returned/re infused to pt. Bedside report to leticia Ocasio.      Problem: Device-Related Complication Risk (Hemodialysis)  Goal: Safe, Effective Therapy Delivery  Outcome: Ongoing, Progressing  Intervention: Optimize Device Care and Function  Recent Flowsheet Documentation  Taken 2/25/2024 1811 by Lexi Sharif, RN  Medication Review/Management: medications reviewed     Problem: Hemodynamic Instability (Hemodialysis)  Goal: Effective Tissue Perfusion  Outcome: Ongoing, Progressing     Problem: Infection (Hemodialysis)  Goal: Absence of Infection Signs and Symptoms  Outcome: Ongoing, Progressing

## 2024-02-27 ENCOUNTER — READMISSION MANAGEMENT (OUTPATIENT)
Dept: CALL CENTER | Facility: HOSPITAL | Age: 74
End: 2024-02-27
Payer: MEDICARE

## 2024-02-27 ENCOUNTER — APPOINTMENT (OUTPATIENT)
Dept: CARDIOLOGY | Facility: HOSPITAL | Age: 74
End: 2024-02-27
Payer: MEDICARE

## 2024-02-27 VITALS
HEART RATE: 75 BPM | HEIGHT: 71 IN | WEIGHT: 174 LBS | OXYGEN SATURATION: 95 % | TEMPERATURE: 98.7 F | BODY MASS INDEX: 24.36 KG/M2 | SYSTOLIC BLOOD PRESSURE: 119 MMHG | RESPIRATION RATE: 18 BRPM | DIASTOLIC BLOOD PRESSURE: 71 MMHG

## 2024-02-27 PROBLEM — E87.70 VOLUME OVERLOAD: Status: RESOLVED | Noted: 2024-02-24 | Resolved: 2024-02-27

## 2024-02-27 LAB
ANION GAP SERPL CALCULATED.3IONS-SCNC: 14 MMOL/L (ref 5–15)
BASOPHILS # BLD AUTO: 0.07 10*3/MM3 (ref 0–0.2)
BASOPHILS NFR BLD AUTO: 0.8 % (ref 0–1.5)
BUN SERPL-MCNC: 30 MG/DL (ref 8–23)
BUN/CREAT SERPL: 6.2 (ref 7–25)
CALCIUM SPEC-SCNC: 9.5 MG/DL (ref 8.6–10.5)
CHLORIDE SERPL-SCNC: 97 MMOL/L (ref 98–107)
CO2 SERPL-SCNC: 22 MMOL/L (ref 22–29)
CREAT SERPL-MCNC: 4.81 MG/DL (ref 0.76–1.27)
DEPRECATED RDW RBC AUTO: 76.8 FL (ref 37–54)
EGFRCR SERPLBLD CKD-EPI 2021: 12.2 ML/MIN/1.73
EOSINOPHIL # BLD AUTO: 0.31 10*3/MM3 (ref 0–0.4)
EOSINOPHIL NFR BLD AUTO: 3.6 % (ref 0.3–6.2)
ERYTHROCYTE [DISTWIDTH] IN BLOOD BY AUTOMATED COUNT: 20.3 % (ref 12.3–15.4)
GLUCOSE BLDC GLUCOMTR-MCNC: 101 MG/DL (ref 70–130)
GLUCOSE BLDC GLUCOMTR-MCNC: 109 MG/DL (ref 70–130)
GLUCOSE BLDC GLUCOMTR-MCNC: 114 MG/DL (ref 70–130)
GLUCOSE BLDC GLUCOMTR-MCNC: 134 MG/DL (ref 70–130)
GLUCOSE BLDC GLUCOMTR-MCNC: 159 MG/DL (ref 70–130)
GLUCOSE BLDC GLUCOMTR-MCNC: 171 MG/DL (ref 70–130)
GLUCOSE BLDC GLUCOMTR-MCNC: 215 MG/DL (ref 70–130)
GLUCOSE BLDC GLUCOMTR-MCNC: 72 MG/DL (ref 70–130)
GLUCOSE SERPL-MCNC: 199 MG/DL (ref 65–99)
HCT VFR BLD AUTO: 30.9 % (ref 37.5–51)
HGB BLD-MCNC: 9.5 G/DL (ref 13–17.7)
IMM GRANULOCYTES # BLD AUTO: 0.12 10*3/MM3 (ref 0–0.05)
IMM GRANULOCYTES NFR BLD AUTO: 1.4 % (ref 0–0.5)
LYMPHOCYTES # BLD AUTO: 1 10*3/MM3 (ref 0.7–3.1)
LYMPHOCYTES NFR BLD AUTO: 11.8 % (ref 19.6–45.3)
MAGNESIUM SERPL-MCNC: 2 MG/DL (ref 1.6–2.4)
MCH RBC QN AUTO: 31.7 PG (ref 26.6–33)
MCHC RBC AUTO-ENTMCNC: 30.7 G/DL (ref 31.5–35.7)
MCV RBC AUTO: 103 FL (ref 79–97)
MONOCYTES # BLD AUTO: 1.19 10*3/MM3 (ref 0.1–0.9)
MONOCYTES NFR BLD AUTO: 14 % (ref 5–12)
NEUTROPHILS NFR BLD AUTO: 5.81 10*3/MM3 (ref 1.7–7)
NEUTROPHILS NFR BLD AUTO: 68.4 % (ref 42.7–76)
NRBC BLD AUTO-RTO: 0 /100 WBC (ref 0–0.2)
PHOSPHATE SERPL-MCNC: 5.9 MG/DL (ref 2.5–4.5)
PLATELET # BLD AUTO: 122 10*3/MM3 (ref 140–450)
PMV BLD AUTO: 11.4 FL (ref 6–12)
POTASSIUM SERPL-SCNC: 5.2 MMOL/L (ref 3.5–5.2)
RBC # BLD AUTO: 3 10*6/MM3 (ref 4.14–5.8)
SODIUM SERPL-SCNC: 133 MMOL/L (ref 136–145)
WBC NRBC COR # BLD AUTO: 8.5 10*3/MM3 (ref 3.4–10.8)

## 2024-02-27 PROCEDURE — 82948 REAGENT STRIP/BLOOD GLUCOSE: CPT

## 2024-02-27 PROCEDURE — 83735 ASSAY OF MAGNESIUM: CPT | Performed by: NURSE PRACTITIONER

## 2024-02-27 PROCEDURE — 80048 BASIC METABOLIC PNL TOTAL CA: CPT | Performed by: INTERNAL MEDICINE

## 2024-02-27 PROCEDURE — 99239 HOSP IP/OBS DSCHRG MGMT >30: CPT | Performed by: INTERNAL MEDICINE

## 2024-02-27 PROCEDURE — 63710000001 INSULIN DETEMIR PER 5 UNITS: Performed by: INTERNAL MEDICINE

## 2024-02-27 PROCEDURE — 84100 ASSAY OF PHOSPHORUS: CPT | Performed by: NURSE PRACTITIONER

## 2024-02-27 PROCEDURE — 85025 COMPLETE CBC W/AUTO DIFF WBC: CPT | Performed by: INTERNAL MEDICINE

## 2024-02-27 RX ADMIN — PANTOPRAZOLE SODIUM 40 MG: 40 TABLET, DELAYED RELEASE ORAL at 08:31

## 2024-02-27 RX ADMIN — CETIRIZINE HYDROCHLORIDE 10 MG: 10 TABLET, FILM COATED ORAL at 08:31

## 2024-02-27 RX ADMIN — CALCITRIOL CAPSULES 0.25 MCG 0.5 MCG: 0.25 CAPSULE ORAL at 08:34

## 2024-02-27 RX ADMIN — CALCIUM ACETATE 667 MG: 667 CAPSULE ORAL at 08:33

## 2024-02-27 RX ADMIN — Medication 10 ML: at 08:34

## 2024-02-27 RX ADMIN — Medication 1000 UNITS: at 08:33

## 2024-02-27 RX ADMIN — Medication 1 TABLET: at 08:31

## 2024-02-27 RX ADMIN — TORSEMIDE 100 MG: 100 TABLET ORAL at 08:33

## 2024-02-27 RX ADMIN — ASPIRIN 81 MG: 81 TABLET, COATED ORAL at 08:31

## 2024-02-27 RX ADMIN — AMIODARONE HYDROCHLORIDE 200 MG: 200 TABLET ORAL at 08:31

## 2024-02-27 RX ADMIN — FINASTERIDE 5 MG: 5 TABLET, FILM COATED ORAL at 08:32

## 2024-02-27 RX ADMIN — INSULIN DETEMIR 15 UNITS: 100 INJECTION, SOLUTION SUBCUTANEOUS at 08:30

## 2024-02-27 RX ADMIN — APIXABAN 5 MG: 5 TABLET, FILM COATED ORAL at 08:32

## 2024-02-27 RX ADMIN — METOPROLOL SUCCINATE 25 MG: 25 TABLET, EXTENDED RELEASE ORAL at 08:32

## 2024-02-27 NOTE — CASE MANAGEMENT/SOCIAL WORK
Continued Stay Note  [facility]     Patient Name: Erlin Savage  MRN: [id number]  Today's Date: 2/27/2024    Admit Date: 2/24/2024    Plan: home with home health   Discharge Plan       Row Name 02/27/24 1117       Plan    Plan home with home health    Patient/Family in Agreement with Plan yes    Plan Comments I met with this patient regarding his discharge home today. CM called [facility]  as he was current with them, but they discharged him when he went to the hospital. CM faxed the order to them and are still awaiting approval for SN, PT and OT. The D/C Summary was faxed to [facility]. His friend will transport. He denies having any further discharge needs.    Final Discharge Disposition Code 06 - home with home health care      Row Name 02/27/24 0918       Plan    Plan home with home health    Patient/Family in Agreement with Plan yes                   Discharge Codes    No documentation.                 Expected Discharge Date and Time       Expected Discharge Date Expected Discharge Time    Feb 27, 2024               Shruti Goff RN

## 2024-02-27 NOTE — DISCHARGE PLACEMENT REQUEST
"Glenis Dominguez (73 y.o. Male)   Kelvin Cheung  From [facility]       Date of Birth   1950    Social Security Number       Address   [address] [place] [address]    Home Phone   [phone]    MRN   [id number]       Sabianist   None    Marital Status   Single                            Admission Date   2/24/24    Admission Type   Emergency    Admitting Provider   Zayra Guajardo MD    Attending Provider   Zayra Guajardo MD    Department, Room/Bed   [address], S579/1       Discharge Date       Discharge Disposition   Home or Self Care    Discharge Destination                                 Attending Provider: Zayra Guajardo MD    Allergies: Doxycycline, Levofloxacin, Gabapentin, Augmentin [Amoxicillin-pot Clavulanate], Biaxin [Clarithromycin], Coreg [Carvedilol], Crestor [Rosuvastatin Calcium], Iodine, Lipitor [Atorvastatin], Lisinopril, Livalo [Pitavastatin], Nortriptyline Hcl, Pravastatin, Questran [Cholestyramine]    Isolation: None   Infection: None   Code Status: CPR    Ht: 180.3 cm (71\")   Wt: 78.9 kg (174 lb)    Admission Cmt: None   Principal Problem: Volume overload [E87.70]                   Active Insurance as of 2/24/2024       Primary Coverage       Payor Plan Insurance Group Employer/Plan Group    MEDICARE MEDICARE A & B        Payor Plan Address Payor Plan Phone Number Payor Plan Fax Number Effective Dates    [address] [phone]  12/1/2004 - None Entered    [place] [address]         Subscriber Name Subscriber Birth Date Member ID       GLENIS DOMINGUEZ 1950 [id number]                     Emergency Contacts        (Rel.) Home Phone Work Phone Mobile Phone    Princess Cardona (Friend) [phone] -- [phone]                 Discharge Summary        Zayra Guajardo MD at 02/27/24 0500              [facility] Medicine Services  DISCHARGE SUMMARY    Patient Name: Glenis" Hussein  : 1950  MRN: [id number]    Date of Admission: 2024  5:03 PM  Date of Discharge:  2024  Primary Care Physician: Joe Diallo MD    Consults       Date and Time Order Name Status Description    2024 10:34 PM Inpatient Nephrology Consult              Hospital Course     Presenting Problem: volume overload    Active Hospital Problems    Diagnosis  POA    **Volume overload [E87.70]  Yes    Severe malnutrition [E43]  Yes    Elevated troponin [R79.89]  Unknown    Atrial fibrillation and flutter [I48.91, I48.92]  Unknown    COPD (chronic obstructive pulmonary disease) [J44.9]  Unknown    On home oxygen therapy [Z99.81]  Not Applicable    Macrocytic anemia [D53.9]  Unknown    Thrombocytopenia [D69.6]  Unknown    Peritonitis, unspecified [K65.9]  Yes    Secondary pulmonary arterial hypertension [I27.21]  Yes    Hypertensive chronic kidney disease with stage 1 through stage 4 chronic kidney disease, or unspecified chronic kidney disease [I12.9]  Yes    ESRD (end stage renal disease) [N18.6]  Yes    Dyslipidemia [E78.5]  Yes    Type 2 diabetes mellitus with kidney complication, with long-term current use of insulin [E11.29, Z79.4]  Not Applicable    Hyperlipidemia LDL goal <70 [E78.5]  Yes      Resolved Hospital Problems   No resolved problems to display.          Hospital Course:  Erlin Savage is a 73 y.o. male  with a PMH significant for ESRD recently initiated on HD (M/W/), prior was on PD but had recent diagnosis of SBP (2023) with E faecalis from cultures treated with IP Vancomycin, HTN, diabetes mellitus type 2, HLD, CAD S/P CABG, atrial fibrillation, severe pulmonary hypertension, moderate mitral and tricuspid regurgitation, systolic CHF with an EF 40-45%, grade 2 diastolic CHF, COPD with chronic hypoxic respiratory failure on 2-3 L nasal cannula who presented with volume overload.      Volume overload  ESRD on HD MWF  - on PD for ~ 5 years, recent recurrent peritonitis s/p  PD cath removal and start of HD 2/16, current on HD MWF w/ last session 2/23/2024, reports tolerating well  -Typically removed 1200 cc - 2000 cc fluid nightly with PD  - dry weight is 165 pounds  - has L AVF that is maturing, currently using right chest access for HD  - given lasix 40 mg IV x 1 in ED, on torsemide at home.  - continue torsemide, calcitriol, phos binder, nephrovite  - consulted nephrology for dialysis. Had extra HD session on 2/25, then his regular HD session 2/26.  Pt does not wish to have another HD session today prior to discharge- plans to go to his regular HD appointment tomorrow, 2/28.      A/C heart failure  Severe pulmonary HTN / VHD  -- proBNP >70K on admission   - from OSH records, TTE 11/3/2023 w/ EF 43%, grade 3 diastolic dysfunction, moderate MR/TR, RV mildly dilated, RV systolic pressure severely elevated; from 2/18/2024, EF 40%, severe pulmonary HTN  - dry weight is 165 pounds     Recurrent peritonitis  - initially dx at  12/23/23, cx positive for E.faecalis, treated w/ IV vanco; return to OSH 2/10 w/ cloudy PD fluid, cx grew E.facecalis sensitive to vanc/pcn; PD cath removed 2/16 w/ placement of L AVF and R chest HD temporary access  - currently on IV vancomycin 500 mg following HD treatments (being infused at the end of his HD treatments)  - per EMR started Vanc 2/14, to complete 3/6/2024    Elevated troponin  CAD s/p CABG  HTN / HLD  - likely r/t chronic renal disease, trending down  - recently ~1500 at OSH and treated with solely heparin gtt during that hospitalization (ie no intervention)  - denies chest pain, chronically elevated  - on Eliquis for afib/flutter  - on aspirin/statin     Afib/flutter  - s/p ADRIANNA w/ cardioversion 2/19/2024 at [facility]  - continue Eliquis  - continue amiodarone and Metoprolol per home meds     Chronic COPD  - duo nebs scheduled / prn  - on 2-3L home O2     T2DM w/ peripheral neuropathy  - resume home meds  - A1c 7.5%     Bilateral toe redness/pain  - [facility]  consulted, reviewed imaging with them 2/26  - xray bilateral feet d/t pain- no evidence of osteomyelitis. May need to consider MRI if ongoing concerns for osteo, however, no leukocytosis and currently on ABX (Vanc) for above   -- had planned for inpatient ABIs, however, pt anxious to go home. Pt states that he already has an appointment with Dr. Schreiber (Vascular Surgery) for further workup.      Macrocytic anemia  Thrombocytopenia  - chronic, at baseline         Discharge Follow Up Recommendations for outpatient labs/diagnostics:   Follow up with PCP within one week.     Day of Discharge     HPI:   Doing well this am, wants to go home- states that his wife needs to come get him then go back to work, so he is a little anxious to leave.  Denies any issues overnight- got hypotensive during HD yesterday.     Review of Systems  Gen- No fevers, chills  CV- No chest pain, palpitations  Resp- No cough, dyspnea  GI- No N/V/D, abd pain     Vital Signs:   Temp:  [97.1 °F (36.2 °C)-98.9 °F (37.2 °C)] 98.7 °F (37.1 °C)  Heart Rate:  [62-72] 72  Resp:  [18-24] 18  BP: ()/(56-77) 116/56  Flow (L/min):  [2-3] 2      Physical Exam:  Constitutional: No acute distress, awake, alert  HENT: NCAT, mucous membranes moist  Respiratory: CTAB, respiratory effort normal   Cardiovascular: RRR, no murmurs, rubs, or gallops  Gastrointestinal: Positive bowel sounds, soft, nontender, nondistended  Musculoskeletal: 1+pitting edema BLEs  Psychiatric: Appropriate affect, cooperative  Neurologic: Oriented x 3, strength symmetric in all extremities, Cranial Nerves grossly intact to confrontation, speech clear  Skin: No rashes   R sided tunneled HD catheter     Pertinent  and/or Most Recent Results     LAB RESULTS:      Lab 02/26/24  0417 02/25/24  0449 02/24/24  1727   WBC 8.90 9.77 10.18   HEMOGLOBIN 9.4* 9.3* 9.9*   HEMATOCRIT 31.4* 30.9* 31.9*   PLATELETS 127* 135* 130*   NEUTROS ABS 6.11 7.14* 7.94*   IMMATURE GRANS (ABS) 0.13* 0.14* 0.15*    LYMPHS ABS 1.04 1.01 0.81   MONOS ABS 1.13* 1.13* 0.99*   EOS ABS 0.42* 0.29 0.24   .6* 101.3* 102.9*         Lab 02/26/24  0417 02/25/24  0449 02/24/24 1935 02/24/24 1727   SODIUM 133* 136  --  132*   POTASSIUM 4.5 4.4  --  4.3   CHLORIDE 97* 98  --  94*   CO2 24.0 23.0  --  23.0   ANION GAP 12.0 15.0  --  15.0   BUN 32* 41*  --  35*   CREATININE 5.97* 7.28*  --  6.59*   EGFR 9.4* 7.4*  --  8.4*   GLUCOSE 68 154*  --  323*   CALCIUM 9.1 9.4  --  9.5   MAGNESIUM 2.4 2.1  --  2.1   PHOSPHORUS 6.4* 7.7*  --   --    HEMOGLOBIN A1C  --   --   --  7.50*   TSH  --   --  3.330  --          Lab 02/26/24 0417 02/24/24 1727   TOTAL PROTEIN 6.0 6.3   ALBUMIN 3.3* 3.6   GLOBULIN 2.7 2.7   ALT (SGPT) 16 19   AST (SGOT) 14 16   BILIRUBIN 0.6 0.4   ALK PHOS 65 84         Lab 02/24/24 1935 02/24/24 1727   PROBNP  --  >70,000.0*   HSTROP T 403* 415*         Lab 02/24/24 1935   CHOLESTEROL 145   LDL CHOL 76   HDL CHOL 49   TRIGLYCERIDES 109             Brief Urine Lab Results  (Last result in the past 365 days)        Color   Clarity   Blood   Leuk Est   Nitrite   Protein   CREAT   Urine HCG        01/01/24 0550 Yellow   Clear     Negative                     Microbiology Results (last 10 days)       Procedure Component Value - Date/Time    COVID PRE-OP / PRE-PROCEDURE SCREENING ORDER (NO ISOLATION) - Swab, Nasopharynx [[id number]]  (Normal) Collected: 02/24/24 1728    Lab Status: Final result Specimen: Swab from Nasopharynx Updated: 02/24/24 1808    Narrative:      The following orders were created for panel order COVID PRE-OP / PRE-PROCEDURE SCREENING ORDER (NO ISOLATION) - Swab, Nasopharynx.  Procedure                               Abnormality         Status                     ---------                               -----------         ------                     COVID-19 and FLU A/B PCR...[[id number]]  Normal              Final result                 Please view results for these tests on the individual orders.     COVID-19 and FLU A/B PCR, 1 HR TAT - Swab, Nasopharynx [[id number]]  (Normal) Collected: 24 1728    Lab Status: Final result Specimen: Swab from Nasopharynx Updated: 24     COVID19 Not Detected     Influenza A PCR Not Detected     Influenza B PCR Not Detected    Narrative:      Fact sheet for providers: https://www.fda.gov/media/394417/download    Fact sheet for patients: https://www.fda.gov/media/328024/download    Test performed by PCR.            XR Foot 2 View Bilateral    Result Date: 2024  XR FOOT 2 VW BILATERAL Date of Exam: 2024 10:00 PM EST Indication: pain, r/o osteo Comparison: None available. Findings: There is no evidence of an acute fracture. There is bilateral calcaneal spurring. There is soft tissue swelling over the dorsal aspect of both feet. There is no conventional radiographic evidence of osteomyelitis. Both the right and left midfoot areas appear intact.     Impression: Soft tissue swelling. No conventional radiographic evidence of osteomyelitis. Electronically Signed: Dameon Pope MD  2024 10:24 PM EST  Workstation ID: [id number]    XR Chest 1 View    Result Date: 2024  XR CHEST 1 VW Date of Exam: 2024 5:05 PM EST Indication: SOA triage protocol Comparison: Chest radiograph 2015. Findings: Tunneled right IJ approach central venous catheter tip overlies the superior cavoatrial junction. Sternotomy and CABG. Unchanged enlarged cardiac silhouette. Pulmonary vascular congestion with diffuse interstitial thickening. Small bilateral pleural effusions. No pneumothorax. No acute osseous abnormality.     Impression: Mild pulmonary edema and small bilateral pleural effusions. Electronically Signed: Kobe Phoenix MD  2024 5:21 PM EST  Workstation ID: [id number]    ADRIANNA with poss cardioversion    Result Date: 2024  ADRIANNA REPORT  Demographics  Patient Name:           ALBERTO GALVIN       :            1950  Medical Record Number:  [id number]          Age:            73 year(s)  Corporate ID Number:    [id number]         Gender          Male  Account Number:         [id number]  Sonographer:            Evelina Tyler    Height:         73 inches                          Jacinda NAZARIO  Referring Physician:    KASH DODSON   Weight:         170.99 pounds  Interpreting Physician: PRUDENCE MCCARTHY MD   BMI:            22.56 kg/m^2  Date of Service:        02/19/2024         Blood Pressure: 177/74 mmHg  Room Number:            [facility] 02 The procedure was explained in detail to the patient. Risks, complications and alternative treatments were reviewed. Written consent was obtained. Type of Study:  ADRIANNA procedure: Echo ADRIANNA D W or W/O M-mode, Echo Doppler Colour Flow  Velocity, EC Echo Transesophageal Complete, ECHOCARDIOGRAM TRANSESOPHAGEAL  W/ POSSIBLE CARDIOV.  HR: 116 bpmRhythm: Atrial flutterPatient Status: Routine IP  Study Location: Echo LabTechnical Quality: Good visualization Procedure consent form was obtained Airway assessment was performed. Procedure Medications   - Fentanyl I.V. 50 mcg.   - Versed I.V. 6 mg.  Impression:  ##############################################################################  #################################################  ADRIANNA/CV DX Paroxysmal Afib i48.0  Normal sized left ventricle. Normal left ventricular wall thickness. Visually  estimated ejection fraction 25% +/- 5%. Severe left ventricular systolic  dysfunction.  3D imaging used to assess mitral regurgitation. Severe (4+) mitral  regurgitation. No mitral stenosis with Peak/Mean PG of 2.60/0.85 mmHg.  Severe tricuspid valve regurgitation. Moderate pulmonary hypertension.  Successful cardioversion with restoration of sinus rhythm with 200 joules of  biphasic energy X 1 attempt .  ##############################################################################  ####################################################  Left Ventricle  Normal sized left  ventricle.  Normal left ventricular wall thickness.  Visually estimated ejection fraction 25% +/- 5%.  Severe left ventricular systolic dysfunction.  No left ventricular masses or thrombi.  Right Ventricle  Normal sized right ventricle.  Left Atrium  Normal sized left atrium.  Intact atrial septum.  No atrial mass or thrombus.  Left atrial appendage well visualized, no thrombus.  Right Atrium  Normal sized right atrium.  Intact atrial septum.  No atrial mass or thrombus.  Mitral Valve  Mean velocity: 0.42 m/s  Mean gradient: 0.85 mmHg  Peak gradient: 2.6 mmHg  Structurally normal mitral valve.  Severe (4+) mitral regurgitation.  No mitral stenosis with Peak/Mean PG of 2.60/0.85 mmHg.  No masses or vegetations seen.  Aortic Valve  Three cusped aortic valve  No aortic regurgitation.  No aortic stenosis.  No masses or vegetations seen.  Tricuspid Valve  TR velocity: 3.52 m/s      TR gradient: 49.24185 mmHg  Structurally normal tricuspid valve.  Severe tricuspid valve regurgitation.  Moderate pulmonary hypertension.  No tricuspid stenosis.  No masses or vegetations seen.  Pulmonic Valve  Structurally normal pulmonic valve.  No pulmonic regurgitation.  No pulmonic stenosis.  No masses or vegetations seen. Great Vessels Visualized aorta is normal. Normal aortic root. No evidence of dissection.  Pericardium / Pleura No pericardial effusion.  Other  ADRIANNA probe passed without difficulty.  No signs of immediate complications.  Procedural sedation administered by attending RN.  See medication section for total dose give.  Sedation duration < or = to 15 minutes. See procedure record for start and  stop times.  3D imaging used to assess mitral regurgitation.  ----------------------------------------------------------------  Electronically signed by PRUDENCE MCCARTHY MD(Interpreting  Physician) on 02/19/2024 11:09  ----------------------------------------------------------------    XR chest AP portable    Result Date:  2024  CLINICAL INDICATION:  Shortness of breath. Dyspnea. EXAMINATION TECHNIQUE: XR CHEST AP PORTABLE COMPARISON: Radiographs 2024. FINDINGS: Prior median sternotomy. Right IJ temporary dialysis catheter tip at the superior cavoatrial junction. Moderate cardiomegaly. Vascular engorgement, edema, small bilateral pleural effusions, and adjacent lung atelectasis. No pneumothorax.    Findings are concerning for congestive failure/partially pulmonary edema or volume overload. Images personally reviewed, interpreted and dictated by LUIS Milton.          ECHO COMPLETE (DOPPLER / COLOR) W OR WO CONTRAST    Result Date: 2024  TRANSTHORACIC ECHOCARDIOGRAPHY REPORT  Demographics  Patient Name:          ALBEROT GALVIN        :            1950  Medical Record Number: [id number]          Age:            73 year(s)  Corporate ID Number:   [id number]          Gender          Male  Account Number:        [id number]  Sonographer:           Brenda Ng     Height:         73 inches                         [facility]  Referring Physician:   DIANA MCINTOSH MD  Weight:         170.99 pounds  Interpreting           FLORECITA WHITE MD    BMI:            22.56 kg/m^2  Physician:  Date of Service:       2024          Blood Pressure: 117/62 mmHg  Room Number:           U 02 Type of Study:  TTE procedure: ECHO COMPLETE (DOPPLER / COLOR) W OR WO CONTRAST.  Patient Status: Routine IP  Study Location: PortableTechnical Quality: Good visualization  Impression:  ########################################  Indication: NSTEMI I21.4  Tachycardic throughout exam.  Normal sized left ventricle with moderately reduced left ventricular  systolic function.  Visually estimated ejection fraction 40% +/- 5%.  Increased left atrial pressure (Grade II diastolic dysfunction).  Flattening of the interventricular septum in diastole, c/w RV volume  overload.  The right ventricle is dilated.  Abnormal TAPSE suggests abnormal global  right ventricular systolic  function.  Mildly dilated left atrium.  Moderate mitral and tricuspid regurgitation.  Severe pulmonary hypertension. Estimated RVSP 70-75mmHg.  Elevated central venous pressure (>15mmHg).  When compared to prior study 10/2023, study findings are unchanged.  ######################################## Measurements Summary:  LVEDd: 4.68 cm         LVESd: 4.03 cm          IVSEd: 1.13 cm  AO Root:2.91 cm        LVPWd: 1.03 cm  Contractility Score  Global Left Ventricular Hypokinesis was noted.  LV regional wall motion: (0-Not visualized 1-Normal 2-Hypokinesis  3-Akinesis 4-Dyskinesis 5-Aneurysm)  Left Ventricle  Volume pylslugkf187.34 ml         LV length: 8.26 cm  Volume xaqyqvcw01.7 ml  LVOT diameter: 1.78 cm  Normal sized left ventricle.  Mild left ventricular hypertrophy.  Visually estimated ejection fraction 40% +/- 5%.  Abnormal left ventricular systolic function.  Unable to obtain bullseye average for strain due to irregular heart  rhythm.  Increased left atrial pressure (Grade II diastolic dysfunction).  No left ventricular masses or thrombi.  Right Ventricle  Diastolic dimension: 4.93 cm   RV systolic pressure: 73.3 mmHg  Dilated right ventricle.  Abnormal TAPSE; abnormal right ventricular function.  Left Atrium  LA dimension: 4.3 cm               LA volume:66.15 ml  LA/Aorta: 1.48  Mildly dilated left atrium.  Left atrial volume index 35.  Intact atrial septum.  No atrial mass or thrombus.  Right Atrium  Normal right atrium size.  Intact atrial septum.  No atrial mass or thrombus.  Mitral Valve  Area PHT: 6.07 cm^2             Mean velocity: 0.77 m/s  P1/2t: 36.24 msec               Mean gradient: 3 mmHg  MR velocity: 4.07 m/s           Peak gradient: 7.36 mmHg  Area (continuity): 1.71 cm^2    MR VTI: 106.07 cm  Thickened mitral valve leaflets.  Mild-moderate mitral regurgitation is present.  PISA ERO: 0.23cm2.  No mitral stenosis.  No masses or vegetations seen.  Aortic Valve  LVOT  VTI: 12.74 cm  Mildly thickend free edges of the aortic valve leaflets.  No aortic regurgitation.  No aortic stenosis.  No masses or vegetations seen.  Tricuspid Valve  TR velocity: 3.82 m/s      TR gradient: 58.32954 mmHg   Estimated RAP: 15 mmHg     RVSP: 73.3 mmHg  Structurally normal tricuspid valve.  Moderate tricuspid regurgitation.  Severe pulmonary hypertension.  Estimated RVSP 70-75mmHg.  No tricuspid stenosis.  No masses or vegetations seen.  Pulmonic Valve  Acceleration time: 96.89 msec            PASP: 73.3 mmHg  Structurally normal pulmonic valve.  Mild (1+) pulmonic valve regurgitation.  Abnormal pulmonary acceleration time.  No pulmonic stenosis.  No masses or vegetations seen. Great Vessels Aorta  Aortic Root: 2.91 cm  LVOT Diameter: 1.78 cm Visualized aorta is normal. Normal aortic root. No evidence of dissection. IVC 24mm with <50% respiratory variation. Elevated central venous pressure (>15mmHg).  Pericardium / Pleura No pericardial effusion.  ----------------------------------------------------------------  Electronically signed by FLORECITA WHITE MD(Interpreting  Physician) on 02/18/2024 08:26  ----------------------------------------------------------------                 Plan for Follow-up of Pending Labs/Results:     Discharge Details        Discharge Medications        New Medications        Instructions Start Date   vancomycin 500 mg in sodium chloride 0.9 % 100 mL IVPB   500 mg, Intravenous, 3 Times Weekly   Start Date: February 28, 2024            Continue These Medications        Instructions Start Date   amiodarone 200 MG tablet  Commonly known as: PACERONE   200 mg, Oral, Daily      apixaban 5 MG tablet tablet  Commonly known as: ELIQUIS   5 mg, Oral, 2 Times Daily      aspirin 81 MG EC tablet   81 mg, Oral, Daily      calcitriol 0.5 MCG capsule  Commonly known as: ROCALTROL   0.5 mcg, Oral, Daily      cholecalciferol 25 MCG (1000 UT) tablet  Commonly known as: VITAMIN D3   1,000 Units,  "Oral, Daily      clonazePAM 0.5 MG tablet  Commonly known as: KlonoPIN   0.5 mg, Oral, As Needed      diphenhydrAMINE 25 mg capsule  Commonly known as: BENADRYL   25 mg, Oral, Every 6 Hours PRN, Between dialysis treatments      dutasteride 0.5 MG capsule  Commonly known as: Avodart   0.5 mg, Oral, Daily      hydrALAZINE 10 MG tablet  Commonly known as: APRESOLINE   10 mg, Oral, 3 Times Daily      insulin glargine 100 UNIT/ML injection  Commonly known as: LANTUS SEMGLEE   35 Units, Subcutaneous, Daily, Take if BS is greater than 150      Insulin Pen Needle 32G X 6 MM misc   1 Needle, Does not apply, 3 Times Daily Before Meals      metoprolol succinate XL 25 MG 24 hr tablet  Commonly known as: TOPROL-XL   25 mg, Oral, 2 Times Daily      multivitamin with minerals tablet tablet   1 tablet, Oral, Daily      NovoLOG FlexPen 100 UNIT/ML solution pen-injector sc pen  Generic drug: insulin aspart   12 Units, Subcutaneous, 3 Times Daily With Meals      pantoprazole 40 MG EC tablet  Commonly known as: PROTONIX   40 mg, Oral, Daily      Carrol-Do Rx 1 MG tablet   1 tablet, Oral, Daily      torsemide 100 MG tablet  Commonly known as: DEMADEX   100 mg, Oral, Daily      triamcinolone 0.1 % cream  Commonly known as: KENALOG   APPLY TWICE A DAY TO ITCHY SKIN FOR UP TO 2 WEEKS PER MONTH AS NEEDED      True Metrix Blood Glucose Test test strip  Generic drug: glucose blood   Test TID     /  Dx: E11.65      Velphoro 500 MG chewable tablet  Generic drug: Sucroferric Oxyhydroxide   1 tablet, Oral, 3 Times Daily               Allergies   Allergen Reactions    Doxycycline Other (See Comments)     Joint pain, tongue swelling    \"Body locks up\"    Levofloxacin Shortness Of Breath    Gabapentin Confusion    Augmentin [Amoxicillin-Pot Clavulanate] Palpitations    Biaxin [Clarithromycin] Palpitations    Coreg [Carvedilol] Itching    Crestor [Rosuvastatin Calcium] Itching     Itching rash    Iodine Rash     \"pineda skin\"    Lipitor " [Atorvastatin] Itching     And Crestor- generalized weakness and chest tightness    Lisinopril Cough     Cough /weakness, nauseas and dirrhea    Livalo [Pitavastatin] Unknown - Low Severity     Chest tightness    Nortriptyline Hcl Other (See Comments)     Arthralgias    Pravastatin Unknown - Low Severity     Chest , neck and arm discomfort    Questran [Cholestyramine] Unknown - Low Severity         Discharge Disposition:      Diet:  Hospital:  Diet Order   Procedures    Diet: Cardiac Diets, Diabetic Diets, Renal Diets; Healthy Heart (2-3 Na+); Consistent Carbohydrate; Low Sodium (2-3g), Low Potassium, Low Phosphorus; Texture: Regular Texture (IDDSI 7); Fluid Consistency: Thin (IDDSI 0)            Activity:      Restrictions or Other Recommendations:         CODE STATUS:    Code Status and Medical Interventions:   Ordered at: 02/24/24 2125     Code Status (Patient has no pulse and is not breathing):    CPR (Attempt to Resuscitate)     Medical Interventions (Patient has pulse or is breathing):    Full Support       No future appointments.              Zayra Guajardo MD  02/27/24      Time Spent on Discharge:  I spent  35  minutes on this discharge activity which included: face-to-face encounter with the patient, reviewing the data in the system, coordination of the care with the nursing staff as well as consultants, documentation, and entering orders.           Electronically signed by Zayra Guajardo MD at 02/27/24 0989

## 2024-02-27 NOTE — OUTREACH NOTE
Prep Survey      Flowsheet Row Responses   [facility] patient discharged from? [place]   Is LACE score < 7 ? No   Eligibility [facility]   Date of Admission 02/24/24   Date of Discharge 02/27/24   Discharge Disposition Home-Health Care Svc   Discharge diagnosis Volume overload  ESRD on HD M[facility]   Does the patient have one of the following disease processes/diagnoses(primary or secondary)? Other   Does the patient have Home health ordered? Yes   What is the Home health agency?  [facility]   Prep survey completed? Yes            Luanne KOLB - Registered Nurse

## 2024-02-27 NOTE — DISCHARGE PLACEMENT REQUEST
Erlin Savage (73 y.o. Male)   [facility]  From Shr[place]  [phone]             [address]  [address] [facility] [address]  Phone:  [phone]  Fax:  [phone] Date: 2024      Ambulatory Referral to Home Health     Patient:  Erlin Savage MRN:  [id number]   [address]  [facility] [address] :  1950  SSN:    Phone: [phone] Sex:  M      INSURANCE PAYOR PLAN GROUP # SUBSCRIBER ID   Primary:    MEDICARE [id number]   [id number]      Referring Provider Information:  ERNIE RUBIN Phone: [phone] Fax: [phone]       Referral Information:   # Visits:  999 Referral Type: Home Health [42]   Urgency:  Routine Referral Reason: Specialty Services Required   Start Date: 2024 End Date:  To be determined by Insurer   Diagnosis: Hypervolemia, unspecified hypervolemia type (E87.70 [ICD-10-CM] 276.69 [ICD-9-CM])  Acute pulmonary edema (J81.0 [ICD-10-CM] 518.4 [ICD-9-CM])  Pleural effusion (J90 [ICD-10-CM] 511.9 [ICD-9-CM])  ESRD on dialysis (N18.6,Z99.2 [ICD-10-CM] 585.6,V45.11 [ICD-9-CM])  Hyperglycemia due to diabetes mellitus (E11.65 [ICD-10-CM] 250.02 [ICD-9-CM])  TIA (transient ischemic attack) (G45.9 [ICD-10-CM] 435.9 [ICD-9-CM])  Ischemic heart disease (I25.9 [ICD-10-CM] 414.9 [ICD-9-CM])  Secondary pulmonary arterial hypertension (I27.21 [ICD-10-CM] 416.8 [ICD-9-CM])  Peritonitis, unspecified (K65.9 [ICD-10-CM] 567.9 [ICD-9-CM])  ESRD (end stage renal disease) (N18.6 [ICD-10-CM] 585.6 [ICD-9-CM])  Shortness of breath (R06.02 [ICD-10-CM] 786.05 [ICD-9-CM])  Hypertensive chronic kidney disease with stage 1 through stage 4 chronic kidney disease, or unspecified chronic kidney disease (I12.9 [ICD-10-CM] 403.90 [ICD-9-CM])  NSTEMI (non-ST elevated myocardial infarction) (I21.4 [ICD-10-CM] 410.70 [ICD-9-CM])  Elevated troponin (R79.89 [ICD-10-CM] 790.6 [ICD-9-CM])  Atrial fibrillation and flutter (I48.91,I48.92 [ICD-10-CM] 427.31,427.32  [ICD-9-CM])  On home oxygen therapy (Z99.81 [ICD-10-CM] V46.2 [ICD-9-CM])  Macrocytic anemia (D53.9 [ICD-10-CM] 281.9 [ICD-9-CM])  Thrombocytopenia (D69.6 [ICD-10-CM] 287.5 [ICD-9-CM])  Severe malnutrition (E43 [ICD-10-CM] 261 [ICD-9-CM])  Coronary artery disease involving native coronary artery of native heart without angina pectoris (I25.10 [ICD-10-CM] 414.01 [ICD-9-CM])  Essential hypertension (I10 [ICD-10-CM] 401.9 [ICD-9-CM])  Hyperlipidemia LDL goal <70 (E78.5 [ICD-10-CM] 272.4 [ICD-9-CM])      Refer to Dept:   Refer to Provider:   Refer to Provider Phone:   Refer to Facility:       Face to Face Visit Date: 2/27/2024  Follow-up provider for Plan of Care? I treated the patient in an acute care facility and will not continue treatment after discharge.  Follow-up provider: COLIN HART [[id number]]  Reason/Clinical Findings: status post hospital stay  Describe mobility limitations that make leaving home difficult: impaired physical mobility and gait endurance; weakness  Nursing/Therapeutic Services Requested: Skilled Nursing  Nursing/Therapeutic Services Requested: Physical Therapy  Skilled nursing orders: Medication education  Skilled nursing orders: Cardiopulmonary assessments  PT orders: Therapeutic exercise  PT orders: Gait Training  PT orders: Transfer training  PT orders: Strengthening  PT orders: Home safety assessment  Weight Bearing Status: As Tolerated  Frequency: 1 Week 1     This document serves as a request of services and does not constitute Insurance authorization or approval of services.  To determine eligibility, please contact the members Insurance carrier to verify and review coverage.     If you have medical questions regarding this request for services. Please contact [address] at [phone] during normal business hours.        Authorizing Provider:Zayra Guajardo MD  Authorizing Provider's NPI: [id number]  Order Entered By: Shruti Goff RN 2/27/2024  8:41 AM    "  Electronically signed by: Zayra Guajardo MD 2/27/2024  8:41 AM                 Date of Birth   1950    Social Security Number       Address   [address] [id number]    Home Phone   [phone]    MRN   [id number]       Mormonism   None    Marital Status   Single                            Admission Date   2/24/24    Admission Type   Emergency    Admitting Provider   Zayra Guajardo MD    Attending Provider   Zayra Guajardo MD    Department, Room/Bed   [address], S579/1       Discharge Date       Discharge Disposition       Discharge Destination                                 Attending Provider: Zayra Guajardo MD    Allergies: Doxycycline, Levofloxacin, Gabapentin, Augmentin [Amoxicillin-pot Clavulanate], Biaxin [Clarithromycin], Coreg [Carvedilol], Crestor [Rosuvastatin Calcium], Iodine, Lipitor [Atorvastatin], Lisinopril, Livalo [Pitavastatin], Nortriptyline Hcl, Pravastatin, Questran [Cholestyramine]    Isolation: None   Infection: None   Code Status: CPR    Ht: 180.3 cm (71\")   Wt: 78.9 kg (174 lb)    Admission Cmt: None   Principal Problem: Volume overload [E87.70]                   Active Insurance as of 2/24/2024       Primary Coverage       Payor Plan Insurance Group Employer/Plan Group    MEDICARE MEDICARE A & B        Payor Plan Address Payor Plan Phone Number Payor Plan Fax Number Effective Dates    [address] [phone]  12/1/2004 - None Entered    [place] [address]         Subscriber Name Subscriber Birth Date Member ID       ERLIN DOMINGUEZ 1950 [id number]                     Emergency Contacts        (Rel.) Home Phone Work Phone Mobile Phone    Princess Cardona (Friend) [phone] -- [phone]              Insurance Information                  MEDICARE/MEDICARE A & B Phone: [phone]    Subscriber: Erlin Dominguez Subscriber#: [id number]    Group#: -- Precert#: --             History & Physical "        Richa Nuno, DO at 24              [facility] Medicine Services  HISTORY AND PHYSICAL    Patient Name: Erlin Savage  : 1950  MRN: [id number]  Primary Care Physician: Joe Diallo MD  Date of admission: 2024    Subjective  Subjective     Chief Complaint:  Shortness of breath, weakness    HPI:  Erlin Savage is a 73 y.o. male with hx Afib/flutter s/p ADRIANNA w/ cardioversion (2024), CAD s/p CABG, VHD (ECHO 11/3/2023 w/ EF 43%, grade 3 diastolic dysfunction, moderate MR/TR, severe pulmonary HTN), HTN, ESRD on HD MWF, COPD w/ home oxygen 2-3 liters NC, T2DM, diabetic neuropathy, peritonitis s/p PD cath removal ( cx grew E. Faecalis, tx w/ IV vanco; recurrent infx 2/10 w/ ultimate discontinuation of PD catheter and placement of temporary HD access right chest and L AVF; started HD ) who presents to the ED for evaluation of increased shortness of breath, swelling and weeping in his L forearm.     Pt hospitalized at  -2024 (initially at [facility] for PD removal and HD cath placement, found to have afib/flutter RVR after procedure and transferred to [facility] s/p cardioversion, now on amiodarone/Eliquis and started HD  MWF schedule, receiving IV vancomycin on HD days for peritonitis). Last HD tx / vanco infusion 2024.    Pt reports since d/c from OSH he has had progressive shortness of breath, increased swelling BLE and extreme fatigue w/ generalized weakness. He is not able to lie flat d/t smothering. He is having difficulty going from sitting to standing d/t leg weakness. He is having pain and swelling in his legs with discoloration to both feet  and wounds on his toes. He is having weeping from his left forearm. Denies fever, chills, productive cough, abdominal pain, diarrhea. Voiding minimal amounts, usually small amount once daily. He is frustrated with his current health status and reports he has been sick for  an entire year, was hospitalized for corona virus and had double pneumonia and since that time has been sick and in/out of the hospital.     Chest xray in the ED w/ mild pulmonary edema and small bilateral pleural effusions. Pertinent labs: HS troponin 415 w/ reflex 403; proBNP >70,000; Na 132, creatinine 6.59; H/H 9.9/31.9.    Review of Systems   Constitutional:  Positive for activity change, appetite change and fatigue. Negative for chills and fever.   Respiratory:  Positive for shortness of breath.    Cardiovascular:  Positive for leg swelling.   Gastrointestinal:  Positive for abdominal distention. Negative for abdominal pain, diarrhea, nausea and vomiting.   Musculoskeletal:  Positive for arthralgias and gait problem.   Skin:  Positive for color change (bilateral feet/toes).   Neurological:  Positive for weakness.   Hematological:  Bruises/bleeds easily.   All other systems reviewed and are negative.               Personal History     Past Medical History:   Diagnosis Date    Anxiety     Anxiety disorder     Back pain     Chronic back pain     work related injury    CKD (chronic kidney disease)     most recent creatinine 2.7 on 01/26/16    Coronary artery disease     Diabetes mellitus     insulin dependent diabetes Onset 2010    Dyslipidemia     Hyperlipidemia     Hypertension     Ischemic heart disease     Renal failure     Stroke     TIA / transient right vision loss age 42    TIA (transient ischemic attack)     Vertigo              Past Surgical History:   Procedure Laterality Date    APPENDECTOMY      CARDIAC CATHETERIZATION      CHOLECYSTECTOMY  03/2012    COLONOSCOPY      CORONARY ARTERY BYPASS GRAFT      ENDOSCOPY         Family History:  family history is not on file.     Social History:  reports that he quit smoking about 37 years ago. His smoking use included cigarettes. He has a 70.00 pack-year smoking history. He has never used smokeless tobacco. He reports that he does not drink alcohol and does  "not use drugs.  Social History     Social History Narrative    Not on file       Medications:  Insulin Pen Needle, Carrol-Do Rx, Sucroferric Oxyhydroxide, amiodarone, apixaban, aspirin, calcitriol, cholecalciferol, clonazePAM, dutasteride, glucose blood, hydrALAZINE, insulin aspart, insulin glargine, metoprolol succinate XL, multivitamin with minerals, pantoprazole, torsemide, and triamcinolone    Allergies   Allergen Reactions    Doxycycline Other (See Comments)     Joint pain, tongue swelling    \"Body locks up\"    Levofloxacin Shortness Of Breath    Gabapentin Confusion    Augmentin [Amoxicillin-Pot Clavulanate] Palpitations    Biaxin [Clarithromycin] Palpitations    Coreg [Carvedilol] Itching    Crestor [Rosuvastatin Calcium] Itching     Itching rash    Iodine Rash     \"pineda skin\"    Lipitor [Atorvastatin] Itching     And Crestor- generalized weakness and chest tightness    Lisinopril Cough     Cough /weakness, nauseas and dirrhea    Livalo [Pitavastatin] Unknown - Low Severity     Chest tightness    Nortriptyline Hcl Other (See Comments)     Arthralgias    Pravastatin Unknown - Low Severity     Chest , neck and arm discomfort    Questran [Cholestyramine] Unknown - Low Severity       Objective  Objective     Vital Signs:   Temp:  [97.5 °F (36.4 °C)] 97.5 °F (36.4 °C)  Heart Rate:  [81-85] 81  Resp:  [18-22] 18  BP: (128-148)/(72-79) 136/77  Flow (L/min):  [3] 3    Physical Exam  Vitals reviewed.   Constitutional:       General: He is not in acute distress.     Appearance: He is well-developed. He is ill-appearing. He is not toxic-appearing.   HENT:      Head: Normocephalic and atraumatic.      Nose: Nose normal.      Mouth/Throat:      Mouth: Mucous membranes are dry.      Pharynx: Oropharynx is clear.   Eyes:      Extraocular Movements: Extraocular movements intact.      Conjunctiva/sclera: Conjunctivae normal.      Pupils: Pupils are equal, round, and reactive to light.   Cardiovascular:      Rate and Rhythm: " Normal rate and regular rhythm.      Pulses: Normal pulses.      Heart sounds: Murmur heard.   Pulmonary:      Effort: Pulmonary effort is normal.      Breath sounds: Rales present.   Abdominal:      General: Bowel sounds are normal. There is no distension.      Palpations: Abdomen is soft.      Tenderness: There is no abdominal tenderness.   Musculoskeletal:         General: Tenderness (bilateral feet / toes) present. Normal range of motion.      Cervical back: Normal range of motion and neck supple.      Right lower le+ Pitting Edema present.      Left lower le+ Pitting Edema present.   Skin:     General: Skin is warm and dry.      Capillary Refill: Capillary refill takes 2 to 3 seconds.      Findings: Bruising and erythema (toes) present.      Comments: Left brachial incision - AVF; right chest HD line     Neurological:      Mental Status: He is alert and oriented to person, place, and time.      Cranial Nerves: No cranial nerve deficit.   Psychiatric:         Mood and Affect: Mood normal.         Behavior: Behavior normal.        Result Review:  I have personally reviewed the results from the time of this admission to 2024 21:27 EST and agree with these findings:  [x]  Laboratory list / accordion  []  Microbiology  [x]  Radiology  [x]  EKG/Telemetry   []  Cardiology/Vascular   []  Pathology  []  Old records  []  Other:  Most notable findings include:     LAB RESULTS:      Lab 24  0524 24  0015   WBC 10.18  --   --    HEMOGLOBIN 9.9*  --   --    HEMATOCRIT 31.9*  --   --    PLATELETS 130*  --   --    NEUTROS ABS 7.94*  --   --    IMMATURE GRANS (ABS) 0.15*  --   --    LYMPHS ABS 0.81  --   --    MONOS ABS 0.99*  --   --    EOS ABS 0.24  --   --    .9*  --   --    APTT  --   --  45.3*   CK TOTAL  --  75  --          Lab 24  1727 24  0524   SODIUM 132*  --    POTASSIUM 4.3  --    CHLORIDE 94*  --    CO2 23.0  --    ANION GAP 15.0  --    BUN 35*  --     CREATININE 6.59*  --    EGFR 8.4*  --    GLUCOSE 323*  --    CALCIUM 9.5  --    MAGNESIUM 2.1  --    TSH  --  1.020         Lab 02/24/24 1727   TOTAL PROTEIN 6.3   ALBUMIN 3.6   GLOBULIN 2.7   ALT (SGPT) 19   AST (SGOT) 16   BILIRUBIN 0.4   ALK PHOS 84         Lab 02/24/24  1935 02/24/24 1727   PROBNP  --  >70,000.0*   HSTROP T 403* 415*                 Brief Urine Lab Results  (Last result in the past 365 days)        Color   Clarity   Blood   Leuk Est   Nitrite   Protein   CREAT   Urine HCG        01/01/24 0550 Yellow   Clear     Negative                     Microbiology Results (last 10 days)       Procedure Component Value - Date/Time    COVID PRE-OP / PRE-PROCEDURE SCREENING ORDER (NO ISOLATION) - Swab, Nasopharynx [[id number]]  (Normal) Collected: 02/24/24 1728    Lab Status: Final result Specimen: Swab from Nasopharynx Updated: 02/24/24 1808    Narrative:      The following orders were created for panel order COVID PRE-OP / PRE-PROCEDURE SCREENING ORDER (NO ISOLATION) - Swab, Nasopharynx.  Procedure                               Abnormality         Status                     ---------                               -----------         ------                     COVID-19 and FLU A/B PCR...[[id number]]  Normal              Final result                 Please view results for these tests on the individual orders.    COVID-19 and FLU A/B PCR, 1 HR TAT - Swab, Nasopharynx [[id number]]  (Normal) Collected: 02/24/24 1728    Lab Status: Final result Specimen: Swab from Nasopharynx Updated: 02/24/24 1808     COVID19 Not Detected     Influenza A PCR Not Detected     Influenza B PCR Not Detected    Narrative:      Fact sheet for providers: https://www.fda.gov/media/748915/download    Fact sheet for patients: https://www.fda.gov/media/957980/download    Test performed by PCR.            XR Chest 1 View    Result Date: 2/24/2024  XR CHEST 1 VW Date of Exam: 2/24/2024 5:05 PM EST Indication: SOA triage protocol Comparison:  Chest radiograph 12/29/2015. Findings: Tunneled right IJ approach central venous catheter tip overlies the superior cavoatrial junction. Sternotomy and CABG. Unchanged enlarged cardiac silhouette. Pulmonary vascular congestion with diffuse interstitial thickening. Small bilateral pleural effusions. No pneumothorax. No acute osseous abnormality.     Impression: Impression: Mild pulmonary edema and small bilateral pleural effusions. Electronically Signed: Kobe Phoenix MD  2/24/2024 5:21 PM EST  Workstation ID: [id number]         Assessment & Plan  Assessment & Plan       Volume overload    Hyperlipidemia LDL goal <70    Type 2 diabetes mellitus with kidney complication, with long-term current use of insulin    Dyslipidemia    Secondary pulmonary arterial hypertension    Peritonitis, unspecified    ESRD (end stage renal disease)    Hypertensive chronic kidney disease with stage 1 through stage 4 chronic kidney disease, or unspecified chronic kidney disease    Elevated troponin    Atrial fibrillation and flutter    COPD (chronic obstructive pulmonary disease)    On home oxygen therapy      Volume overload  ESRD on HD MWF  - on PD for ~ 5 years, recent recurrent peritonitis s/p PD cath removal and start of HD 2/16, current on HD MWF w/ last session 2/23/2024, reports tolerating well  -Typically removed 1200 cc - 2000 cc fluid nightly with PD  - dry weight is 165 pounds  - has L AVF that is maturing, currently using right chest access for HD  - given lasix 40 mg IV x 1 in ED, on torsemide at home  - continue torsemide, calcitrol, phos binder, nephrovite  - consult nephrology for dialysis     A/C heart failure  Severe pulmonary HTN / VHD  - from OSH records, TTE 11/3/2023 w/ EF 43%, grade 3 diastolic dysfunction, moderate MR/TR, RV mildly dilated, RV systolic pressure severely elevated; from 2/18/2024, EF 40%, severe pulmonary HTN  - daily weights, strict intake output  - dry weight is 165 pounds    Recurrent peritonitis  -  initially dx at  12/23/23, cx positive for E.faecalis, treated w/ IV vanco; return to OSH 2/10 w/ cloudy PD fluid, cx grew E.facecalis sensitive to vanc/pcn; PD cath removed 2/16 w/ placement of L AVF and R chest HD temporary access  - currently on IV vancomycin 500 mg following HD treatments (being infused at the end of his HD treatments, last dose Friday 2/23/2024)  - per EMR started Vanco 2/14, to complete 3/6/2024  - pharmacy to dose vancomycin    Elevated troponin  CAD s/p CABG  HTN / HLD  - likely r/t chronic renal disease, trending down  - recently ~1500 at OSH  - denies chest pain, chronically elevated  - on Eliquis for afib/flutter  - on aspirin/statin    Afib/flutter  - s/p ADRIANNA w/ cardioversion 2/19/2024 at [facility]  - continue Eliquis  - continue amiodarone    Chronic COPD  - duo nebs scheduled / prn  - on 2-3L home O2    T2DM w/ peripheral neuropathy  - levemir  - fsbg achs w/ moderate dose ssi  - A1c 7.4 11/16/23    Bilateral toe redness/pain  - WOC consult  - xray bilateral feet d/t pain / r/o osteo    Macrocytic anemia  Thrombocytopenia  - chronic, at baseline  - a.m. labs    DVT prophylaxis:  Eliquis    CODE STATUS:    Code Status (Patient has no pulse and is not breathing): CPR (Attempt to Resuscitate)  Medical Interventions (Patient has pulse or is breathing): Full Support      Expected Discharge    Expected discharge date/ time has not been documented.      This note has been completed as part of a split-shared workflow.     Signature: Electronically signed by JUSTIN Prescott, 02/24/24, 9:28 PM EST      Total time spent: 95 min  Time spent includes time reviewing chart, face-to-face time, counseling patient/family/caregiver, ordering medications/tests/procedures, communicating with other health care professionals, documenting clinical information in the electronic health record, and coordination of care.      Attending   Admission Attestation       I have performed an independent face-to-face  diagnostic evaluation including performing an independent physical examination.  I approve of the documented plan of care above that was reviewed and developed with the advanced practice clinician (APC) and take responsibility for that plan along with its associated risks.  I have updated the HPI as appropriate.    Brief HPI    This is a 73-year-old male patient with a PMH significant for ESRD recently initiated on HD (M/W/F), HTN, diabetes mellitus type 2, HLD, CAD S/P CABG, atrial fibrillation, severe pulmonary hypertension, moderate mitral and tricuspid regurgitation, systolic CHF with an EF 40-45%, grade 2 diastolic CHF, COPD with chronic hypoxic respiratory failure on 2-3 L nasal cannula, who comes to the ED due to shortness of breath.  Patient had a recent diagnosis of SBP (12/2023) w/ cx + for E. Faecalis ultimatelty treated with IP Vancomycin with PD catheter removed on 2/16.  Patient was initiated on HD at that time.  Since then, he has had progressive shortness of breath and worsening diffuse body edema, orthopnea and cough.  He says with PD he would take off 5358-9018 cc fluid daily.  He is unsure how much fluid has been removed with HD currently.  He says his dry weight is around 165 pounds.  He denies fever, chest pain, nausea, vomiting.    Patient was hospitalized at [facility] on 2/16/2024 for initiation of hemodialysis with placement of tunneled HD catheter and creation of left brachiocephalic fistula.  Postoperatively, he had atrial fibrillation with RVR, hypotension, requiring IV pressor therapy and hypercapnic respiratory failure requiring BiPAP in the ICU.  He also had non-STEMI with troponins trending up to around 1500.  He was placed on a heparin drip, given full dose aspirin without further intervention.      Attending Physical Exam:  Temp:  [97.5 °F (36.4 °C)] 97.5 °F (36.4 °C)  Heart Rate:  [81-85] 81  Resp:  [18-22] 18  BP: (128-148)/(72-79) 136/77  Flow (L/min):  [3]  3    Constitutional: Awake, alert  Eyes: PERRLA, sclerae anicteric, no conjunctival injection  HENT: NCAT, mucous membranes moist  Neck: Supple, no thyromegaly, no lymphadenopathy, trachea midline  Respiratory: Moderate air movement, bilateral inspiratory crackles  Cardiovascular: RRR, no murmurs, rubs, or gallops, palpable pedal pulses bilaterally  Gastrointestinal: Positive bowel sounds, firm, nontender, distended  Musculoskeletal: 3+ pitting bilateral ankle edema, no clubbing or cyanosis to extremities  Psychiatric: Appropriate affect, cooperative  Neurologic: Oriented x 3, strength symmetric in all extremities, Cranial Nerves grossly intact to confrontation, speech clear  Skin: No rashes      Result Review:  I have personally reviewed the results from the time of this admission to 2/24/2024 22:06 EST and agree with these findings:  [x]  Laboratory list / accordion  []  Microbiology  [x]  Radiology  [x]  EKG/Telemetry   [x]  Cardiology/Vascular   []  Pathology  [x]  Old records    Assessment and Plan:    See assessment and plan documented by APC above and updated/edited by me as appropriate.    Richa Nuno DO  02/24/24                  Electronically signed by Richa Nuno DO at 02/24/24 2216          Physician Progress Notes (most recent note)        Praveen Novoa MD at 02/26/24 1548           LOS: 2 days   Patient Care Team:  Joe Diallo MD as PCP - General (Family Medicine)  Fadi Garner MD as Consulting Physician (General Surgery)  Amor Celeste MD (Cardiology)  Praveen Novoa MD as Consulting Physician (Nephrology)  Kristin Moya MD as Consulting Physician (Internal Medicine)    Chief Complaint: ESRD    Subjective     Seen on HD tolerating well. UF target 3.5 liter. BP stable.     Subjective:  Symptoms:  Stable.  No shortness of breath.        History taken from: patient    Objective     Vital Sign Min/Max for last 24 hours  Temp  Min: 97.1 °F (36.2 °C)  Max: 98.2 °F (36.8  "°C)   BP  Min: 107/64  Max: 140/77   Pulse  Min: 62  Max: 73   Resp  Min: 17  Max: 24   SpO2  Min: 91 %  Max: 100 %   Flow (L/min)  Min: 2  Max: 3   Weight  Min: 79.7 kg (175 lb 9.6 oz)  Max: 79.7 kg (175 lb 9.6 oz)     Flowsheet Rows      Flowsheet Row First Filed Value   Admission Height 180.3 cm (71\") Documented at 02/24/2024 1700   Admission Weight 77.1 kg (170 lb) Documented at 02/24/2024 1700            No intake/output data recorded.  I/O last 3 completed shifts:  In: 600 [P.O.:600]  Out: 3100 [Urine:100]    Objective:  General Appearance:  Comfortable.    Vital signs: (most recent): Blood pressure 111/62, pulse 62, temperature 97.1 °F (36.2 °C), temperature source Oral, resp. rate 24, height 180.3 cm (71\"), weight 79.7 kg (175 lb 9.6 oz), SpO2 95%.  Vital signs are normal.    Output: Minimal urine output.    HEENT: Normal HEENT exam.    Lungs:  Normal effort and normal respiratory rate.  There are rales.    Heart: Normal rate.  Regular rhythm.  S1 normal and S2 normal.    Abdomen: Abdomen is soft.    Extremities: There is dependent edema.    Neurological: Patient is alert and oriented to person, place and time.  Normal strength.    Pupils:  Pupils are equal, round, and reactive to light.                Results Review:     I reviewed the patient's new clinical results.    WBC WBC   Date Value Ref Range Status   02/26/2024 8.90 3.40 - 10.80 10*3/mm3 Final   02/25/2024 9.77 3.40 - 10.80 10*3/mm3 Final   02/24/2024 10.18 3.40 - 10.80 10*3/mm3 Final      HGB Hemoglobin   Date Value Ref Range Status   02/26/2024 9.4 (L) 13.0 - 17.7 g/dL Final   02/25/2024 9.3 (L) 13.0 - 17.7 g/dL Final   02/24/2024 9.9 (L) 13.0 - 17.7 g/dL Final      HCT Hematocrit   Date Value Ref Range Status   02/26/2024 31.4 (L) 37.5 - 51.0 % Final   02/25/2024 30.9 (L) 37.5 - 51.0 % Final   02/24/2024 31.9 (L) 37.5 - 51.0 % Final      Platlets No results found for: \"LABPLAT\"   MCV MCV   Date Value Ref Range Status   02/26/2024 101.6 (H) 79.0 " "- 97.0 fL Final   02/25/2024 101.3 (H) 79.0 - 97.0 fL Final   02/24/2024 102.9 (H) 79.0 - 97.0 fL Final          Sodium Sodium   Date Value Ref Range Status   02/26/2024 133 (L) 136 - 145 mmol/L Final   02/25/2024 136 136 - 145 mmol/L Final   02/24/2024 132 (L) 136 - 145 mmol/L Final      Potassium Potassium   Date Value Ref Range Status   02/26/2024 4.5 3.5 - 5.2 mmol/L Final   02/25/2024 4.4 3.5 - 5.2 mmol/L Final   02/24/2024 4.3 3.5 - 5.2 mmol/L Final      Chloride Chloride   Date Value Ref Range Status   02/26/2024 97 (L) 98 - 107 mmol/L Final   02/25/2024 98 98 - 107 mmol/L Final   02/24/2024 94 (L) 98 - 107 mmol/L Final      CO2 CO2   Date Value Ref Range Status   02/26/2024 24.0 22.0 - 29.0 mmol/L Final   02/25/2024 23.0 22.0 - 29.0 mmol/L Final   02/24/2024 23.0 22.0 - 29.0 mmol/L Final      BUN BUN   Date Value Ref Range Status   02/26/2024 32 (H) 8 - 23 mg/dL Final   02/25/2024 41 (H) 8 - 23 mg/dL Final   02/24/2024 35 (H) 8 - 23 mg/dL Final      Creatinine Creatinine   Date Value Ref Range Status   02/26/2024 5.97 (H) 0.76 - 1.27 mg/dL Final   02/25/2024 7.28 (H) 0.76 - 1.27 mg/dL Final   02/24/2024 6.59 (H) 0.76 - 1.27 mg/dL Final      Calcium Calcium   Date Value Ref Range Status   02/26/2024 9.1 8.6 - 10.5 mg/dL Final   02/25/2024 9.4 8.6 - 10.5 mg/dL Final   02/24/2024 9.5 8.6 - 10.5 mg/dL Final      PO4 No results found for: \"CAPO4\"   Albumin Albumin   Date Value Ref Range Status   02/26/2024 3.3 (L) 3.5 - 5.2 g/dL Final   02/24/2024 3.6 3.5 - 5.2 g/dL Final      Magnesium Magnesium   Date Value Ref Range Status   02/26/2024 2.4 1.6 - 2.4 mg/dL Final   02/25/2024 2.1 1.6 - 2.4 mg/dL Final   02/24/2024 2.1 1.6 - 2.4 mg/dL Final      Uric Acid No results found for: \"URICACID\"     Medication Review: Yes    Assessment & Plan       Volume overload    Hyperlipidemia LDL goal <70    Type 2 diabetes mellitus with kidney complication, with long-term current use of insulin    Dyslipidemia    Secondary " pulmonary arterial hypertension    Peritonitis, unspecified    ESRD (end stage renal disease)    Hypertensive chronic kidney disease with stage 1 through stage 4 chronic kidney disease, or unspecified chronic kidney disease    Elevated troponin    Atrial fibrillation and flutter    COPD (chronic obstructive pulmonary disease)    On home oxygen therapy    Macrocytic anemia    Thrombocytopenia    Severe malnutrition      Assessment & Plan    ESRD HD per [facility]. [facility]. NAL.Previously on PD stopped due to persistent enterococci peritonitis. PD cath removed. Getting 2 weeks of vancomycin post HD.     Anemia: AMANDA with HD      BMD: Continue home meds.      Volume status : Depedent edema noted. Admitted with SOB on supp o2.         Recs  Extra session of HD yesterday. SOB improved. Doesn't want to do another treatment tomorrow. He can be discharge with f/u HD in dialysis clinic on Wednesday   HD per [facility].  AMANDA on HD   Renal Diet     Praveen Novoa MD  24  15:48 EST              Electronically signed by Praveen Novoa MD at 24 1550          Physical Therapy Notes (most recent note)        Tiffany Stallings, PT at 24 1120  Version 1 of 1         Patient Name: Erlin Savage  : 1950    MRN: [id number]                              Today's Date: 2024       Admit Date: 2024    Visit Dx:     ICD-10-CM ICD-9-CM   1. Hypervolemia, unspecified hypervolemia type  E87.70 276.69   2. Acute pulmonary edema  J81.0 518.4   3. Pleural effusion  J90 511.9   4. ESRD on dialysis  N18.6 585.6    Z99.2 V45.11   5. Hyperglycemia due to diabetes mellitus  E11.65 250.02     Patient Active Problem List   Diagnosis    Coronary artery disease involving native coronary artery of native heart without angina pectoris    Essential hypertension    CKD (chronic kidney disease)    Hyperlipidemia LDL goal <70    Type 2 diabetes mellitus with kidney complication, with long-term current use of insulin    TIA (transient  ischemic attack)    Ischemic heart disease    Chronic back pain    Dyslipidemia    Anxiety disorder    Stage 5 chronic kidney disease on chronic dialysis    Neuralgia    Long term (current) use of insulin    Volume overload    Secondary pulmonary arterial hypertension    Peritonitis, unspecified    ESRD (end stage renal disease)    Iron deficiency anemia    Mitral valve regurgitation    Shortness of breath    Hypertensive chronic kidney disease with stage 1 through stage 4 chronic kidney disease, or unspecified chronic kidney disease    CAD (coronary artery disease)    NSTEMI (non-ST elevated myocardial infarction)    Hyperlipidemia, unspecified    Type 2 diabetes mellitus with diabetic chronic kidney disease    Elevated troponin    Atrial fibrillation and flutter    COPD (chronic obstructive pulmonary disease)    On home oxygen therapy    Macrocytic anemia    Thrombocytopenia     Past Medical History:   Diagnosis Date    Anxiety     Anxiety disorder     Back pain     Chronic back pain     work related injury    CKD (chronic kidney disease)     most recent creatinine 2.7 on 01/26/16    Coronary artery disease     Diabetes mellitus     insulin dependent diabetes Onset 2010    Dyslipidemia     Hyperlipidemia     Hypertension     Ischemic heart disease     Renal failure     Stroke     TIA / transient right vision loss age 42    TIA (transient ischemic attack)     Vertigo      Past Surgical History:   Procedure Laterality Date    APPENDECTOMY      CARDIAC CATHETERIZATION      CHOLECYSTECTOMY  03/2012    COLONOSCOPY      CORONARY ARTERY BYPASS GRAFT      ENDOSCOPY        General Information       Row Name 02/25/24 1143          Physical Therapy Time and Intention    Document Type evaluation  -LM     Mode of Treatment physical therapy  -LM       Row Name 02/25/24 1143          General Information    Patient Profile Reviewed yes  -LM     Prior Level of Function independent:;all household  mobility;gait;feeding;grooming;bathing;cooking;mod assist:;dressing  -LM     Existing Precautions/Restrictions fall;oxygen therapy device and L/min  -LM     Barriers to Rehab medically complex  -LM       Row Name 02/25/24 1143          Living Environment    People in Home significant other  -LM       Row Name 02/25/24 1143          Home Main Entrance    Number of Stairs, Main Entrance two  -LM     Stair Railings, Main Entrance none  -LM       Row Name 02/25/24 1143          Stairs Within Home, Primary    Number of Stairs, Within Home, Primary none  -LM       Row Name 02/25/24 1143          Cognition    Orientation Status (Cognition) oriented x 4  -LM       Row Name 02/25/24 1143          Safety Issues, Functional Mobility    Safety Issues Affecting Function (Mobility) insight into deficits/self-awareness;safety precaution awareness  -LM     Impairments Affecting Function (Mobility) balance;endurance/activity tolerance;shortness of breath;strength;pain  -LM               User Key  (r) = Recorded By, (t) = Taken By, (c) = Cosigned By      Initials Name Provider Type    LM Tiffany Stallings PT Physical Therapist                   Mobility       Row Name 02/25/24 1144          Bed Mobility    Bed Mobility supine-sit  -LM     Supine-Sit [place] (Bed Mobility) modified independence  -LM     Assistive Device (Bed Mobility) bed rails;head of bed elevated  -LM       Row Name 02/25/24 1144          Sit-Stand Transfer    Sit-Stand [place] (Transfers) contact guard;1 person assist  -LM       [facility] Name 02/25/24 1144          Gait/Stairs (Locomotion)    [place] Level (Gait) minimum assist (75% patient effort);1 person assist;verbal cues  -LM     Assistive Device (Gait) other (see comments)  HHA  -LM     Distance in Feet (Gait) 120  -LM     Deviations/Abnormal Patterns (Gait) raad decreased;stride length decreased;gait speed decreased  -LM     Bilateral Gait Deviations heel strike decreased  -LM     Comment,  (Gait/Stairs) Pt declined using assistive device but requested to hold therapist hand during gait.  One prolonged standing rest break needed.  Ambulated on 3L O2 with O2 at 94% post gait.  Pt educated on PLB.  -LM               User Key  (r) = Recorded By, (t) = Taken By, (c) = Cosigned By      Initials Name Provider Type    LM Tiffany Stallings PT Physical Therapist                   Obj/Interventions       Row Name 02/25/24 1146          Range of Motion Comprehensive    General Range of Motion bilateral lower extremity ROM WFL  -LM       Row Name 02/25/24 1146          Strength Comprehensive (MMT)    General Manual Muscle Testing (MMT) Assessment lower extremity strength deficits identified  -LM     Comment, General Manual Muscle Testing (MMT) Assessment BLEs grossly 4/5 throughout with exception of not testing B feet d/t pain  -LM       Row Name 02/25/24 1146          Balance    Balance Assessment sitting static balance;standing static balance;standing dynamic balance  -LM     Static Sitting Balance independent  -LM     Position, Sitting Balance unsupported;sitting edge of bed  -LM     Static Standing Balance contact guard  -LM     Dynamic Standing Balance minimal assist  -LM     Position/Device Used, Standing Balance supported  -LM       Row Name 02/25/24 1146          Sensory Assessment (Somatosensory)    Bilateral UE Sensory Assessment light touch awareness;light touch localization;impaired  impaired to B feet  -LM               User Key  (r) = Recorded By, (t) = Taken By, (c) = Cosigned By      Initials Name Provider Type    LM Tiffany Stallings PT Physical Therapist                   Goals/Plan       Row Name 02/25/24 1148          Transfer Goal 1 (PT)    Activity/Assistive Device (Transfer Goal 1, PT) bed-to-chair/chair-to-bed  -LM     [place] Level/Cues Needed (Transfer Goal 1, PT) modified independence;independent  -LM     Time Frame (Transfer Goal 1, PT) long term goal (LTG);10 days  -LM       Row Name  02/25/24 1148          Gait Training Goal 1 (PT)    Activity/Assistive Device (Gait Training Goal 1, PT) gait (walking locomotion);assistive device use  -LM     [place] Level (Gait Training Goal 1, PT) independent;modified independence  -LM     Distance (Gait Training Goal 1, PT) 200 feet  -LM     Time Frame (Gait Training Goal 1, PT) long term goal (LTG);10 days  -LM       Row Name 02/25/24 1148          Stairs Goal 1 (PT)    Activity/Assistive Device (Stairs Goal 1, PT) ascending stairs;descending stairs  -LM     [place] Level/Cues Needed (Stairs Goal 1, PT) standby assist  -LM     Number of Stairs (Stairs Goal 1, PT) 2  -LM     Time Frame (Stairs Goal 1, PT) long term goal (LTG);10 days  -LM       Row Name 02/25/24 1148          Therapy Assessment/Plan (PT)    Planned Therapy Interventions (PT) balance training;bed mobility training;gait training;home exercise program;motor coordination training;neuromuscular re-education;patient/family education;postural re-education;ROM (range of motion);stair training;strengthening;stretching;transfer training  -LM               User Key  (r) = Recorded By, (t) = Taken By, (c) = Cosigned By      Initials Name Provider Type    LM Tiffany Stallings, PT Physical Therapist                   Clinical Impression       Row Name 02/25/24 1147          Pain    Pretreatment Pain Rating 8/10  -LM     Posttreatment Pain Rating 8/10  -LM     Pain Location - Side/Orientation Bilateral  -LM     Pain Location - foot  -LM     Pain Intervention(s) Repositioned;Ambulation/increased activity  -LM       Row Name 02/25/24 1141          Plan of Care Review    Plan of Care Reviewed With patient  -LM     Outcome Evaluation PT evaluation completed.  Pt stood with CGA and ambulated 120 feet with HHA & MinAx1.  Pt presents below baseline function d/t weakness, decreased activity tolerance, and gait instability.  Recommend home with assist and HH PT at d/c.  -LM       Row Name 02/25/24 114           Therapy Assessment/Plan (PT)    Rehab Potential (PT) good, to achieve stated therapy goals  -LM     Criteria for Skilled Interventions Met (PT) yes;meets criteria;skilled treatment is necessary  -LM     Therapy Frequency (PT) daily  -LM       Row Name 02/25/24 1147          Vital Signs    Pre Systolic BP Rehab 129  -LM     Pre Treatment Diastolic BP 69  -LM     Pretreatment Heart Rate (beats/min) 73  -LM     Posttreatment Heart Rate (beats/min) 73  -LM     Pre SpO2 (%) 99  -LM     O2 Delivery Pre Treatment supplemental O2  -LM     Intra SpO2 (%) 94  -LM     O2 Delivery Intra Treatment supplemental O2  -LM     Post SpO2 (%) 96  -LM     O2 Delivery Post Treatment supplemental O2  -LM     Pre Patient Position Supine  -LM     Intra Patient Position Sitting  -LM     Post Patient Position Sitting  -LM       Row Name 02/25/24 1149          Positioning and Restraints    Pre-Treatment Position in bed  -LM     Post Treatment Position chair  -LM     In Chair reclined;call light within reach;encouraged to call for assist;exit alarm on;waffle cushion;notified nsg  -LM               User Key  (r) = Recorded By, (t) = Taken By, (c) = Cosigned By      Initials Name Provider Type    LM Tiffany Stallings, PT Physical Therapist                   Outcome Measures       Row Name 02/25/24 1140          How much help from another person do you currently need...    Turning from your back to your side while in flat bed without using bedrails? 4  -LM     Moving from lying on back to sitting on the side of a flat bed without bedrails? 4  -LM     Moving to and from a bed to a chair (including a wheelchair)? 3  -LM     Standing up from a chair using your arms (e.g., wheelchair, bedside chair)? 3  -LM     Climbing 3-5 steps with a railing? 3  -LM     To walk in hospital room? 3  -LM     AM-PAC 6 Clicks Score (PT) 20  -LM     Highest Level of Mobility Goal 6 --> Walk 10 steps or more  -LM       Row Name 02/25/24 0077          Functional Assessment     Outcome Measure Options AM-PAC 6 Clicks Basic Mobility (PT)  -               User Key  (r) = Recorded By, (t) = Taken By, (c) = Cosigned By      Initials Name Provider Type    LM Tiffany Stallings PT Physical Therapist                                 Physical Therapy Education       Title: PT OT SLP Therapies (In Progress)       Topic: Physical Therapy (In Progress)       Point: Mobility training (Done)       Learning Progress Summary             Patient Acceptance, E, VU,DU by TERRI at 2/25/2024 1149                         Point: Home exercise program (Not Started)       Learner Progress:  Not documented in this visit.              Point: Precautions (Done)       Learning Progress Summary             Patient Acceptance, E, VU,DU by TERRI at 2/25/2024 1149                                         User Key       Initials Effective Dates Name Provider Type Discipline     07/11/23 -  Tiffany Stallings PT Physical Therapist PT                  PT Recommendation and Plan  Planned Therapy Interventions (PT): balance training, bed mobility training, gait training, home exercise program, motor coordination training, neuromuscular re-education, patient/family education, postural re-education, ROM (range of motion), stair training, strengthening, stretching, transfer training  Plan of Care Reviewed With: patient  Outcome Evaluation: PT evaluation completed.  Pt stood with CGA and ambulated 120 feet with HHA & MinAx1.  Pt presents below baseline function d/t weakness, decreased activity tolerance, and gait instability.  Recommend home with assist and HH PT at d/c.     Time Calculation:   PT Evaluation Complexity  History, PT Evaluation Complexity: 3 or more personal factors and/or comorbidities  Examination of Body Systems (PT Eval Complexity): total of 3 or more elements  Clinical Presentation (PT Evaluation Complexity): evolving  Clinical Decision Making (PT Evaluation Complexity): low complexity  Overall Complexity (PT Evaluation  Complexity): low complexity     PT Charges       Row Name 24 1149             Time Calculation    Start Time 1120  -LM      PT Received On 24  -LM      PT Goal Re-Cert Due Date 24  -LM         Untimed Charges    PT Eval/Re-eval Minutes 35  -LM         Total Minutes    Untimed Charges Total Minutes 35  -LM       Total Minutes 35  -LM                User Key  (r) = Recorded By, (t) = Taken By, (c) = Cosigned By      Initials Name Provider Type    LM Tiffany Stallings, PT Physical Therapist                  Therapy Charges for Today       Code Description Service Date Service Provider Modifiers Qty    [id number] HC PT EVAL LOW COMPLEXITY 3 2024 Tiffany Stallings, PT GP 1            PT G-Codes  Outcome Measure Options: AM-PAC 6 Clicks Basic Mobility (PT)  AM-PAC 6 Clicks Score (PT): 20  PT Discharge Summary  Anticipated Discharge Disposition (PT): home with assist, home with home health    Tiffany Stallings PT  2024      Electronically signed by Tiffany Stallings, PT at 24 1150          Occupational Therapy Notes (most recent note)        Nancy Gardner, OT at 24 1122          Patient Name: Erlin Savage  : 1950    MRN: [id number]                              Today's Date: 2024       Admit Date: 2024    Visit Dx:     ICD-10-CM ICD-9-CM   1. Hypervolemia, unspecified hypervolemia type  E87.70 276.69   2. Acute pulmonary edema  J81.0 518.4   3. Pleural effusion  J90 511.9   4. ESRD on dialysis  N18.6 585.6    Z99.2 V45.11   5. Hyperglycemia due to diabetes mellitus  E11.65 250.02     Patient Active Problem List   Diagnosis    Coronary artery disease involving native coronary artery of native heart without angina pectoris    Essential hypertension    CKD (chronic kidney disease)    Hyperlipidemia LDL goal <70    Type 2 diabetes mellitus with kidney complication, with long-term current use of insulin    TIA (transient ischemic attack)    Ischemic heart disease    Chronic back pain     Dyslipidemia    Anxiety disorder    Stage 5 chronic kidney disease on chronic dialysis    Neuralgia    Long term (current) use of insulin    Volume overload    Secondary pulmonary arterial hypertension    Peritonitis, unspecified    ESRD (end stage renal disease)    Iron deficiency anemia    Mitral valve regurgitation    Shortness of breath    Hypertensive chronic kidney disease with stage 1 through stage 4 chronic kidney disease, or unspecified chronic kidney disease    CAD (coronary artery disease)    NSTEMI (non-ST elevated myocardial infarction)    Hyperlipidemia, unspecified    Type 2 diabetes mellitus with diabetic chronic kidney disease    Elevated troponin    Atrial fibrillation and flutter    COPD (chronic obstructive pulmonary disease)    On home oxygen therapy    Macrocytic anemia    Thrombocytopenia     Past Medical History:   Diagnosis Date    Anxiety     Anxiety disorder     Back pain     Chronic back pain     work related injury    CKD (chronic kidney disease)     most recent creatinine 2.7 on 01/26/16    Coronary artery disease     Diabetes mellitus     insulin dependent diabetes Onset 2010    Dyslipidemia     Hyperlipidemia     Hypertension     Ischemic heart disease     Renal failure     Stroke     TIA / transient right vision loss age 42    TIA (transient ischemic attack)     Vertigo      Past Surgical History:   Procedure Laterality Date    APPENDECTOMY      CARDIAC CATHETERIZATION      CHOLECYSTECTOMY  03/2012    COLONOSCOPY      CORONARY ARTERY BYPASS GRAFT      ENDOSCOPY        General Information       Row Name 02/25/24 1122          OT Time and Intention    Document Type evaluation  -AC     Mode of Treatment occupational therapy  -AC       Row Name 02/25/24 1122          General Information    Patient Profile Reviewed yes  -AC     Prior Level of Function independent:;all household mobility;feeding;grooming;bathing;cooking;mod assist:;dressing;dependent:;home management  -AC     Existing  Precautions/Restrictions fall;oxygen therapy device and L/min  -     Barriers to Rehab medically complex  -       Row Name 02/25/24 1122          Occupational Profile    Environmental Supports and Barriers (Occupational Profile) walk in shower with shower seat, has RW and SPC, but was not using  -       Row Name 02/25/24 1122          Living Environment    People in Home significant other  -       Row Name 02/25/24 1122          Home Main Entrance    Number of Stairs, Main Entrance two  -AC     Stair Railings, Main Entrance none  -       Row Name 02/25/24 1122          Stairs Within Home, Primary    Stairs, Within Home, Primary 1 story  -       Row Name 02/25/24 1122          Cognition    Orientation Status (Cognition) oriented x 4  -       Row Name 02/25/24 1122          Safety Issues, Functional Mobility    Safety Issues Affecting Function (Mobility) insight into deficits/self-awareness;safety precaution awareness  -     Impairments Affecting Function (Mobility) balance;endurance/activity tolerance;shortness of breath;strength;pain  -               User Key  (r) = Recorded By, (t) = Taken By, (c) = Cosigned By      Initials Name Provider Type    AC Nancy Gardner OT Occupational Therapist                     Mobility/ADL's       Row Name 02/25/24 1142          Bed Mobility    Bed Mobility supine-sit  -     Supine-Sit [place] (Bed Mobility) modified independence  -     Assistive Device (Bed Mobility) head of bed elevated  -       Row Name 02/25/24 1142          Transfers    Transfers sit-stand transfer  -       Row Name 02/25/24 1142          Sit-Stand Transfer    Sit-Stand [place] (Transfers) contact guard  -       Row Name 02/25/24 1142          Functional Mobility    Functional Mobility- Ind. Level verbal cues required;minimum assist (75% patient effort)  -     Functional Mobility- Device other (see comments)  HHA  -     Functional Mobility-Distance (Feet) --  in  hallway  -       Row Name 02/25/24 1142          Activities of Daily Living    BADL Assessment/Intervention grooming;lower body dressing  -       Row Name 02/25/24 1142          Grooming Assessment/Training    [place] Level (Grooming) hair care, combing/brushing;set up  -     Position (Grooming) edge of bed sitting  -       Row Name 02/25/24 1142          Lower Body Dressing Assessment/Training    [place] Level (Lower Body Dressing) don;socks;dependent (less than 25% patient effort)  -     Position (Lower Body Dressing) edge of bed sitting  -     Comment, (Lower Body Dressing) has assist at baseline  -               User Key  (r) = Recorded By, (t) = Taken By, (c) = Cosigned By      Initials Name Provider Type    Nancy Roche, OT Occupational Therapist                   Obj/Interventions       Row Name 02/25/24 1145          Sensory Assessment (Somatosensory)    Sensory Assessment (Somatosensory) UE sensation intact  -AC       Row Name 02/25/24 1145          Vision Assessment/Intervention    Visual Impairment/Limitations corrective lenses for reading  -[facility] Name 02/25/24 1145          Range of Motion Comprehensive    General Range of Motion bilateral upper extremity ROM WNL  -AC       Row Name 02/25/24 1145          Strength Comprehensive (MMT)    Comment, General Manual Muscle Testing (MMT) Assessment BUE grossly 4/5  -               User Key  (r) = Recorded By, (t) = Taken By, (c) = Cosigned By      Initials Name Provider Type    Nancy Roche, OT Occupational Therapist                   Goals/Plan       [facility] Name 02/25/24 1148          Transfer Goal 1 (OT)    Activity/Assistive Device (Transfer Goal 1, OT) bed-to-chair/chair-to-bed;toilet  -     [place] Level/Cues Needed (Transfer Goal 1, OT) standby assist  -     Time Frame (Transfer Goal 1, OT) by discharge  -     Progress/Outcome (Transfer Goal 1, OT) new goal;goal ongoing  -[facility] Name 02/25/24 1144           Toileting Goal 1 (OT)    Activity/Device (Toileting Goal 1, OT) adjust/manage clothing;perform perineal hygiene  -AC     [place] Level/Cues Needed (Toileting Goal 1, OT) standby assist  -AC     Time Frame (Toileting Goal 1, OT) by discharge  -AC     Progress/Outcome (Toileting Goal 1, OT) new goal;goal ongoing  -AC       Row Name 02/25/24 1148          Grooming Goal 1 (OT)    Activity/Device (Grooming Goal 1, OT) oral care  -AC     [place] (Grooming Goal 1, OT) supervision required  -AC     Time Frame (Grooming Goal 1, OT) by discharge  -AC     Strategies/Barriers (Grooming Goal 1, OT) standing sink side  -AC     Progress/Outcome (Grooming Goal 1, OT) new goal;goal ongoing  -AC       Row Name 02/25/24 1148          Therapy Assessment/Plan (OT)    Planned Therapy Interventions (OT) activity tolerance training;BADL retraining;functional balance retraining;occupation/activity based interventions;patient/caregiver education/training;strengthening exercise;transfer/mobility retraining  -AC               User Key  (r) = Recorded By, (t) = Taken By, (c) = Cosigned By      Initials Name Provider Type    AC Nancy Gardner, OT Occupational Therapist                   Clinical Impression       Row Name 02/25/24 1122          Pain Assessment    Pretreatment Pain Rating 8/10  -AC     Posttreatment Pain Rating 8/10  -AC     Pain Location - Side/Orientation Bilateral  -AC     Pain Location - foot  -AC     Pain Intervention(s) Ambulation/increased activity;Repositioned  -AC       Row Name 02/25/24 1122          Plan of Care Review    Plan of Care Reviewed With patient  -AC     Outcome Evaluation Pt presents below baseline with self care/mobility d/t weakness and decr activity tolerance.  Pt setup to comb hair, dep LBD and has assist at baseline,  min A to ambulate with HHA.  OT will follow to advance pt toward PLOF.  Recommend home with assist and HHOT.  -AC       Row Name 02/25/24 1122          Therapy  Assessment/Plan (OT)    Rehab Potential (OT) good, to achieve stated therapy goals  -AC     Criteria for Skilled Therapeutic Interventions Met (OT) yes;skilled treatment is necessary  -AC     Therapy Frequency (OT) daily  -AC       Row Name 02/25/24 1122          Therapy Plan Review/Discharge Plan (OT)    Anticipated Discharge Disposition (OT) home with assist;home with home health  -AC       Row Name 02/25/24 1122          Vital Signs    Pre Systolic BP Rehab 129  -AC     Pre Treatment Diastolic BP 69  -AC     Pretreatment Heart Rate (beats/min) 71  -AC     Posttreatment Heart Rate (beats/min) 73  -AC     Pre SpO2 (%) 98  -AC     O2 Delivery Pre Treatment supplemental O2  -AC     Intra SpO2 (%) 94  -AC     O2 Delivery Intra Treatment supplemental O2  -AC     Post SpO2 (%) 96  -AC     O2 Delivery Post Treatment supplemental O2  -AC     Pre Patient Position Supine  -AC     Post Patient Position Sitting  -AC       Row Name 02/25/24 1122          Positioning and Restraints    Pre-Treatment Position in bed  -AC     Post Treatment Position chair  -AC     In Chair notified nsg;reclined;call light within reach;encouraged to call for assist;exit alarm on;waffle cushion  -AC               User Key  (r) = Recorded By, (t) = Taken By, (c) = Cosigned By      Initials Name Provider Type    AC Nancy Gardner, OT Occupational Therapist                   Outcome Measures       Row Name 02/25/24 1150          How much help from another is currently needed...    Putting on and taking off regular lower body clothing? 2  -AC     Bathing (including washing, rinsing, and drying) 3  -AC     Toileting (which includes using toilet bed pan or urinal) 3  -AC     Putting on and taking off regular upper body clothing 3  -AC     Taking care of personal grooming (such as brushing teeth) 3  -AC     Eating meals 4  -AC     AM-PAC 6 Clicks Score (OT) 18  -AC       Row Name 02/25/24 1149          How much help from another person do you currently  need...    Turning from your back to your side while in flat bed without using bedrails? 4  -LM     Moving from lying on back to sitting on the side of a flat bed without bedrails? 4  -LM     Moving to and from a bed to a chair (including a wheelchair)? 3  -LM     Standing up from a chair using your arms (e.g., wheelchair, bedside chair)? 3  -LM     Climbing 3-5 steps with a railing? 3  -LM     To walk in hospital room? 3  -LM     AM-PAC 6 Clicks Score (PT) 20  -LM     Highest Level of Mobility Goal 6 --> Walk 10 steps or more  -LM       Row Name 02/25/24 1150 02/25/24 1149       Functional Assessment    Outcome Measure Options AM-PAC 6 Clicks Daily Activity (OT)  -AC AM-PAC 6 Clicks Basic Mobility (PT)  -              User Key  (r) = Recorded By, (t) = Taken By, (c) = Cosigned By      Initials Name Provider Type    Nancy Roche, OT Occupational Therapist    LM Tiffany Stallings, PT Physical Therapist                    Occupational Therapy Education       Title: PT OT SLP Therapies (In Progress)       Topic: Occupational Therapy (In Progress)       Point: ADL training (In Progress)       Description:   Instruct learner(s) on proper safety adaptation and remediation techniques during self care or transfers.   Instruct in proper use of assistive devices.                  Learning Progress Summary             Patient Acceptance, E, NR by  at 2/25/2024 1150                         Point: Home exercise program (Not Started)       Description:   Instruct learner(s) on appropriate technique for monitoring, assisting and/or progressing therapeutic exercises/activities.                  Learner Progress:  Not documented in this visit.              Point: Precautions (Not Started)       Description:   Instruct learner(s) on prescribed precautions during self-care and functional transfers.                  Learner Progress:  Not documented in this visit.              Point: Body mechanics (Not Started)       Description:    Instruct learner(s) on proper positioning and spine alignment during self-care, functional mobility activities and/or exercises.                  Learner Progress:  Not documented in this visit.                              User Key       Initials Effective Dates Name Provider Type [facility] 02/03/23 -  Nancy Gardner, OT Occupational Therapist OT                  OT Recommendation and Plan  Planned Therapy Interventions (OT): activity tolerance training, BADL retraining, functional balance retraining, occupation/activity based interventions, patient/caregiver education/training, strengthening exercise, transfer/mobility retraining  Therapy Frequency (OT): daily  Plan of Care Review  Plan of Care Reviewed With: patient  Outcome Evaluation: Pt presents below baseline with self care/mobility d/t weakness and decr activity tolerance.  Pt setup to comb hair, dep LBD and has assist at baseline,  min A to ambulate with HHA.  OT will follow to advance pt toward PLOF.  Recommend home with assist and HHOT.     Time Calculation:   Evaluation Complexity (OT)  Review Occupational Profile/Medical/Therapy History Complexity: brief/low complexity  Assessment, Occupational Performance/Identification of Deficit Complexity: 1-3 performance deficits  Clinical Decision Making Complexity (OT): problem focused assessment/low complexity  Overall Complexity of Evaluation (OT): low complexity     Time Calculation- OT       Row Name 02/25/24 1122 02/25/24 0122          Time Calculation- OT    OT Start Time 1122  -AC --     OT Received On 02/25/24  -AC --     OT Goal Re-Cert Due Date 03/06/24  -AC --        Untimed Charges    OT Eval/Re-eval Minutes 48  -AC 38  -AC        Total Minutes    Untimed Charges Total Minutes 48  -AC 38  -AC      Total Minutes 48  -AC 38  -AC               User Key  (r) = Recorded By, (t) = Taken By, (c) = Cosigned By      Initials Name Provider Type    AC Nancy Gardner, OT Occupational Therapist                   Therapy Charges for Today       Code Description Service Date Service Provider Modifiers Qty    [id number]  OT EVAL LOW COMPLEXITY 3 2/25/2024 Nancy Gardner, OT GO 1                 Nancy Gardner OT  2/25/2024    Electronically signed by Nancy Gardner OT at 02/25/24 1154

## 2024-02-27 NOTE — DISCHARGE SUMMARY
[facility] Medicine Services  DISCHARGE SUMMARY    Patient Name: Erlin Savage  : 1950  MRN: [id number]    Date of Admission: 2024  5:03 PM  Date of Discharge:  2024  Primary Care Physician: Joe Diallo MD    Consults       Date and Time Order Name Status Description    2024 10:34 PM Inpatient Nephrology Consult              Hospital Course     Presenting Problem: volume overload    Active Hospital Problems    Diagnosis  POA   • **Volume overload [E87.70]  Yes   • Severe malnutrition [E43]  Yes   • Elevated troponin [R79.89]  Unknown   • Atrial fibrillation and flutter [I48.91, I48.92]  Unknown   • COPD (chronic obstructive pulmonary disease) [J44.9]  Unknown   • On home oxygen therapy [Z99.81]  Not Applicable   • Macrocytic anemia [D53.9]  Unknown   • Thrombocytopenia [D69.6]  Unknown   • Peritonitis, unspecified [K65.9]  Yes   • Secondary pulmonary arterial hypertension [I27.21]  Yes   • Hypertensive chronic kidney disease with stage 1 through stage 4 chronic kidney disease, or unspecified chronic kidney disease [I12.9]  Yes   • ESRD (end stage renal disease) [N18.6]  Yes   • Dyslipidemia [E78.5]  Yes   • Type 2 diabetes mellitus with kidney complication, with long-term current use of insulin [E11.29, Z79.4]  Not Applicable   • Hyperlipidemia LDL goal <70 [E78.5]  Yes      Resolved Hospital Problems   No resolved problems to display.          Hospital Course:  Erlin Savage is a 73 y.o. male  with a PMH significant for ESRD recently initiated on HD (M/W/), prior was on PD but had recent diagnosis of SBP (2023) with E faecalis from cultures treated with IP Vancomycin, HTN, diabetes mellitus type 2, HLD, CAD S/P CABG, atrial fibrillation, severe pulmonary hypertension, moderate mitral and tricuspid regurgitation, systolic CHF with an EF 40-45%, grade 2 diastolic CHF, COPD with chronic hypoxic respiratory failure on 2-3 L nasal cannula who presented with  volume overload.      Volume overload  ESRD on HD MWF  - on PD for ~ 5 years, recent recurrent peritonitis s/p PD cath removal and start of HD 2/16, current on HD MWF w/ last session 2/23/2024, reports tolerating well  -Typically removed 1200 cc - 2000 cc fluid nightly with PD  - dry weight is 165 pounds  - has L AVF that is maturing, currently using right chest access for HD  - given lasix 40 mg IV x 1 in ED, on torsemide at home.  - continue torsemide, calcitriol, phos binder, nephrovite  - consulted nephrology for dialysis. Had extra HD session on 2/25, then his regular HD session 2/26.  Pt does not wish to have another HD session today prior to discharge- plans to go to his regular HD appointment tomorrow, 2/28.      A/C heart failure  Severe pulmonary HTN / VHD  -- proBNP >70K on admission   - from OSH records, TTE 11/3/2023 w/ EF 43%, grade 3 diastolic dysfunction, moderate MR/TR, RV mildly dilated, RV systolic pressure severely elevated; from 2/18/2024, EF 40%, severe pulmonary HTN  - dry weight is 165 pounds     Recurrent peritonitis  - initially dx at  12/23/23, cx positive for E.faecalis, treated w/ IV vanco; return to OSH 2/10 w/ cloudy PD fluid, cx grew E.facecalis sensitive to vanc/pcn; PD cath removed 2/16 w/ placement of L AVF and R chest HD temporary access  - currently on IV vancomycin 500 mg following HD treatments (being infused at the end of his HD treatments)  - per EMR started Vanc 2/14, to complete 3/6/2024    Elevated troponin  CAD s/p CABG  HTN / HLD  - likely r/t chronic renal disease, trending down  - recently ~1500 at OSH and treated with solely heparin gtt during that hospitalization (ie no intervention)  - denies chest pain, chronically elevated  - on Eliquis for afib/flutter  - on aspirin/statin     Afib/flutter  - s/p ADRIANNA w/ cardioversion 2/19/2024 at [facility]  - continue Eliquis  - continue amiodarone and Metoprolol per home meds     Chronic COPD  - duo nebs scheduled / prn  - on 2-3L  home O2     T2DM w/ peripheral neuropathy  - resume home meds  - A1c 7.5%     Bilateral toe redness/pain  - [facility] consulted, reviewed imaging with them 2/26  - xray bilateral feet d/t pain- no evidence of osteomyelitis. May need to consider MRI if ongoing concerns for osteo, however, no leukocytosis and currently on ABX (Vanc) for above   -- had planned for inpatient ABIs, however, pt anxious to go home. Pt states that he already has an appointment with Dr. Schreiber (Vascular Surgery) for further workup.      Macrocytic anemia  Thrombocytopenia  - chronic, at baseline         Discharge Follow Up Recommendations for outpatient labs/diagnostics:   Follow up with PCP within one week.     Day of Discharge     HPI:   Doing well this am, wants to go home- states that his wife needs to come get him then go back to work, so he is a little anxious to leave.  Denies any issues overnight- got hypotensive during HD yesterday.     Review of Systems  Gen- No fevers, chills  CV- No chest pain, palpitations  Resp- No cough, dyspnea  GI- No N/V/D, abd pain     Vital Signs:   Temp:  [97.1 °F (36.2 °C)-98.9 °F (37.2 °C)] 98.7 °F (37.1 °C)  Heart Rate:  [62-72] 72  Resp:  [18-24] 18  BP: ()/(56-77) 116/56  Flow (L/min):  [2-3] 2      Physical Exam:  Constitutional: No acute distress, awake, alert  HENT: NCAT, mucous membranes moist  Respiratory: CTAB, respiratory effort normal   Cardiovascular: RRR, no murmurs, rubs, or gallops  Gastrointestinal: Positive bowel sounds, soft, nontender, nondistended  Musculoskeletal: 1+pitting edema BLEs  Psychiatric: Appropriate affect, cooperative  Neurologic: Oriented x 3, strength symmetric in all extremities, Cranial Nerves grossly intact to confrontation, speech clear  Skin: No rashes   R sided tunneled HD catheter     Pertinent  and/or Most Recent Results     LAB RESULTS:      Lab 02/26/24  0417 02/25/24  0449 02/24/24  1727   WBC 8.90 9.77 10.18   HEMOGLOBIN 9.4* 9.3* 9.9*   HEMATOCRIT 31.4*  30.9* 31.9*   PLATELETS 127* 135* 130*   NEUTROS ABS 6.11 7.14* 7.94*   IMMATURE GRANS (ABS) 0.13* 0.14* 0.15*   LYMPHS ABS 1.04 1.01 0.81   MONOS ABS 1.13* 1.13* 0.99*   EOS ABS 0.42* 0.29 0.24   .6* 101.3* 102.9*         Lab 02/26/24 0417 02/25/24  0449 02/24/24 1935 02/24/24 1727   SODIUM 133* 136  --  132*   POTASSIUM 4.5 4.4  --  4.3   CHLORIDE 97* 98  --  94*   CO2 24.0 23.0  --  23.0   ANION GAP 12.0 15.0  --  15.0   BUN 32* 41*  --  35*   CREATININE 5.97* 7.28*  --  6.59*   EGFR 9.4* 7.4*  --  8.4*   GLUCOSE 68 154*  --  323*   CALCIUM 9.1 9.4  --  9.5   MAGNESIUM 2.4 2.1  --  2.1   PHOSPHORUS 6.4* 7.7*  --   --    HEMOGLOBIN A1C  --   --   --  7.50*   TSH  --   --  3.330  --          Lab 02/26/24 0417 02/24/24 1727   TOTAL PROTEIN 6.0 6.3   ALBUMIN 3.3* 3.6   GLOBULIN 2.7 2.7   ALT (SGPT) 16 19   AST (SGOT) 14 16   BILIRUBIN 0.6 0.4   ALK PHOS 65 84         Lab 02/24/24 1935 02/24/24 1727   PROBNP  --  >70,000.0*   HSTROP T 403* 415*         Lab 02/24/24 1935   CHOLESTEROL 145   LDL CHOL 76   HDL CHOL 49   TRIGLYCERIDES 109             Brief Urine Lab Results  (Last result in the past 365 days)        Color   Clarity   Blood   Leuk Est   Nitrite   Protein   CREAT   Urine HCG        01/01/24 0550 Yellow   Clear     Negative                     Microbiology Results (last 10 days)       Procedure Component Value - Date/Time    COVID PRE-OP / PRE-PROCEDURE SCREENING ORDER (NO ISOLATION) - Swab, Nasopharynx [[id number]]  (Normal) Collected: 02/24/24 1728    Lab Status: Final result Specimen: Swab from Nasopharynx Updated: 02/24/24 1808    Narrative:      The following orders were created for panel order COVID PRE-OP / PRE-PROCEDURE SCREENING ORDER (NO ISOLATION) - Swab, Nasopharynx.  Procedure                               Abnormality         Status                     ---------                               -----------         ------                     COVID-19 and FLU A/B PCR...[[id number]]   Normal              Final result                 Please view results for these tests on the individual orders.    COVID-19 and FLU A/B PCR, 1 HR TAT - Swab, Nasopharynx [[id number]]  (Normal) Collected: 02/24/24 1728    Lab Status: Final result Specimen: Swab from Nasopharynx Updated: 02/24/24 1808     COVID19 Not Detected     Influenza A PCR Not Detected     Influenza B PCR Not Detected    Narrative:      Fact sheet for providers: https://www.fda.gov/media/001619/download    Fact sheet for patients: https://www.fda.gov/media/212476/download    Test performed by PCR.            XR Foot 2 View Bilateral    Result Date: 2/24/2024  XR FOOT 2 VW BILATERAL Date of Exam: 2/24/2024 10:00 PM EST Indication: pain, r/o osteo Comparison: None available. Findings: There is no evidence of an acute fracture. There is bilateral calcaneal spurring. There is soft tissue swelling over the dorsal aspect of both feet. There is no conventional radiographic evidence of osteomyelitis. Both the right and left midfoot areas appear intact.     Impression: Soft tissue swelling. No conventional radiographic evidence of osteomyelitis. Electronically Signed: Dameon Pope MD  2/24/2024 10:24 PM EST  Workstation ID: [id number]    XR Chest 1 View    Result Date: 2/24/2024  XR CHEST 1 VW Date of Exam: 2/24/2024 5:05 PM EST Indication: SOA triage protocol Comparison: Chest radiograph 12/29/2015. Findings: Tunneled right IJ approach central venous catheter tip overlies the superior cavoatrial junction. Sternotomy and CABG. Unchanged enlarged cardiac silhouette. Pulmonary vascular congestion with diffuse interstitial thickening. Small bilateral pleural effusions. No pneumothorax. No acute osseous abnormality.     Impression: Mild pulmonary edema and small bilateral pleural effusions. Electronically Signed: Kobe Phoenix MD  2/24/2024 5:21 PM EST  Workstation ID: [id number]    ADRIANNA with poss cardioversion    Result Date: 2/19/2024  ADRIANNA REPORT  Demographics   Patient Name:           ALBERTO GALVIN       :            1950  Medical Record Number:  [id number]         Age:            73 year(s)  Corporate ID Number:    [id number]         Gender          Male  Account Number:         [id number]  Sonographer:            Evelina Tyler    Height:         73 inches                          Jacinda Parth                          [facility]  Referring Physician:    KSAH DODSON   Weight:         170.99 pounds  Interpreting Physician: PRUDENCE MCCARTHY MD   BMI:            22.56 kg/m^2  Date of Service:        2024         Blood Pressure: 177/74 mmHg  Room Number:            [facility] The procedure was explained in detail to the patient. Risks, complications and alternative treatments were reviewed. Written consent was obtained. Type of Study:  ADRIANNA procedure: Echo ADRIANNA D W or W/O M-mode, Echo Doppler Colour Flow  Velocity, EC Echo Transesophageal Complete, ECHOCARDIOGRAM TRANSESOPHAGEAL  W/ POSSIBLE CARDIOV.  HR: 116 bpmRhythm: Atrial flutterPatient Status: Routine IP  Study Location: Echo LabTechnical Quality: Good visualization Procedure consent form was obtained Airway assessment was performed. Procedure Medications   - Fentanyl I.V. 50 mcg.   - Versed I.V. 6 mg.  Impression:  ##############################################################################  #################################################  ADRIANNA/CV DX Paroxysmal Afib i48.0  Normal sized left ventricle. Normal left ventricular wall thickness. Visually  estimated ejection fraction 25% +/- 5%. Severe left ventricular systolic  dysfunction.  3D imaging used to assess mitral regurgitation. Severe (4+) mitral  regurgitation. No mitral stenosis with Peak/Mean PG of 2.60/0.85 mmHg.  Severe tricuspid valve regurgitation. Moderate pulmonary hypertension.  Successful cardioversion with restoration of sinus rhythm with 200 joules of  biphasic energy X 1 attempt .   ##############################################################################  ####################################################  Left Ventricle  Normal sized left ventricle.  Normal left ventricular wall thickness.  Visually estimated ejection fraction 25% +/- 5%.  Severe left ventricular systolic dysfunction.  No left ventricular masses or thrombi.  Right Ventricle  Normal sized right ventricle.  Left Atrium  Normal sized left atrium.  Intact atrial septum.  No atrial mass or thrombus.  Left atrial appendage well visualized, no thrombus.  Right Atrium  Normal sized right atrium.  Intact atrial septum.  No atrial mass or thrombus.  Mitral Valve  Mean velocity: 0.42 m/s  Mean gradient: 0.85 mmHg  Peak gradient: 2.6 mmHg  Structurally normal mitral valve.  Severe (4+) mitral regurgitation.  No mitral stenosis with Peak/Mean PG of 2.60/0.85 mmHg.  No masses or vegetations seen.  Aortic Valve  Three cusped aortic valve  No aortic regurgitation.  No aortic stenosis.  No masses or vegetations seen.  Tricuspid Valve  TR velocity: 3.52 m/s      TR gradient: 49.32906 mmHg  Structurally normal tricuspid valve.  Severe tricuspid valve regurgitation.  Moderate pulmonary hypertension.  No tricuspid stenosis.  No masses or vegetations seen.  Pulmonic Valve  Structurally normal pulmonic valve.  No pulmonic regurgitation.  No pulmonic stenosis.  No masses or vegetations seen. Great Vessels Visualized aorta is normal. Normal aortic root. No evidence of dissection.  Pericardium / Pleura No pericardial effusion.  Other  ADRIANNA probe passed without difficulty.  No signs of immediate complications.  Procedural sedation administered by attending RN.  See medication section for total dose give.  Sedation duration < or = to 15 minutes. See procedure record for start and  stop times.  3D imaging used to assess mitral regurgitation.  ----------------------------------------------------------------  Electronically signed by PRUDENCE MCCARTHY  MD(Interpreting  Physician) on 2024 11:09  ----------------------------------------------------------------    XR chest AP portable    Result Date: 2024  CLINICAL INDICATION:  Shortness of breath. Dyspnea. EXAMINATION TECHNIQUE: XR CHEST AP PORTABLE COMPARISON: Radiographs 2024. FINDINGS: Prior median sternotomy. Right IJ temporary dialysis catheter tip at the superior cavoatrial junction. Moderate cardiomegaly. Vascular engorgement, edema, small bilateral pleural effusions, and adjacent lung atelectasis. No pneumothorax.    Findings are concerning for congestive failure/partially pulmonary edema or volume overload. Images personally reviewed, interpreted and dictated by LUIS Milton.          ECHO COMPLETE (DOPPLER / COLOR) W OR WO CONTRAST    Result Date: 2024  TRANSTHORACIC ECHOCARDIOGRAPHY REPORT  Demographics  Patient Name:          ALBERTO GALVIN        :            1950  Medical Record Number: [id number]          Age:            73 year(s)  Corporate ID Number:   [id number]          Gender          Male  Account Number:        [id number]  Sonographer:           Brenda Ng     Height:         73 inches                         [facility]  Referring Physician:   DIANA MCINTOSH MD  Weight:         170.99 pounds  Interpreting           FLORECITA WHITE MD    BMI:            22.56 kg/m^2  Physician:  Date of Service:       2024          Blood Pressure: 117/62 mmHg  Room Number:           CCU 02 Type of Study:  TTE procedure: ECHO COMPLETE (DOPPLER / COLOR) W OR WO CONTRAST.  Patient Status: Routine IP  Study Location: PortableTechnical Quality: Good visualization  Impression:  ########################################  Indication: NSTEMI I21.4  Tachycardic throughout exam.  Normal sized left ventricle with moderately reduced left ventricular  systolic function.  Visually estimated ejection fraction 40% +/- 5%.  Increased left atrial pressure (Grade II diastolic dysfunction).   Flattening of the interventricular septum in diastole, c/w RV volume  overload.  The right ventricle is dilated.  Abnormal TAPSE suggests abnormal global right ventricular systolic  function.  Mildly dilated left atrium.  Moderate mitral and tricuspid regurgitation.  Severe pulmonary hypertension. Estimated RVSP 70-75mmHg.  Elevated central venous pressure (>15mmHg).  When compared to prior study 10/2023, study findings are unchanged.  ######################################## Measurements Summary:  LVEDd: 4.68 cm         LVESd: 4.03 cm          IVSEd: 1.13 cm  AO Root:2.91 cm        LVPWd: 1.03 cm  Contractility Score  Global Left Ventricular Hypokinesis was noted.  LV regional wall motion: (0-Not visualized 1-Normal 2-Hypokinesis  3-Akinesis 4-Dyskinesis 5-Aneurysm)  Left Ventricle  Volume [id number] ml         LV length: 8.26 cm  Volume [id number] ml  LVOT diameter: 1.78 cm  Normal sized left ventricle.  Mild left ventricular hypertrophy.  Visually estimated ejection fraction 40% +/- 5%.  Abnormal left ventricular systolic function.  Unable to obtain bullseye average for strain due to irregular heart  rhythm.  Increased left atrial pressure (Grade II diastolic dysfunction).  No left ventricular masses or thrombi.  Right Ventricle  Diastolic dimension: 4.93 cm   RV systolic pressure: 73.3 mmHg  Dilated right ventricle.  Abnormal TAPSE; abnormal right ventricular function.  Left Atrium  LA dimension: 4.3 cm               LA volume:66.15 ml  LA/Aorta: 1.48  Mildly dilated left atrium.  Left atrial volume index 35.  Intact atrial septum.  No atrial mass or thrombus.  Right Atrium  Normal right atrium size.  Intact atrial septum.  No atrial mass or thrombus.  Mitral Valve  Area PHT: 6.07 cm^2             Mean velocity: 0.77 m/s  P1/2t: 36.24 msec               Mean gradient: 3 mmHg  MR velocity: 4.07 m/s           Peak gradient: 7.36 mmHg  Area (continuity): 1.71 cm^2    MR VTI: 106.07 cm  Thickened mitral valve  leaflets.  Mild-moderate mitral regurgitation is present.  PISA ERO: 0.23cm2.  No mitral stenosis.  No masses or vegetations seen.  Aortic Valve  LVOT VTI: 12.74 cm  Mildly thickend free edges of the aortic valve leaflets.  No aortic regurgitation.  No aortic stenosis.  No masses or vegetations seen.  Tricuspid Valve  TR velocity: 3.82 m/s      TR gradient: 58.56559 mmHg   Estimated RAP: 15 mmHg     RVSP: 73.3 mmHg  Structurally normal tricuspid valve.  Moderate tricuspid regurgitation.  Severe pulmonary hypertension.  Estimated RVSP 70-75mmHg.  No tricuspid stenosis.  No masses or vegetations seen.  Pulmonic Valve  Acceleration time: 96.89 msec            PASP: 73.3 mmHg  Structurally normal pulmonic valve.  Mild (1+) pulmonic valve regurgitation.  Abnormal pulmonary acceleration time.  No pulmonic stenosis.  No masses or vegetations seen. Great Vessels Aorta  Aortic Root: 2.91 cm  LVOT Diameter: 1.78 cm Visualized aorta is normal. Normal aortic root. No evidence of dissection. IVC 24mm with <50% respiratory variation. Elevated central venous pressure (>15mmHg).  Pericardium / Pleura No pericardial effusion.  ----------------------------------------------------------------  Electronically signed by FLORECITA WHITE MD(Interpreting  Physician) on 02/18/2024 08:26  ----------------------------------------------------------------                 Plan for Follow-up of Pending Labs/Results:     Discharge Details        Discharge Medications        New Medications        Instructions Start Date   vancomycin 500 mg in sodium chloride 0.9 % 100 mL IVPB   500 mg, Intravenous, 3 Times Weekly   Start Date: February 28, 2024            Continue These Medications        Instructions Start Date   amiodarone 200 MG tablet  Commonly known as: PACERONE   200 mg, Oral, Daily      apixaban 5 MG tablet tablet  Commonly known as: ELIQUIS   5 mg, Oral, 2 Times Daily      aspirin 81 MG EC tablet   81 mg, Oral, Daily      calcitriol 0.5 MCG  "capsule  Commonly known as: ROCALTROL   0.5 mcg, Oral, Daily      cholecalciferol 25 MCG (1000 UT) tablet  Commonly known as: VITAMIN D3   1,000 Units, Oral, Daily      clonazePAM 0.5 MG tablet  Commonly known as: KlonoPIN   0.5 mg, Oral, As Needed      diphenhydrAMINE 25 mg capsule  Commonly known as: BENADRYL   25 mg, Oral, Every 6 Hours PRN, Between dialysis treatments      dutasteride 0.5 MG capsule  Commonly known as: Avodart   0.5 mg, Oral, Daily      hydrALAZINE 10 MG tablet  Commonly known as: APRESOLINE   10 mg, Oral, 3 Times Daily      insulin glargine 100 UNIT/ML injection  Commonly known as: LANTUS, SEMGLEE   35 Units, Subcutaneous, Daily, Take if BS is greater than 150      Insulin Pen Needle 32G X 6 MM misc   1 Needle, Does not apply, 3 Times Daily Before Meals      metoprolol succinate XL 25 MG 24 hr tablet  Commonly known as: TOPROL-XL   25 mg, Oral, 2 Times Daily      multivitamin with minerals tablet tablet   1 tablet, Oral, Daily      NovoLOG FlexPen 100 UNIT/ML solution pen-injector sc pen  Generic drug: insulin aspart   12 Units, Subcutaneous, 3 Times Daily With Meals      pantoprazole 40 MG EC tablet  Commonly known as: PROTONIX   40 mg, Oral, Daily      Carrol-Do Rx 1 MG tablet   1 tablet, Oral, Daily      torsemide 100 MG tablet  Commonly known as: DEMADEX   100 mg, Oral, Daily      triamcinolone 0.1 % cream  Commonly known as: KENALOG   APPLY TWICE A DAY TO ITCHY SKIN FOR UP TO 2 WEEKS PER MONTH AS NEEDED      True Metrix Blood Glucose Test test strip  Generic drug: glucose blood   Test TID     /  Dx: E11.65      Velphoro 500 MG chewable tablet  Generic drug: Sucroferric Oxyhydroxide   1 tablet, Oral, 3 Times Daily               Allergies   Allergen Reactions   • Doxycycline Other (See Comments)     Joint pain, tongue swelling    \"Body locks up\"   • Levofloxacin Shortness Of Breath   • Gabapentin Confusion   • Augmentin [Amoxicillin-Pot Clavulanate] Palpitations   • Biaxin [Clarithromycin] " "Palpitations   • Coreg [Carvedilol] Itching   • Crestor [Rosuvastatin Calcium] Itching     Itching rash   • Iodine Rash     \"burns skin\"   • Lipitor [Atorvastatin] Itching     And Crestor- generalized weakness and chest tightness   • Lisinopril Cough     Cough /weakness, nauseas and dirrhea   • Livalo [Pitavastatin] Unknown - Low Severity     Chest tightness   • Nortriptyline Hcl Other (See Comments)     Arthralgias   • Pravastatin Unknown - Low Severity     Chest , neck and arm discomfort   • Questran [Cholestyramine] Unknown - Low Severity         Discharge Disposition:      Diet:  Hospital:  Diet Order   Procedures   • Diet: Cardiac Diets, Diabetic Diets, Renal Diets; Healthy Heart (2-3 Na+); Consistent Carbohydrate; Low Sodium (2-3g), Low Potassium, Low Phosphorus; Texture: Regular Texture (IDDSI 7); Fluid Consistency: Thin (IDDSI 0)            Activity:      Restrictions or Other Recommendations:         CODE STATUS:    Code Status and Medical Interventions:   Ordered at: 02/24/24 1010     Code Status (Patient has no pulse and is not breathing):    CPR (Attempt to Resuscitate)     Medical Interventions (Patient has pulse or is breathing):    Full Support       No future appointments.              Zayra Guajardo MD  02/27/24      Time Spent on Discharge:  I spent  35  minutes on this discharge activity which included: face-to-face encounter with the patient, reviewing the data in the system, coordination of the care with the nursing staff as well as consultants, documentation, and entering orders.         "

## 2024-02-27 NOTE — PROGRESS NOTES
Pharmacy Consult-Vancomycin Dosing  Erlin Savage is a  73 y.o. male receiving vancomycin therapy.     Indication: Recurrent Peritonitis   Consulting Provider: Dr. Guajardo  ID Consult: No    Goal Trough:    Current Antimicrobial Therapy  Anti-Infectives (From admission, onward)      Ordered     Dose/Rate Route Frequency Start Stop    02/27/24 0735  vancomycin (VANCOCIN) 500 mg IVPB in 100 mL NS (MBP)        Ordering Provider: Kyrie Walsh PharmD    500 mg  200 mL/hr over 30 Minutes Intravenous Once per day on Monday Wednesday Friday 02/28/24 1500 03/27/24 1459    02/26/24 1016  vancomycin in dextrose 5% 150 mL (VANCOCIN) IVPB 750 mg        Ordering Provider: Kyrie Walsh PharmD    750 mg  over 45 Minutes Intravenous Once 02/26/24 1500 02/26/24 1709    02/25/24 0839  Pharmacy to dose vancomycin        Ordering Provider: Zayra Guajardo MD     Does not apply Continuous PRN 02/25/24 0839 03/26/24 0938            Allergies  Allergies as of 02/24/2024 - Reviewed 02/24/2024   Allergen Reaction Noted    Doxycycline Other (See Comments) 06/08/2023    Levofloxacin Shortness Of Breath 11/02/2023    Gabapentin Confusion 11/16/2023    Augmentin [amoxicillin-pot clavulanate] Palpitations 08/16/2016    Biaxin [clarithromycin] Palpitations 08/16/2016    Coreg [carvedilol] Itching 08/16/2016    Crestor [rosuvastatin calcium] Itching 01/09/2017    Iodine Rash 10/02/2023    Lipitor [atorvastatin] Itching 08/16/2016    Lisinopril Cough 08/16/2016    Livalo [pitavastatin] Unknown - Low Severity 08/16/2016    Nortriptyline hcl Other (See Comments) 12/05/2023    Pravastatin Unknown - Low Severity 08/16/2016    Questran [cholestyramine] Unknown - Low Severity 08/16/2016       Labs    Results from last 7 days   Lab Units 02/27/24  0517 02/26/24 0417 02/25/24  0449   BUN mg/dL 30* 32* 41*   CREATININE mg/dL 4.81* 5.97* 7.28*       Results from last 7 days   Lab Units 02/27/24  0517 02/26/24  0417 02/25/24  0449   WBC 10*3/mm3 8.50  "8.90 9.77       Evaluation of Dosing     Last Dose Received in the ED/Outside Facility: Yes, vancomycin 500 mg IV after HD on 2/23  Is Patient on Dialysis or Renal Replacement: Yes- HD    Ht - 180.3 cm (71\")  Wt - 78.9 kg (174 lb)    Estimated Creatinine Clearance: 15.7 mL/min (A) (by C-G formula based on SCr of 4.81 mg/dL (H)).    Intake & Output (last 3 days)         02/22 0701 02/23 0700 02/23 0701 02/24 0700 02/24 0701 02/25 0700 02/25 0701 02/26 0700    Urine (mL/kg/hr)   50     Total Output   50     Net   -50                     Microbiology and Radiology  Microbiology Results (last 10 days)       Procedure Component Value - Date/Time    COVID PRE-OP / PRE-PROCEDURE SCREENING ORDER (NO ISOLATION) - Swab, Nasopharynx [[id number]]  (Normal) Collected: 02/24/24 1728    Lab Status: Final result Specimen: Swab from Nasopharynx Updated: 02/24/24 1808    Narrative:      The following orders were created for panel order COVID PRE-OP / PRE-PROCEDURE SCREENING ORDER (NO ISOLATION) - Swab, Nasopharynx.  Procedure                               Abnormality         Status                     ---------                               -----------         ------                     COVID-19 and FLU A/B PCR...[[id number]]  Normal              Final result                 Please view results for these tests on the individual orders.    COVID-19 and FLU A/B PCR, 1 HR TAT - Swab, Nasopharynx [[id number]]  (Normal) Collected: 02/24/24 1728    Lab Status: Final result Specimen: Swab from Nasopharynx Updated: 02/24/24 1808     COVID19 Not Detected     Influenza A PCR Not Detected     Influenza B PCR Not Detected    Narrative:      Fact sheet for providers: https://www.fda.gov/media/255886/download    Fact sheet for patients: https://www.fda.gov/media/864311/download    Test performed by PCR.            Vancomycin Levels:    Results from last 7 days   Lab Units 02/26/24  0417 02/25/24  0449   VANCOMYCIN RM mcg/mL 11.50 14.70         "             Assessment/Plan:    Pharmacy to dose vancomycin for recurrent peritonitis, initially diagnosed 12/23/23 with positive cultures growing E.faecalis and treated with vancomycin. Returned to OSH 2/10 with E.faecalis in PD fluid.  Patient receives vancomycin 500 mg IV after each HD session. Vancomycin treatment began 2/24 and is planned to complete 3/6.  Patient received vancomycin 750 mg IV x 1 post dialysis session on 2/26.  Will order patient's outpatient maintenance regimen of vancomycin 500 mg IV MWF after dialysis sessions and will adjust regimen if additional sessions are scheduled inpatient.   Will order vancomycin random levels as clinically appropriate to assess current regimen.   Will continue to monitor renal function, culture and sensitivities, and clinical status and adjust regimen as needed. Pharmacy will continue to follow     Thanks  Shanna BrennanD, BCPS  2/27/2024  07:38 EST

## 2024-02-28 ENCOUNTER — TRANSITIONAL CARE MANAGEMENT TELEPHONE ENCOUNTER (OUTPATIENT)
Dept: CALL CENTER | Facility: HOSPITAL | Age: 74
End: 2024-02-28
Payer: MEDICARE

## 2024-02-28 NOTE — OUTREACH NOTE
Call Center TCM Note      Flowsheet Row Responses   [facility] [place] patient discharged from? [place]   Does the patient have one of the following disease processes/diagnoses(primary or secondary)? Other   TCM attempt successful? Yes   Call start time 1408   Call end time 1410   Discharge diagnosis Volume overload  ESRD on HD MWF   Meds reviewed with patient/caregiver? Yes   Is the patient having any side effects they believe may be caused by any medication additions or changes? No   Does the patient have all medications ordered at discharge? Yes   Is the patient taking all medications as directed (includes completed medication regime)? Yes   Comments PCP appt on 3/4 @ 11:15   Does the patient have an appointment with their PCP within 7-14 days of discharge? Yes   What is the Home health agency?  [facility]   Has home health visited the patient within 72 hours of discharge? Call prior to 72 hours   Home health comments Will be there tomorrow.   Psychosocial issues? No   Did the patient receive a copy of their discharge instructions? Yes   Nursing interventions Reviewed instructions with patient   What is the patient's perception of their health status since discharge? Improving   Is the patient/caregiver able to teach back signs and symptoms related to disease process for when to call PCP? Yes   Is the patient/caregiver able to teach back signs and symptoms related to disease process for when to call 911? Yes   Is the patient/caregiver able to teach back the hierarchy of who to call/visit for symptoms/problems? PCP, Specialist, Home health nurse, Urgent Care, ED, 911 Yes   Additional teach back comments States he is doing better now that he has gotten the fluid off.  Breathing has improved.  Pt is currently at dialysis and getting IV antibiotics there.   TCM call completed? Yes   Wrap up additional comments Denies questions or needs at this time.   Call end time 1410            Leslie Spangler  LPN    2/28/2024, 14:12 EST

## 2024-02-29 ENCOUNTER — TELEPHONE (OUTPATIENT)
Dept: FAMILY MEDICINE CLINIC | Facility: CLINIC | Age: 74
End: 2024-02-29
Payer: MEDICARE

## 2024-02-29 NOTE — TELEPHONE ENCOUNTER
Jocy called wanting to know if Dr. Diallo can sign this pts HH orders. Please call her back at [phone].

## 2024-03-05 ENCOUNTER — OFFICE VISIT (OUTPATIENT)
Dept: FAMILY MEDICINE CLINIC | Facility: CLINIC | Age: 74
End: 2024-03-05
Payer: MEDICARE

## 2024-03-05 VITALS
HEART RATE: 64 BPM | DIASTOLIC BLOOD PRESSURE: 80 MMHG | BODY MASS INDEX: 24.36 KG/M2 | SYSTOLIC BLOOD PRESSURE: 112 MMHG | HEIGHT: 71 IN | WEIGHT: 174 LBS

## 2024-03-05 DIAGNOSIS — I25.10 CORONARY ARTERY DISEASE INVOLVING NATIVE CORONARY ARTERY OF NATIVE HEART WITHOUT ANGINA PECTORIS: ICD-10-CM

## 2024-03-05 DIAGNOSIS — Z99.2 STAGE 5 CHRONIC KIDNEY DISEASE ON CHRONIC DIALYSIS: Primary | ICD-10-CM

## 2024-03-05 DIAGNOSIS — I27.21 SECONDARY PULMONARY ARTERIAL HYPERTENSION: ICD-10-CM

## 2024-03-05 DIAGNOSIS — N18.6 STAGE 5 CHRONIC KIDNEY DISEASE ON CHRONIC DIALYSIS: Primary | ICD-10-CM

## 2024-03-05 DIAGNOSIS — I34.0 SEVERE MITRAL VALVE REGURGITATION: ICD-10-CM

## 2024-03-05 DIAGNOSIS — L97.511 ULCER OF TOE OF RIGHT FOOT, LIMITED TO BREAKDOWN OF SKIN: ICD-10-CM

## 2024-03-05 DIAGNOSIS — I50.32 CHRONIC DIASTOLIC (CONGESTIVE) HEART FAILURE: ICD-10-CM

## 2024-03-05 DIAGNOSIS — I42.0 DILATED CARDIOMYOPATHY: ICD-10-CM

## 2024-03-05 DIAGNOSIS — L89.151 PRESSURE INJURY OF SACRAL REGION, STAGE 1: ICD-10-CM

## 2024-03-05 DIAGNOSIS — I87.2 VENOUS INSUFFICIENCY OF BOTH LOWER EXTREMITIES: ICD-10-CM

## 2024-03-05 DIAGNOSIS — I07.1 SEVERE TRICUSPID REGURGITATION: ICD-10-CM

## 2024-03-05 DIAGNOSIS — I50.22 CHRONIC SYSTOLIC (CONGESTIVE) HEART FAILURE: ICD-10-CM

## 2024-03-05 RX ORDER — METHOXY POLYETHYLENE GLYCOL-EPOETIN BETA 200 UG/.3ML
200 INJECTION, SOLUTION INTRAVENOUS
COMMUNITY

## 2024-03-05 RX ORDER — OXYCODONE HYDROCHLORIDE 5 MG/1
1 TABLET ORAL EVERY 8 HOURS PRN
COMMUNITY
Start: 2024-02-01

## 2024-03-05 RX ORDER — TRAZODONE HYDROCHLORIDE 50 MG/1
1 TABLET ORAL
COMMUNITY
Start: 2024-02-01

## 2024-03-05 RX ORDER — NALOXONE HYDROCHLORIDE 4 MG/.1ML
1 SPRAY NASAL AS NEEDED
COMMUNITY

## 2024-03-05 RX ORDER — PROMETHAZINE HYDROCHLORIDE 25 MG/1
1 TABLET ORAL EVERY 6 HOURS PRN
COMMUNITY
Start: 2023-12-28

## 2024-03-05 RX ORDER — GENTAMICIN SULFATE 1 MG/G
1 CREAM TOPICAL DAILY
COMMUNITY

## 2024-03-05 RX ORDER — BETAMETHASONE DIPROPIONATE 0.05 %
1 OINTMENT (GRAM) TOPICAL DAILY
COMMUNITY
Start: 2024-03-05

## 2024-03-05 NOTE — PROGRESS NOTES
Transitional Care Follow Up Visit  Subjective     Erlin Savage is a 73 y.o. male who presents for a transitional care management visit.    Within 48 business hours after discharge our office contacted him via telephone to coordinate his care and needs.      I reviewed and discussed the details of that call along with the discharge summary, hospital problems, inpatient lab results, inpatient diagnostic studies, and consultation reports with Erlin.     Current outpatient and discharge medications have been reconciled for the patient.  Reviewed by: Joe Diallo MD          2/27/2024     4:28 PM   Date of TCM Phone Call   [facility]   Date of Admission 2/24/2024   Date of Discharge 2/27/2024   Discharge Disposition [place] [facility]     Risk for Readmission (LACE) Score: 12 (2/27/2024  6:00 AM)      History of Present Illness   Course During Hospital Stay: Patient presented to [facility] on [facility] and had a 3-day hospital stay for being volume overloaded.  Since patient is transition from peritoneal dialysis to hemodialysis he has had trouble maintaining his dry weight of 165 pounds.  In addition to this patient has pulmonary hypertension, mitral and tricuspid regurgitation, systolic congestive heart failure and grade 2 diastolic congestive heart failure.  Nephrologist is Dr. Gaming.  Cardiologist is Dr. Calderon.  Vascular surgeon is Dr. Schreiber.  Electrophysiologist Dr. Man.    Patient saw Dr. Calderon and is scheduled for heart catheterization on March 19.  His amiodarone was discontinued today    Patient has arterial Doppler scheduled for March 7 ordered by Dr. Schreiber.  Patient has some concerns about his feet.  He notes pain in the toes of the right foot.    Next hemodialysis session is tomorrow.  Weight recorded at patient's cardiology office is 191 pounds.  Weight recorded in office today is 191 pounds.  Last recorded weight in this office was 167 pounds on January 29.    Patient has  follow-up with electrophysiology on March 22.    Patient is now wearing his oxygen 24 hours/day.     The following portions of the patient's history were reviewed and updated as appropriate: allergies, current medications, past family history, past medical history, past social history, past surgical history, and problem list.    Review of Systems    Objective   Physical Exam  Vitals reviewed.   Constitutional:       Appearance: Normal appearance.   Cardiovascular:      Rate and Rhythm: Normal rate.      Pulses: Normal pulses.           Dorsalis pedis pulses are 1+ on the right side and 1+ on the left side.      Heart sounds: Murmur heard.   Pulmonary:      Effort: Pulmonary effort is normal.      Breath sounds: Normal breath sounds.   Abdominal:      General: There is distension.      Palpations: Abdomen is soft.      Comments: Subcutaneous edema of the trunk   Musculoskeletal:      Right lower leg: Edema present.      Left lower leg: Edema present.   Feet:      Comments: Patient is noted to have small ulcerations on the tip of the third and fourth right toe.  The third right toe has a 3 mm oval ulceration.  The fourth right toe has a 2 mm oval ulceration is healing.  At the base of the nail on the second right toe there is an irregular shaped ulceration covered in Bactroban.  The distal foot and toes have purplish red discoloration consistent with venous congestion.  He has good capillary refills at the tips of the toes and nail bases of the toes in both feet.  The toes of the right foot are tender to touch.  There is significant edema of the feet that extends up into the legs.  Patient has weak but palpable dorsalis pedis pulses in both feet but I am unable to detect posterior tibialis pulses.  Skin:     Comments: Patient has an irregular oval-shaped area of erythema on the sacrum.  Skin is coated with Desitin.  There is no skin breakdown.  Area is mildly tender to touch   Neurological:      Mental Status: He is  alert.         Assessment & Plan   Diagnoses and all orders for this visit:    1. Stage 5 chronic kidney disease on chronic dialysis (Primary)    2. Coronary artery disease involving native coronary artery of native heart without angina pectoris    3. Chronic systolic (congestive) heart failure    4. Chronic diastolic (congestive) heart failure    5. Severe mitral valve regurgitation    6. Severe tricuspid regurgitation    7. Secondary pulmonary arterial hypertension    8. Dilated cardiomyopathy    9. Venous insufficiency of both lower extremities    10. Ulcer of toe of right foot, limited to breakdown of skin    11. Pressure injury of sacral region, stage 1      Plan  1.  Patient will keep specialist appointments  2.  I spoke with Dr. Novoa and he is working on arranging an extra dialysis treatment for the patient possibly as frequently as weekly to address his hyperkalemia  3.  Regarding the wounds of the foot I have instructed his wife to monitor them daily.  Keep keep clean with soap and water.  Bactroban ointment is acceptable to apply to any open wounds but should not coat the toes in This.  4.  Regarding the sacral pressure injury patient should use a pillow to provide additional cushioning.  At dialysis I recommended he change position every 30 minutes alternating between sides to offload the sacrum.

## 2024-03-07 ENCOUNTER — TELEPHONE (OUTPATIENT)
Dept: FAMILY MEDICINE CLINIC | Facility: CLINIC | Age: 74
End: 2024-03-07
Payer: MEDICARE

## 2024-03-07 NOTE — TELEPHONE ENCOUNTER
Caller: [facility] - [facility]    Relationship:     Best call back number: [phone]    What orders are you requesting (i.e. lab or imaging):     PHYSICAL THERAPY     Additional notes:     NEEDS VERBAL ORDERS TO CANCEL PHYSICAL THERAPY AS PATIENT DECLINED EVALUATIOIN

## 2024-03-08 ENCOUNTER — READMISSION MANAGEMENT (OUTPATIENT)
Dept: CALL CENTER | Facility: HOSPITAL | Age: 74
End: 2024-03-08
Payer: MEDICARE

## 2024-03-08 NOTE — OUTREACH NOTE
Medical Week 2 Survey      Flowsheet Row Responses   [facility] patient discharged from? [place]   Does the patient have one of the following disease processes/diagnoses(primary or secondary)? Other   Week 2 attempt successful? Yes   Call start time 1346   Discharge diagnosis Volume overload  ESRD on HD MWF   Call end time 1348   Comments regarding appointments Pt is currently at dialysis   Does the patient have a primary care provider?  Yes   Does the patient have an appointment with their PCP within 7 days of discharge? Yes   Comments regarding PCP Dr. Diallo   Has the patient kept scheduled appointments due by today? Yes   What is the patient's perception of their health status since discharge? Improving   Week 2 Call Completed? Yes   Is the patient interested in additional calls from an ambulatory ? No   Would this patient benefit from a Referral to Amb Social Work? No   Wrap up additional comments Brief call, patient is currently at dialysis states he is doing well and denies needs.   Call end time 1348            DOUG JUNIOR - Registered Nurse

## 2024-03-29 ENCOUNTER — TELEPHONE (OUTPATIENT)
Dept: FAMILY MEDICINE CLINIC | Facility: CLINIC | Age: 74
End: 2024-03-29
Payer: MEDICARE

## 2024-03-29 DIAGNOSIS — M79.2 NEURALGIA: Primary | ICD-10-CM

## 2024-03-29 NOTE — TELEPHONE ENCOUNTER
Pt will call us back once he looks at his bottle to see if he has enough to last until Dr. Diallo returns on Monday.

## 2024-03-29 NOTE — TELEPHONE ENCOUNTER
Caller: Princess Cardona    Relationship: Emergency Contact    Best call back number: [phone]     Requested Prescriptions:   Requested Prescriptions     Pending Prescriptions Disp Refills    oxyCODONE (ROXICODONE) 5 MG immediate release tablet       Sig: Take 1 tablet by mouth Every 8 (Eight) Hours As Needed.        Pharmacy where request should be sent: [facility] [address] [place] [address] [phone] [facility] [phone]      Last office visit with prescribing clinician: 3/5/2024   Last telemedicine visit with prescribing clinician: Visit date not found   Next office visit with prescribing clinician: Visit date not found     Additional details provided by patient: PLEASE CALL THE PATIENT'S CELL PHONE WHEN THE MEDICATION IS CALLED IN, [phone].    Does the patient have less than a 3 day supply:  [x] Yes  [] No    Would you like a call back once the refill request has been completed: [x] Yes [] No    If the office needs to give you a call back, can they leave a voicemail: [x] Yes [] No    Cleo Banks Rep   03/29/24 09:20 EDT

## 2024-04-01 RX ORDER — OXYCODONE HYDROCHLORIDE 5 MG/1
5 TABLET ORAL EVERY 8 HOURS PRN
Qty: 30 TABLET | Refills: 0 | Status: SHIPPED | OUTPATIENT
Start: 2024-04-01

## 2024-04-01 NOTE — TELEPHONE ENCOUNTER
AIYANA, THE PATIENT'S PARTNER CALLED ABOUT THIS MEDICATION.  THE PATIENT WILL BE OUT OF HIS MEDICATION AFTER TODAY.  PLEASE CALL THE PATIENT  TO LET HIM KNOW WHEN THIS HAS BEEN CALLED IN.

## 2024-04-08 RX ORDER — PANTOPRAZOLE SODIUM 40 MG/1
40 TABLET, DELAYED RELEASE ORAL DAILY
Qty: 30 TABLET | Refills: 5 | Status: SHIPPED | OUTPATIENT
Start: 2024-04-08

## 2024-04-08 RX ORDER — HYDRALAZINE HYDROCHLORIDE 10 MG/1
10 TABLET, FILM COATED ORAL 3 TIMES DAILY
Qty: 90 TABLET | Refills: 5 | Status: SHIPPED | OUTPATIENT
Start: 2024-04-08

## 2024-04-08 NOTE — TELEPHONE ENCOUNTER
Caller: Princess Cardona    Relationship: Emergency Contact    Best call back number: [phone]     Requested Prescriptions:   Requested Prescriptions     Pending Prescriptions Disp Refills    pantoprazole (PROTONIX) 40 MG EC tablet       Sig: Take 1 tablet by mouth Daily.    hydrALAZINE (APRESOLINE) 10 MG tablet       Sig: Take 1 tablet by mouth 3 (Three) Times a Day.    apixaban (ELIQUIS) 5 MG tablet tablet 60 tablet      Sig: Take 1 tablet by mouth 2 (Two) Times a Day.        Pharmacy where request should be sent: [address] [phone] [facility] [phone]      Last office visit with prescribing clinician: 3/5/2024   Last telemedicine visit with prescribing clinician: Visit date not found   Next office visit with prescribing clinician: Visit date not found     Additional details provided by patient: 1 DAY OF MEDICATION ON HAND     Does the patient have less than a 3 day supply:  [x] Yes  [x] No    Would you like a call back once the refill request has been completed: [x] Yes [] No    If the office needs to give you a call back, can they leave a voicemail: [] Yes [] No    Cleo Cruz   04/08/24 08:57 EDT

## 2024-04-27 ENCOUNTER — READMISSION MANAGEMENT (OUTPATIENT)
Dept: CALL CENTER | Facility: HOSPITAL | Age: 74
End: 2024-04-27
Payer: MEDICARE

## 2024-04-27 NOTE — OUTREACH NOTE
Prep Survey      Flowsheet Row Responses   [facility]   Is LACE score < 7 ? Non-BH Discharge   Eligibility [facility]   Date of Admission 04/17/24   Date of Discharge 04/26/24   Discharge Disposition Home or Self Care   Discharge diagnosis SOB (shortness of breath) (Primary Dx),  Toe gangrene (HCC),  Stage 5 chronic kidney disease on chronic dialysis (HCC),   [Toe amputation 4/19/24.]   Does the patient have one of the following disease processes/diagnoses(primary or secondary)? General Surgery   Does the patient have Home health ordered? No   Is there a DME ordered? No   Prep survey completed? Yes            Shama HAQUE - Registered Nurse

## 2024-04-29 ENCOUNTER — TRANSITIONAL CARE MANAGEMENT TELEPHONE ENCOUNTER (OUTPATIENT)
Dept: CALL CENTER | Facility: HOSPITAL | Age: 74
End: 2024-04-29
Payer: MEDICARE

## 2024-04-29 NOTE — OUTREACH NOTE
Call Center TCM Note      Flowsheet Row Responses   [facility] patient discharged from? Non-BH   Does the patient have one of the following disease processes/diagnoses(primary or secondary)? General Surgery   TCM attempt successful? Yes  [VR-Princess Cardona]   Call start time 1153   Call end time 1157   Discharge diagnosis SOB (shortness of breath) (Primary Dx),  Toe gangrene (HCC),  Stage 5 chronic kidney disease on chronic dialysis (HCC),    Meds reviewed with patient/caregiver? Yes   Is the patient having any side effects they believe may be caused by any medication additions or changes? No   Does the patient have all medications ordered at discharge? Yes   Is the patient taking all medications as directed (includes completed medication regime)? Yes   Does the patient have an appointment with their PCP within 7-14 days of discharge? No   Nursing Interventions Patient declined scheduling/rescheduling appointment at this time, Routed TCM call to PCP office   Has home health visited the patient within 72 hours of discharge? N/A   Psychosocial issues? No   Did the patient receive a copy of their discharge instructions? Yes   What is the patient's perception of their health status since discharge? Improving   Is the patient/caregiver able to teach back signs and symptoms related to disease process for when to call PCP? Yes   Is the patient/caregiver able to teach back signs and symptoms related to disease process for when to call 911? Yes   Is the patient/caregiver able to teach back the hierarchy of who to call/visit for symptoms/problems? PCP, Specialist, Home health nurse, Urgent Care, ED, 911 Yes   TCM call completed? Yes   Wrap up additional comments Per patient-he has several blockages in his leg and he is having stents placed on Wednesday and then plans to go to inpatient rehab   Call end time 1157   Would this patient benefit from a Referral to [facility] Social Work? No   Is the patient interested in additional  calls from an ambulatory ? No            Starr Celeste RN    4/29/2024, 11:58 EDT

## 2024-05-03 ENCOUNTER — TELEPHONE (OUTPATIENT)
Dept: FAMILY MEDICINE CLINIC | Facility: CLINIC | Age: 74
End: 2024-05-03

## 2024-05-03 NOTE — TELEPHONE ENCOUNTER
Caller: MIKAYLA DOMINGUEZ    Relationship: Child    Best call back number:[phone]    What orders are you requesting (i.e. lab or imaging): ORDER FOR INPATIENT REHABILITATION [facility]ENTER    In what timeframe would the patient need to come in: AS SOON AS POSSIBLE     Where will you receive your lab/imaging services: [facility]     Additional notes: THE CALLER STATES THAT THE PATIENT WAS RELEASED FORM THE HOSPITAL AFTER AN AMPUTATION OF THE TOES ON HIS RIGHT FOOT THE CALLER IS TRYING TO GET THE PATIENT INTO A REHABILITATION FACILITY BUT THE FACILITY SAID THEY NEED NEW ORDERS SINCE ITS BEEN 7 DAYS SINCE THE PATIENT LEFT THE HOSPITAL PLEASE CALL THE CALLER TO LET HIM KNOW IF THE ORDER CAN BE SENT TO [facility] IN [place]

## 2024-05-06 ENCOUNTER — OFFICE VISIT (OUTPATIENT)
Dept: FAMILY MEDICINE CLINIC | Facility: CLINIC | Age: 74
End: 2024-05-06
Payer: MEDICARE

## 2024-05-06 ENCOUNTER — TELEPHONE (OUTPATIENT)
Dept: FAMILY MEDICINE CLINIC | Facility: CLINIC | Age: 74
End: 2024-05-06

## 2024-05-06 VITALS
RESPIRATION RATE: 14 BRPM | TEMPERATURE: 97.7 F | DIASTOLIC BLOOD PRESSURE: 70 MMHG | WEIGHT: 174 LBS | SYSTOLIC BLOOD PRESSURE: 120 MMHG | BODY MASS INDEX: 24.36 KG/M2 | HEIGHT: 71 IN

## 2024-05-06 DIAGNOSIS — N18.6 ESRD (END STAGE RENAL DISEASE) ON DIALYSIS: ICD-10-CM

## 2024-05-06 DIAGNOSIS — Z09 FOLLOW-UP EXAMINATION, FOLLOWING OTHER SURGERY: ICD-10-CM

## 2024-05-06 DIAGNOSIS — I87.2 VENOUS INSUFFICIENCY OF BOTH LOWER EXTREMITIES: ICD-10-CM

## 2024-05-06 DIAGNOSIS — S98.131A AMPUTATION OF TOE OF RIGHT FOOT: Primary | ICD-10-CM

## 2024-05-06 DIAGNOSIS — Z99.2 ESRD (END STAGE RENAL DISEASE) ON DIALYSIS: ICD-10-CM

## 2024-05-06 PROCEDURE — 3051F HG A1C>EQUAL 7.0%<8.0%: CPT | Performed by: NURSE PRACTITIONER

## 2024-05-06 PROCEDURE — 1126F AMNT PAIN NOTED NONE PRSNT: CPT | Performed by: NURSE PRACTITIONER

## 2024-05-06 PROCEDURE — 1159F MED LIST DOCD IN RCRD: CPT | Performed by: NURSE PRACTITIONER

## 2024-05-06 PROCEDURE — 3078F DIAST BP <80 MM HG: CPT | Performed by: NURSE PRACTITIONER

## 2024-05-06 PROCEDURE — 99214 OFFICE O/P EST MOD 30 MIN: CPT | Performed by: NURSE PRACTITIONER

## 2024-05-06 PROCEDURE — 3074F SYST BP LT 130 MM HG: CPT | Performed by: NURSE PRACTITIONER

## 2024-05-06 PROCEDURE — 1160F RVW MEDS BY RX/DR IN RCRD: CPT | Performed by: NURSE PRACTITIONER

## 2024-05-06 NOTE — TELEPHONE ENCOUNTER
Caller: Erlin Savage    Relationship to patient: Self    Best call back number:[phone]    Type of visit: HOSPITAL FOLLOW UP    Requested date: 5-6-24 AFTER THE 3:15 PM APPOINTMENT     If rescheduling, when is the original appointment: 5-6-24  3:15 PM    Additional notes:PATIENT STATED THAT HE WILL BE AT ANOTHER [facility] APPOINTMENT BUT WANTED TO PUSH THIS APPOINTMENT TO A LATER TIME.

## 2024-05-06 NOTE — PROGRESS NOTES
Date: 2024   Patient Name: Erlin Savage  : 1950   MRN: [id number]     Chief Complaint:    Chief Complaint   Patient presents with    Follow-up     Hospital follow up. Needs order for skilled nursing.       History of Present Illness: Erlin Savage is a 73 y.o. male who is here today for Right foot pain, all 5 toes have been surgically removed at [facility] on 2024, he has been in and out of ED since d/c due to postoperative pain and infection.  Needing referral to [facility] for inpatient PT and OT  Dr Santos tomorrow for bandage removal and follow up from surgery  Unable to walk, Needing PT,  He is a hemodialysis patient, Left arm fistula is present but not currently cleared for use.  Currently have a subclavian port for hemodialysis, went today, go to HD 3 days M W F   3-4L NC of oxygen in office today             Review of Systems:   Review of Systems   Musculoskeletal:         Postoperative pain right foot.   Skin:  Positive for wound.       Past Medical History:   Past Medical History:   Diagnosis Date    Anxiety     Anxiety disorder     Back pain     Chronic back pain     work related injury    CKD (chronic kidney disease)     most recent creatinine 2.7 on 16    Coronary artery disease     Diabetes mellitus     insulin dependent diabetes Onset     Dyslipidemia     Hyperlipidemia     Hypertension     Ischemic heart disease     Renal failure     Stroke     TIA / transient right vision loss age 42    TIA (transient ischemic attack)     Vertigo        Past Surgical History:   Past Surgical History:   Procedure Laterality Date    APPENDECTOMY      CARDIAC CATHETERIZATION      CHOLECYSTECTOMY  2012    COLONOSCOPY      CORONARY ARTERY BYPASS GRAFT      ENDOSCOPY         Family History: History reviewed. No pertinent family history.    Social History:   Social History     Socioeconomic History    Marital status: Single   Tobacco Use    Smoking status: Former     Current  packs/day: 0.00     Types: Cigarettes     Quit date: 1986     Years since quittin.8    Smokeless tobacco: Never   Vaping Use    Vaping status: Never Used   Substance and Sexual Activity    Alcohol use: No    Drug use: No    Sexual activity: Defer       Medications:     Current Outpatient Medications:     apixaban (ELIQUIS) 5 MG tablet tablet, Take 1 tablet by mouth 2 (Two) Times a Day., Disp: 60 tablet, Rfl: 5    aspirin 81 MG EC tablet, Take 1 tablet by mouth Daily., Disp: , Rfl:     B Complex-C-Folic Acid (Carrol-Do Rx) 1 MG tablet, Take 1 tablet by mouth Daily., Disp: , Rfl:     calcitriol (ROCALTROL) 0.5 MCG capsule, Take 1 capsule by mouth Daily., Disp: , Rfl:     cholecalciferol (VITAMIN D3) 25 MCG (1000 UT) tablet, Take 1 tablet by mouth Daily., Disp: , Rfl:     clonazePAM (KlonoPIN) 0.5 MG tablet, Take 1 tablet by mouth As Needed., Disp: , Rfl:     diphenhydrAMINE (BENADRYL) 25 mg capsule, Take 1 capsule by mouth Every 6 (Six) Hours As Needed for Itching. Between dialysis treatments, Disp: , Rfl:     dutasteride (Avodart) 0.5 MG capsule, Take 1 capsule by mouth Daily., Disp: 30 capsule, Rfl: 5    gentamicin (GARAMYCIN) 0.1 % cream, Apply 1 Application topically to the appropriate area as directed Daily., Disp: , Rfl:     glucose blood (True Metrix Blood Glucose Test) test strip, Test TID     /  Dx: E11.65, Disp: 100 each, Rfl: 6    hydrALAZINE (APRESOLINE) 10 MG tablet, Take 1 tablet by mouth 3 (Three) Times a Day., Disp: 90 tablet, Rfl: 5    insulin aspart (NovoLOG FlexPen) 100 UNIT/ML solution pen-injector sc pen, Inject 12 Units under the skin into the appropriate area as directed 3 (Three) Times a Day With Meals., Disp: 15 mL, Rfl: 5    insulin glargine (LANTUS) 100 UNIT/ML injection, Inject 35 Units under the skin into the appropriate area as directed Daily. Take if BS is greater than 150, Disp: , Rfl:     Insulin Pen Needle 32G X 6 MM misc, Use 1 Needle 3 (Three) Times a Day Before Meals.,  "Disp: 100 each, Rfl: 11    Methoxy PEG-Epoetin Beta (Mircera) 200 MCG/0.3ML solution prefilled syringe, 200 mcg., Disp: , Rfl:     metoprolol succinate XL (TOPROL-XL) 25 MG 24 hr tablet, Take 1 tablet by mouth 2 (Two) Times a Day., Disp: , Rfl:     multivitamin with minerals tablet tablet, Take 1 tablet by mouth Daily., Disp: , Rfl:     mupirocin (BACTROBAN) 2 % ointment, APPLY OINTMENT TOPICALLY TO CRUSTED PATCH BY LEFT EAR TWICE DAILY UNTIL FULLY HEALED, Disp: , Rfl:     naloxone (NARCAN) 4 MG/0.1ML nasal spray, 1 spray into the nostril(s) as directed by provider As Needed., Disp: , Rfl:     oxyCODONE (ROXICODONE) 5 MG immediate release tablet, Take 1 tablet by mouth Every 8 (Eight) Hours As Needed for Severe Pain., Disp: 30 tablet, Rfl: 0    pantoprazole (PROTONIX) 40 MG EC tablet, Take 1 tablet by mouth Daily., Disp: 30 tablet, Rfl: 5    promethazine (PHENERGAN) 25 MG tablet, Take 1 tablet by mouth Every 6 (Six) Hours As Needed., Disp: , Rfl:     Sucroferric Oxyhydroxide (Velphoro) 500 MG chewable tablet, Chew 1 tablet 3 (Three) Times a Day., Disp: , Rfl:     torsemide (DEMADEX) 100 MG tablet, Take 1 tablet by mouth Daily., Disp: , Rfl:     triamcinolone (KENALOG) 0.1 % cream, APPLY TWICE A DAY TO ITCHY SKIN FOR UP TO 2 WEEKS PER MONTH AS NEEDED, Disp: , Rfl:     Allergies:   Allergies   Allergen Reactions    Cefepime Other (See Comments) and Seizure     myoclonus    Doxycycline Other (See Comments)     Joint pain, tongue swelling    \"Body locks up\"    Levofloxacin Shortness Of Breath    Gabapentin Confusion    Ranolazine Dizziness and Other (See Comments)     Severe tremors/ shakiness    Cephalosporins Unknown - Low Severity    Hydralazine Unknown - Low Severity    Augmentin [Amoxicillin-Pot Clavulanate] Palpitations    Biaxin [Clarithromycin] Palpitations    Coreg [Carvedilol] Itching    Crestor [Rosuvastatin Calcium] Itching     Itching rash    Iodine Rash     \"pineda skin\"    Lipitor [Atorvastatin] Itching    " " And Crestor- generalized weakness and chest tightness    Lisinopril Cough     Cough /weakness, nauseas and dirrhea    Livalo [Pitavastatin] Unknown - Low Severity     Chest tightness    Nortriptyline Hcl Other (See Comments)     Arthralgias    Pravastatin Unknown - Low Severity     Chest , neck and arm discomfort    Questran [Cholestyramine] Unknown - Low Severity         Physical Exam:  Vital Signs:   Vitals:    05/06/24 1527   BP: 120/70   Resp: 14   Temp: 97.7 °F (36.5 °C)   Weight: 78.9 kg (174 lb)   Height: 180.3 cm (71\")     Body mass index is 24.27 kg/m².     Physical Exam  Vitals and nursing note reviewed.   Constitutional:       Appearance: Normal appearance.   HENT:      Head: Normocephalic and atraumatic.   Cardiovascular:      Rate and Rhythm: Normal rate and regular rhythm.   Pulmonary:      Effort: Pulmonary effort is normal.      Breath sounds: Normal breath sounds.   Abdominal:      General: Bowel sounds are normal.   Skin:     General: Skin is warm.          Neurological:      General: No focal deficit present.      Mental Status: He is alert and oriented to person, place, and time.   Psychiatric:         Mood and Affect: Mood normal.           Assessment/Plan:   Diagnoses and all orders for this visit:    1. Amputation of toe of right foot (Primary)  -     Ambulatory Referral to Physical Medicine Rehab    2. Venous insufficiency of both lower extremities  -     Ambulatory Referral to Physical Medicine Rehab    3. ESRD (end stage renal disease) on dialysis  -     Ambulatory Referral to Physical Medicine Rehab    4. Follow-up examination, following other surgery  -     Ambulatory Referral to Physical Medicine Rehab       Referral has been made to [facility]  Reviewed medications with patient, and he is not needing any refills at this time  MAR will be sent to [facility]  Monitor for s/s of infection, fevers, chills,  Keep appt with Dr Santos for management of postop wound  Go to the ER " with severe symptoms, shortness of breath, chest pains, temp 100.5 or greater.    *35 minutes spent with patient, POC, review of medication, education and coordinating care at [facility]    Follow Up:   Return for Next scheduled follow up.    Tasia Whitney. JUSTIN   [facility]

## 2024-05-16 ENCOUNTER — READMISSION MANAGEMENT (OUTPATIENT)
Dept: CALL CENTER | Facility: HOSPITAL | Age: 74
End: 2024-05-16
Payer: MEDICARE

## 2024-05-17 NOTE — OUTREACH NOTE
Prep Survey      Flowsheet Row Responses   [facility] facility patient discharged from? Non-BH   Is LACE score < 7 ? Non-BH Discharge   Eligibility Not Eligible   What are the reasons patient is not eligible? [facility]   Does the patient have one of the following disease processes/diagnoses(primary or secondary)? Other   Prep survey completed? Yes            aMvis Carney Registered Nurse

## 2024-06-11 ENCOUNTER — TELEPHONE (OUTPATIENT)
Dept: FAMILY MEDICINE CLINIC | Facility: CLINIC | Age: 74
End: 2024-06-11
Payer: MEDICARE

## 2024-06-11 NOTE — TELEPHONE ENCOUNTER
HE will have to contact his surgeon. Medication will not be prescribed until he is seen in follow up. If this is phantom pain there may be other alternatives

## 2024-06-11 NOTE — TELEPHONE ENCOUNTER
Caller: SavageErlin    Relationship: Self    Best call back number: [phone]     What medication are you requesting: OXYCODONE HCL 10MG TABLET    What are your current symptoms: PAIN IN FOOT FROM HAVING TOES CUT OFF    Have you had these symptoms before:    [] Yes  [x] No    Have you been treated for these symptoms before:   [] Yes  [x] No    If a prescription is needed, what is your preferred pharmacy and phone number: [facility] [address] [place] [address] [phone] [facility] [phone]      Additional notes: THIS WAS PRESCRIBED IN [facility] BUT THEY INFORMED HIM TO CONTACT PRIMARY CARE FOR FURTHER REFILLS.

## 2024-06-12 NOTE — TELEPHONE ENCOUNTER
Erlin has an upcoming appointment on 06/25/2024. Princess came into the office and stated that he only has 2 days left of his pain medication. She stated that the top of his foot is black and the podiatrist stated that he does have good blood flow and is healing. He does still have the stitches in his foot. The podiatrist stated that she would not fill the pain medication, that his primary doctor would need to. Princess stated that she does not believe it is phantom pains due to she believe it is infected because it is weeping. She stated that it is a pale yellow color. Podiatrist told her to put Betadine on the foot.

## 2024-06-12 NOTE — TELEPHONE ENCOUNTER
Spoke with pt aware of response. He saw surgeon yesterday and they advised him to contact his PCP. He is aware no medication will be prescribed until he is seen in office

## 2024-06-13 ENCOUNTER — OFFICE VISIT (OUTPATIENT)
Dept: FAMILY MEDICINE CLINIC | Facility: CLINIC | Age: 74
End: 2024-06-13
Payer: MEDICARE

## 2024-06-13 ENCOUNTER — TELEPHONE (OUTPATIENT)
Dept: FAMILY MEDICINE CLINIC | Facility: CLINIC | Age: 74
End: 2024-06-13

## 2024-06-13 VITALS
BODY MASS INDEX: 24.27 KG/M2 | HEART RATE: 80 BPM | RESPIRATION RATE: 20 BRPM | TEMPERATURE: 98.1 F | DIASTOLIC BLOOD PRESSURE: 68 MMHG | SYSTOLIC BLOOD PRESSURE: 120 MMHG | HEIGHT: 71 IN

## 2024-06-13 DIAGNOSIS — M79.2 NEUROPATHIC PAIN: ICD-10-CM

## 2024-06-13 DIAGNOSIS — R18.8 OTHER ASCITES: ICD-10-CM

## 2024-06-13 DIAGNOSIS — M79.671 RIGHT FOOT PAIN: ICD-10-CM

## 2024-06-13 DIAGNOSIS — G89.18 POSTOPERATIVE PAIN: Primary | ICD-10-CM

## 2024-06-13 PROCEDURE — 1126F AMNT PAIN NOTED NONE PRSNT: CPT | Performed by: FAMILY MEDICINE

## 2024-06-13 PROCEDURE — G2211 COMPLEX E/M VISIT ADD ON: HCPCS | Performed by: FAMILY MEDICINE

## 2024-06-13 PROCEDURE — 3078F DIAST BP <80 MM HG: CPT | Performed by: FAMILY MEDICINE

## 2024-06-13 PROCEDURE — 3074F SYST BP LT 130 MM HG: CPT | Performed by: FAMILY MEDICINE

## 2024-06-13 PROCEDURE — 99213 OFFICE O/P EST LOW 20 MIN: CPT | Performed by: FAMILY MEDICINE

## 2024-06-13 PROCEDURE — 3051F HG A1C>EQUAL 7.0%<8.0%: CPT | Performed by: FAMILY MEDICINE

## 2024-06-13 RX ORDER — OXYCODONE HYDROCHLORIDE 10 MG/1
10 TABLET ORAL EVERY 4 HOURS PRN
Qty: 60 EACH | Refills: 0 | Status: SHIPPED | OUTPATIENT
Start: 2024-06-13

## 2024-06-13 NOTE — TELEPHONE ENCOUNTER
Caller: DulcePrincess    Relationship: Emergency Contact    Best call back number: [phone]     What was the call regarding: PRINCESS STATES THE PATIENT RIDES A BUS TO GET TO HIS APPOINTMENTS CALLED [facility] AND THEY HAVE FAXED OVER SOME PAPERWORK TO HAVE DR. HART FILL OUT AND FAX BACK TO THEM BEFORE HIS APPOINTMENT TODAY AT 3:45 PM. SHE WOULD LIKE TO CHECK ON THE STATUS OF THIS.

## 2024-06-13 NOTE — PROGRESS NOTES
"Chief Complaint   Patient presents with    FU from L foot toe amputation        Subjective      Erlin Savage is a 73 y.o. who presents for right foot pain described as burning, stabbing, and sharp aching pain. Pain is associated with vascular disease and recent amputation of all 5 toes of right foot.     Objective   Vital Signs:  /68   Pulse 80   Temp 98.1 °F (36.7 °C)   Resp 20   Ht 180.3 cm (71\")   BMI 24.27 kg/m²     Physical Exam  Vitals reviewed.   Feet:      Comments: All toes are missing form foot. Foot is warm and tenderness is out of proportion to palpation. Foot is coated in betadine.Wound is clean and dry w/o drainage  Neurological:      Mental Status: He is alert.          Result Review                     Assessment and Plan  Diagnoses and all orders for this visit:    1. Postoperative pain (Primary)  -     oxyCODONE (ROXICODONE) 10 MG tablet; Take 1 tablet by mouth Every 4 (Four) Hours As Needed for Moderate Pain.  Dispense: 60 each; Refill: 0    2. Right foot pain  -     oxyCODONE (ROXICODONE) 10 MG tablet; Take 1 tablet by mouth Every 4 (Four) Hours As Needed for Moderate Pain.  Dispense: 60 each; Refill: 0    3. Neuropathic pain  -     oxyCODONE (ROXICODONE) 10 MG tablet; Take 1 tablet by mouth Every 4 (Four) Hours As Needed for Moderate Pain.  Dispense: 60 each; Refill: 0    4. Other ascites  -     Ambulatory Referral to Gastroenterology    Plan  Continue Oxycodone 10mg qid and prn tylenol in addition to topicals prescribed by surgeon  Refer to local gi for ascites and need for paracentesis        Follow Up  No follow-ups on file.  Patient was given instructions and counseling regarding his condition or for health maintenance advice. Please see specific information pulled into the AVS if appropriate.       "

## 2024-06-14 NOTE — TELEPHONE ENCOUNTER
Form was provided to you and Dr. Diallo brought the form back up to me once he signed it. I faxed the form.

## 2024-06-18 ENCOUNTER — TELEPHONE (OUTPATIENT)
Dept: FAMILY MEDICINE CLINIC | Facility: CLINIC | Age: 74
End: 2024-06-18
Payer: MEDICARE

## 2024-06-18 NOTE — TELEPHONE ENCOUNTER
CAM FROM [facility] CALLED TO REQUEST VERBALS FOR OCCUPATIONAL THERAPY, PHYSICAL THERAPY AND SKILLED NURSING      PLEASE ADVISE

## 2024-06-19 ENCOUNTER — OUTSIDE FACILITY SERVICE (OUTPATIENT)
Dept: FAMILY MEDICINE CLINIC | Facility: CLINIC | Age: 74
End: 2024-06-19
Payer: MEDICARE

## 2024-06-19 PROCEDURE — G0180 MD CERTIFICATION HHA PATIENT: HCPCS | Performed by: FAMILY MEDICINE

## 2024-06-19 NOTE — TELEPHONE ENCOUNTER
[phone] [facility] is needing a answer before 5 today- with dr campos being out, who is taking care of his messages?

## 2024-06-24 ENCOUNTER — TELEPHONE (OUTPATIENT)
Dept: FAMILY MEDICINE CLINIC | Facility: CLINIC | Age: 74
End: 2024-06-24

## 2024-06-24 NOTE — TELEPHONE ENCOUNTER
Spoke with Amber RIVAS From [facility]. Amber stated that patient has declined skilled nursing order for wound care.       (P) [phone]

## 2024-06-26 ENCOUNTER — TELEPHONE (OUTPATIENT)
Dept: FAMILY MEDICINE CLINIC | Facility: CLINIC | Age: 74
End: 2024-06-26
Payer: MEDICARE

## 2024-06-26 NOTE — TELEPHONE ENCOUNTER
We received a set of forms from [facility] for oxygen concentrator. We have no record of ever being the one who ordered his O2.  I reached out to patient. He has been on oxygen for more than a year and it was prescribed at a hospital discharge.    Pt informed me he has a Pulm in [place] but does not know her name. I informed him that is who these papers need to go to.     Pt will call us back with the name and contact info for his Pulm doctor.

## 2024-06-27 ENCOUNTER — TELEPHONE (OUTPATIENT)
Dept: FAMILY MEDICINE CLINIC | Facility: CLINIC | Age: 74
End: 2024-06-27
Payer: MEDICARE

## 2024-06-27 NOTE — TELEPHONE ENCOUNTER
Pt called back w/ name of Pulm.  Dr. Kristin Moya  Faxed this order to them for them to consider ordering.

## 2024-06-27 NOTE — TELEPHONE ENCOUNTER
PA Case: [id number], Status: Approved, Coverage Starts on: 1/1/2024 12:00:00 AM, Coverage Ends on: 12/31/2024 12:00:00 AM. Questions? Contact [phone].

## 2024-06-27 NOTE — TELEPHONE ENCOUNTER
Submitted PA for Pantoprazole 40mg via Cover My Meds    Key: [id number]    When I submitted the question set, they stated they could see paid claims for Omeprazole 20mg. I could only answer that the pharmacy is now trying to run the Pantoprazole 40mg and he would be DC'ing the Omeprazole.     Awaiting response.

## 2024-06-28 ENCOUNTER — TELEPHONE (OUTPATIENT)
Dept: FAMILY MEDICINE CLINIC | Facility: CLINIC | Age: 74
End: 2024-06-28
Payer: MEDICARE

## 2024-06-28 DIAGNOSIS — R18.8 OTHER ASCITES: Primary | ICD-10-CM

## 2024-06-28 NOTE — TELEPHONE ENCOUNTER
PATIENT CALLED REGARDING GASTRO REFERRAL AFTER DR MEYER'S OFFICE CALLED HIM TO SCHEDULE. THEY OFFERED JULY 10 BUT HE COULDN'T MAKE THAT APPOINTMENT THEREFORE THE NEXT AVAILABLE WOULD BE IN AUGUST. I CALLED 'S OFFICE TO CONFIRM THIS INFORMATION THEY STATED THIS IS CORRECT BECAUSE THEIR LOSING TWO OF THEIR PROVIDERS.     THEY STATED PER THEIR PROVIDER PATIENT CAN EITHER GO TO THE ER AT [place]. OR HIS PRIMARY CARE CAN ORDER A PARACENTESIS AND SEND OVER TO [facility] [facility] FOR PATIENT TO GO AHEAD AND HAVE THIS PROCEDURE COMPLETED. PLEASE ADVISE AS TO WHAT THE PATIENT NEEDS TO DO.

## 2024-07-01 ENCOUNTER — TELEPHONE (OUTPATIENT)
Dept: FAMILY MEDICINE CLINIC | Facility: CLINIC | Age: 74
End: 2024-07-01

## 2024-07-03 DIAGNOSIS — G89.18 POSTOPERATIVE PAIN: ICD-10-CM

## 2024-07-03 DIAGNOSIS — M79.2 NEUROPATHIC PAIN: ICD-10-CM

## 2024-07-03 DIAGNOSIS — M79.671 RIGHT FOOT PAIN: ICD-10-CM

## 2024-07-03 NOTE — TELEPHONE ENCOUNTER
Caller: Princess Cardona    Relationship: Emergency Contact    Best call back number: [phone]     Requested Prescriptions:   Requested Prescriptions     Pending Prescriptions Disp Refills    oxyCODONE (ROXICODONE) 10 MG tablet 60 each 0     Sig: Take 1 tablet by mouth Every 4 (Four) Hours As Needed for Moderate Pain.        Pharmacy where request should be sent: [facility] [address] [place] [address] [phone] [facility] [phone]      Last office visit with prescribing clinician: 6/13/2024   Last telemedicine visit with prescribing clinician: Visit date not found   Next office visit with prescribing clinician: Visit date not found     Additional details provided by patient:     Does the patient have less than a 3 day supply:  [x] Yes  [] No    Would you like a call back once the refill request has been completed: [] Yes [x] No    If the office needs to give you a call back, can they leave a voicemail: [] Yes [x] No    Cleo Rubio   07/03/24 14:42 EDT

## 2024-07-05 RX ORDER — OXYCODONE HYDROCHLORIDE 10 MG/1
10 TABLET ORAL EVERY 4 HOURS PRN
Qty: 60 EACH | Refills: 0 | Status: SHIPPED | OUTPATIENT
Start: 2024-07-05

## 2024-07-08 RX ORDER — GENTAMICIN SULFATE 1 MG/G
1 CREAM TOPICAL DAILY
Qty: 30 G | Refills: 0 | Status: SHIPPED | OUTPATIENT
Start: 2024-07-08

## 2024-07-08 NOTE — TELEPHONE ENCOUNTER
Caller: Erlin Savage    Relationship: Self    Best call back number: [phone]    Requested Prescriptions:   Requested Prescriptions     Pending Prescriptions Disp Refills    gentamicin (GARAMYCIN) 0.1 % cream       Sig: Apply 1 Application topically to the appropriate area as directed Daily.        Pharmacy where request should be sent: [facility] [address] [place] [address] [phone] [facility] [phone]      Last office visit with prescribing clinician: 6/13/2024   Last telemedicine visit with prescribing clinician: Visit date not found   Next office visit with prescribing clinician: Visit date not found     Additional details provided by patient: OUT OF MEDICATION ,  USES IT FOR HIS PORT TO KEEP GERMS DOWN    Does the patient have less than a 3 day supply:  [x] Yes  [] No    Would you like a call back once the refill request has been completed: [x] Yes [] No    If the office needs to give you a call back, can they leave a voicemail: [x] Yes [] No    Natasha Talley MA   07/08/24 15:22 EDT

## 2024-07-11 ENCOUNTER — TELEPHONE (OUTPATIENT)
Dept: FAMILY MEDICINE CLINIC | Facility: CLINIC | Age: 74
End: 2024-07-11
Payer: MEDICARE

## 2024-07-11 NOTE — TELEPHONE ENCOUNTER
Caller: [facility] SCHEDULING    Relationship to patient: Other    Best call back number: [phone]     Patient is needing: [facility] SCHEDULING CALLED TO ADVISE DR HART THAT THE PATIENT SHOWED UP AT THE HOSPITAL AND THEY WERE ABLE TO GET HIM IN TODAY TO HAVE HIS ABDOMINAL PARACENTESIS PERFORMED.    PLEASE BE ADVISED.

## 2024-07-11 NOTE — TELEPHONE ENCOUNTER
PATIENT WENT OVER TO [facility] TO DO HIS ULTRA SOUND PARACENTESIS AND TOLD THE OFFICE THAT DR. HART WANTED HIM TO HAVE THIS DONE MONTHLY. THEY'RE REQUESTING AN ORDER TO BE SENT OVER STATING THAT.          PLEASE ADVICE

## 2024-07-12 ENCOUNTER — TELEPHONE (OUTPATIENT)
Dept: FAMILY MEDICINE CLINIC | Facility: CLINIC | Age: 74
End: 2024-07-12

## 2024-07-12 NOTE — TELEPHONE ENCOUNTER
Caller: Princess Cardona    Relationship: Emergency Contact    Best call back number:     What is the medical concern/diagnosis:  PARACENTESIS    What is the provider, practice or medical service name: [facility]    Any additional details: PATIENT WAS SEEN AT [facility] YESTERDAY TO HAVE THE DRAINING OF THE FLUID AND THEY ARE ASKING DR HART TO SEND OVER AN ORDER TO HAVE THIS DONE MONTHLY (REOCCURING)    SHE ADVISED [facility]ENTRAL SCHEDULING FROM THERE WILL BE SETTING THIS UP FOR THE PATIENT

## 2024-07-15 DIAGNOSIS — R18.8 OTHER ASCITES: ICD-10-CM

## 2024-07-16 NOTE — TELEPHONE ENCOUNTER
Spoke with pt yesterday, see TE. Spoke with Princess today and advised that Dr Diallo never said this needs to be done on a monthly basis. Per princess the ER and radiology advised them this is normally done a routine monthly basis before it got to a point he has trouble breathing. Per princess she was told this is routine for this. He has had three done so far.

## 2024-07-29 DIAGNOSIS — M79.2 NEUROPATHIC PAIN: ICD-10-CM

## 2024-07-29 DIAGNOSIS — G89.18 POSTOPERATIVE PAIN: ICD-10-CM

## 2024-07-29 DIAGNOSIS — M79.671 RIGHT FOOT PAIN: ICD-10-CM

## 2024-07-29 RX ORDER — OXYCODONE HYDROCHLORIDE 10 MG/1
10 TABLET ORAL EVERY 4 HOURS PRN
Qty: 60 EACH | Refills: 0 | Status: SHIPPED | OUTPATIENT
Start: 2024-07-29

## 2024-07-29 NOTE — TELEPHONE ENCOUNTER
Caller: Erlin Savage    Relationship: Self    Best call back number: [phone]     Requested Prescriptions:   Requested Prescriptions     Pending Prescriptions Disp Refills    oxyCODONE (ROXICODONE) 10 MG tablet 60 each 0     Sig: Take 1 tablet by mouth Every 4 (Four) Hours As Needed for Moderate Pain.        Pharmacy where request should be sent: [facility] [address] [place] [address] [phone] [facility] [phone]      Last office visit with prescribing clinician: 6/13/2024   Last telemedicine visit with prescribing clinician: Visit date not found   Next office visit with prescribing clinician: Visit date not found     Additional details provided by patient:     Does the patient have less than a 3 day supply:  [x] Yes  [] No    Would you like a call back once the refill request has been completed: [] Yes [x] No    If the office needs to give you a call back, can they leave a voicemail: [] Yes [x] No    Cleo Jenkins Rep   07/29/24 09:27 EDT

## 2024-08-14 DIAGNOSIS — M79.2 NEUROPATHIC PAIN: ICD-10-CM

## 2024-08-14 DIAGNOSIS — M79.671 RIGHT FOOT PAIN: ICD-10-CM

## 2024-08-14 DIAGNOSIS — G89.18 POSTOPERATIVE PAIN: ICD-10-CM

## 2024-08-14 RX ORDER — OXYCODONE HYDROCHLORIDE 10 MG/1
10 TABLET ORAL EVERY 4 HOURS PRN
Qty: 60 EACH | Refills: 0 | Status: SHIPPED | OUTPATIENT
Start: 2024-08-14

## 2024-08-14 NOTE — TELEPHONE ENCOUNTER
Caller: Erlin Savage    Relationship: Self    Best call back number: [phone]     Requested Prescriptions:   Requested Prescriptions     Pending Prescriptions Disp Refills    oxyCODONE (ROXICODONE) 10 MG tablet 60 each 0     Sig: Take 1 tablet by mouth Every 4 (Four) Hours As Needed for Moderate Pain.        Pharmacy where request should be sent: [facility] [address] [place] [address] [phone] [facility] [phone]      Last office visit with prescribing clinician: 6/13/2024   Last telemedicine visit with prescribing clinician: Visit date not found   Next office visit with prescribing clinician: Visit date not found     Additional details provided by patient:     Does the patient have less than a 3 day supply:  [x] Yes  [] No    Would you like a call back once the refill request has been completed: [] Yes [x] No    If the office needs to give you a call back, can they leave a voicemail: [] Yes [x] No    Cleo Jenkins Rep   08/14/24 14:53 EDT

## 2024-08-20 ENCOUNTER — READMISSION MANAGEMENT (OUTPATIENT)
Dept: CALL CENTER | Facility: HOSPITAL | Age: 74
End: 2024-08-20
Payer: MEDICARE

## 2024-08-21 ENCOUNTER — TRANSITIONAL CARE MANAGEMENT TELEPHONE ENCOUNTER (OUTPATIENT)
Dept: CALL CENTER | Facility: HOSPITAL | Age: 74
End: 2024-08-21
Payer: MEDICARE

## 2024-08-21 NOTE — OUTREACH NOTE
Call Center TCM Note      Flowsheet Row Responses   [facility] [place] patient discharged from? Non-  []   Does the patient have one of the following disease processes/diagnoses(primary or secondary)? Other   TCM attempt successful? No   Unsuccessful attempts Attempt 1   Revoked Reason Other  [Patient left  AMA, not eligible for TCM]            Melissa Celaya RN    8/21/2024, 12:19 EDT

## 2024-08-21 NOTE — OUTREACH NOTE
Prep Survey      Flowsheet Row Responses   [facility] facility patient discharged from? Non-BH   Is LACE score < 7 ? Non- Discharge   Eligibility [facility]   Date of Admission 08/14/24   Date of Discharge 08/20/24   Discharge Disposition Home or Self Care   Discharge diagnosis Postoperative infection, unspecified type, initial encounter (Primary Dx),  Arterial insufficiency with ischemic ulcer (HCC),  Infected ulcer of skin, with necrosis of muscle (HCC),  Osteomyelitis of metatarsal (HCC)   Does the patient have one of the following disease processes/diagnoses(primary or secondary)? Other   Prep survey completed? Yes            Paloma MILLER - Registered Nurse

## 2024-08-26 ENCOUNTER — APPOINTMENT (OUTPATIENT)
Dept: NEPHROLOGY | Facility: HOSPITAL | Age: 74
End: 2024-08-26
Payer: MEDICARE

## 2024-08-26 ENCOUNTER — HOSPITAL ENCOUNTER (INPATIENT)
Facility: HOSPITAL | Age: 74
LOS: 10 days | Discharge: NURSING, REHAB OR LONG-TERM CARE | End: 2024-09-05
Attending: FAMILY MEDICINE | Admitting: FAMILY MEDICINE
Payer: MEDICARE

## 2024-08-26 ENCOUNTER — TELEPHONE (OUTPATIENT)
Dept: FAMILY MEDICINE CLINIC | Facility: CLINIC | Age: 74
End: 2024-08-26
Payer: MEDICARE

## 2024-08-26 DIAGNOSIS — I21.A1 TYPE 2 MYOCARDIAL INFARCTION: ICD-10-CM

## 2024-08-26 DIAGNOSIS — I50.22 CHRONIC SYSTOLIC HEART FAILURE: ICD-10-CM

## 2024-08-26 DIAGNOSIS — I96 DRY GANGRENE: Primary | ICD-10-CM

## 2024-08-26 LAB
ALBUMIN SERPL-MCNC: 3.3 G/DL (ref 3.5–5.2)
ALBUMIN/GLOB SERPL: 1.1 G/DL
ALP SERPL-CCNC: 199 U/L (ref 39–117)
ALT SERPL W P-5'-P-CCNC: 14 U/L (ref 1–41)
ANION GAP SERPL CALCULATED.3IONS-SCNC: 13 MMOL/L (ref 5–15)
AST SERPL-CCNC: 19 U/L (ref 1–40)
BASOPHILS # BLD AUTO: 0.08 10*3/MM3 (ref 0–0.2)
BASOPHILS NFR BLD AUTO: 0.7 % (ref 0–1.5)
BILIRUB SERPL-MCNC: 0.5 MG/DL (ref 0–1.2)
BUN SERPL-MCNC: 43 MG/DL (ref 8–23)
BUN/CREAT SERPL: 7.6 (ref 7–25)
CALCIUM SPEC-SCNC: 9.4 MG/DL (ref 8.6–10.5)
CHLORIDE SERPL-SCNC: 93 MMOL/L (ref 98–107)
CO2 SERPL-SCNC: 23 MMOL/L (ref 22–29)
CREAT SERPL-MCNC: 5.63 MG/DL (ref 0.76–1.27)
D-LACTATE SERPL-SCNC: 1.1 MMOL/L (ref 0.5–2)
DEPRECATED RDW RBC AUTO: 66.3 FL (ref 37–54)
EGFRCR SERPLBLD CKD-EPI 2021: 10.1 ML/MIN/1.73
EOSINOPHIL # BLD AUTO: 0.34 10*3/MM3 (ref 0–0.4)
EOSINOPHIL NFR BLD AUTO: 2.8 % (ref 0.3–6.2)
ERYTHROCYTE [DISTWIDTH] IN BLOOD BY AUTOMATED COUNT: 20.6 % (ref 12.3–15.4)
GLOBULIN UR ELPH-MCNC: 3.1 GM/DL
GLUCOSE BLDC GLUCOMTR-MCNC: 150 MG/DL (ref 70–130)
GLUCOSE BLDC GLUCOMTR-MCNC: 180 MG/DL (ref 70–130)
GLUCOSE SERPL-MCNC: 239 MG/DL (ref 65–99)
HBA1C MFR BLD: 6.2 % (ref 4.8–5.6)
HCT VFR BLD AUTO: 34.4 % (ref 37.5–51)
HGB BLD-MCNC: 10.3 G/DL (ref 13–17.7)
IMM GRANULOCYTES # BLD AUTO: 0.09 10*3/MM3 (ref 0–0.05)
IMM GRANULOCYTES NFR BLD AUTO: 0.8 % (ref 0–0.5)
LYMPHOCYTES # BLD AUTO: 0.63 10*3/MM3 (ref 0.7–3.1)
LYMPHOCYTES NFR BLD AUTO: 5.3 % (ref 19.6–45.3)
MAGNESIUM SERPL-MCNC: 2.3 MG/DL (ref 1.6–2.4)
MCH RBC QN AUTO: 27.3 PG (ref 26.6–33)
MCHC RBC AUTO-ENTMCNC: 29.9 G/DL (ref 31.5–35.7)
MCV RBC AUTO: 91.2 FL (ref 79–97)
MONOCYTES # BLD AUTO: 0.93 10*3/MM3 (ref 0.1–0.9)
MONOCYTES NFR BLD AUTO: 7.8 % (ref 5–12)
MRSA DNA SPEC QL NAA+PROBE: NEGATIVE
NEUTROPHILS NFR BLD AUTO: 82.6 % (ref 42.7–76)
NEUTROPHILS NFR BLD AUTO: 9.88 10*3/MM3 (ref 1.7–7)
NRBC BLD AUTO-RTO: 0 /100 WBC (ref 0–0.2)
PLATELET # BLD AUTO: 251 10*3/MM3 (ref 140–450)
PMV BLD AUTO: 10.4 FL (ref 6–12)
POTASSIUM SERPL-SCNC: 5.9 MMOL/L (ref 3.5–5.2)
PROCALCITONIN SERPL-MCNC: 0.43 NG/ML (ref 0–0.25)
PROT SERPL-MCNC: 6.4 G/DL (ref 6–8.5)
RBC # BLD AUTO: 3.77 10*6/MM3 (ref 4.14–5.8)
SODIUM SERPL-SCNC: 129 MMOL/L (ref 136–145)
WBC NRBC COR # BLD AUTO: 11.95 10*3/MM3 (ref 3.4–10.8)

## 2024-08-26 PROCEDURE — 25010000002 HEPARIN (PORCINE) PER 1000 UNITS: Performed by: INTERNAL MEDICINE

## 2024-08-26 PROCEDURE — 83605 ASSAY OF LACTIC ACID: CPT | Performed by: FAMILY MEDICINE

## 2024-08-26 PROCEDURE — 84145 PROCALCITONIN (PCT): CPT | Performed by: FAMILY MEDICINE

## 2024-08-26 PROCEDURE — 82948 REAGENT STRIP/BLOOD GLUCOSE: CPT

## 2024-08-26 PROCEDURE — 99223 1ST HOSP IP/OBS HIGH 75: CPT | Performed by: FAMILY MEDICINE

## 2024-08-26 PROCEDURE — 83735 ASSAY OF MAGNESIUM: CPT | Performed by: FAMILY MEDICINE

## 2024-08-26 PROCEDURE — 87040 BLOOD CULTURE FOR BACTERIA: CPT | Performed by: FAMILY MEDICINE

## 2024-08-26 PROCEDURE — 25010000002 VANCOMYCIN HCL IN NACL 1.75-0.9 GM/500ML-% SOLUTION

## 2024-08-26 PROCEDURE — 25010000002 MEROPENEM PER 100 MG: Performed by: FAMILY MEDICINE

## 2024-08-26 PROCEDURE — 85025 COMPLETE CBC W/AUTO DIFF WBC: CPT | Performed by: FAMILY MEDICINE

## 2024-08-26 PROCEDURE — 83036 HEMOGLOBIN GLYCOSYLATED A1C: CPT | Performed by: FAMILY MEDICINE

## 2024-08-26 PROCEDURE — 87641 MR-STAPH DNA AMP PROBE: CPT

## 2024-08-26 PROCEDURE — 80053 COMPREHEN METABOLIC PANEL: CPT | Performed by: FAMILY MEDICINE

## 2024-08-26 PROCEDURE — 87102 FUNGUS ISOLATION CULTURE: CPT | Performed by: FAMILY MEDICINE

## 2024-08-26 RX ORDER — CLONAZEPAM 0.5 MG/1
0.5 TABLET ORAL DAILY PRN
Status: DISCONTINUED | OUTPATIENT
Start: 2024-08-26 | End: 2024-08-26

## 2024-08-26 RX ORDER — ACETAMINOPHEN 325 MG/1
650 TABLET ORAL EVERY 6 HOURS PRN
Status: DISCONTINUED | OUTPATIENT
Start: 2024-08-26 | End: 2024-09-05 | Stop reason: HOSPADM

## 2024-08-26 RX ORDER — SODIUM CHLORIDE 9 MG/ML
40 INJECTION, SOLUTION INTRAVENOUS AS NEEDED
Status: DISCONTINUED | OUTPATIENT
Start: 2024-08-26 | End: 2024-09-05 | Stop reason: HOSPADM

## 2024-08-26 RX ORDER — PANTOPRAZOLE SODIUM 40 MG/1
40 TABLET, DELAYED RELEASE ORAL DAILY
Status: DISCONTINUED | OUTPATIENT
Start: 2024-08-26 | End: 2024-09-05 | Stop reason: HOSPADM

## 2024-08-26 RX ORDER — BISACODYL 10 MG
10 SUPPOSITORY, RECTAL RECTAL DAILY PRN
Status: DISCONTINUED | OUTPATIENT
Start: 2024-08-26 | End: 2024-09-05 | Stop reason: HOSPADM

## 2024-08-26 RX ORDER — SODIUM CHLORIDE 0.9 % (FLUSH) 0.9 %
10 SYRINGE (ML) INJECTION AS NEEDED
Status: DISCONTINUED | OUTPATIENT
Start: 2024-08-26 | End: 2024-09-05 | Stop reason: HOSPADM

## 2024-08-26 RX ORDER — ONDANSETRON 2 MG/ML
4 INJECTION INTRAMUSCULAR; INTRAVENOUS EVERY 6 HOURS PRN
Status: DISCONTINUED | OUTPATIENT
Start: 2024-08-26 | End: 2024-09-05 | Stop reason: HOSPADM

## 2024-08-26 RX ORDER — AMOXICILLIN 250 MG
2 CAPSULE ORAL 2 TIMES DAILY PRN
Status: DISCONTINUED | OUTPATIENT
Start: 2024-08-26 | End: 2024-09-05 | Stop reason: HOSPADM

## 2024-08-26 RX ORDER — INSULIN LISPRO 100 [IU]/ML
2-9 INJECTION, SOLUTION INTRAVENOUS; SUBCUTANEOUS
Status: DISCONTINUED | OUTPATIENT
Start: 2024-08-26 | End: 2024-08-26

## 2024-08-26 RX ORDER — VANCOMYCIN 1.75 GRAM/500 ML IN 0.9 % SODIUM CHLORIDE INTRAVENOUS
1750 ONCE
Status: COMPLETED | OUTPATIENT
Start: 2024-08-26 | End: 2024-08-26

## 2024-08-26 RX ORDER — HYDRALAZINE HYDROCHLORIDE 10 MG/1
10 TABLET, FILM COATED ORAL 3 TIMES DAILY
Status: DISCONTINUED | OUTPATIENT
Start: 2024-08-26 | End: 2024-08-30

## 2024-08-26 RX ORDER — NITROGLYCERIN 0.4 MG/1
0.4 TABLET SUBLINGUAL
Status: DISCONTINUED | OUTPATIENT
Start: 2024-08-26 | End: 2024-09-03

## 2024-08-26 RX ORDER — ONDANSETRON 4 MG/1
4 TABLET, ORALLY DISINTEGRATING ORAL EVERY 6 HOURS PRN
Status: DISCONTINUED | OUTPATIENT
Start: 2024-08-26 | End: 2024-09-05 | Stop reason: HOSPADM

## 2024-08-26 RX ORDER — DEXTROSE MONOHYDRATE 25 G/50ML
25 INJECTION, SOLUTION INTRAVENOUS
Status: DISCONTINUED | OUTPATIENT
Start: 2024-08-26 | End: 2024-09-05 | Stop reason: HOSPADM

## 2024-08-26 RX ORDER — CHOLECALCIFEROL (VITAMIN D3) 25 MCG
1000 TABLET ORAL DAILY
Status: DISCONTINUED | OUTPATIENT
Start: 2024-08-26 | End: 2024-08-26

## 2024-08-26 RX ORDER — IBUPROFEN 600 MG/1
1 TABLET ORAL
Status: DISCONTINUED | OUTPATIENT
Start: 2024-08-26 | End: 2024-09-05 | Stop reason: HOSPADM

## 2024-08-26 RX ORDER — CALCITRIOL 0.25 UG/1
0.5 CAPSULE, LIQUID FILLED ORAL DAILY
Status: DISCONTINUED | OUTPATIENT
Start: 2024-08-26 | End: 2024-09-05 | Stop reason: HOSPADM

## 2024-08-26 RX ORDER — BISACODYL 5 MG/1
5 TABLET, DELAYED RELEASE ORAL DAILY PRN
Status: DISCONTINUED | OUTPATIENT
Start: 2024-08-26 | End: 2024-09-05 | Stop reason: HOSPADM

## 2024-08-26 RX ORDER — POLYETHYLENE GLYCOL 3350 17 G/17G
17 POWDER, FOR SOLUTION ORAL DAILY PRN
Status: DISCONTINUED | OUTPATIENT
Start: 2024-08-26 | End: 2024-09-05 | Stop reason: HOSPADM

## 2024-08-26 RX ORDER — NICOTINE POLACRILEX 4 MG
15 LOZENGE BUCCAL
Status: DISCONTINUED | OUTPATIENT
Start: 2024-08-26 | End: 2024-09-05 | Stop reason: HOSPADM

## 2024-08-26 RX ORDER — HEPARIN SODIUM 1000 [USP'U]/ML
2000 INJECTION, SOLUTION INTRAVENOUS; SUBCUTANEOUS AS NEEDED
Status: DISCONTINUED | OUTPATIENT
Start: 2024-08-26 | End: 2024-09-05 | Stop reason: HOSPADM

## 2024-08-26 RX ORDER — OXYCODONE HYDROCHLORIDE 10 MG/1
10 TABLET ORAL EVERY 4 HOURS PRN
Status: DISCONTINUED | OUTPATIENT
Start: 2024-08-26 | End: 2024-08-27

## 2024-08-26 RX ORDER — ASPIRIN 81 MG/1
81 TABLET ORAL DAILY
Status: DISCONTINUED | OUTPATIENT
Start: 2024-08-26 | End: 2024-09-05 | Stop reason: HOSPADM

## 2024-08-26 RX ORDER — SODIUM CHLORIDE 0.9 % (FLUSH) 0.9 %
10 SYRINGE (ML) INJECTION EVERY 12 HOURS SCHEDULED
Status: DISCONTINUED | OUTPATIENT
Start: 2024-08-26 | End: 2024-09-05 | Stop reason: HOSPADM

## 2024-08-26 RX ORDER — FOLIC ACID/VIT B COMPLEX AND C 0.8 MG
1 TABLET ORAL DAILY
Status: DISCONTINUED | OUTPATIENT
Start: 2024-08-26 | End: 2024-09-05 | Stop reason: HOSPADM

## 2024-08-26 RX ADMIN — HYDRALAZINE HYDROCHLORIDE 10 MG: 10 TABLET, FILM COATED ORAL at 18:15

## 2024-08-26 RX ADMIN — Medication 10 ML: at 20:13

## 2024-08-26 RX ADMIN — MEROPENEM 1000 MG: 1 INJECTION, POWDER, FOR SOLUTION INTRAVENOUS at 15:34

## 2024-08-26 RX ADMIN — CALCITRIOL CAPSULES 0.25 MCG 0.5 MCG: 0.25 CAPSULE ORAL at 18:16

## 2024-08-26 RX ADMIN — PANTOPRAZOLE SODIUM 40 MG: 40 TABLET, DELAYED RELEASE ORAL at 18:15

## 2024-08-26 RX ADMIN — Medication 10 ML: at 18:16

## 2024-08-26 RX ADMIN — HYDRALAZINE HYDROCHLORIDE 10 MG: 10 TABLET, FILM COATED ORAL at 20:13

## 2024-08-26 RX ADMIN — OXYCODONE HYDROCHLORIDE 10 MG: 10 TABLET ORAL at 16:11

## 2024-08-26 RX ADMIN — HEPARIN SODIUM 2000 UNITS: 1000 INJECTION INTRAVENOUS; SUBCUTANEOUS at 16:02

## 2024-08-26 RX ADMIN — Medication 1 TABLET: at 18:15

## 2024-08-26 RX ADMIN — ASPIRIN 81 MG: 81 TABLET, COATED ORAL at 18:15

## 2024-08-26 RX ADMIN — Medication 1750 MG: at 18:12

## 2024-08-26 NOTE — CONSULTS
"      [facility]  Renal Consult Note    Erlin Savage  1950  [id number]    Date of Admit:  8/26/2024    Date of Consult: 8/26/2024    Requesting Provider: SIMIN Meza DO    Evaluating Physician: Christophe Smith MD    Chief Complaint: Lower extremity foot wound    Reason for Consultation: ESRD management    History of present illness:    Patient is a 74 y.o.  Yr old male with a history of ESRD on HD MWF followed by NAL at [facility].  Patient admitted for evaluation of lower extremity ulceration.  Nephrology consulted to assist with dialysis needs.      Past Medical History  Past Medical History:   Diagnosis Date    Anxiety     Anxiety disorder     Back pain     Chronic back pain     work related injury    CKD (chronic kidney disease)     most recent creatinine 2.7 on 01/26/16    Coronary artery disease     Diabetes mellitus     insulin dependent diabetes Onset 2010    Dyslipidemia     Hyperlipidemia     Hypertension     Ischemic heart disease     Renal failure     Stroke     TIA / transient right vision loss age 42    TIA (transient ischemic attack)     Vertigo        Past Surgical History:   Procedure Laterality Date    APPENDECTOMY      CARDIAC CATHETERIZATION      CHOLECYSTECTOMY  03/2012    COLONOSCOPY      CORONARY ARTERY BYPASS GRAFT      ENDOSCOPY         Allergies:  Allergies   Allergen Reactions    Cefepime Other (See Comments) and Seizure     myoclonus    Doxycycline Other (See Comments)     Joint pain, tongue swelling    \"Body locks up\"    Gabapentin Confusion    Ranolazine Dizziness and Other (See Comments)     Severe tremors/ shakiness    Cephalosporins Unknown - Low Severity    Hydralazine Unknown - Low Severity    Augmentin [Amoxicillin-Pot Clavulanate] Palpitations    Biaxin [Clarithromycin] Palpitations    Coreg [Carvedilol] Itching    Crestor [Rosuvastatin Calcium] Itching     Itching rash    Iodine Rash     \"pineda skin\"    Lipitor [Atorvastatin] Itching "     And Crestor- generalized weakness and chest tightness    Lisinopril Cough     Cough /weakness, nauseas and dirrhea    Livalo [Pitavastatin] Unknown - Low Severity     Chest tightness    Nortriptyline Hcl Other (See Comments)     Arthralgias    Pravastatin Unknown - Low Severity     Chest , neck and arm discomfort    Questran [Cholestyramine] Unknown - Low Severity       Medication:   See electronic record    Soc Hx:   Social History     Socioeconomic History    Marital status: Single   Tobacco Use    Smoking status: Former     Current packs/day: 0.00     Types: Cigarettes     Quit date: 1986     Years since quittin.0    Smokeless tobacco: Never   Vaping Use    Vaping status: Never Used   Substance and Sexual Activity    Alcohol use: No    Drug use: No    Sexual activity: Defer       Fam Hx:  No congenital renal disease      Review of Systems:  Full review of systems reviewed and are as above  In HPI or per admitting H&P,otherwise negative for acute complaints    Physical Exam:   Vital Signs   Blood pressure 149/82, pulse 87, temperature 98.1 °F (36.7 °C), temperature source Oral, resp. rate 13, SpO2 100%.  No intake/output data recorded.    GENERAL: WD WM NAD  NEURO: Awake and alert, oriented. No focal deficit  PSYCHIATRIC: NMA. Cooperative with PE  EYE: PE, no icterus, no conjunctivitis  ENT: ommm, dentition intact,  Hearing intact  NECK:  No JVD discernable,  Trachea midline  CV: No edema, RRR  LUNGS:  Quiet,  Nonlabored resp.  Symmetrical expansion  ABDOMEN: Nondistended, soft nontender.  : No Breen, no palp bladder  SKIN: Warm and dry without rash.  RLE with dressing in place    Laboratory Data  Results from last 7 days   Lab Units 24  1145   HEMOGLOBIN g/dL 10.3*   HEMATOCRIT % 34.4*     Results from last 7 days   Lab Units 24  1145   SODIUM mmol/L 129*   POTASSIUM mmol/L 5.9*   CHLORIDE mmol/L 93*   CO2 mmol/L 23.0   BUN mg/dL 43*   CREATININE mg/dL 5.63*   CALCIUM mg/dL 9.4    MAGNESIUM mg/dL 2.3   ALBUMIN g/dL 3.3*         Results from last 7 days   Lab Units 08/26/24  1145   ALK PHOS U/L 199*   BILIRUBIN mg/dL 0.5   ALT (SGPT) U/L 14   AST (SGOT) U/L 19         CrCl cannot be calculated (Unknown ideal weight.).  Lab Results   Component Value Date    CREATININEUR 90.8 10/06/2020    PROTEINUR >=300 (A) 01/01/2024       A/P:        ESRD: On HD MWF.  HD today to keep on schedule.    HTN: Blood pressure stable.  Monitor for now.    Hyperkalemia: Potassium elevated at 5.9.  Adjust with HD.  Low potassium diet.    Hyponatremia: Sodium low at 129.  Likely due to underlying renal failure.  Continue fluid rate restriction and adjust with HD.    Metabolic acidosis: Stable.  Adjust with HD.    Volume: Appears stable today.  UF as tolerated.    Peripheral vascular disease: Patient with nonhealing right transmetatarsal amputation site with dry gangrene.  Previous is followed by Dr. Lynn in the past.  Vascular surgery evaluating.     Thank you consulting us on Erlin Savage   We will follow along closely    Christophe Smith MD  8/26/2024  15:36 EDT

## 2024-08-26 NOTE — PROGRESS NOTES
LOS: 0 days    Patient Care Team:  Joe Diallo MD as PCP - General (Family Medicine)  Fadi Garner MD as Consulting Physician (General Surgery)  Amor Celeste MD (Cardiology)  Praveen Novoa MD as Consulting Physician (Nephrology)  Kristin Moya MD as Consulting Physician (Pulmonary Disease)    Reason For Visit:    Subjective     On HD    Objective     Vital Signs:  Blood pressure 149/82, pulse 87, temperature 98.1 °F (36.7 °C), temperature source Oral, resp. rate 13, SpO2 100%.        No intake/output data recorded.    Physical Exam:     Seen on HD    Labs:    Results from last 7 days   Lab Units 08/26/24  1145   WBC 10*3/mm3 11.95*   HEMOGLOBIN g/dL 10.3*   PLATELETS 10*3/mm3 251     Results from last 7 days   Lab Units 08/26/24  1145   SODIUM mmol/L 129*   POTASSIUM mmol/L 5.9*   CHLORIDE mmol/L 93*   CO2 mmol/L 23.0   BUN mg/dL 43*   CREATININE mg/dL 5.63*   CALCIUM mg/dL 9.4   MAGNESIUM mg/dL 2.3   ALBUMIN g/dL 3.3*     Results from last 7 days   Lab Units 08/26/24  1145   GLUCOSE mg/dL 239*     Results from last 7 days   Lab Units 08/26/24  1145   ALK PHOS U/L 199*   BILIRUBIN mg/dL 0.5   ALT (SGPT) U/L 14   AST (SGOT) U/L 19               A/P:      Patient seen on dialysis.  Tolerating well.        Christophe Smith MD  08/26/24  15:39 EDT

## 2024-08-26 NOTE — H&P
[facility] Medicine Services  HISTORY AND PHYSICAL    Patient Name: Erlin Savage  : 1950  MRN: [id number]  Primary Care Physician: Joe Diallo MD  Date of admission: 2024      Subjective   Subjective     Chief Complaint:  Right leg pain    HPI:  Erlin Savage is a 74 y.o. male with a medical history significant for end-stage renal disease on HD MWF, coronary artery disease s/p CABG, A. Fib on OAC, aortic stenosis, diabetes mellitus, COPD on 2 to 4 L baseline, hyperlipidemia, hypertension and known peripheral arterial disease with nonhealing right foot transmetatarsal amputation site with dry gangrene.  Patient is status post multiple attempted bilateral lower extremity revascularization attempts, most recently right posterior tibial artery, peroneal artery, anterior tibial artery and proximal superficial femoral artery angioplasty on 2024 with Dr. Schreiber.  Patient additionally has heart failure and needs a new valve with a history of coding during last operation and remains high risk for any surgical procedure.  Patient was recently admitted to [facility] from  to  at which time he was recommended to undergo right BKA.  Patient at that time was unwilling to undergo procedure but is now agreeable to right below the knee amputation.  He was followed by infectious disease during his admission to [facility], he was followed by Dr. Valiente.  He was receiving vancomycin with his dialysis as well as Merrem.  He did leave [facility] AMA on 2024.  He was seen in nephrology clinic by Dr. Novoa and it was recommended that patient come to the hospital for further evaluation due to worsening appearance of his RLE.       Personal History     Past Medical History:   Diagnosis Date    Anxiety     Anxiety disorder     Back pain     Chronic back pain     work related injury    CKD (chronic kidney disease)     most recent creatinine 2.7 on 16     "Coronary artery disease     Diabetes mellitus     insulin dependent diabetes Onset 2010    Dyslipidemia     Hyperlipidemia     Hypertension     Ischemic heart disease     Renal failure     Stroke     TIA / transient right vision loss age 42    TIA (transient ischemic attack)     Vertigo            Past Surgical History:   Procedure Laterality Date    APPENDECTOMY      CARDIAC CATHETERIZATION      CHOLECYSTECTOMY  03/2012    COLONOSCOPY      CORONARY ARTERY BYPASS GRAFT      ENDOSCOPY         Family History: family history is not on file.     Social History:  reports that he quit smoking about 38 years ago. His smoking use included cigarettes. He has never used smokeless tobacco. He reports that he does not drink alcohol and does not use drugs.  Social History     Social History Narrative    Not on file       Medications:  Available home medication information reviewed.  Insulin Pen Needle, Methoxy PEG-Epoetin Beta, Carrol-Do Rx, Sucroferric Oxyhydroxide, apixaban, aspirin, calcitriol, cholecalciferol, clonazePAM, dutasteride, gentamicin, glucose blood, hydrALAZINE, insulin aspart, insulin glargine, metoprolol succinate XL, multivitamin with minerals, mupirocin, naloxone, oxyCODONE, pantoprazole, promethazine, and triamcinolone    Allergies   Allergen Reactions    Cefepime Other (See Comments) and Seizure     myoclonus    Doxycycline Other (See Comments)     Joint pain, tongue swelling    \"Body locks up\"    Gabapentin Confusion    Ranolazine Dizziness and Other (See Comments)     Severe tremors/ shakiness    Cephalosporins Unknown - Low Severity    Hydralazine Unknown - Low Severity    Augmentin [Amoxicillin-Pot Clavulanate] Palpitations    Biaxin [Clarithromycin] Palpitations    Coreg [Carvedilol] Itching    Crestor [Rosuvastatin Calcium] Itching     Itching rash    Iodine Rash     \"pineda skin\"    Lipitor [Atorvastatin] Itching     And Crestor- generalized weakness and chest tightness    Lisinopril Cough     Cough " /weakness, nauseas and dirrhea    Livalo [Pitavastatin] Unknown - Low Severity     Chest tightness    Nortriptyline Hcl Other (See Comments)     Arthralgias    Pravastatin Unknown - Low Severity     Chest , neck and arm discomfort    Questran [Cholestyramine] Unknown - Low Severity       Objective   Objective     Vital Signs:   Temp:  [98.1 °F (36.7 °C)] 98.1 °F (36.7 °C)  Heart Rate:  [] 87  Resp:  [13-18] 13  BP: (145-158)/(80-89) 149/82  Flow (L/min):  [2-3] 2       Physical Exam   Constitutional: Awake, alert, no acute distress, chronically ill-appearing elderly male  Eyes: PERRLA, sclerae anicteric, no conjunctival injection  HENT: NCAT, mucous membranes moist  Neck: Supple, no thyromegaly, no lymphadenopathy, trachea midline  Respiratory: Decreased in bases bilaterally, nonlabored respirations 2 L nasal cannula  Cardiovascular: RRR, no murmurs, rubs, or gallops, palpable pedal pulses bilaterally  Gastrointestinal: Positive bowel sounds, abdomen distended and firm  Musculoskeletal: 3+ pitting lower extremity edema  Psychiatric: Appropriate affect, cooperative  Neurologic: Oriented x 3, LAMA, speech clear  Skin: Right lower extremity erythema, status post transmetatarsal amputation, ischemic areas also noted to the left foot first and third toe    Result Review:  I have personally reviewed the results from the time of this admission to 8/26/2024 15:10 EDT and agree with these findings:  [x]  Laboratory list / accordion  []  Microbiology  [x]  Radiology  []  EKG/Telemetry   []  Cardiology/Vascular   []  Pathology  [x]  Old records  []  Other:    LAB RESULTS:      Lab 08/26/24  1145 08/26/24  1144   WBC 11.95*  --    HEMOGLOBIN 10.3*  --    HEMATOCRIT 34.4*  --    PLATELETS 251  --    NEUTROS ABS 9.88*  --    IMMATURE GRANS (ABS) 0.09*  --    LYMPHS ABS 0.63*  --    MONOS ABS 0.93*  --    EOS ABS 0.34  --    MCV 91.2  --    PROCALCITONIN 0.43*  --    LACTATE  --  1.1         Lab 08/26/24  1145   SODIUM 129*    POTASSIUM 5.9*   CHLORIDE 93*   CO2 23.0   ANION GAP 13.0   BUN 43*   CREATININE 5.63*   EGFR 10.1*   GLUCOSE 239*   CALCIUM 9.4   MAGNESIUM 2.3         Lab 08/26/24  1145   TOTAL PROTEIN 6.4   ALBUMIN 3.3*   GLOBULIN 3.1   ALT (SGPT) 14   AST (SGOT) 19   BILIRUBIN 0.5   ALK PHOS 199*                     UA          1/1/2024    05:50   Urinalysis   Squamous Epithelial Cells, UA 0-2       Specific Gravity, UA 1.020       Blood, UA Small       Leukocytes, UA Negative       RBC, UA 11-15       Bacteria, UA Negative          Details          This result is from an external source.               Microbiology Results (last 10 days)       ** No results found for the last 240 hours. **            No radiology results from the last 24 hrs        Assessment & Plan   Assessment & Plan       Dry gangrene    Coronary artery disease involving native coronary artery of native heart without angina pectoris    Essential hypertension    Hyperlipidemia LDL goal <70    Type 2 diabetes mellitus with kidney complication, with long-term current use of insulin    TIA (transient ischemic attack)    Ischemic heart disease    Stage 5 chronic kidney disease on chronic dialysis    Secondary pulmonary arterial hypertension    ESRD (end stage renal disease)    Iron deficiency anemia    Severe mitral valve regurgitation    Type 2 diabetes mellitus with diabetic chronic kidney disease    On home oxygen therapy    Severe malnutrition    Chronic diastolic (congestive) heart failure    Chronic systolic (congestive) heart failure    Right lower extremity critical limb ischemia  Recent admission for dry gangrene  Right lower extremity cellulitis  History of nonhealing right TMA  L foot with ischemic changes of toes   BLE PVD  -4/10/24 ([facility]): RIGHT lower extremity tibial angioplasty   -5/14/24 ([facility]): RIGHT lower extremity arteriogram with angioplasty and drug coated balloon of RIGHT posterior tibial artery and RIGHT peroneal artery, anterior  tibial artery, proximal superficial femoral artery angioplasty  -Discussed with vascular today, they will see patient in formal consult in the morning.  Tentatively plan for right BKA on 8/29 with Dr. Schreiber  -Followed by Dr. Heaton while admitted at [facility].  Discussed case with him today.  Continue vancomycin and Merrem.   -Blood cultures x 2 pending  -Previous wound culture at [facility] with Pseudomonas x 2, Enterococcus and corynebacterium  -Patient also noted to have LEFT foot with ischemic changes at first and third toe.  Wound care to see.  Vascular following.    End-stage renal disease  -On hemodialysis Monday/Wednesday/Friday  -Nephrology consulted, patient going for maintenance HD today    Hyponatremia  Hyperkalemia  -Patient going for dialysis today  -Patient has minimal urine output at baseline, volume status adjusted through HD  -repeat BMP in AM     CAD status post CABG  [id number]  Valvular heart disease  -Most recent echo in system from 2023 with a EF of 43%, severe diastolic dysfunction, moderate mitral regurgitation, moderate tricuspid regurgitation    Ascites  Lower extremity edema  -multifactorial secondary to CHF, end-stage renal disease  -Per patient, he gets monthly paracentesis at hospital in [place].  Denies any prior history of cirrhosis.  -Reviewed imaging in system, prior ultrasound with parenchymal liver disease  -Albumin 3.3  -Obtain repeat liver ultrasound with elastography  -PT wound for lower extremity compression wraps    PAF  -Hold Eliquis in anticipation of surgery    DM2  -Basal bolus regimen  -Obtain hemoglobin A1c    Anemia of Chronic Disease     COPD on chronic home O2  -4L NC at baseline   -DuoNebs PRN    VTE Prophylaxis:  Pharmacologic & mechanical VTE prophylaxis orders are present.          CODE STATUS:    Code Status and Medical Interventions: CPR (Attempt to Resuscitate); Full Support   Ordered at: 08/26/24 1218     Level Of Support Discussed With:     Patient     Code Status (Patient has no pulse and is not breathing):    CPR (Attempt to Resuscitate)     Medical Interventions (Patient has pulse or is breathing):    Full Support       Expected Discharge   Expected Discharge Date: 9/2/2024; Expected Discharge Time:      Lisa Rowe DO  08/26/24

## 2024-08-26 NOTE — PLAN OF CARE
Goal Outcome Evaluation: Scheduled HD completed. Pt tolerated well. Goal reached. Reinfused blood to pt. Called report to Shayna      Problem: Device-Related Complication Risk (Hemodialysis)  Goal: Safe, Effective Therapy Delivery  Outcome: Ongoing, Progressing     Problem: Hemodynamic Instability (Hemodialysis)  Goal: Effective Tissue Perfusion  Outcome: Ongoing, Progressing     Problem: Infection (Hemodialysis)  Goal: Absence of Infection Signs and Symptoms  Outcome: Ongoing, Progressing                                                  Routine referral received from Dr. Ruiz for right varicose veins. Referral was placed as service to vascular surgery. Please review chart to ensure it's appropriate to offer an appointment with Dr. Cardenas or if referral needs to transferred to vascular surgery in [place] for other issues not indicated in the referral.

## 2024-08-26 NOTE — PROGRESS NOTES
Pharmacy Consult - Vancomycin Dosing and Monitoring (Renal Dysfunction / Dialysis)    Erlin Savage is a 74 y.o. male receiving vancomycin therapy.     Indication: SSTI  Consulting Provider: Hospitalist  ID Consult: Yes    Goal Trough:     Current Antimicrobial Therapy  Anti-Infectives (From admission, onward)      Ordered     Dose/Rate Route Frequency Start Stop    08/26/24 1223  piperacillin-tazobactam (ZOSYN) 3.375 g IVPB in 100 mL NS MBP (CD)        Ordering Provider: Lisa Rowe DO    3.375 g  over 4 Hours Intravenous Every 12 Hours 08/27/24 0400 09/03/24 0359    08/26/24 1223  piperacillin-tazobactam (ZOSYN) 3.375 g IVPB in 100 mL NS MBP (CD)        Ordering Provider: Lisa Rowe DO    3.375 g  over 30 Minutes Intravenous Once 08/26/24 1500      08/26/24 1223  Pharmacy to dose vancomycin        Ordering Provider: Lisa Rowe DO     Does not apply Continuous PRN 08/26/24 1223 08/29/24 1222          Allergies  Allergies as of 08/26/2024 - Reviewed 08/26/2024   Allergen Reaction Noted    Cefepime Other (See Comments) and Seizure 12/31/2023    Doxycycline Other (See Comments) 06/08/2023    Gabapentin Confusion 11/16/2023    Ranolazine Dizziness and Other (See Comments) 12/11/2023    Cephalosporins Unknown - Low Severity 02/10/2024    Hydralazine Unknown - Low Severity 03/05/2024    Augmentin [amoxicillin-pot clavulanate] Palpitations 08/16/2016    Biaxin [clarithromycin] Palpitations 08/16/2016    Coreg [carvedilol] Itching 08/16/2016    Crestor [rosuvastatin calcium] Itching 01/09/2017    Iodine Rash 10/02/2023    Lipitor [atorvastatin] Itching 08/16/2016    Lisinopril Cough 08/16/2016    Livalo [pitavastatin] Unknown - Low Severity 08/16/2016    Nortriptyline hcl Other (See Comments) 12/05/2023    Pravastatin Unknown - Low Severity 08/16/2016    Questran [cholestyramine] Unknown - Low Severity 08/16/2016     Labs  Results from last 7 days   Lab Units 08/26/24  1145   BUN mg/dL 43*    CREATININE mg/dL 5.63*     Results from last 7 days   Lab Units 08/26/24  1145   WBC 10*3/mm3 11.95*     Evaluation of Dosing     Last Dose Received in the ED/Outside Facility:   Is Patient on Dialysis or Renal Replacement: HD, MWF outpatient    Height -    Weight -      CrCl cannot be calculated (Unknown ideal weight.).    Intake & Output (last 3 days)       None          Microbiology  Microbiology Results (last 10 days)       ** No results found for the last 240 hours. **          Vancomycin Levels              Assessment/Plan:  Pharmacy to dose vancomycin for SSTI. Goal trough 15-20 mcg/mL.  Will give patient a loading dose of vancomycin 1750 mg (~22 mg/kg) after his dialysis session.   Will order a vancomycin random on 8/27 with AM labs to assess clearance.   Will follow patient's HD plan and give vancomycin doses post sessions.   Monitor renal function, cultures and sensitivities, and clinical status, and adjust regimen as necessary.    Pharmacy will continue to follow.    Kyrie Walsh, PharmKENNEDI  8/26/2024  13:01 EDT

## 2024-08-26 NOTE — CONSULTS
Vascular Surgery Consult Note    Reason for consultation: RIGHT BKA  Consult requested by: Lisa Rowe DO    HPI: Erlin Savage is a 73 y.o. male with a medical history significant for end-stage renal disease on HD MWF, coronary artery disease s/p CABG, A. Fib on OAC, aortic stenosis, diabetes mellitus, COPD, hyperlipidemia, hypertension and known peripheral arterial disease with nonhealing RIGHT foot transmetatarsal amputation site with dry gangrene. Patient is well known to our service and is status post multiple attempted BILATERAL lower extremity revascularization attempts, most recently RIGHT posterior tibial artery, peroneal artery, anterior tibial artery, and proximal superficial femoral artery angioplasty on 5/14/24 with Dr. Schreiber. He additionally has heart failure needing a new valve with history of coding during last operation and remains high risk for any surgical procedure. Patient was recently seen at [facility] on 8/15/24 at which time he was recommended to undergo RIGHT BKA and extensive discussion was had regarding the risks, benefits and alternatives including palliative care. At that time he was not willing to undergo further amputation but is now agreeable to right below the knee amputation. He also is concerned about left great toe ulceration that does not seem to be healing. No fever, chills, malodor or drainage. Edema has been uncontrolled at home and he has questions regarding heart valve replacement candidacy. He previously has followed with Dr. Calderon but requests cardiology second opinion.       Review of Systems:   General: No recent fevers and chills/sweats  HEENT: No visual changes, hearing deficits, nasal congestion, neck or throat pain  Respiratory: No shortness of breath, cough, wheezing, hemoptysis  Cardiovascular: No chest pain, palpitations, syncope  Gastrointestinal: no constipation, nausea, vomiting, diarrhea, melena, hematochezia  Genitourinary: Poor urine output  at baseline. On HD. No hematuria, dysuria, frequency  Hema/Lymph: No bleeding disorders or DVT, bruising tendency, swollen lymph glands  Endocrine: No excessive thirst, excessive hunger  Extremities: +right foot gangrene and edema   Musculoskeletal: no back pain, neck pain, joint pain, muscle pain, range of motion changes  Skin: as noted above  Neurologic: No dizziness, history of stroke  Psychiatric: No anxiety, depression    History  Past Medical History:   Diagnosis Date    Anxiety     Anxiety disorder     Back pain     Chronic back pain     work related injury    CKD (chronic kidney disease)     most recent creatinine 2.7 on 16    Coronary artery disease     Diabetes mellitus     insulin dependent diabetes Onset     Dyslipidemia     Hyperlipidemia     Hypertension     Ischemic heart disease     Renal failure     Stroke     TIA / transient right vision loss age 42    TIA (transient ischemic attack)     Vertigo      Past Surgical History:   Procedure Laterality Date    APPENDECTOMY      CARDIAC CATHETERIZATION      CHOLECYSTECTOMY  2012    COLONOSCOPY      CORONARY ARTERY BYPASS GRAFT      ENDOSCOPY       History reviewed. No pertinent family history.  Social History     Tobacco Use    Smoking status: Former     Current packs/day: 0.00     Types: Cigarettes     Quit date: 1986     Years since quittin.0    Smokeless tobacco: Never   Vaping Use    Vaping status: Never Used   Substance Use Topics    Alcohol use: No    Drug use: No     Medications Prior to Admission   Medication Sig Dispense Refill Last Dose    apixaban (ELIQUIS) 5 MG tablet tablet Take 1 tablet by mouth 2 (Two) Times a Day. 60 tablet 5     aspirin 81 MG EC tablet Take 1 tablet by mouth Daily.       B Complex-C-Folic Acid (Carrol-Do Rx) 1 MG tablet Take 1 tablet by mouth Daily.       calcitriol (ROCALTROL) 0.5 MCG capsule Take 1 capsule by mouth Daily.       cholecalciferol (VITAMIN D3) 25 MCG (1000 UT) tablet Take 1 tablet by  mouth Daily.       clonazePAM (KlonoPIN) 0.5 MG tablet Take 1 tablet by mouth As Needed.       dutasteride (Avodart) 0.5 MG capsule Take 1 capsule by mouth Daily. (Patient not taking: Reported on 6/13/2024) 30 capsule 5     gentamicin (GARAMYCIN) 0.1 % cream Apply 1 Application topically to the appropriate area as directed Daily. 30 g 0     glucose blood (True Metrix Blood Glucose Test) test strip Test TID     /  Dx: E11.65 100 each 6     hydrALAZINE (APRESOLINE) 10 MG tablet Take 1 tablet by mouth 3 (Three) Times a Day. 90 tablet 5     insulin aspart (NovoLOG FlexPen) 100 UNIT/ML solution pen-injector sc pen Inject 12 Units under the skin into the appropriate area as directed 3 (Three) Times a Day With Meals. 15 mL 5     insulin glargine (LANTUS) 100 UNIT/ML injection Inject 35 Units under the skin into the appropriate area as directed Daily. Take if BS is greater than 150 (Patient not taking: Reported on 6/13/2024)       Insulin Pen Needle 32G X 6 MM misc Use 1 Needle 3 (Three) Times a Day Before Meals. 100 each 11     Methoxy PEG-Epoetin Beta (Mircera) 200 MCG/0.3ML solution prefilled syringe 200 mcg.       metoprolol succinate XL (TOPROL-XL) 25 MG 24 hr tablet Take 1 tablet by mouth 2 (Two) Times a Day. (Patient not taking: Reported on 6/13/2024)       multivitamin with minerals tablet tablet Take 1 tablet by mouth Daily.       mupirocin (BACTROBAN) 2 % ointment APPLY OINTMENT TOPICALLY TO CRUSTED PATCH BY LEFT EAR TWICE DAILY UNTIL FULLY HEALED       naloxone (NARCAN) 4 MG/0.1ML nasal spray 1 spray into the nostril(s) as directed by provider As Needed.       oxyCODONE (ROXICODONE) 10 MG tablet Take 1 tablet by mouth Every 4 (Four) Hours As Needed for Moderate Pain. 60 each 0     pantoprazole (PROTONIX) 40 MG EC tablet Take 1 tablet by mouth Daily. 30 tablet 5     promethazine (PHENERGAN) 25 MG tablet Take 1 tablet by mouth Every 6 (Six) Hours As Needed.       Sucroferric Oxyhydroxide (Velphoro) 500 MG chewable  tablet Chew 1 tablet 3 (Three) Times a Day.       triamcinolone (KENALOG) 0.1 % cream APPLY TWICE A DAY TO ITCHY SKIN FOR UP TO 2 WEEKS PER MONTH AS NEEDED        Allergies:  Cefepime, Doxycycline, Gabapentin, Ranolazine, Cephalosporins, Hydralazine, Augmentin [amoxicillin-pot clavulanate], Biaxin [clarithromycin], Coreg [carvedilol], Crestor [rosuvastatin calcium], Iodine, Lipitor [atorvastatin], Lisinopril, Livalo [pitavastatin], Nortriptyline hcl, Pravastatin, and Questran [cholestyramine]    Vital Signs  Resp:  [18] 18    Physical Exam:   Nursing student at bedside. No family present.   Alert and oriented x4, Awake and sitting on edge of bed, pleasant, male  NCAT, EOMI, MMM  Nonlabored respirations on supplemental O2   Absent pedal pulses bilaterally  RIGHT TMA wound with overlying gangrenous/ischemic changes, no erythema or drianage. +TTP. Edema up to thigh.   LEFT distal great and third toe wound with great toe dorsal ulceration. Toes erythematous. No drainage. Minimal TTP. Edema from foot up to thigh.   Cooperative, good insight and judgement   Speech normal and following commands appropriately       Results Review:    I reviewed the patient's new clinical results.  I reviewed the patient's new imaging results and agree with the interpretation.    Assessment   Right Lower Extremity Critical Limb Ischemia  - 4/10/24 (Candie): RIGHT lower extremity tibial angioplasty   - 5/14/24 (Raissa): RIGHT lower extremity arteriogram with angioplasty and drug coated balloon of RIGHT posterior tibial artery and   RIGHT peroneal artery, anterior tibial artery, proximal superficial femoral artery angioplasty.   - [facility] 8/14/24 XR: Right toe metatarsal osteomyelitis is suspected.  - [facility] 8/15/24 arterial and venous studies with no DVT. LEFT TBI 0.38. Tibial disease noted.     Plan   - scheduled for RIGHT BKA on 8/29/24 with Dr. Schreiber  - NPO and consent ordered   - home Eliquis held in anticipation of OR   - start heparin DVT  ppx, hold on morning of surgery   - Cont ASA   - pain control prn  - abx per ID  - local wound care to LEFT foot and BLE ACE compression, orders placed  - cardiology consulted for second opinion regarding aortic valve stenosis and replacement per patient request. Extensive cardiac history of CABG, NSTEMI, cardiac arrest.  - please see attending attestation for all other recommendations     Plan reviewed with the patient, nursing and wife over the phone. All questions answered.     Chastity Suarez PA-C  Vascular Surgery   8/27/24 09:03 EDT

## 2024-08-26 NOTE — CONSULTS
"Erlin Savage  1950  [id number]    Date of Consult: 8/26/2024    Admit Date:  8/26/2024      Requesting Provider: Dr JOHNATHON Rowe  Evaluating Physician: Marlo Heaton MD    Chief Complaint: right foot infection    Reason for Consultation: right foot infection    History of present illness:     Patient is a 74 y.o.  Yr old male noncompliant previously, with prior history of  end-stage renal disease with intermittent hemodialysis via right chest central line.  He has known peripheral arterial disease with right leg angiogram on April 10 with recanalization of SFA/PTA; post procedure with continued worsening pain at the right distal leg, gangrene at his right second/third and fourth toes with second worse than third worse than fourth.   MRI right foot was soft tissue edema, no loculated collection with abnormality at the posterior calcaneus difficult to completely exclude osteomyelitis at that site as per radiology.     4/19/24 Dr Santos   \"Procedures: Procedure(s):  RIGHT Foot  Second toe amputation\"     4/23/24  Dr Santos .  \"Procedures: Procedure(s):  RIGHT Foot     Amputation hallux  Amputation third toe  Amputation fourth toe  Amputation fifth toe\"     Pathology from April 23, 2024 with no osteomyelitis.  Pathology had noted cellulitis and gangrenous necrosis.  Transitioned to IV vancomycin and oral Augmentin at discharge.     Revascularization on May 14, 2024 by Dr. Schreiber  \"PROCEDURES WITH LATERALITY:   Ultrasound guided access of left common femoral artery with micropuncture needle and images stored  Abd Aorotgram  Right lower extremity arteriogram  Balloon angioplasty of right posterior tibial artery with a 3 mm balloon  Balloon angioplasty of right posterior tibial artery origin4 mm balloon drug-coated  Balloon angioplasty of right peroneal artery with 3 mm balloon  Balloon angioplasty of right anterior tibial artery 3 mm balloon  Balloon angioplasty of right proximal superficial femoral artery with a 5 " "mm balloon\"     Readmitted August 14 with deterioration at the right foot over unspecified period of time per patient, at least weeks if not longer with large wound, exposed bone on the medial side.     8/20/24 he  refused recommendation of amputation from surgical team and left [facility].     8/26/24 readmitted on August 26 with reports to me that patient now agreeable to right side amputation by Dr. Schreiber;  Right foot  pain is sharp, nonradiating, constant, worse with palpation, better with pain meds and 3-5/10     No headache photophobia or neck stiffness. No shortness of breath cough or hemoptysis. No nausea vomiting diarrhea or abdominal pain. No dysuria hematuria or pyuria. No other new skin rash.     Review of Systems     8/26/24      Constitutional-- No Fever, chills or sweats.  Appetite good. No malaise.  Heent-- No new vision, hearing or throat complaints.  No epistaxis or oral sores.  Denies odynophagia or dysphagia.  No headache, photophobia or neck stiffness.  CV-- No chest pain, palpitation or syncope  Resp-- No SOB, cough, or hemoptysis  GI- No nausea, vomiting, or diarrhea.   -- No dysuria, hematuria, or flank pain.  Denies hesitancy, urgency or flank pain.  Lymph- no swollen lymph nodes in neck, axilla or groin.   Heme- No active bruising or bleeding  MS-- no swelling or pain in the bones or joints of arms or legs.  No new back pain.  Neuro-- No acute focal weakness or numbness in the arms or legs.  Skin-- No rash    Past Medical History:   Diagnosis Date    Anxiety     Anxiety disorder     Back pain     Chronic back pain     work related injury    CKD (chronic kidney disease)     most recent creatinine 2.7 on 01/26/16    Coronary artery disease     Diabetes mellitus     insulin dependent diabetes Onset 2010    Dyslipidemia     Hyperlipidemia     Hypertension     Ischemic heart disease     Renal failure     Stroke     TIA / transient right vision loss age 42    TIA (transient ischemic attack)     " "Vertigo        Past Surgical History:   Procedure Laterality Date    APPENDECTOMY      CARDIAC CATHETERIZATION      CHOLECYSTECTOMY  03/2012    COLONOSCOPY      CORONARY ARTERY BYPASS GRAFT      ENDOSCOPY         Pediatric History   Patient Parents    Not on file     Other Topics Concern    Not on file   Social History Narrative    Not on file       family history is not on file.    Allergies   Allergen Reactions    Cefepime Other (See Comments) and Seizure     myoclonus    Doxycycline Other (See Comments)     Joint pain, tongue swelling    \"Body locks up\"    Gabapentin Confusion    Ranolazine Dizziness and Other (See Comments)     Severe tremors/ shakiness    Cephalosporins Unknown - Low Severity    Hydralazine Unknown - Low Severity    Augmentin [Amoxicillin-Pot Clavulanate] Palpitations    Biaxin [Clarithromycin] Palpitations    Coreg [Carvedilol] Itching    Crestor [Rosuvastatin Calcium] Itching     Itching rash    Iodine Rash     \"pineda skin\"    Lipitor [Atorvastatin] Itching     And Crestor- generalized weakness and chest tightness    Lisinopril Cough     Cough /weakness, nauseas and dirrhea    Livalo [Pitavastatin] Unknown - Low Severity     Chest tightness    Nortriptyline Hcl Other (See Comments)     Arthralgias    Pravastatin Unknown - Low Severity     Chest , neck and arm discomfort    Questran [Cholestyramine] Unknown - Low Severity       Medication:  Current Facility-Administered Medications   Medication Dose Route Frequency Provider Last Rate Last Admin    sennosides-docusate (PERICOLACE) 8.6-50 MG per tablet 2 tablet  2 tablet Oral BID PRN Lisa Rowe DO        And    polyethylene glycol (MIRALAX) packet 17 g  17 g Oral Daily PRN Lisa Rowe DO        And    bisacodyl (DULCOLAX) EC tablet 5 mg  5 mg Oral Daily PRN Lisa Rowe DO        And    bisacodyl (DULCOLAX) suppository 10 mg  10 mg Rectal Daily PRN Lisa Rowe DO        meropenem (MERREM) 1,000 mg in sodium " chloride 0.9 % 100 mL MBP  1,000 mg Intravenous Once Lisa Rowe DO        [START ON 8/27/2024] meropenem (MERREM) 500 mg in sodium chloride 0.9 % 100 mL MBP  500 mg Intravenous Q24H Lisa Rowe DO        nitroglycerin (NITROSTAT) SL tablet 0.4 mg  0.4 mg Sublingual Q5 Min PRN Lisa Rowe DO        ondansetron ODT (ZOFRAN-ODT) disintegrating tablet 4 mg  4 mg Oral Q6H PRN Lisa Rowe DO        Or    ondansetron (ZOFRAN) injection 4 mg  4 mg Intravenous Q6H PRN Lisa Rowe DO        Pharmacy to dose vancomycin   Does not apply Continuous PRN Lisa Rowe DO        sodium chloride 0.9 % flush 10 mL  10 mL Intravenous Q12H Lisa Rowe DO        sodium chloride 0.9 % flush 10 mL  10 mL Intravenous PRN Lisa Rowe,         sodium chloride 0.9 % infusion 40 mL  40 mL Intravenous PRN Lisa Rowe DO        vancomycin (dosing per levels)   Does not apply Daily Kyrie Walsh, Aniceto        vancomycin IVPB 1750 mg in 0.9% Sodium Chloride (premix) 500 mL  1,750 mg Intravenous Once Kyrie Walsh PharmD           Antibiotics:  Anti-Infectives (From admission, onward)      Ordered     Dose/Rate Route Frequency Start Stop    08/26/24 1304  meropenem (MERREM) 500 mg in sodium chloride 0.9 % 100 mL MBP        Ordering Provider: Lisa Rowe DO    500 mg  over 3 Hours Intravenous Every 24 Hours 08/27/24 1600 09/03/24 1559    08/26/24 1306  vancomycin IVPB 1750 mg in 0.9% Sodium Chloride (premix) 500 mL        Ordering Provider: Kyrie Walsh PharmD    1,750 mg  285.7 mL/hr over 105 Minutes Intravenous Once 08/26/24 1800      08/26/24 1304  meropenem (MERREM) 1,000 mg in sodium chloride 0.9 % 100 mL MBP        Ordering Provider: Lisa Rowe DO    1,000 mg  over 30 Minutes Intravenous Once 08/26/24 1500      08/26/24 1306  vancomycin (dosing per levels)        Ordering Provider: Freeborn, Kyrie, PharmD     Does not apply Daily 08/26/24 1400 08/29/24 0859     08/26/24 1223  Pharmacy to dose vancomycin        Ordering Provider: Lisa Rowe DO     Does not apply Continuous PRN 08/26/24 1223 08/29/24 1222                 Physical Exam:   Vital Signs   Resp 18       GENERAL: Awake and alert, in no acute distress.  Appears chronically debilitated  HEENT: Normocephalic, atraumatic.    No conjunctival injection. No icterus. Oropharynx clear without evidence of thrush or exudate.   NECK: Supple without nuchal rigidity. No mass.  LYMPH: No cervical, axillary or inguinal lymphadenopathy.  HEART: RRR; No murmur.  LUNGS: Clear to auscultation bilaterally without wheezing, rales, rhonchi. Normal respiratory effort. Nonlabored.  ABDOMEN: Soft, nontender, nondistended. Positive bowel sounds. No rebound or guarding.  EXT:  No cyanosis, clubbing or edema.  MSK: FROM without joint effusions noted arms/legs.    SKIN: Warm and dry without cutaneous eruptions on Inspection/palpation.    NEURO: Oriented to PPT. Generally fatigued     Left foot first and third toe distally with small black areas,  toes with only minimal erythema, no discrete mass bulge or fluctuance.  No crepitus or bulla     Right foot distally with tissue defect, slough and black eschar and exposed bone.  Vague erythema and diffuse pain.  No discrete mass bulge or fluctuance.  No crepitus or bulla.     No peripheral stigmata/phenomenon of endocarditis    Laboratory Data    Results from last 7 days   Lab Units 08/26/24  1145   WBC 10*3/mm3 11.95*   HEMOGLOBIN g/dL 10.3*   HEMATOCRIT % 34.4*   PLATELETS 10*3/mm3 251     Results from last 7 days   Lab Units 08/26/24  1145   SODIUM mmol/L 129*   POTASSIUM mmol/L 5.9*   CHLORIDE mmol/L 93*   CO2 mmol/L 23.0   BUN mg/dL 43*   CREATININE mg/dL 5.63*   GLUCOSE mg/dL 239*   CALCIUM mg/dL 9.4     Results from last 7 days   Lab Units 08/26/24  1145   ALK PHOS U/L 199*   BILIRUBIN mg/dL 0.5   ALT (SGPT) U/L 14   AST (SGOT) U/L 19               CrCl cannot be calculated (Unknown  ideal weight.).      Microbiology:      Radiology:  Imaging Results (Last 72 Hours)       ** No results found for the last 72 hours. **              Impression:     --Acute right foot surgical site/wound infection/cellulitis, exposed bone on the medial side consistent with first metatarsal osteomyelitis and likely sequela to ischemia with peripheral vascular disease and other comorbidity as above. High risk for further serious morbidity and other serious sequela. They understand risk for persistent/recurrent or nonhealing wounds, persistent/progressive or recurrent infection and risk for further functional/limb loss, amputation, chronic pain etc. They know antibiotics and surgery not a guarantee for cure. These risks will persist. Timing/option threshold for surgery per surgical team at their discretion.  Vascular team has recommended amputation which patient has previously refused, left the hospital AGAINST MEDICAL ADVICE on August 20 but subsequently readmitted August 26 and agreeable to surgical recommendation    --Peripheral vascular disease.  Prior interventions as above.    --Left foot with small blackened areas at first/third toe likely sequela to ischemia.  Vascular team to help determine options and salvageability as above    --End-stage renal disease on hemodialysis Monday/Wednesday/Friday.    --Diabetes.  Glucose control per medicine team    --Cephalosporin and doxycycline intolerances in the past.  Has tolerated penicillin class    PLAN: Thank you for asking us to see Erlin Savage, I recommend the following:      --IV vancomycin, merrem     **Cx at [facility] with PSA x 2 (zosyn DD, not ideal EDWIN), enterococcus and corynebac      --check/review labs cultures and scans     --Partial history per nursing staff     --Discussed with microbiology     --D/w Dr Rowe     --Highly complex set of issues with high risk for further serious morbidity and other serious sequela     --Monitor IV and IV antibiotic with risk  for systemic complication and potential drug interactions       Marlo Heaton MD  8/26/2024

## 2024-08-26 NOTE — TELEPHONE ENCOUNTER
[facility] CALLED REQUESTING VERBALS FOR A NEW START OF CARE FOR PT,OT,SKILLED NURSING.        PLEASE ADVISE

## 2024-08-26 NOTE — PLAN OF CARE
Goal Outcome Evaluation:           Progress: no change  Outcome Evaluation: Patient AOX4, 3l NC, NSR. Patient received dialysis M, W, F. Today in dialysis they removed off 3L . Patient has IV antibiotics ordered to be given. WOC, PT/OT,  Liver and PT Wound care will need to be followed up tomorrow patient was in dialysis. Strict I's & O's, daily weigh and education done regarding Incentive spirometry. Plan on 8/29 for patient to have R BKA. Patient c/o bilateral foot pain, meds given per MAR. Will continue to monitor.

## 2024-08-26 NOTE — LETTER
EMS Transport Request  For use at [facility], [place], [place], [place], and [place] only   Patient Name: Erlin Savage : 1950   Weight:84.8 kg (186 lb 14.4 oz) [facility] BLS/ALS: BLS/ALS: BLS   Insurance: MEDICARE Auth End Date:    Pre-Cert #: D/C Summary complete:    Destination: [facility]   Contact Precautions: None   Equipment (O2, Fluids, etc.): None   Arrive By Date/Time:  2024 by 1600 Stretcher/WC: Stretcher   CM Requesting: Verona Stroud RN Ext: [phone]   Notes/Medical Necessity:  New right BKA;  Impaired trunk control     ______________________________________________________________________    *Only 2 patient bags OR 1 carry-on size bag are permitted.  Wheelchairs and walkers CANNOT transported with the patient. Acknowledge: Yes

## 2024-08-27 ENCOUNTER — APPOINTMENT (OUTPATIENT)
Dept: ULTRASOUND IMAGING | Facility: HOSPITAL | Age: 74
End: 2024-08-27
Payer: MEDICARE

## 2024-08-27 LAB
ALBUMIN SERPL-MCNC: 3.1 G/DL (ref 3.5–5.2)
ALBUMIN/GLOB SERPL: 1 G/DL
ALP SERPL-CCNC: 176 U/L (ref 39–117)
ALT SERPL W P-5'-P-CCNC: 14 U/L (ref 1–41)
ANION GAP SERPL CALCULATED.3IONS-SCNC: 15 MMOL/L (ref 5–15)
ANISOCYTOSIS BLD QL: NORMAL
AST SERPL-CCNC: 20 U/L (ref 1–40)
BASOPHILS # BLD AUTO: 0.06 10*3/MM3 (ref 0–0.2)
BASOPHILS NFR BLD AUTO: 0.6 % (ref 0–1.5)
BILIRUB SERPL-MCNC: 0.4 MG/DL (ref 0–1.2)
BUN SERPL-MCNC: 31 MG/DL (ref 8–23)
BUN/CREAT SERPL: 7 (ref 7–25)
CALCIUM SPEC-SCNC: 9 MG/DL (ref 8.6–10.5)
CHLORIDE SERPL-SCNC: 95 MMOL/L (ref 98–107)
CO2 SERPL-SCNC: 22 MMOL/L (ref 22–29)
CREAT SERPL-MCNC: 4.41 MG/DL (ref 0.76–1.27)
DEPRECATED RDW RBC AUTO: 66.5 FL (ref 37–54)
EGFRCR SERPLBLD CKD-EPI 2021: 13.5 ML/MIN/1.73
EOSINOPHIL # BLD AUTO: 0.32 10*3/MM3 (ref 0–0.4)
EOSINOPHIL NFR BLD AUTO: 3.3 % (ref 0.3–6.2)
ERYTHROCYTE [DISTWIDTH] IN BLOOD BY AUTOMATED COUNT: 20.3 % (ref 12.3–15.4)
GLOBULIN UR ELPH-MCNC: 3.1 GM/DL
GLUCOSE BLDC GLUCOMTR-MCNC: 207 MG/DL (ref 70–130)
GLUCOSE BLDC GLUCOMTR-MCNC: 210 MG/DL (ref 70–130)
GLUCOSE BLDC GLUCOMTR-MCNC: 227 MG/DL (ref 70–130)
GLUCOSE BLDC GLUCOMTR-MCNC: 258 MG/DL (ref 70–130)
GLUCOSE SERPL-MCNC: 226 MG/DL (ref 65–99)
HCT VFR BLD AUTO: 32.9 % (ref 37.5–51)
HGB BLD-MCNC: 9.8 G/DL (ref 13–17.7)
IMM GRANULOCYTES # BLD AUTO: 0.07 10*3/MM3 (ref 0–0.05)
IMM GRANULOCYTES NFR BLD AUTO: 0.7 % (ref 0–0.5)
LYMPHOCYTES # BLD AUTO: 0.62 10*3/MM3 (ref 0.7–3.1)
LYMPHOCYTES NFR BLD AUTO: 6.4 % (ref 19.6–45.3)
MAGNESIUM SERPL-MCNC: 1.9 MG/DL (ref 1.6–2.4)
MCH RBC QN AUTO: 27.2 PG (ref 26.6–33)
MCHC RBC AUTO-ENTMCNC: 29.8 G/DL (ref 31.5–35.7)
MCV RBC AUTO: 91.4 FL (ref 79–97)
MONOCYTES # BLD AUTO: 1 10*3/MM3 (ref 0.1–0.9)
MONOCYTES NFR BLD AUTO: 10.3 % (ref 5–12)
NEUTROPHILS NFR BLD AUTO: 7.66 10*3/MM3 (ref 1.7–7)
NEUTROPHILS NFR BLD AUTO: 78.7 % (ref 42.7–76)
NRBC BLD AUTO-RTO: 0 /100 WBC (ref 0–0.2)
PLAT MORPH BLD: NORMAL
PLATELET # BLD AUTO: 245 10*3/MM3 (ref 140–450)
PMV BLD AUTO: 10 FL (ref 6–12)
POTASSIUM SERPL-SCNC: 5.6 MMOL/L (ref 3.5–5.2)
PROT SERPL-MCNC: 6.2 G/DL (ref 6–8.5)
RBC # BLD AUTO: 3.6 10*6/MM3 (ref 4.14–5.8)
SODIUM SERPL-SCNC: 132 MMOL/L (ref 136–145)
VANCOMYCIN SERPL-MCNC: 19 MCG/ML (ref 5–40)
WBC MORPH BLD: NORMAL
WBC NRBC COR # BLD AUTO: 9.73 10*3/MM3 (ref 3.4–10.8)

## 2024-08-27 PROCEDURE — 85007 BL SMEAR W/DIFF WBC COUNT: CPT | Performed by: FAMILY MEDICINE

## 2024-08-27 PROCEDURE — 97110 THERAPEUTIC EXERCISES: CPT

## 2024-08-27 PROCEDURE — 97530 THERAPEUTIC ACTIVITIES: CPT

## 2024-08-27 PROCEDURE — 76981 USE PARENCHYMA: CPT

## 2024-08-27 PROCEDURE — 97162 PT EVAL MOD COMPLEX 30 MIN: CPT

## 2024-08-27 PROCEDURE — 99232 SBSQ HOSP IP/OBS MODERATE 35: CPT | Performed by: INTERNAL MEDICINE

## 2024-08-27 PROCEDURE — 83735 ASSAY OF MAGNESIUM: CPT | Performed by: FAMILY MEDICINE

## 2024-08-27 PROCEDURE — 99221 1ST HOSP IP/OBS SF/LOW 40: CPT | Performed by: PHYSICIAN ASSISTANT

## 2024-08-27 PROCEDURE — 85025 COMPLETE CBC W/AUTO DIFF WBC: CPT | Performed by: FAMILY MEDICINE

## 2024-08-27 PROCEDURE — 80053 COMPREHEN METABOLIC PANEL: CPT | Performed by: FAMILY MEDICINE

## 2024-08-27 PROCEDURE — 80202 ASSAY OF VANCOMYCIN: CPT

## 2024-08-27 PROCEDURE — 25010000002 MEROPENEM PER 100 MG: Performed by: FAMILY MEDICINE

## 2024-08-27 PROCEDURE — 97166 OT EVAL MOD COMPLEX 45 MIN: CPT

## 2024-08-27 PROCEDURE — 25010000002 HEPARIN (PORCINE) PER 1000 UNITS: Performed by: PHYSICIAN ASSISTANT

## 2024-08-27 PROCEDURE — 82948 REAGENT STRIP/BLOOD GLUCOSE: CPT

## 2024-08-27 PROCEDURE — 25010000002 HYDROMORPHONE PER 4 MG: Performed by: INTERNAL MEDICINE

## 2024-08-27 PROCEDURE — 63710000001 ONDANSETRON ODT 4 MG TABLET DISPERSIBLE: Performed by: FAMILY MEDICINE

## 2024-08-27 PROCEDURE — 97535 SELF CARE MNGMENT TRAINING: CPT

## 2024-08-27 PROCEDURE — 76705 ECHO EXAM OF ABDOMEN: CPT

## 2024-08-27 RX ORDER — OXYCODONE HYDROCHLORIDE 10 MG/1
10 TABLET ORAL EVERY 4 HOURS
Status: DISCONTINUED | OUTPATIENT
Start: 2024-08-27 | End: 2024-08-27

## 2024-08-27 RX ORDER — HEPARIN SODIUM 5000 [USP'U]/ML
5000 INJECTION, SOLUTION INTRAVENOUS; SUBCUTANEOUS EVERY 8 HOURS SCHEDULED
Status: DISCONTINUED | OUTPATIENT
Start: 2024-08-27 | End: 2024-08-29

## 2024-08-27 RX ORDER — OXYCODONE HYDROCHLORIDE 10 MG/1
10 TABLET ORAL EVERY 4 HOURS
Status: DISCONTINUED | OUTPATIENT
Start: 2024-08-27 | End: 2024-08-31

## 2024-08-27 RX ORDER — HYDROMORPHONE HYDROCHLORIDE 1 MG/ML
0.5 INJECTION, SOLUTION INTRAMUSCULAR; INTRAVENOUS; SUBCUTANEOUS
Status: DISPENSED | OUTPATIENT
Start: 2024-08-27 | End: 2024-09-01

## 2024-08-27 RX ORDER — CASTOR OIL AND BALSAM, PERU 788; 87 MG/G; MG/G
1 OINTMENT TOPICAL DAILY
Status: DISCONTINUED | OUTPATIENT
Start: 2024-08-27 | End: 2024-09-05 | Stop reason: HOSPADM

## 2024-08-27 RX ADMIN — OXYCODONE HYDROCHLORIDE 10 MG: 10 TABLET ORAL at 12:13

## 2024-08-27 RX ADMIN — CALCITRIOL CAPSULES 0.25 MCG 0.5 MCG: 0.25 CAPSULE ORAL at 09:34

## 2024-08-27 RX ADMIN — Medication 1 APPLICATION: at 16:40

## 2024-08-27 RX ADMIN — HYDROMORPHONE HYDROCHLORIDE 0.5 MG: 1 INJECTION, SOLUTION INTRAMUSCULAR; INTRAVENOUS; SUBCUTANEOUS at 09:42

## 2024-08-27 RX ADMIN — Medication 1 TABLET: at 09:35

## 2024-08-27 RX ADMIN — HEPARIN SODIUM 5000 UNITS: 5000 INJECTION INTRAVENOUS; SUBCUTANEOUS at 14:26

## 2024-08-27 RX ADMIN — HEPARIN SODIUM 5000 UNITS: 5000 INJECTION INTRAVENOUS; SUBCUTANEOUS at 20:11

## 2024-08-27 RX ADMIN — Medication 10 ML: at 20:11

## 2024-08-27 RX ADMIN — PANTOPRAZOLE SODIUM 40 MG: 40 TABLET, DELAYED RELEASE ORAL at 09:35

## 2024-08-27 RX ADMIN — Medication 10 ML: at 09:36

## 2024-08-27 RX ADMIN — HYDRALAZINE HYDROCHLORIDE 10 MG: 10 TABLET, FILM COATED ORAL at 09:35

## 2024-08-27 RX ADMIN — ONDANSETRON 4 MG: 4 TABLET, ORALLY DISINTEGRATING ORAL at 09:38

## 2024-08-27 RX ADMIN — OXYCODONE HYDROCHLORIDE 10 MG: 10 TABLET ORAL at 01:49

## 2024-08-27 RX ADMIN — MEROPENEM 500 MG: 500 INJECTION, POWDER, FOR SOLUTION INTRAVENOUS at 16:34

## 2024-08-27 RX ADMIN — HYDRALAZINE HYDROCHLORIDE 10 MG: 10 TABLET, FILM COATED ORAL at 20:10

## 2024-08-27 RX ADMIN — ASPIRIN 81 MG: 81 TABLET, COATED ORAL at 09:34

## 2024-08-27 RX ADMIN — OXYCODONE HYDROCHLORIDE 10 MG: 10 TABLET ORAL at 16:36

## 2024-08-27 RX ADMIN — HYDRALAZINE HYDROCHLORIDE 10 MG: 10 TABLET, FILM COATED ORAL at 16:35

## 2024-08-27 RX ADMIN — OXYCODONE HYDROCHLORIDE 10 MG: 10 TABLET ORAL at 20:10

## 2024-08-27 NOTE — PLAN OF CARE
Goal Outcome Evaluation:  Plan of Care Reviewed With: patient           Outcome Evaluation: PT initial Eval completed.  Pt presents with generalized weakness, R foot pain, balance deficits, decreased functional endurance, SOA/GLASS, and decreased indep and safety with functional mobility compared to baseline.  STS x 2 reps from EOB at elevated bed height.  On 1st rep, required minAx1 and FWW while being WBAT BLE.  On 2nd rep, attempted to maintain NWB RLE in preparation for upcoming planned R BKA and required modAx2 with FWW and unable to fully maintain NWB RLE.  Limited today by decreased activity tolerance, reported SOA, and fatigue.  Pt will benefit from skilled IP PT to improve indep and safety with mobility and promote return to PLOF.  Rec IPR upon d/c for best fxl outcome.      Anticipated Discharge Disposition (PT): inpatient rehabilitation facility

## 2024-08-27 NOTE — PROGRESS NOTES
LOS: 1 day    Patient Care Team:  Joe Diallo MD as PCP - General (Family Medicine)  Fadi Garner MD as Consulting Physician (General Surgery)  Amor Celeste MD (Cardiology)  Praveen Novoa MD as Consulting Physician (Nephrology)  Kristin Moya MD as Consulting Physician (Pulmonary Disease)    Subjective     No new events    Objective     Vital Signs:  Blood pressure 130/86, pulse 94, temperature 97.6 °F (36.4 °C), temperature source Oral, resp. rate 20, SpO2 95%.      Intake/Output Summary (Last 24 hours) at 8/27/2024 1016  Last data filed at 8/27/2024 0800  Gross per 24 hour   Intake 1620 ml   Output 3000 ml   Net -1380 ml        08/26 0701 - 08/27 0700  In: 1380 [P.O.:1380]  Out: 3000     Physical Exam:        General Appearance: WDWN NAD  Neuro:   awake alert   Psych:  Normal mood and affect, cooperative  CV:   No edema  Lungs: respirations regular and unlabored  Abdomen: not distended  :  No du  Skin: No rash, Warm and dry.  + Dressing over right lower extremity.        Labs:  Results from last 7 days   Lab Units 08/27/24  0549 08/26/24  1145   WBC 10*3/mm3 9.73 11.95*   HEMOGLOBIN g/dL 9.8* 10.3*   PLATELETS 10*3/mm3 245 251     Results from last 7 days   Lab Units 08/27/24  0549 08/26/24  1145   SODIUM mmol/L 132* 129*   POTASSIUM mmol/L 5.6* 5.9*   CHLORIDE mmol/L 95* 93*   CO2 mmol/L 22.0 23.0   BUN mg/dL 31* 43*   CREATININE mg/dL 4.41* 5.63*   CALCIUM mg/dL 9.0 9.4   MAGNESIUM mg/dL 1.9 2.3   ALBUMIN g/dL 3.1* 3.3*     Results from last 7 days   Lab Units 08/27/24  0549   ALK PHOS U/L 176*   BILIRUBIN mg/dL 0.4   ALT (SGPT) U/L 14   AST (SGOT) U/L 20                   CrCl cannot be calculated (Unknown ideal weight.).         A/P:    ESRD: On HD MWF.  HD today to keep on schedule.     HTN: Blood pressure stable.  Monitor for now.     Hyperkalemia: Potassium elevated at 5.6  Adjust with HD.  Low potassium diet.     Hyponatremia: Sodium low at 129.  Likely due to  underlying renal failure.  Continue fluid rate restriction and adjust with HD.     Metabolic acidosis: Stable.  Adjust with HD.     Volume: Appears stable today.  UF as tolerated.     Peripheral vascular disease: Patient with nonhealing right transmetatarsal amputation site with dry gangrene.   Vascular surgery evaluating.  Scheduled for BKA 8/29/2024.       Christophe Smith MD  08/27/24  10:16 EDT

## 2024-08-27 NOTE — THERAPY EVALUATION
Patient Name: Erlin Savage  : 1950    MRN: [id number]                              Today's Date: 2024       Admit Date: 2024    Visit Dx:     ICD-10-CM ICD-9-CM   1. Dry gangrene  I96 785.4     Patient Active Problem List   Diagnosis    Coronary artery disease involving native coronary artery of native heart without angina pectoris    Essential hypertension    Hyperlipidemia LDL goal <70    Type 2 diabetes mellitus with kidney complication, with long-term current use of insulin    TIA (transient ischemic attack)    Ischemic heart disease    Chronic back pain    Anxiety disorder    Stage 5 chronic kidney disease on chronic dialysis    Neuralgia    Long term (current) use of insulin    Secondary pulmonary arterial hypertension    Peritonitis, unspecified    ESRD (end stage renal disease)    Iron deficiency anemia    Severe mitral valve regurgitation    CAD (coronary artery disease)    NSTEMI (non-ST elevated myocardial infarction)    Type 2 diabetes mellitus with diabetic chronic kidney disease    Elevated troponin    Atrial fibrillation and flutter    COPD (chronic obstructive pulmonary disease)    On home oxygen therapy    Macrocytic anemia    Thrombocytopenia    Severe malnutrition    Severe tricuspid regurgitation    Chronic diastolic (congestive) heart failure    Chronic systolic (congestive) heart failure    Dry gangrene     Past Medical History:   Diagnosis Date    Anxiety     Anxiety disorder     Back pain     Chronic back pain     work related injury    CKD (chronic kidney disease)     most recent creatinine 2.7 on 16    Coronary artery disease     Diabetes mellitus     insulin dependent diabetes Onset     Dyslipidemia     Hyperlipidemia     Hypertension     Ischemic heart disease     Renal failure     Stroke     TIA / transient right vision loss age 42    TIA (transient ischemic attack)     Vertigo      Past Surgical History:   Procedure Laterality Date    APPENDECTOMY       CARDIAC CATHETERIZATION      CHOLECYSTECTOMY  03/2012    COLONOSCOPY      CORONARY ARTERY BYPASS GRAFT      ENDOSCOPY        General Information       Row Name 08/27/24 1213          OT Time and Intention    Document Type evaluation  -JY     Mode of Treatment occupational therapy  -JY       Row Name 08/27/24 1213          General Information    Patient Profile Reviewed yes  -JY     Prior Level of Function independent:;all household mobility;gait;transfer;bed mobility;community mobility;w/c or scooter;feeding;grooming;cooking;min assist:;dressing;bathing;mod assist:;home management;cleaning;dependent:;driving  pt I in most ADLs until recently now req A for LB dressing, bathing d/t foot deficits; I in cooking; shared IADLs w/ S.O., using motorized chair in community (~ 2 wks), has 4WW pt uses to get to motorized chair in home, has manual w/c using prior, 1 fall  -JY     Existing Precautions/Restrictions fall;cardiac;oxygen therapy device and L/min;other (see comments)  currently WBAT BLE - planned R BKA 8/29/24, extensive cardiac hx  -JY     Barriers to Rehab medically complex;previous functional deficit  -JY       Row Name 08/27/24 1213          Occupational Profile    Environmental Supports and Barriers (Occupational Profile) elevated toilet with arm supports, has sstep over tub/shower combo adn walk in shower however currently sponge bathing; DME: has motorized w/c, manual w/c, FWW, 4WW, lift chair with pt using motorized chair most often in last 2 weeks since obtaining  -JY     Patient Goals (Occupational Profile) to return to PLOF, prepare toward planned R BKA 8/29/24  -J       Row Name 08/27/24 1213          Living Environment    People in Home significant other;other (see comments)  pt reports S.O disabled and not able to provide much assistance  -Giiv       Row Name 08/27/24 1213          Home Main Entrance    Number of Stairs, Main Entrance none;other (see comments)  ramp to enter  -       Row Name 08/27/24  "1213          Stairs Within Home, Primary    Stairs, Within Home, Primary 1 step to garage that pt does not have to use at d/c, bedroom, bathroom and kitchen all on main level  -JY     Number of Stairs, Within Home, Primary one  -JY     Stair Railings, Within Home, Primary none  -JY       Row Name 08/27/24 1213          Cognition    Orientation Status (Cognition) oriented x 4  -JY       Row Name 08/27/24 1213          Safety Issues, Functional Mobility    Safety Issues Affecting Function (Mobility) ability to follow commands;at risk behavior observed;awareness of need for assistance;insight into deficits/self-awareness;judgment;positioning of assistive device;problem-solving;safety precaution awareness;safety precautions follow-through/compliance;sequencing abilities  -JY     Impairments Affecting Function (Mobility) balance;endurance/activity tolerance;pain;sensation/sensory awareness;shortness of breath;strength  -JY     Comment, Safety Issues/Impairments (Mobility) pt alert and able to follow commands; attempted to facilitate NWB RLE in preparation for planned R BKA 8/29/24 and pt not consistently compliant in adherence; fatigued easily with reported increase in SOA with more static and dynamic demands; pt frequently said, \" I can't do this\" and appeared more anxious pre and during tasks  -JY               User Key  (r) = Recorded By, (t) = Taken By, (c) = Cosigned By      Initials Name Provider Type    Autumn Bahena OT Occupational Therapist                     Mobility/ADL's       Row Name 08/27/24 1226          Bed Mobility    Bed Mobility sit-supine  -JY     Supine-Sit [place] (Bed Mobility) not tested;other (see comments)  pt sitting on EOB upon arrival  -JY     Sit-Supine [place] (Bed Mobility) contact guard;verbal cues  -JY     Bed Mobility, Safety Issues decreased use of legs for bridging/pushing;impaired trunk control for bed mobility  -JY     Assistive Device (Bed Mobility) bed rails;head " "of bed elevated  -[place]     Comment, (Bed Mobility) skilled cues for optimal seq for advancing LEs back up to bed height, lowering self to bed surface and turning back toward supine at midline; pt sat EOB with varying positions often extended at trunk, leaning to sides  -[place]       Row Name 08/27/24 1226          Transfers    Transfers sit-stand transfer;stand-sit transfer  -J     Comment, (Transfers) skilled cues for optimal hand placement for controlled ascend, descend specifically to push up from seated surface, reach back prior to sitting once aligned and in close proximity to seated surface attempting to adhere to NWB RLE for prep training ahead of planned R BKA on 8/29/24; in initial stand pt did not adhere to NWB and stood with less A and more stability; during consecutive attempt pt initially demo'd NWB then put weight through RLE for balance and then resumed partial NWB with A and grossly req'd more A due to unsteadiness; fatigued easily trying to push through UEs and req'd return to sitting; pt rated PRE  during standing and attempting NWB at 7/10  -HouzeMe       Row Name 08/27/24 1226          Sit-Stand Transfer    Sit-Stand [place] (Transfers) minimum assist (75% patient effort);moderate assist (50% patient effort);2 person assist;verbal cues  -JY     Assistive Device (Sit-Stand Transfers) walker, front-wheeled  -[place]       Row Name 08/27/24 1226          Stand-Sit Transfer    Stand-Sit [place] (Transfers) moderate assist (50% patient effort);2 person assist;verbal cues  -JY     Assistive Device (Stand-Sit Transfers) walker, front-wheeled  -BAL     Comment, (Stand-Sit Transfer) pt descended with less than optimal control and \"plopped\" down to bed with increased fatigue; cued pt on more safe descend  -[place]       Row Name 08/27/24 1226          Functional Mobility    Functional Mobility- Ind. Level not tested  -[place]     Functional Mobility- Comment defer to PT for specifics  -[place]       Row Name 08/27/24 1226    "       Activities of Daily Living    BADL Assessment/Intervention upper body dressing;lower body dressing;grooming  -JY       Row Name 08/27/24 1226          Mobility    Extremity Weight-bearing Status left lower extremity;right lower extremity  -JY     Left Lower Extremity (Weight-bearing Status) weight-bearing as tolerated (WBAT)  -JY     Right Lower Extremity (Weight-bearing Status) weight-bearing as tolerated (WBAT)  -JY       Row Name 08/27/24 1226          Upper Body Dressing Assessment/Training    [place] Level (Upper Body Dressing) upper body dressing skills;not tested  -J     Comment, (Upper Body Dressing) pt already donned in button front shirt and declined any exchange of top however based on skill set anticipate minimal or less A w/ d/d UB garments  -JY       Row Name 08/27/24 1226          Lower Body Dressing Assessment/Training    [place] Level (Lower Body Dressing) doff;don;socks;moderate assist (50% patient effort);dependent (less than 25% patient effort)  -J     Position (Lower Body Dressing) edge of bed sitting  -JY     Comment, (Lower Body Dressing) pt grasped sock and initiated threading over L foot w/ careful attention toward wounds, pt u/a to complete full donning at LLE and was dep at RLE; pt very fatigued with attempt at LLE; reports use of LH reacher to assist with d/d pants of which were already donned and pt decliend further exchange, educated pt on careful use of AE with known wounds to decrease further injury, complications  -JY       Row Name 08/27/24 1226          Grooming Assessment/Training    [place] Level (Grooming) wash face, hands;set up;supervision  -JY     Position (Grooming) sitting up in bed  -JY               User Key  (r) = Recorded By, (t) = Taken By, (c) = Cosigned By      Initials Name Provider Type    Autumn Bahena OT Occupational Therapist                   Obj/Interventions       Row Name 08/27/24 1400          Sensory Assessment (Somatosensory)     Sensory Assessment (Somatosensory) bilateral UE;sensation intact  -     Bilateral UE Sensory Assessment general sensation;light touch awareness;intact  -JY     Sensory Assessment denies any numbness or tingling and able to recognize LT stimuli as intact and symmetrical at BUEs, reports decreased sensory awareness at forefoot toward toes B feet  -FABRIZIO       [facility] Name 08/27/24 1400          Vision Assessment/Intervention    Visual Impairment/Limitations corrective lenses for reading  -     Vision Assessment Comment denies any acute changes to vision  -[facility] Name 08/27/24 1400          Range of Motion Comprehensive    General Range of Motion bilateral upper extremity ROM WFL  -[facility] Name 08/27/24 1400          Strength Comprehensive (MMT)    General Manual Muscle Testing (MMT) Assessment upper extremity strength deficits identified  -JY     Comment, General Manual Muscle Testing (MMT) Assessment BUE MMS grossly 4/5 per MMT  -[facility] Name 08/27/24 1400          Motor Skills    Motor Skills functional endurance  -J     Functional Endurance decreased activity tolerance toward more dynamic demands issa standing attempting to adhere to NWB at RLE in prep for planned procedure on 8/29/24 and when scooting at EOB to obtain improved bed mobility  -[facility] Name 08/27/24 1400          Balance    Balance Assessment sitting static balance;sitting dynamic balance;standing static balance  -JY     Static Sitting Balance standby assist  -JY     Dynamic Sitting Balance contact guard  -JY     Position, Sitting Balance unsupported;sitting edge of bed  -JY     Static Standing Balance moderate assist;maximum assist;2-person assist;verbal cues  -JY     Position/Device Used, Standing Balance supported;walker, front-wheeled  -JY     Balance Interventions sitting;static;dynamic;sit to stand;supported;occupation based/functional task  -JY     Comment, Balance pt able to sit at EOB without support, as pt fatigued at  EOB leaned L & R yet able to come back to midline, in standing pt req'd greater A when attempting NWB at RLE yet when pt bears weight through BLE less A x 2 provided  -JY               User Key  (r) = Recorded By, (t) = Taken By, (c) = Cosigned By      Initials Name Provider Type    Autumn Bahena, OT Occupational Therapist                   Goals/Plan       Row Name 08/27/24 1416          Transfer Goal 1 (OT)    Activity/Assistive Device (Transfer Goal 1, OT) sit-to-stand/stand-to-sit;bed-to-chair/chair-to-bed;commode, bedside without drop arms;walker, rolling  -JY     [place] Level/Cues Needed (Transfer Goal 1, OT) moderate assist (50-74% patient effort);verbal cues required;nonverbal cues (demo/gesture) required  -JY     Time Frame (Transfer Goal 1, OT) long term goal (LTG);by discharge  -JY     Strategies/Barriers (Transfers Goal 1, OT) while adhering to NWB at RLE when tolerable to practice toward precautions following planned procedure  -JY     Progress/Outcome (Transfer Goal 1, OT) new goal  -JY       Row Name 08/27/24 1416          Dressing Goal 1 (OT)    Activity/Device (Dressing Goal 1, OT) upper body dressing;lower body dressing;other (see comments)  d/d TB garments with AE PRN w/ close monitoring of LEs d/t skin integrity  -JY     Hill/Cues Needed (Dressing Goal 1, OT) minimum assist (75% or more patient effort);moderate assist (50-74% patient effort)  -JY     Time Frame (Dressing Goal 1, OT) short term goal (STG);5 days  -JY     Progress/Outcome (Dressing Goal 1, OT) new goal  -JY       Row Name 08/27/24 1416          Toileting Goal 1 (OT)    Activity/Device (Toileting Goal 1, OT) adjust/manage clothing;perform perineal hygiene;commode, bedside without drop arms;grab bar/safety frame;raised toilet seat  -JY     [place] Level/Cues Needed (Toileting Goal 1, OT) moderate assist (50-74% patient effort);verbal cues required  -JY     Time Frame (Toileting Goal 1, OT) long term goal  (LTG);by discharge  -JY     Progress/Outcome (Toileting Goal 1, OT) new goal  -       Row Name 08/27/24 1416          Strength Goal 1 (OT)    Strength Goal 1 (OT) Pt to complete seated HEP encompassing BUEs targeting strength and endurance w/ progressive sets/reps/resistance in order to improve integration in ADLs, related t/fs, mobility  -JY     Time Frame (Strength Goal 1, OT) long term goal (LTG);by discharge  -JY     Progress/Outcome (Strength Goal 1, OT) new goal  -       Row Name 08/27/24 1416          Therapy Assessment/Plan (OT)    Planned Therapy Interventions (OT) activity tolerance training;adaptive equipment training;BADL retraining;functional balance retraining;neuromuscular control/coordination retraining;occupation/activity based interventions;passive ROM/stretching;patient/caregiver education/training;ROM/therapeutic exercise;strengthening exercise;transfer/mobility retraining  -J               User Key  (r) = Recorded By, (t) = Taken By, (c) = Cosigned By      Initials Name Provider Type    Autumn Bahena, REYNA Occupational Therapist                   Clinical Impression       Row Name 08/27/24 1406          Pain Assessment    Pretreatment Pain Rating 8/10  -JY     Posttreatment Pain Rating 5/10  -JY     Pain Location - Side/Orientation Right  -J     Pain Location - foot  -JY     Pre/Posttreatment Pain Comment pt reported pain at R foot consistently with more severe pain noted initially, appeared to improve with attempt at NWB and feet elevated in bed, pt was in dependent LE position sitting at EOB upon OT arrival  -JY     Pain Intervention(s) Repositioned;Ambulation/increased activity;Rest;Nursing Notified  -       Row Name 08/27/24 1406          Plan of Care Review    Plan of Care Reviewed With patient  -JY     Progress no change  OT IE  -JY     Outcome Evaluation OT evaluation completed. Pt presents with decreased I in ADLs, related t/fs, mobility compared to PLOF limited by decreased  activity tolerance w/ increased PRE with more dynamic demands, muscle weakness at BUEs, impaired balance, decreaed distal reach toward LEs, pain and safety concerns including attempting to stand prior to safety readiness and not adhering to NWB at [facility] when challenged to prepare for planned procedure on [facility]. Pt req'd variable A for ADLs observed including mod A to dep A for d/d socks, spv for face and hand hygiene after SUA. Pt req'd mod A x 2 for STS when pt able to bear weight through BLEs and max A x 2 for when pt trialled NWB at [facility] and was not generally compliant throughout. Pt is below baseline occupational performance and would benefit from IPOT POC and IRF at d/c when medically ready pending progress and medical/sx POC.  -JY       Row Name 08/27/24 1406          Therapy Assessment/Plan (OT)    Patient/Family Therapy Goal Statement (OT) to return to PLOF, prepare for planned procedure at [facility] on 8/29/24  -JY     Rehab Potential (OT) good, to achieve stated therapy goals  -JY     Criteria for Skilled Therapeutic Interventions Met (OT) yes;skilled treatment is necessary  -JY     Therapy Frequency (OT) daily  -JY     Predicted Duration of Therapy Intervention (OT) 5 days  -JY       Row Name 08/27/24 1406          Therapy Plan Review/Discharge Plan (OT)    Anticipated Discharge Disposition (OT) inpatient rehabilitation facility  -J       Row Name 08/27/24 1406          Vital Signs    Pre Systolic BP Rehab 130  -JY     Pre Treatment Diastolic BP 86  -JY     Intra Systolic BP Rehab 143  -JY     Intra Treatment Diastolic BP 86  -JY     Pretreatment Heart Rate (beats/min) 91  -JY     Intratreatment Heart Rate (beats/min) 89  -JY     Pre SpO2 (%) 99  -JY     O2 Delivery Pre Treatment supplemental O2  -JY     Intra SpO2 (%) 97  -JY     O2 Delivery Intra Treatment supplemental O2  -JY     Post SpO2 (%) 99  -JY     O2 Delivery Post Treatment supplemental O2  -JY     Pre Patient Position Sitting  -JY     Intra Patient  Position Sitting  -JY       Row Name 08/27/24 1406          Positioning and Restraints    Pre-Treatment Position in bed  sitting EOB upon OT arrival  -JY     Post Treatment Position bed  -JY     In Bed notified nsg;fowlers;call light within reach;encouraged to call for assist;exit alarm on;side rails up x2;legs elevated;heels elevated  SCDs declined d/t pt attempting to pull off in bed upon OT arrival and concern for safety if pt reached toward LEs while sitting UIC  -JY               User Key  (r) = Recorded By, (t) = Taken By, (c) = Cosigned By      Initials Name Provider Type    Autumn Bahena, OT Occupational Therapist                   Outcome Measures       Row Name 08/27/24 1422          How much help from another is currently needed...    Putting on and taking off regular lower body clothing? 3  -JY     Bathing (including washing, rinsing, and drying) 2  -JY     Toileting (which includes using toilet bed pan or urinal) 2  -JY     Putting on and taking off regular upper body clothing 3  -JY     Taking care of personal grooming (such as brushing teeth) 4  -JY     Eating meals 4  -JY     AM-PAC 6 Clicks Score (OT) 18  -JY       Row Name 08/27/24 0800          How much help from another person do you currently need...    Turning from your back to your side while in flat bed without using bedrails? 4  -AF     Moving from lying on back to sitting on the side of a flat bed without bedrails? 4  -AF     Moving to and from a bed to a chair (including a wheelchair)? 3  -AF     Standing up from a chair using your arms (e.g., wheelchair, bedside chair)? 3  -AF     Climbing 3-5 steps with a railing? 3  -AF     To walk in hospital room? 3  -AF     AM-PAC 6 Clicks Score (PT) 20  -AF     Highest Level of Mobility Goal 6 --> Walk 10 steps or more  -AF       Row Name 08/27/24 1422          Functional Assessment    Outcome Measure Options AM-PAC 6 Clicks Daily Activity (OT)  -JY               User Key  (r) = Recorded By, (t)  = Taken By, (c) = Cosigned By      Initials Name Provider Type    Shayna Zaidi RN Registered Nurse    Autumn Bahena OT Occupational Therapist                    Occupational Therapy Education       Title: PT OT SLP Therapies (In Progress)       Topic: Occupational Therapy (In Progress)       Point: ADL training (In Progress)       Description:   Instruct learner(s) on proper safety adaptation and remediation techniques during self care or transfers.   Instruct in proper use of assistive devices.                  Learning Progress Summary             Patient Acceptance, E,D, NR by BAL at 8/27/2024 0955                         Point: Home exercise program (Not Started)       Description:   Instruct learner(s) on appropriate technique for monitoring, assisting and/or progressing therapeutic exercises/activities.                  Learner Progress:  Not documented in this visit.              Point: Precautions (In Progress)       Description:   Instruct learner(s) on prescribed precautions during self-care and functional transfers.                  Learning Progress Summary             Patient Acceptance, E,D, NR by BAL at 8/27/2024 0955                         Point: Body mechanics (In Progress)       Description:   Instruct learner(s) on proper positioning and spine alignment during self-care, functional mobility activities and/or exercises.                  Learning Progress Summary             Patient Acceptance, E,D, NR by BAL at 8/27/2024 0955                                         User Key       Initials Effective Dates Name Provider Type Discipline    BAL 06/16/21 -  Autumn Boucher OT Occupational Therapist OT                  OT Recommendation and Plan  Planned Therapy Interventions (OT): activity tolerance training, adaptive equipment training, BADL retraining, functional balance retraining, neuromuscular control/coordination retraining, occupation/activity based interventions, passive ROM/stretching,  patient/caregiver education/training, ROM/therapeutic exercise, strengthening exercise, transfer/mobility retraining  Therapy Frequency (OT): daily  Plan of Care Review  Plan of Care Reviewed With: patient  Progress: no change (OT IE)  Outcome Evaluation: OT evaluation completed. Pt presents with decreased I in ADLs, related t/fs, mobility compared to PLOF limited by decreased activity tolerance w/ increased PRE with more dynamic demands, muscle weakness at BUEs, impaired balance, decreaed distal reach toward LEs, pain and safety concerns including attempting to stand prior to safety readiness and not adhering to NWB at RLE when challenged to prepare for planned procedure on RLE. Pt req'd variable A for ADLs observed including mod A to dep A for d/d socks, spv for face and hand hygiene after SUA. Pt req'd mod A x 2 for STS when pt able to bear weight through BLEs and max A x 2 for when pt trialled NWB at RLE and was not generally compliant throughout. Pt is below baseline occupational performance and would benefit from IPOT POC and IRF at d/c when medically ready pending progress and medical/sx POC.     Time Calculation:   Evaluation Complexity (OT)  Review Occupational Profile/Medical/Therapy History Complexity: expanded/moderate complexity  Assessment, Occupational Performance/Identification of Deficit Complexity: 3-5 performance deficits  Clinical Decision Making Complexity (OT): detailed assessment/moderate complexity  Overall Complexity of Evaluation (OT): moderate complexity     Time Calculation- OT       Row Name 08/27/24 1424             Time Calculation- OT    OT Start Time 0955  -JY      OT Received On 08/27/24  -JY      OT Goal Re-Cert Due Date 09/06/24  -JY         Timed Charges    37958 - OT Therapeutic Activity Minutes 15  -JY      16008 - OT Self Care/Mgmt Minutes 10  -JY         Untimed Charges    OT Eval/Re-eval Minutes 55  -JY         Total Minutes    Timed Charges Total Minutes 25  -JY       Untimed Charges Total Minutes 55  -JY       Total Minutes 80  -JY                User Key  (r) = Recorded By, (t) = Taken By, (c) = Cosigned By      Initials Name Provider Type    Autumn Bahena OT Occupational Therapist                  Therapy Charges for Today       Code Description Service Date Service Provider Modifiers Qty    [id number]  OT THERAPEUTIC ACT EA 15 MIN 8/27/2024 Autumn Boucher OT GO 1    [id number]  OT SELF CARE/MGMT/TRAIN EA 15 MIN 8/27/2024 Autumn Boucher OT GO 1    [id number]  OT EVAL MOD COMPLEXITY 4 8/27/2024 Autumn Boucher OT GO 1                 Autumn Boucher OT  8/27/2024

## 2024-08-27 NOTE — CASE MANAGEMENT/SOCIAL WORK
Discharge Planning Assessment  [facility]     Patient Name: Erlin Savage  MRN: [id number]  Today's Date: 8/27/2024    Admit Date: 8/26/2024    Plan: ONGOING   Discharge Needs Assessment       Row Name 08/27/24 1230       Discharge Needs Assessment    Current Outpatient/Agency/Support Group outpatient hemodialysis      Row Name 08/27/24 1228       Living Environment    People in Home significant other    Current Living Arrangements home    Primary Care Provided by self    Provides Primary Care For no one    Family Caregiver if Needed significant other    Quality of Family Relationships supportive    Able to Return to Prior Arrangements yes       Transition Planning    Patient/Family Anticipates Transition to home with family    Patient/Family Anticipated Services at Transition     Transportation Anticipated --  TBD       Discharge Needs Assessment    Equipment Currently Used at Home power chair,(recliner lift);other (see comments);hospital bed;rollator  motorized scooter    Concerns to be Addressed discharge planning    Anticipated Changes Related to Illness inability to care for self    Equipment Needed After Discharge --  TBD    Outpatient/Agency/Support Group Needs inpatient rehabilitation facility    Discharge Facility/Level of Care Needs rehabilitation facility    Current Discharge Risk chronically ill                   Discharge Plan       Row Name 08/27/24 1230       Plan    Plan ONGOING    Patient/Family in Agreement with Plan yes    Plan Comments Met with pt at bedside for DCP.  He resides with his s/o in [facility].  At baseline, he is independent with ADLs/IADLs.  He uses a rollator or motorized scooter for mobility.  No current HH services ([facility] was following PTA).  He dialyzed on MWF at [facility] and uses BUS to/from those appts.  Confirmed he has Medicare with Rx coverage.  Plan for Right BKA on Thurs.  Anticipate need for IRF at [place].  Pt not immediately interested.  CM will  cont to follow.    Final Discharge Disposition Code 30 - still a patient                  Continued Care and Services - Admitted Since 8/26/2024    No active coordination exists for this encounter.       Expected Discharge Date and Time       Expected Discharge Date Expected Discharge Time    Sep 3, 2024            Demographic Summary       Row Name 08/27/24 1227       General Information    Admission Type inpatient    Referral Source admission list    Reason for Consult discharge planning    Preferred Language English       Contact Information    Permission Granted to Share Info With     Contact Information Obtained for                    Functional Status       Row Name 08/27/24 1228       Functional Status    Usual Activity Tolerance moderate    Current Activity Tolerance moderate       Functional Status, IADL    Medications independent    Meal Preparation independent    Housekeeping independent    Laundry independent    Shopping independent                   Psychosocial    No documentation.                  Abuse/Neglect    No documentation.                  Legal    No documentation.                  Substance Abuse    No documentation.                  Patient Forms    No documentation.                     Verona Stroud RN

## 2024-08-27 NOTE — CONSULTS
[facility], Inpatient Consult  Date of Hospital Visit: 24  Encounter Provider: Berta Ackerman PA-C    Place of Service: [facility]  Patient Name: Erlin Savage  :1950  MRN: [id number]     Primary Care Provider: Joe Diallo MD    Chief complaint: CAD and valvular heart disease    PROBLEM LIST  Valvular heart disease  Echo 2023 ([facility]): EF 43%, grade 3 diastolic dysfunction, moderate MR, moderate TR, RVSP greater than 70  Chronic systolic heart failure  Echo 2023: EF 45%  Echo 10/2023: EF 40%  CAD  S/p CABG X3  (LIMA to the LAD, SVG to diagonal, SVG to OM)  [facility] 2015: Multivessel disease with patent 3 out of 3 grafts  [facility] 3/19/2024 ([facility]): Patent LIMA to the LAD, 2 other grafts are occluded  PAD  Multiple bilateral lower extremity revascularization attempts by vascular surgery.  Poor healing right transmetatarsal amputation  PAF  Severe pulmonary hypertension  [facility] 2023: Significantly elevated left and right-sided filling pressures, preserved cardiac output, predominantly who group 2.  ESRD on HD  COPD  History of tobacco abuse  Surgical history  Appendectomy  Cholecystectomy  CABG      History of Present Illness:  Patient is a 74-year-old with extensive vascular history including CAD s/p CABG, PAD s/p multiple revascularizations of lower extremities, PAF, valvular heart disease with moderate MR and moderate TR and chronic systolic heart failure presented to [facility] after leaving [facility] AGAINST MEDICAL ADVICE.  Patient has had a poor healing right transmetatarsal amputation and it was recommended that patient undergo BKA.  He has been on IV antibiotics and being seen by infectious disease.  He is also being followed by nephrology due to his chronic kidney disease and is on hemodialysis.  Patient tells me that he just wished to have a second opinion because he was being evaluated at [facility] for possible  Adonay.  He underwent testing, including a heart catheterization back in March.  He states that he was scheduled for the procedure and then it was canceled.  He states he is unsure what precipitated the cancellation.  Patient states that he is on chronic oxygen, he does not currently have any chest pain or shortness of breath.  He has chronic swelling to his lower extremities.  He states he mostly just has pain to his right leg but the pain currently seems to be controlled.      I reviewed patient's past medical history, surgical history, family history and social history.    Current Outpatient Medications   Medication Instructions    apixaban (ELIQUIS) 5 mg, Oral, 2 Times Daily    aspirin 81 mg, Oral, Daily    B Complex-C-Folic Acid (Carrol-Do Rx) 1 MG tablet 1 tablet, Oral, Daily    calcitriol (ROCALTROL) 0.5 mcg, Oral, Daily    cholecalciferol (VITAMIN D3) 1,000 Units, Oral, Daily    dutasteride (AVODART) 0.5 mg, Oral, Daily    gentamicin (GARAMYCIN) 0.1 % cream 1 Application, Topical, Daily    glucose blood (True Metrix Blood Glucose Test) test strip Test TID     /  Dx: E11.65    hydrALAZINE (APRESOLINE) 10 mg, Oral, 3 Times Daily    insulin glargine (LANTUS, SEMGLEE) 35 Units, Daily    Insulin Pen Needle 32G X 6 MM misc 1 Needle, Does not apply, 3 Times Daily Before Meals    Mircera 200 mcg    multivitamin with minerals tablet tablet 1 tablet, Oral, Daily    mupirocin (BACTROBAN) 2 % ointment APPLY OINTMENT TOPICALLY TO CRUSTED PATCH BY LEFT EAR TWICE DAILY UNTIL FULLY HEALED    naloxone (NARCAN) 4 MG/0.1ML nasal spray 1 spray, Nasal, As Needed    NovoLOG FlexPen 12 Units, Subcutaneous, 3 Times Daily With Meals    oxyCODONE (ROXICODONE) 10 mg, Oral, Every 4 Hours PRN    pantoprazole (PROTONIX) 40 mg, Oral, Daily    promethazine (PHENERGAN) 25 MG tablet 1 tablet, Oral, Every 6 Hours PRN    Sucroferric Oxyhydroxide (Velphoro) 500 MG chewable tablet 1 tablet, Oral, 3 Times Daily    triamcinolone (KENALOG) 0.1 %  cream APPLY TWICE A DAY TO ITCHY SKIN FOR UP TO 2 WEEKS PER MONTH AS NEEDED          Scheduled Meds:aspirin, 81 mg, Oral, Daily  b complex-vitamin c-folic acid, 1 tablet, Oral, Daily  calcitriol, 0.5 mcg, Oral, Daily  heparin (porcine), 5,000 Units, Subcutaneous, Q8H  hydrALAZINE, 10 mg, Oral, TID  meropenem, 500 mg, Intravenous, Q24H  oxyCODONE, 10 mg, Oral, Q4H  pantoprazole, 40 mg, Oral, Daily  sodium chloride, 10 mL, Intravenous, Q12H  vancomycin (dosing per levels), , Does not apply, Daily      Continuous Infusions:Pharmacy to dose vancomycin,       Review of Systems   Pertinent positives and negatives noted in the HPI.         Objective:     Vitals:    08/27/24 0400 08/27/24 0600 08/27/24 0704 08/27/24 0928   BP:   154/94 130/86   BP Location:   Right arm Right arm   Patient Position:   Sitting Sitting   Pulse: 94 92 94    Resp:   18 20   Temp:   97.4 °F (36.3 °C) 97.6 °F (36.4 °C)   TempSrc:   Oral Oral   SpO2: 98% 95%  95%       There is no height or weight on file to calculate BMI.      Intake/Output Summary (Last 24 hours) at 8/27/2024 0945  Last data filed at 8/27/2024 0800  Gross per 24 hour   Intake 1620 ml   Output 3000 ml   Net -1380 ml       Vitals reviewed.   Constitutional:       Appearance: Not in distress.   Cardiovascular:      Normal rate. Regular rhythm.      Murmurs: There is a grade 2/6 high frequency blowing holosystolic murmur at the apex.      Comments: Swelling to left foot extending up to thigh.  Musculoskeletal:      Comments: Right foot with dressing in place, erythema extending up the right leg Skin:     General: Skin is warm and dry.   Neurological:      Mental Status: Alert.           Lab Review:                CBC:      Lab 08/27/24  0549 08/26/24  1145   WBC 9.73 11.95*   HEMOGLOBIN 9.8* 10.3*   HEMATOCRIT 32.9* 34.4*   PLATELETS 245 251   NEUTROS ABS 7.66* 9.88*   IMMATURE GRANS (ABS) 0.07* 0.09*   LYMPHS ABS 0.62* 0.63*   MONOS ABS 1.00* 0.93*   EOS ABS 0.32 0.34   MCV 91.4  "91.2     CMP:        Lab 08/27/24  0549 08/26/24  1145   SODIUM 132* 129*   POTASSIUM 5.6* 5.9*   CHLORIDE 95* 93*   CO2 22.0 23.0   ANION GAP 15.0 13.0   BUN 31* 43*   CREATININE 4.41* 5.63*   EGFR 13.5* 10.1*   GLUCOSE 226* 239*   CALCIUM 9.0 9.4   MAGNESIUM 1.9 2.3   TOTAL PROTEIN 6.2 6.4   ALBUMIN 3.1* 3.3*   GLOBULIN 3.1 3.1   ALT (SGPT) 14 14   AST (SGOT) 20 19   BILIRUBIN 0.4 0.5   ALK PHOS 176* 199*     Results from last 7 days   Lab Units 08/27/24  0549   MAGNESIUM mg/dL 1.9     Lab Results   Component Value Date    HGBA1C 6.20 (H) 08/26/2024     CrCl cannot be calculated (Unknown ideal weight.).  No results found for: \"DDIMER\"        Lab Results   Component Value Date    CHOL 145 02/24/2024    CHLPL 211 (H) 04/13/2016    TRIG 109 02/24/2024    HDL 49 02/24/2024    LDL 76 02/24/2024         US Liver    Result Date: 8/27/2024  Impression: SWV <1.37 m/s indicates a low probability of clinically significant fibrosis (METAVIR Stage F2 or below). No overt morphologic changes of cirrhosis by ultrasound. Small volume ascites. Cholecystectomy. Electronically Signed: Kobe Phoenix MD  8/27/2024 7:41 AM EDT  Workstation ID: [id number]           EKG: No recent EKG for evaluation    Result Review:  I have personally reviewed the results from the time of admission and agree with these findings:  [x]  Laboratory  [x]  Radiology  [x]  EKG/Telemetry   []  Pathology  []  Old records  []  Other:             Assessment:   CAD s/p CABG, without angina  Last [facility] 3/2024 with only 1/3 patent grafts  Valvular heart disease  Last echo showing moderate MR and moderate TR  Chronic heart failure, mildly reduced EF of 43%  PAF  Eliquis currently on hold in preparation for BKA  PAD with right lower extremity ischemia  Plan for right BKA 8/29 by Dr. Schreiber  Acute infection to right foot, managed by infectious disease.      Plan:   I have extensively reviewed the patient's records from [facility].  It sounds like they were evaluating the " patient for possible MitraClip and may have canceled the procedure due to patient's infection, this is unclear.  Will obtain echocardiogram for evaluation of patient's heart function and valvular heart function.  Dr. Amaral to review patient's chart extensively to give further recommendation regarding if patient is a candidate in the future for MitraClip.      Berta Ackerman PA-C     Seen Independently by JEAN Solis MD, FACC

## 2024-08-27 NOTE — PLAN OF CARE
Goal Outcome Evaluation:           Progress: no change  Outcome Evaluation: Patient AOX4, 2L NC, NSR. Patient seen by WOC, PT/OT today. Vascular placed orders for care with right wound. WOC placed orders for Left great toe and patient bottom. Patient reeducated about calf pumps and elevated left leg. Plan is still surgery for Right BKA on 8/29. Patient c/o pain throughout shift with meds given per MAR.

## 2024-08-27 NOTE — THERAPY EVALUATION
Patient Name: Erlin Savgae  : 1950    MRN: [id number]                              Today's Date: 2024       Admit Date: 2024    Visit Dx:     ICD-10-CM ICD-9-CM   1. Dry gangrene  I96 785.4     Patient Active Problem List   Diagnosis    Coronary artery disease involving native coronary artery of native heart without angina pectoris    Essential hypertension    Hyperlipidemia LDL goal <70    Type 2 diabetes mellitus with kidney complication, with long-term current use of insulin    TIA (transient ischemic attack)    Ischemic heart disease    Chronic back pain    Anxiety disorder    Stage 5 chronic kidney disease on chronic dialysis    Neuralgia    Long term (current) use of insulin    Secondary pulmonary arterial hypertension    Peritonitis, unspecified    ESRD (end stage renal disease)    Iron deficiency anemia    Severe mitral valve regurgitation    CAD (coronary artery disease)    NSTEMI (non-ST elevated myocardial infarction)    Type 2 diabetes mellitus with diabetic chronic kidney disease    Elevated troponin    Atrial fibrillation and flutter    COPD (chronic obstructive pulmonary disease)    On home oxygen therapy    Macrocytic anemia    Thrombocytopenia    Severe malnutrition    Severe tricuspid regurgitation    Chronic diastolic (congestive) heart failure    Chronic systolic (congestive) heart failure    Dry gangrene     Past Medical History:   Diagnosis Date    Anxiety     Anxiety disorder     Back pain     Chronic back pain     work related injury    CKD (chronic kidney disease)     most recent creatinine 2.7 on 16    Coronary artery disease     Diabetes mellitus     insulin dependent diabetes Onset     Dyslipidemia     Hyperlipidemia     Hypertension     Ischemic heart disease     Renal failure     Stroke     TIA / transient right vision loss age 42    TIA (transient ischemic attack)     Vertigo      Past Surgical History:   Procedure Laterality Date    APPENDECTOMY       "CARDIAC CATHETERIZATION      CHOLECYSTECTOMY  03/2012    COLONOSCOPY      CORONARY ARTERY BYPASS GRAFT      ENDOSCOPY        General Information       Row Name 08/27/24 1320          Physical Therapy Time and Intention    Document Type evaluation  -BA     Mode of Treatment physical therapy  -       Row Name 08/27/24 1320          General Information    Patient Profile Reviewed yes  -BA     Prior Level of Function independent:;all household mobility;community mobility;gait;transfer;w/c or scooter;bed mobility;ADL's;cooking;mod assist:;home management;cleaning;dependent:;driving  Reported either uses rollator or \"furniture walks\" for shorter distances in home. Uses manual w/c for longer community distances. Has primarily been using new motorized chair for last ~2 wks. Hx falls, w/ last fall ~2 mon ago. Uses public transportation.  -BA     Existing Precautions/Restrictions cardiac;fall;oxygen therapy device and L/min;other (see comments)  nonhealing R TMA wound; planned R BKA 8/29/24; attempted NWB RLE in anticipation of upcoming R BKA; L great toe and 3rd toe wound; WBAT LLE per MD; BLE edema; extensive cardiac hx  -BA     Barriers to Rehab medically complex;previous functional deficit  -       Row Name 08/27/24 1320          Living Environment    People in Home significant other  -       Row Name 08/27/24 1320          Home Main Entrance    Number of Stairs, Main Entrance none;one;other (see comments)  has ramp at primary entrance; has 1 step when entering home from garage (pt reported he does not access garage)  -     Stair Railings, Main Entrance none  -       Row Name 08/27/24 1320          Stairs Within Home, Primary    Number of Stairs, Within Home, Primary none  -BA       Row Name 08/27/24 1320          Cognition    Orientation Status (Cognition) oriented x 3  -BA       Row Name 08/27/24 1320          Safety Issues, Functional Mobility    Safety Issues Affecting Function (Mobility) awareness of need " for assistance;insight into deficits/self-awareness;judgment;positioning of assistive device;problem-solving;safety precaution awareness;safety precautions follow-through/compliance;sequencing abilities;at risk behavior observed  -     Impairments Affecting Function (Mobility) balance;coordination;endurance/activity tolerance;motor planning;pain;postural/trunk control;range of motion (ROM);sensation/sensory awareness;shortness of breath;strength  -     Comment, Safety Issues/Impairments (Mobility) Decreased safety awareness throughout and with poor insight into deficits.  Attempted NWB RLE in anticipation of upcominig R BKA planned for 8/29/24; pt had difficulty fully maintaining NWB RLE.  Would attempt, but would frequently become frustrated and appeared anxious at times.  -               User Key  (r) = Recorded By, (t) = Taken By, (c) = Cosigned By      Initials Name Provider Type    Peace Lowe, PT Physical Therapist                   Mobility       Row Name 08/27/24 1348          Bed Mobility    Bed Mobility scooting/bridging;sit-supine  -     Scooting/Bridging [place] (Bed Mobility) contact guard;1 person assist;verbal cues;nonverbal cues (demo/gesture)  -     Sit-Supine [place] (Bed Mobility) contact guard;verbal cues  -     Assistive Device (Bed Mobility) bed rails;head of bed elevated  -     Comment, (Bed Mobility) Received sitting EOB upon arrival.  Pt declined attempt at sitting upright in recliner chair despite increased edu, motivation, and encouragement; requested to stay in bed.  Performed 4-5 seated lateral scoots along EOB toward R side up toward HOB for optimal repositioning prior to transition to supine in bed.  VCs/TCs for sequencing, hand placement, and to attempt to maintain NWB RLE; pt able to maintain NWB RLE with all bed mobility.  -       Row Name 08/27/24 1348          Sit-Stand Transfer    Sit-Stand [place] (Transfers) minimum assist (75% patient  "effort);1 person assist;moderate assist (50% patient effort);2 person assist;verbal cues;nonverbal cues (demo/gesture)  -     Assistive Device (Sit-Stand Transfers) walker, front-wheeled  -BA     Comment, (Sit-Stand Transfer) STS x 2 reps from EOB at elevated bed height.  On 1st rep, pt stood with minAx1 while being WBAT BLE.  On 2nd rep, attempted to maintain NWB RLE and required modAx2; unable to fully maintain NWB RLE and had to place RLE down on ground for added support and assist.  Once acheiving full standing position, then picked RLE up off ground again to maintain NWB RLE.  Poor control with descent to seated position and would \"plop\" down without reaching back for seated surface.  VCs/TCs for optimal pre-positioning, sequencing, hand placement, to maintain NWB RLE, and improved safety awareness with attempt at more controlled lowering to seated position and to reach back with an UE to feel for seated surface prior to sitting down.  -       Row Name 08/27/24 1348          Mobility    Extremity Weight-bearing Status left lower extremity;right lower extremity  -     Left Lower Extremity (Weight-bearing Status) weight-bearing as tolerated (WBAT)  per MD  -ARNALDO     Right Lower Extremity (Weight-bearing Status) weight-bearing as tolerated (WBAT);other (see comments)  WBAT as of current; attempted to keep pt NWB RLE during session in anticipation of upcoming R BKA.  -               User Key  (r) = Recorded By, (t) = Taken By, (c) = Cosigned By      Initials Name Provider Type    Peace Lowe, PT Physical Therapist                   Obj/Interventions       Row Name 08/27/24 1416          Range of Motion Comprehensive    General Range of Motion lower extremity range of motion deficits identified  -     Comment, General Range of Motion AROM B hip and knee WFL.  Mild decreased AROM B ankle DF to ~neutral d/t tightness, weakness, and edema.  AAROM L ankle DF to ~5 degrees.  AAROM R ankle DF not " formally assessed d/t nonhealing R TMA wound and planned R BKA.  -       Row Name 08/27/24 1416          Strength Comprehensive (MMT)    General Manual Muscle Testing (MMT) Assessment lower extremity strength deficits identified  -     Comment, General Manual Muscle Testing (MMT) Assessment Grossly B hip 3/5, B knee ext 3+/5, B knee flex 4-/5, L ankle 4-/5.  -       Row Name 08/27/24 1416          Motor Skills    Motor Skills coordination;functional endurance  -     Coordination gross motor deficit;bilateral;lower extremity;minimal impairment  -     Functional Endurance Decreased functional endurance.  Easily fatigues with activity with reported SOA/GLASS.  Required several rest breaks throughout activity.  O2 sats stable b/w % on 5L throughout.  -     Therapeutic Exercise knee  -       Row Name 08/27/24 1416          Knee (Therapeutic Exercise)    Knee (Therapeutic Exercise) strengthening exercise  -     Knee Strengthening (Therapeutic Exercise) bilateral;SLR (straight leg raise);supine;3 repetitions  -       Row Name 08/27/24 1416          Balance    Balance Assessment sitting static balance;sitting dynamic balance;sit to stand dynamic balance;standing static balance  -     Static Sitting Balance standby assist  -     Dynamic Sitting Balance contact guard  -     Position, Sitting Balance unsupported;sitting edge of bed  -     Sit to Stand Dynamic Balance minimal assist;1-person assist;moderate assist;2-person assist;verbal cues;non-verbal cues (demo/gesture)  -     Static Standing Balance moderate assist;2-person assist;verbal cues  -     Position/Device Used, Standing Balance supported;walker, front-wheeled  -     Balance Interventions sitting;sit to stand;standing;supported;static;dynamic;occupation based/functional task  -     Comment, Balance Progressive unsteadiness with static standing with FWW with attempt at maintaining NWB RLE.  Pt easily fatigues with noted BUE  shakiness and begins to minimally place RLE down on ground for added support.  No overt LOB noted throughout.  Decreased toleration to standing position to ~1-2 minute interval.  -       Row Name 08/27/24 1416          Sensory Assessment (Somatosensory)    Sensory Assessment (Somatosensory) bilateral LE  -BA     Bilateral LE Sensory Assessment light touch awareness;impaired;other (see comments)  Reported N/T from distal to B ankle and primarily in B forefeet.  -BA               User Key  (r) = Recorded By, (t) = Taken By, (c) = Cosigned By      Initials Name Provider Type    Peace Lowe, PT Physical Therapist                   Goals/Plan       Row Name 08/27/24 1441          Bed Mobility Goal 1 (PT)    Activity/Assistive Device (Bed Mobility Goal 1, PT) sit to supine/supine to sit  -BA     [place] Level/Cues Needed (Bed Mobility Goal 1, PT) standby assist  -BA     Time Frame (Bed Mobility Goal 1, PT) short term goal (STG);5 days  -       Row Name 08/27/24 1441          Transfer Goal 1 (PT)    Activity/Assistive Device (Transfer Goal 1, PT) sit-to-stand/stand-to-sit;bed-to-chair/chair-to-bed;wheelchair transfer;walker, rolling;other (see comments)  with maintaining NWB RLE in anticipation of upcoming R BKA  -BA     [place] Level/Cues Needed (Transfer Goal 1, PT) moderate assist (50-74% patient effort)  -BA     Time Frame (Transfer Goal 1, PT) long term goal (LTG);10 days  -       Row Name 08/27/24 1441          Gait Training Goal 1 (PT)    Activity/Assistive Device (Gait Training Goal 1, PT) gait (walking locomotion);assistive device use;walker, rolling;maintain weight-bearing status;other (see comments)  with maintaining NWB RLE in anticipation of upcoming R BKA  -BA     [place] Level (Gait Training Goal 1, PT) maximum assist (25-49% patient effort)  -BA     Distance (Gait Training Goal 1, PT) 5ft  -BA     Time Frame (Gait Training Goal 1, PT) long term goal (LTG);10 days  -BA        Row Name 08/27/24 1441          Therapy Assessment/Plan (PT)    Planned Therapy Interventions (PT) balance training;bed mobility training;gait training;home exercise program;motor coordination training;neuromuscular re-education;patient/family education;postural re-education;ROM (range of motion);strengthening;transfer training  -               User Key  (r) = Recorded By, (t) = Taken By, (c) = Cosigned By      Initials Name Provider Type     Peace Berg, PT Physical Therapist                   Clinical Impression       Row Name 08/27/24 1431          Pain    Pretreatment Pain Rating 8/10  -BA     Posttreatment Pain Rating 5/10  -BA     Pain Location - Side/Orientation Right  -     Pain Location lower  -     Pain Location - extremity;foot  -     Pre/Posttreatment Pain Comment Reported c/o pain primarily in R foot and forefoot area.  RN notified and reported recently gave pt pain meds just prior to therapy session.  Following therapy session, pt reported improved pain, but also requesting more pain meds.  Tolerated activity; RN notified and managing.  -     Pain Intervention(s) Ambulation/increased activity;Repositioned;Rest;Elevated;Nursing Notified  -[facility] Name 08/27/24 1431          Plan of Care Review    Plan of Care Reviewed With patient  -BA     Outcome Evaluation PT initial Eval completed.  Pt presents with generalized weakness, R foot pain, balance deficits, decreased functional endurance, SOA/GLASS, and decreased indep and safety with functional mobility compared to baseline.  STS x 2 reps from EOB at elevated bed height.  On 1st rep, required minAx1 and FWW while being WBAT BLE.  On 2nd rep, attempted to maintain NWB RLE in preparation for upcoming planned R BKA and required modAx2 with FWW and unable to fully maintain NWB RLE.  Limited today by decreased activity tolerance, reported SOA, and fatigue.  Pt will benefit from skilled IP PT to improve indep and safety with mobility and  promote return to PLOF.  Rec IPR upon d/c for best fxl outcome.  -       Row Name 08/27/24 1431          Therapy Assessment/Plan (PT)    Patient/Family Therapy Goals Statement (PT) to return home and to PLOF  -     Rehab Potential (PT) fair, will monitor progress closely  -     Criteria for Skilled Interventions Met (PT) yes;meets criteria;skilled treatment is necessary  -     Therapy Frequency (PT) daily  -     Predicted Duration of Therapy Intervention (PT) 10 days  -       Row Name 08/27/24 1431          Vital Signs    Pre Systolic BP Rehab 130  taken prior to PT arrival  -BA     Pre Treatment Diastolic BP 86  -BA     Post Systolic BP Rehab 143  -BA     Post Treatment Diastolic BP 86  -BA     Pretreatment Heart Rate (beats/min) 91  -BA     Posttreatment Heart Rate (beats/min) 90  -BA     Pre SpO2 (%) 98  -BA     O2 Delivery Pre Treatment nasal cannula  5L  -BA     Intra SpO2 (%) 97  -BA     O2 Delivery Intra Treatment nasal cannula  5L  -BA     Post SpO2 (%) 100  -BA     O2 Delivery Post Treatment nasal cannula  5L  -BA     Pre Patient Position Sitting  -BA     Intra Patient Position Standing  -BA     Post Patient Position Supine  -BA       Row Name 08/27/24 1431          Positioning and Restraints    Pre-Treatment Position in bed  -BA     Post Treatment Position bed  -BA     In Bed notified nsg;side lying right;fowlers;call light within reach;encouraged to call for assist;exit alarm on;side rails up x2;legs elevated;heels elevated  Pt in semi R sidelying for repositioning and pressure relief with pillows placed underneath pt's back side.  -               User Key  (r) = Recorded By, (t) = Taken By, (c) = Cosigned By      Initials Name Provider Type    Peace Lowe, PT Physical Therapist                   Outcome Measures       Row Name 08/27/24 1446 08/27/24 0800       How much help from another person do you currently need...    Turning from your back to your side while in flat bed  without using bedrails? 3  -BA 4  -AF    Moving from lying on back to sitting on the side of a flat bed without bedrails? 3  -BA 4  -AF    Moving to and from a bed to a chair (including a wheelchair)? 2  -BA 3  -AF    Standing up from a chair using your arms (e.g., wheelchair, bedside chair)? 2  -BA 3  -AF    Climbing 3-5 steps with a railing? 1  -BA 3  -AF    To walk in hospital room? 2  -BA 3  -AF    AM-PAC 6 Clicks Score (PT) 13  -BA 20  -AF    Highest Level of Mobility Goal 4 --> Transfer to chair/commode  -BA 6 --> Walk 10 steps or more  -AF      Row Name 08/27/24 1446 08/27/24 1422       Functional Assessment    Outcome Measure Options AM-PAC 6 Clicks Basic Mobility (PT)  Assessed/evaluated with attempt at maintaining NWB RLE in anticipation of upcoming R BKA.  -BA AM-PAC 6 Clicks Daily Activity (OT)  -BAL              User Key  (r) = Recorded By, (t) = Taken By, (c) = Cosigned By      Initials Name Provider Type    AF Shayna Miller RN Registered Nurse    Autumn Bahena, OT Occupational Therapist     Peace Berg, PT Physical Therapist                                 Physical Therapy Education       Title: PT OT SLP Therapies (In Progress)       Topic: Physical Therapy (Done)       Point: Mobility training (Done)       Learning Progress Summary             Patient Acceptance, E, VU,NR by  at 8/27/2024 1449                         Point: Home exercise program (Done)       Learning Progress Summary             Patient Acceptance, E, VU,NR by  at 8/27/2024 1449                         Point: Body mechanics (Done)       Learning Progress Summary             Patient Acceptance, E, VU,NR by  at 8/27/2024 1449                         Point: Precautions (Done)       Learning Progress Summary             Patient Acceptance, E, VU,NR by  at 8/27/2024 1449                                         User Key       Initials Effective Dates Name Provider Type Discipline     09/21/21 -  Liu Tapia  Peace PT Physical Therapist PT                  PT Recommendation and Plan  Planned Therapy Interventions (PT): balance training, bed mobility training, gait training, home exercise program, motor coordination training, neuromuscular re-education, patient/family education, postural re-education, ROM (range of motion), strengthening, transfer training  Plan of Care Reviewed With: patient  Outcome Evaluation: PT initial Eval completed.  Pt presents with generalized weakness, R foot pain, balance deficits, decreased functional endurance, SOA/GLASS, and decreased indep and safety with functional mobility compared to baseline.  STS x 2 reps from EOB at elevated bed height.  On 1st rep, required minAx1 and FWW while being WBAT BLE.  On 2nd rep, attempted to maintain NWB RLE in preparation for upcoming planned R BKA and required modAx2 with FWW and unable to fully maintain NWB RLE.  Limited today by decreased activity tolerance, reported SOA, and fatigue.  Pt will benefit from skilled IP PT to improve indep and safety with mobility and promote return to PLOF.  Rec IPR upon d/c for best fxl outcome.     Time Calculation:   PT Evaluation Complexity  History, PT Evaluation Complexity: 3 or more personal factors and/or comorbidities  Examination of Body Systems (PT Eval Complexity): total of 3 or more elements  Clinical Presentation (PT Evaluation Complexity): evolving  Clinical Decision Making (PT Evaluation Complexity): moderate complexity  Overall Complexity (PT Evaluation Complexity): moderate complexity     PT Charges       Row Name 08/27/24 1450             Time Calculation    Start Time 1012  -BA      PT Received On 08/27/24  -BA      PT Goal Re-Cert Due Date 09/06/24  -BA         Time Calculation- PT    Total Timed Code Minutes- PT 23 minute(s)  -BA         Timed Charges    54859 - PT Therapeutic Exercise Minutes 6  -BA      11127 - PT Therapeutic Activity Minutes 17  -BA         Untimed Charges    PT Eval/Re-eval  Minutes 64  -BA         Total Minutes    Timed Charges Total Minutes 23  -BA      Untimed Charges Total Minutes 64  -BA       Total Minutes 87  -BA                User Key  (r) = Recorded By, (t) = Taken By, (c) = Cosigned By      Initials Name Provider Type    Peace Lowe, PT Physical Therapist                  Therapy Charges for Today       Code Description Service Date Service Provider Modifiers Qty    42057496698 HC PT THER PROC EA 15 MIN 8/27/2024 Peace Berg, PT GP 1    68894235709 HC PT THERAPEUTIC ACT EA 15 MIN 8/27/2024 Peace Berg, PT GP 1    [id number]  PT EVAL MOD COMPLEXITY 4 8/27/2024 Peace Berg, PT GP 1            PT G-Codes  Outcome Measure Options: AM-PAC 6 Clicks Basic Mobility (PT) (Assessed/evaluated with attempt at maintaining NWB RLE in anticipation of upcoming R BKA.)  AM-PAC 6 Clicks Score (PT): 13  AM-PAC 6 Clicks Score (OT): 18  PT Discharge Summary  Anticipated Discharge Disposition (PT): inpatient rehabilitation facility    Peace Berg PT  8/27/2024

## 2024-08-27 NOTE — NURSING NOTE
[facility] consult for feet.    Noted that there is plan in place for right BKA on the 29th..  Vascular placed orders for care this wound, wound was gently cleansed and Xeroform/light gauze wrapping was placed.    Left great toe ulceration x 2 and left third toe with small ulceration.  The dry callused skin was actually causing maceration so this was gently removed and the entire toe was painted with Betadine as well as the third toe.  Recommend against dressing which will cause more moisture retention against wounds.  Recommend against compression wraps due to already decreased vascular flow.  Patient is aware that these wounds are at high risk for worsening and encouraged to follow-up with vascular surgery.    Educated patient on calf pumps and elevating left leg.  Unfortunately patient has been dangling legs due to right leg pain more frequently recently.  This probably worsened his edema.          Patient has present on admission stage II pressure injury to coccyx.  Area with white dermis present, mild epidermal peeling surrounding and irritation.  Area measures: 1 x 0.8 cm.    Orders for daily dressing change with Allevyn and Venelex.    Patient on foam mattress, patient educated on need for waffle overlay but patient refused stating that this current mattress was the most comfortable hospital bed and he is able to move well in it.

## 2024-08-27 NOTE — PROGRESS NOTES
Pharmacy Consult - Vancomycin Dosing and Monitoring (Renal Dysfunction / Dialysis)    Erlin Savage is a 74 y.o. male receiving vancomycin therapy.     Indication: SSTI  Consulting Provider: Hospitalist  ID Consult: Yes    Goal Trough: 15-20 mcg/mL    Current Antimicrobial Therapy  Anti-Infectives (From admission, onward)      Ordered     Dose/Rate Route Frequency Start Stop    08/26/24 1304  meropenem (MERREM) 500 mg in sodium chloride 0.9 % 100 mL MBP        Ordering Provider: Lisa Rowe DO    500 mg  over 3 Hours Intravenous Every 24 Hours 08/27/24 1600 09/03/24 1559    08/26/24 1306  vancomycin IVPB 1750 mg in 0.9% Sodium Chloride (premix) 500 mL        Ordering Provider: Kyrie Walsh, PharmD    1,750 mg  285.7 mL/hr over 105 Minutes Intravenous Once 08/26/24 1800 08/26/24 1957    08/26/24 1304  meropenem (MERREM) 1,000 mg in sodium chloride 0.9 % 100 mL MBP        Ordering Provider: Lisa Rowe DO    1,000 mg  over 30 Minutes Intravenous Once 08/26/24 1500 08/26/24 1604    08/26/24 1306  vancomycin (dosing per levels)        Ordering Provider: Kyrie Walsh PharmD     Does not apply Daily 08/26/24 1400 08/29/24 0859    08/26/24 1223  Pharmacy to dose vancomycin        Ordering Provider: Lisa Rowe DO     Does not apply Continuous PRN 08/26/24 1223 08/29/24 1222          Allergies  Allergies as of 08/26/2024 - Reviewed 08/26/2024   Allergen Reaction Noted    Cefepime Other (See Comments) and Seizure 12/31/2023    Doxycycline Other (See Comments) 06/08/2023    Gabapentin Confusion 11/16/2023    Ranolazine Dizziness and Other (See Comments) 12/11/2023    Cephalosporins Unknown - Low Severity 02/10/2024    Hydralazine Unknown - Low Severity 03/05/2024    Augmentin [amoxicillin-pot clavulanate] Palpitations 08/16/2016    Biaxin [clarithromycin] Palpitations 08/16/2016    Coreg [carvedilol] Itching 08/16/2016    Crestor [rosuvastatin calcium] Itching 01/09/2017    Iodine Rash 10/02/2023     Lipitor [atorvastatin] Itching 08/16/2016    Lisinopril Cough 08/16/2016    Livalo [pitavastatin] Unknown - Low Severity 08/16/2016    Nortriptyline hcl Other (See Comments) 12/05/2023    Pravastatin Unknown - Low Severity 08/16/2016    Questran [cholestyramine] Unknown - Low Severity 08/16/2016     Labs  Results from last 7 days   Lab Units 08/27/24  0549 08/26/24  1145   BUN mg/dL 31* 43*   CREATININE mg/dL 4.41* 5.63*     Results from last 7 days   Lab Units 08/27/24  0549 08/26/24  1145   WBC 10*3/mm3 9.73 11.95*     Evaluation of Dosing     Last Dose Received in the ED/Outside Facility:   Is Patient on Dialysis or Renal Replacement: HD, MWF outpatient    Height -    Weight -      CrCl cannot be calculated (Unknown ideal weight.).    Intake & Output (last 3 days)       None          Microbiology  Microbiology Results (last 10 days)       Procedure Component Value - Date/Time    MRSA Screen, PCR (Inpatient) - Swab, Nares [[id number]]  (Normal) Collected: 08/26/24 1715    Lab Status: Final result Specimen: Swab from Nares Updated: 08/26/24 2124     MRSA PCR Negative    Narrative:      The negative predictive value of this diagnostic test is high and should only be used to consider de-escalating anti-MRSA therapy. A positive result may indicate colonization with MRSA and must be correlated clinically.  MRSA Negative          Vancomycin Levels  Results from last 7 days   Lab Units 08/27/24  0549   VANCOMYCIN RM mcg/mL 19.00             Assessment/Plan:  Pharmacy to dose vancomycin for SSTI. Goal trough 15-20 mcg/mL.  Patient received a loading dose of vancomycin 1750 mg (~22 mg/kg) on 8/26 after dialysis session.  Pertinent labs: Afebrile, WBC 9.73, Scr 4.41 (HD MWF)  Vancomycin AM random on 8/27 is 19 mcg/mL.   Trough is therapeutic will hold Vancomycin on 8/27.  Will order a Vancomycin AM random on 8/28 to assess level prior to 2nd HD session.   Will continue to follow HD plan and dose vancomycin s/p sessions.    Monitor renal function, cultures and sensitivities, and clinical status, and adjust regimen as necessary.    Pharmacy will continue to follow.    Kyrie Walsh, PharmD  8/27/2024  10:46 EDT

## 2024-08-27 NOTE — PLAN OF CARE
Goal Outcome Evaluation:  Plan of Care Reviewed With: patient        Progress: no change (OT IE)  Outcome Evaluation: OT evaluation completed. Pt presents with decreased I in ADLs, related t/fs, mobility compared to PLOF limited by decreased activity tolerance w/ increased PRE with more dynamic demands, muscle weakness at BUEs, impaired balance, decreaed distal reach toward LEs, pain and safety concerns including attempting to stand prior to safety readiness and not adhering to NWB at RLE when challenged to prepare for planned procedure on RLE. Pt req'd variable A for ADLs observed including mod A to dep A for d/d socks, spv for face and hand hygiene after SUA. Pt req'd mod A x 2 for STS when pt able to bear weight through BLEs and max A x 2 for when pt trialled NWB at RLE and was not generally compliant throughout. Pt is below baseline occupational performance and would benefit from IPOT POC and IRF at d/c when medically ready pending progress and medical/sx POC.      Anticipated Discharge Disposition (OT): inpatient rehabilitation facility

## 2024-08-27 NOTE — PROGRESS NOTES
[facility] Medicine Services  PROGRESS NOTE    Patient Name: Erlin Savage  : 1950  MRN: [id number]    Date of Admission: 2024  Primary Care Physician: Joe Diallo MD    Subjective   Subjective     CC: f/u PAD    HPI: Up in bed eating. Pain not optimally controlled. Sad as he thinks he is going to lose his leg.       Objective   Objective     Vital Signs:   Temp:  [97.4 °F (36.3 °C)-98.1 °F (36.7 °C)] 97.4 °F (36.3 °C)  Heart Rate:  [] 94  Resp:  [13-20] 18  BP: (123-158)/(71-95) 154/94  Flow (L/min):  [2-4] 2     Physical Exam:  Constitutional: No acute distress, awake, alert  HENT: NCAT, mucous membranes moist  Respiratory: Clear to auscultation bilaterally, respiratory effort normal   Cardiovascular: RRR, no murmurs, rubs, or gallops  Gastrointestinal: Positive bowel sounds, soft, nontender, nondistended  Musculoskeletal: LLE w/ large wound with necrotic edges, no granulation tissue noted. Right foot dressed as well.  Psychiatric: Appropriate affect, cooperative  Neurologic: Oriented x 3, strength symmetric in all extremities, Cranial Nerves grossly intact to confrontation, speech clear  Skin: No rashes     Results Reviewed:  LAB RESULTS:      Lab 24  0549 24  1145 24  1144   WBC 9.73 11.95*  --    HEMOGLOBIN 9.8* 10.3*  --    HEMATOCRIT 32.9* 34.4*  --    PLATELETS 245 251  --    NEUTROS ABS 7.66* 9.88*  --    IMMATURE GRANS (ABS) 0.07* 0.09*  --    LYMPHS ABS 0.62* 0.63*  --    MONOS ABS 1.00* 0.93*  --    EOS ABS 0.32 0.34  --    MCV 91.4 91.2  --    PROCALCITONIN  --  0.43*  --    LACTATE  --   --  1.1         Lab 24  0549 24  1145   SODIUM 132* 129*   POTASSIUM 5.6* 5.9*   CHLORIDE 95* 93*   CO2 22.0 23.0   ANION GAP 15.0 13.0   BUN 31* 43*   CREATININE 4.41* 5.63*   EGFR 13.5* 10.1*   GLUCOSE 226* 239*   CALCIUM 9.0 9.4   MAGNESIUM 1.9 2.3   HEMOGLOBIN A1C  --  6.20*         Lab 24  0549 24  1145   TOTAL  PROTEIN 6.2 6.4   ALBUMIN 3.1* 3.3*   GLOBULIN 3.1 3.1   ALT (SGPT) 14 14   AST (SGOT) 20 19   BILIRUBIN 0.4 0.5   ALK PHOS 176* 199*                     Brief Urine Lab Results  (Last result in the past 365 days)        Color   Clarity   Blood   Leuk Est   Nitrite   Protein   CREAT   Urine HCG        01/01/24 0550 Yellow   Clear     Negative                       Microbiology Results Abnormal       Procedure Component Value - Date/Time    MRSA Screen, PCR (Inpatient) - Swab, Nares [[id number]]  (Normal) Collected: 08/26/24 1715    Lab Status: Final result Specimen: Swab from Nares Updated: 08/26/24 2124     MRSA PCR Negative    Narrative:      The negative predictive value of this diagnostic test is high and should only be used to consider de-escalating anti-MRSA therapy. A positive result may indicate colonization with MRSA and must be correlated clinically.  MRSA Negative            US Liver    Result Date: 8/27/2024  US LIVER Date of Exam: 8/27/2024 5:00 AM EDT Indication: possible cirrhosis. Comparison: No comparisons available. Technique: Grayscale and color Doppler ultrasound evaluation of the right upper quadrant was performed. Elastography performed. Findings: Liver and hepatic vasculature: Echogenicity is within normal limits. No discrete contour nodularity. No focal liver lesions are displayed on this ultrasound. The imaged portal vein and hepatic veins demonstrate normal direction of flow and normal waveform on spectral imaging. Elastography values below. Gallbladder and biliary: Cholecystectomy. The common bile duct measures 9 mm, within normal limits given age and surgically absent gallbladder. Right kidney: The right kidney measures 9 cm. No hydronephrosis. Pancreas: Not well evaluated due to overlying bowel gas. Free fluid: There is small volume ascites. Liver elastography: Measurement quality: Adequate Median SWV: 0.73 m/s (see note below) Standard deviation: 0.12 Interquartile range/median (IQR/M):  0.23 (quality metric; <0.3 supports good precision) Note: SWV is a measure of liver stiffness, a surrogate marker for fibrosis. SWV <1.37 m/s indicates a low probability of clinically significant fibrosis (METAVIR Stage F2 or below) SWV >2.2 m/s indicates a high probability of clinically significant fibrosis (METAVIR Stage F3 or above) Intermediate values are indeterminant and correlation with clinical risk factors, laboratory markers, or liver biopsy may be needed to determine appropriate follow-up.      Impression: Impression: SWV <1.37 m/s indicates a low probability of clinically significant fibrosis (METAVIR Stage F2 or below). No overt morphologic changes of cirrhosis by ultrasound. Small volume ascites. Cholecystectomy. Electronically Signed: Kobe Phoenix MD  8/27/2024 7:41 AM EDT  Workstation ID: [id number]         Current medications:  Scheduled Meds:aspirin, 81 mg, Oral, Daily  b complex-vitamin c-folic acid, 1 tablet, Oral, Daily  calcitriol, 0.5 mcg, Oral, Daily  heparin (porcine), 5,000 Units, Subcutaneous, Q8H  hydrALAZINE, 10 mg, Oral, TID  meropenem, 500 mg, Intravenous, Q24H  pantoprazole, 40 mg, Oral, Daily  sodium chloride, 10 mL, Intravenous, Q12H  vancomycin (dosing per levels), , Does not apply, Daily      Continuous Infusions:Pharmacy to dose vancomycin,       PRN Meds:.  acetaminophen    senna-docusate sodium **AND** polyethylene glycol **AND** bisacodyl **AND** bisacodyl    dextrose    dextrose    glucagon (human recombinant)    heparin (porcine)    nitroglycerin    ondansetron ODT **OR** ondansetron    oxyCODONE    Pharmacy to dose vancomycin    sodium chloride    sodium chloride    Assessment & Plan   Assessment & Plan     Active Hospital Problems    Diagnosis  POA    **Dry gangrene [I96]  Yes    Chronic diastolic (congestive) heart failure [I50.32]  Yes    Chronic systolic (congestive) heart failure [I50.22]  Yes    Severe malnutrition [E43]  Yes    On home oxygen therapy [Z99.81]  Not  Applicable    Type 2 diabetes mellitus with diabetic chronic kidney disease [E11.22]  Yes    Secondary pulmonary arterial hypertension [I27.21]  Yes    Severe mitral valve regurgitation [I34.0]  Yes    Iron deficiency anemia [D50.9]  Yes    ESRD (end stage renal disease) [N18.6]  Yes    Stage 5 chronic kidney disease on chronic dialysis [N18.6, Z99.2]  Not Applicable    Ischemic heart disease [I25.9]  Yes    Coronary artery disease involving native coronary artery of native heart without angina pectoris [I25.10]  Yes    Essential hypertension [I10]  Yes    Type 2 diabetes mellitus with kidney complication, with long-term current use of insulin [E11.29, Z79.4]  Not Applicable    TIA (transient ischemic attack) [G45.9]  Yes    Hyperlipidemia LDL goal <70 [E78.5]  Yes      Resolved Hospital Problems   No resolved problems to display.        Brief Hospital Course to date:  Erlin Savage is a 74 y.o. male with a medical history significant for end-stage renal disease on HD MWF, coronary artery disease s/p CABG, A. Fib on OAC, aortic stenosis, diabetes mellitus, COPD on 2 to 4 L baseline, hyperlipidemia, hypertension and known peripheral arterial disease with nonhealing right foot transmetatarsal amputation site with dry gangrene.     Right lower extremity critical limb ischemia  Recent admission for dry gangrene  Right lower extremity cellulitis  History of nonhealing right TMA  L foot with ischemic changes of toes   BLE PVD  -My partner discussed with vascular on arrival. They are following - BKA with Dr. Schreiber 8/29.  -Followed by Dr. Heaton while admitted at [facility].  My partner discussed case with him. Continue vancomycin and Merrem.   -Schedule oxycodone q 4. Add prn dilaudid.     End-stage renal disease  Hyponatremia  Hyperkalemia  -On hemodialysis Monday/Wednesday/Friday  -Nephrology consulted.     CAD status post CABG  CHFmrEF  Valvular heart disease  -Most recent echo in system from 2023 with a EF of 43%,  severe diastolic dysfunction, moderate mitral regurgitation, moderate tricuspid regurgitation     Ascites  Lower extremity edema  -multifactorial secondary to CHF, end-stage renal disease  -Per patient, he gets monthly paracentesis at hospital in [place].  Denies any prior history of cirrhosis.     PAF  -Hold Eliquis in anticipation of surgery     DM2  -Basal bolus regimen  -Obtain hemoglobin A1c     Anemia of Chronic Disease      Chronic hypoxic respiratory failure  COPD on chronic home O2  -4L NC at baseline   -DuoNebs PRN    Expected Discharge Location and Transportation:   Expected Discharge   Expected Discharge Date: 9/2/2024; Expected Discharge Time:      VTE Prophylaxis:  Pharmacologic & mechanical VTE prophylaxis orders are present.         AM-PAC 6 Clicks Score (PT): 20 (08/26/24 2000)    CODE STATUS:   Code Status and Medical Interventions: CPR (Attempt to Resuscitate); Full Support   Ordered at: 08/26/24 1218     Level Of Support Discussed With:    Patient     Code Status (Patient has no pulse and is not breathing):    CPR (Attempt to Resuscitate)     Medical Interventions (Patient has pulse or is breathing):    Full Support       Maura Gaspar II, DO  08/27/24

## 2024-08-27 NOTE — PROGRESS NOTES
Clinical Nutrition Assessment     Patient Name: Erlin Saavge  YOB: 1950  MRN: [id number]  Date of Encounter: 08/27/24 17:36 EDT  Admission date: 8/26/2024  Reason for Visit: Identified at risk by screening criteria    Assessment   Nutrition Assessment   Admission Diagnosis:  Dry gangrene [I96]    Problem List:    Dry gangrene    Coronary artery disease involving native coronary artery of native heart without angina pectoris    Essential hypertension    Hyperlipidemia LDL goal <70    Type 2 diabetes mellitus with kidney complication, with long-term current use of insulin    TIA (transient ischemic attack)    Ischemic heart disease    Stage 5 chronic kidney disease on chronic dialysis    Secondary pulmonary arterial hypertension    ESRD (end stage renal disease)    Iron deficiency anemia    Severe mitral valve regurgitation    Type 2 diabetes mellitus with diabetic chronic kidney disease    On home oxygen therapy    Severe malnutrition    Chronic diastolic (congestive) heart failure    Chronic systolic (congestive) heart failure      PMH:   He  has a past medical history of Anxiety, Anxiety disorder, Back pain, Chronic back pain, CKD (chronic kidney disease), Coronary artery disease, Diabetes mellitus, Dyslipidemia, Hyperlipidemia, Hypertension, Ischemic heart disease, Renal failure, Stroke, TIA (transient ischemic attack), and Vertigo.    PSH:  He  has a past surgical history that includes Coronary artery bypass graft; Cardiac catheterization; Cholecystectomy (03/2012); Appendectomy; Colonoscopy; and Esophagogastroduodenoscopy.    Applicable Nutrition History:   Stage II coccyx PI  Toe ulceration - plan BKA on 8/29.    Anthropometrics     Height:  180.3 cm, 71 in  Last Filed Weight:  78.9 Kg 174 lbs  Method:    BMI:  24.3    UBW:  Per EMR post dialysis wt of 187 lbs on 8/23 Note 167 lbs on 1/29/24 and 175 lbs on 2/26/24    Weight change: uncertain 2/2 fluid status/dialysis    Nutrition  Focused Physical Exam    Date: 8/27    Pt does not meet criteria for malnutrition diagnosis, at this time.  Likely as unable to perform full exam 2/2 LE edema    Subcutaneous fat: MILD  Orbital Moderate   Triceps/Biceps No fat loss identified   Thoracic and lumbar region- ribs lower back, midaxillary line Mild     Muscle:  Temple/temporalis Mild   Clavicle- pectoralis, deltoid, trapezius Moderate   Clavicle and acromion process  Mild   Scapular bone region Moderate   Dorsal hand No muscle loss identified   Patellar  Unable to determine at this time   Quadriceps Unable to determine at this time   Calf Unable to determine at this time     Note prev dx of severe acute malnutrition in February this year.     Subjective   Reported/Observed/Food/Nutrition Related History:     8/27  Pt allows UBW of 177lbs. Allows eats ok at home. Not liking food sent to him. W menu available will use to get well liked choices including fish. Has tried suppl - sometimes does not tolerate, but sometimes can use.    Current Nutrition Prescription   PO: Diet: Cardiac, Diabetic, Renal; Healthy Heart (2-3 Na+); Consistent Carbohydrate; Low Sodium (2-3g), Low Potassium, Low Phosphorus; Fluid Consistency: Thin (IDDSI 0)  NPO Diet NPO Type: Sips with Meds  Oral Nutrition Supplement:   Intake: 2 Days: 69% x 4 meals recorded    Assessment & Plan   Nutrition Diagnosis   Date:  8/26            Updated:    Problem Increased nutrient needs    Etiology Skin breakdown    Signs/Symptoms PI  and anticipated surgical wound p BKA   Status: Active      Goal / Objectives:   Nutrition to support treatment and Increase intake      Nutrition Intervention      Follow treatment progress, Care plan reviewed, Advise alternate selection, Menu provided, Supplement provided    Novasource renal added daily  Follow p BKA     Monitoring/Evaluation:   Per protocol, I&O, PO intake, Supplement intake, Pertinent labs, Weight, Skin status, Symptoms    Nisha Banks RD  Time  Spent: 30 min

## 2024-08-28 ENCOUNTER — APPOINTMENT (OUTPATIENT)
Dept: CARDIOLOGY | Facility: HOSPITAL | Age: 74
End: 2024-08-28
Payer: MEDICARE

## 2024-08-28 ENCOUNTER — APPOINTMENT (OUTPATIENT)
Dept: NEPHROLOGY | Facility: HOSPITAL | Age: 74
End: 2024-08-28
Payer: MEDICARE

## 2024-08-28 ENCOUNTER — ANESTHESIA EVENT (OUTPATIENT)
Dept: PERIOP | Facility: HOSPITAL | Age: 74
End: 2024-08-28
Payer: MEDICARE

## 2024-08-28 LAB
ALBUMIN SERPL-MCNC: 3.4 G/DL (ref 3.5–5.2)
ALBUMIN/GLOB SERPL: 1.1 G/DL
ALP SERPL-CCNC: 198 U/L (ref 39–117)
ALT SERPL W P-5'-P-CCNC: 17 U/L (ref 1–41)
ANION GAP SERPL CALCULATED.3IONS-SCNC: 14 MMOL/L (ref 5–15)
AST SERPL-CCNC: 27 U/L (ref 1–40)
BASOPHILS # BLD AUTO: 0.11 10*3/MM3 (ref 0–0.2)
BASOPHILS NFR BLD AUTO: 1 % (ref 0–1.5)
BILIRUB SERPL-MCNC: 0.5 MG/DL (ref 0–1.2)
BUN SERPL-MCNC: 40 MG/DL (ref 8–23)
BUN/CREAT SERPL: 7.8 (ref 7–25)
CALCIUM SPEC-SCNC: 9.3 MG/DL (ref 8.6–10.5)
CHLORIDE SERPL-SCNC: 95 MMOL/L (ref 98–107)
CO2 SERPL-SCNC: 21 MMOL/L (ref 22–29)
CREAT SERPL-MCNC: 5.16 MG/DL (ref 0.76–1.27)
DEPRECATED RDW RBC AUTO: 65 FL (ref 37–54)
EGFRCR SERPLBLD CKD-EPI 2021: 11.2 ML/MIN/1.73
EOSINOPHIL # BLD AUTO: 0.36 10*3/MM3 (ref 0–0.4)
EOSINOPHIL NFR BLD AUTO: 3.3 % (ref 0.3–6.2)
ERYTHROCYTE [DISTWIDTH] IN BLOOD BY AUTOMATED COUNT: 19.9 % (ref 12.3–15.4)
GLOBULIN UR ELPH-MCNC: 3.2 GM/DL
GLUCOSE BLDC GLUCOMTR-MCNC: 121 MG/DL (ref 70–130)
GLUCOSE BLDC GLUCOMTR-MCNC: 157 MG/DL (ref 70–130)
GLUCOSE BLDC GLUCOMTR-MCNC: 230 MG/DL (ref 70–130)
GLUCOSE BLDC GLUCOMTR-MCNC: 234 MG/DL (ref 70–130)
GLUCOSE SERPL-MCNC: 178 MG/DL (ref 65–99)
HCT VFR BLD AUTO: 32.5 % (ref 37.5–51)
HGB BLD-MCNC: 9.8 G/DL (ref 13–17.7)
IMM GRANULOCYTES # BLD AUTO: 0.07 10*3/MM3 (ref 0–0.05)
IMM GRANULOCYTES NFR BLD AUTO: 0.7 % (ref 0–0.5)
LYMPHOCYTES # BLD AUTO: 0.86 10*3/MM3 (ref 0.7–3.1)
LYMPHOCYTES NFR BLD AUTO: 8 % (ref 19.6–45.3)
MAGNESIUM SERPL-MCNC: 2 MG/DL (ref 1.6–2.4)
MCH RBC QN AUTO: 27.3 PG (ref 26.6–33)
MCHC RBC AUTO-ENTMCNC: 30.2 G/DL (ref 31.5–35.7)
MCV RBC AUTO: 90.5 FL (ref 79–97)
MONOCYTES # BLD AUTO: 1.15 10*3/MM3 (ref 0.1–0.9)
MONOCYTES NFR BLD AUTO: 10.7 % (ref 5–12)
NEUTROPHILS NFR BLD AUTO: 76.3 % (ref 42.7–76)
NEUTROPHILS NFR BLD AUTO: 8.21 10*3/MM3 (ref 1.7–7)
NRBC BLD AUTO-RTO: 0 /100 WBC (ref 0–0.2)
PLATELET # BLD AUTO: 271 10*3/MM3 (ref 140–450)
PMV BLD AUTO: 10.3 FL (ref 6–12)
POTASSIUM SERPL-SCNC: 6.6 MMOL/L (ref 3.5–5.2)
PROT SERPL-MCNC: 6.6 G/DL (ref 6–8.5)
RBC # BLD AUTO: 3.59 10*6/MM3 (ref 4.14–5.8)
SODIUM SERPL-SCNC: 130 MMOL/L (ref 136–145)
VANCOMYCIN SERPL-MCNC: 14.7 MCG/ML (ref 5–40)
WBC NRBC COR # BLD AUTO: 10.76 10*3/MM3 (ref 3.4–10.8)

## 2024-08-28 PROCEDURE — 80053 COMPREHEN METABOLIC PANEL: CPT | Performed by: FAMILY MEDICINE

## 2024-08-28 PROCEDURE — 93306 TTE W/DOPPLER COMPLETE: CPT

## 2024-08-28 PROCEDURE — 93306 TTE W/DOPPLER COMPLETE: CPT | Performed by: INTERNAL MEDICINE

## 2024-08-28 PROCEDURE — 25010000002 HYDROMORPHONE PER 4 MG: Performed by: INTERNAL MEDICINE

## 2024-08-28 PROCEDURE — 25010000002 MEROPENEM PER 100 MG: Performed by: FAMILY MEDICINE

## 2024-08-28 PROCEDURE — 85025 COMPLETE CBC W/AUTO DIFF WBC: CPT | Performed by: FAMILY MEDICINE

## 2024-08-28 PROCEDURE — 25810000003 SODIUM CHLORIDE 0.9 % SOLUTION 250 ML FLEX CONT

## 2024-08-28 PROCEDURE — 25010000002 VANCOMYCIN HCL 1.25 G RECONSTITUTED SOLUTION 1 EACH VIAL

## 2024-08-28 PROCEDURE — 99232 SBSQ HOSP IP/OBS MODERATE 35: CPT | Performed by: INTERNAL MEDICINE

## 2024-08-28 PROCEDURE — 25010000002 HEPARIN (PORCINE) PER 1000 UNITS: Performed by: PHYSICIAN ASSISTANT

## 2024-08-28 PROCEDURE — 83735 ASSAY OF MAGNESIUM: CPT | Performed by: FAMILY MEDICINE

## 2024-08-28 PROCEDURE — 25010000002 HEPARIN (PORCINE) PER 1000 UNITS: Performed by: INTERNAL MEDICINE

## 2024-08-28 PROCEDURE — 82948 REAGENT STRIP/BLOOD GLUCOSE: CPT

## 2024-08-28 PROCEDURE — 80202 ASSAY OF VANCOMYCIN: CPT

## 2024-08-28 RX ORDER — SODIUM CHLORIDE 0.9 % (FLUSH) 0.9 %
10 SYRINGE (ML) INJECTION EVERY 12 HOURS SCHEDULED
Status: CANCELLED | OUTPATIENT
Start: 2024-08-28

## 2024-08-28 RX ORDER — SODIUM CHLORIDE, SODIUM LACTATE, POTASSIUM CHLORIDE, CALCIUM CHLORIDE 600; 310; 30; 20 MG/100ML; MG/100ML; MG/100ML; MG/100ML
9 INJECTION, SOLUTION INTRAVENOUS CONTINUOUS
Status: CANCELLED | OUTPATIENT
Start: 2024-08-28

## 2024-08-28 RX ORDER — FAMOTIDINE 10 MG/ML
20 INJECTION, SOLUTION INTRAVENOUS ONCE
Status: CANCELLED | OUTPATIENT
Start: 2024-08-28 | End: 2024-08-28

## 2024-08-28 RX ORDER — SODIUM CHLORIDE 0.9 % (FLUSH) 0.9 %
10 SYRINGE (ML) INJECTION AS NEEDED
Status: CANCELLED | OUTPATIENT
Start: 2024-08-28

## 2024-08-28 RX ORDER — SODIUM CHLORIDE 9 MG/ML
40 INJECTION, SOLUTION INTRAVENOUS AS NEEDED
Status: CANCELLED | OUTPATIENT
Start: 2024-08-28

## 2024-08-28 RX ADMIN — PANTOPRAZOLE SODIUM 40 MG: 40 TABLET, DELAYED RELEASE ORAL at 11:26

## 2024-08-28 RX ADMIN — Medication 10 ML: at 20:45

## 2024-08-28 RX ADMIN — HEPARIN SODIUM 5000 UNITS: 5000 INJECTION INTRAVENOUS; SUBCUTANEOUS at 15:09

## 2024-08-28 RX ADMIN — OXYCODONE HYDROCHLORIDE 10 MG: 10 TABLET ORAL at 00:15

## 2024-08-28 RX ADMIN — HYDRALAZINE HYDROCHLORIDE 10 MG: 10 TABLET, FILM COATED ORAL at 11:26

## 2024-08-28 RX ADMIN — HEPARIN SODIUM 2000 UNITS: 1000 INJECTION INTRAVENOUS; SUBCUTANEOUS at 09:55

## 2024-08-28 RX ADMIN — ASPIRIN 81 MG: 81 TABLET, COATED ORAL at 11:26

## 2024-08-28 RX ADMIN — OXYCODONE HYDROCHLORIDE 10 MG: 10 TABLET ORAL at 03:32

## 2024-08-28 RX ADMIN — HEPARIN SODIUM 5000 UNITS: 5000 INJECTION INTRAVENOUS; SUBCUTANEOUS at 05:23

## 2024-08-28 RX ADMIN — OXYCODONE HYDROCHLORIDE 10 MG: 10 TABLET ORAL at 17:35

## 2024-08-28 RX ADMIN — OXYCODONE HYDROCHLORIDE 10 MG: 10 TABLET ORAL at 20:45

## 2024-08-28 RX ADMIN — HYDROMORPHONE HYDROCHLORIDE 0.5 MG: 1 INJECTION, SOLUTION INTRAMUSCULAR; INTRAVENOUS; SUBCUTANEOUS at 12:01

## 2024-08-28 RX ADMIN — HYDRALAZINE HYDROCHLORIDE 10 MG: 10 TABLET, FILM COATED ORAL at 15:09

## 2024-08-28 RX ADMIN — Medication 1 TABLET: at 11:26

## 2024-08-28 RX ADMIN — Medication 10 ML: at 11:27

## 2024-08-28 RX ADMIN — OXYCODONE HYDROCHLORIDE 10 MG: 10 TABLET ORAL at 11:26

## 2024-08-28 RX ADMIN — HYDRALAZINE HYDROCHLORIDE 10 MG: 10 TABLET, FILM COATED ORAL at 20:45

## 2024-08-28 RX ADMIN — MEROPENEM 500 MG: 500 INJECTION, POWDER, FOR SOLUTION INTRAVENOUS at 17:35

## 2024-08-28 RX ADMIN — Medication 1 APPLICATION: at 11:25

## 2024-08-28 RX ADMIN — CALCITRIOL CAPSULES 0.25 MCG 0.5 MCG: 0.25 CAPSULE ORAL at 11:26

## 2024-08-28 RX ADMIN — VANCOMYCIN HYDROCHLORIDE 1250 MG: 1.25 INJECTION, POWDER, LYOPHILIZED, FOR SOLUTION INTRAVENOUS at 16:01

## 2024-08-28 NOTE — PLAN OF CARE
Goal Outcome Evaluation:           Progress: no change  Outcome Evaluation: Patient AOX4, 2L NC, NSR. Patient had Dialysis today with 3L removed. Patient received IV antibiotics this afternoon. Patient NPO p MN for right BKA surgery, signed consents on chart. Wound care done on bilateral extremities and Bottom today. Patient c/o pain meds given.

## 2024-08-28 NOTE — PROGRESS NOTES
LOS: 2 days   Patient Care Team:  Joe Diallo MD as PCP - General (Family Medicine)  Fadi Garner MD as Consulting Physician (General Surgery)  Amor Celeste MD (Cardiology)  Pravene Novoa MD as Consulting Physician (Nephrology)  Kristin Moya MD as Consulting Physician (Pulmonary Disease)    Chief Complaint: ESRD  Right non healing foot wound  Wound infection.    Subjective     Seen on HD tolerating well. Plan for BKA tomorrow. Hyperkalemia noted on labs.     Subjective:  Symptoms:  Stable.  No shortness of breath, chest pain or chest pressure.        History taken from: patient    Objective     Vital Sign Min/Max for last 24 hours  Temp  Min: 97.4 °F (36.3 °C)  Max: 98 °F (36.7 °C)   BP  Min: 113/66  Max: 139/83   Pulse  Min: 80  Max: 97   Resp  Min: 16  Max: 20   SpO2  Min: 91 %  Max: 99 %   Flow (L/min)  Min: 2  Max: 3.5   No data recorded         No intake/output data recorded.  I/O last 3 completed shifts:  In: 2700 [P.O.:2700]  Out: -     Objective:  General Appearance:  Comfortable.    Vital signs: (most recent): Blood pressure 128/78, pulse 91, temperature 97.6 °F (36.4 °C), temperature source Oral, resp. rate 16, SpO2 98%.  Vital signs are normal.    HEENT: Normal HEENT exam.    Lungs:  Normal effort and normal respiratory rate.  Breath sounds clear to auscultation.  He is not in respiratory distress.  No decreased breath sounds or wheezes.    Heart: Normal rate.  Regular rhythm.  S1 normal and S2 normal.  No murmur or gallop.   Abdomen: Abdomen is soft and distended.  There are signs of ascites.   Extremities: There is dependent edema and local swelling.    Skin:  There is ulceration.                Results Review:     I reviewed the patient's new clinical results.    WBC WBC   Date Value Ref Range Status   08/28/2024 10.76 3.40 - 10.80 10*3/mm3 Final   08/27/2024 9.73 3.40 - 10.80 10*3/mm3 Final   08/26/2024 11.95 (H) 3.40 - 10.80 10*3/mm3 Final      HGB Hemoglobin   Date  "Value Ref Range Status   08/28/2024 9.8 (L) 13.0 - 17.7 g/dL Final   08/27/2024 9.8 (L) 13.0 - 17.7 g/dL Final   08/26/2024 10.3 (L) 13.0 - 17.7 g/dL Final      HCT Hematocrit   Date Value Ref Range Status   08/28/2024 32.5 (L) 37.5 - 51.0 % Final   08/27/2024 32.9 (L) 37.5 - 51.0 % Final   08/26/2024 34.4 (L) 37.5 - 51.0 % Final      Platlets No results found for: \"LABPLAT\"   MCV MCV   Date Value Ref Range Status   08/28/2024 90.5 79.0 - 97.0 fL Final   08/27/2024 91.4 79.0 - 97.0 fL Final   08/26/2024 91.2 79.0 - 97.0 fL Final          Sodium Sodium   Date Value Ref Range Status   08/28/2024 130 (L) 136 - 145 mmol/L Final   08/27/2024 132 (L) 136 - 145 mmol/L Final   08/26/2024 129 (L) 136 - 145 mmol/L Final      Potassium Potassium   Date Value Ref Range Status   08/28/2024 6.6 (C) 3.5 - 5.2 mmol/L Final   08/27/2024 5.6 (H) 3.5 - 5.2 mmol/L Final   08/26/2024 5.9 (H) 3.5 - 5.2 mmol/L Final      Chloride Chloride   Date Value Ref Range Status   08/28/2024 95 (L) 98 - 107 mmol/L Final   08/27/2024 95 (L) 98 - 107 mmol/L Final   08/26/2024 93 (L) 98 - 107 mmol/L Final      CO2 CO2   Date Value Ref Range Status   08/28/2024 21.0 (L) 22.0 - 29.0 mmol/L Final   08/27/2024 22.0 22.0 - 29.0 mmol/L Final   08/26/2024 23.0 22.0 - 29.0 mmol/L Final      BUN BUN   Date Value Ref Range Status   08/28/2024 40 (H) 8 - 23 mg/dL Final   08/27/2024 31 (H) 8 - 23 mg/dL Final   08/26/2024 43 (H) 8 - 23 mg/dL Final      Creatinine Creatinine   Date Value Ref Range Status   08/28/2024 5.16 (H) 0.76 - 1.27 mg/dL Final   08/27/2024 4.41 (H) 0.76 - 1.27 mg/dL Final   08/26/2024 5.63 (H) 0.76 - 1.27 mg/dL Final      Calcium Calcium   Date Value Ref Range Status   08/28/2024 9.3 8.6 - 10.5 mg/dL Final   08/27/2024 9.0 8.6 - 10.5 mg/dL Final   08/26/2024 9.4 8.6 - 10.5 mg/dL Final      PO4 No results found for: \"CAPO4\"   Albumin Albumin   Date Value Ref Range Status   08/28/2024 3.4 (L) 3.5 - 5.2 g/dL Final   08/27/2024 3.1 (L) 3.5 - 5.2 " "g/dL Final   08/26/2024 3.3 (L) 3.5 - 5.2 g/dL Final      Magnesium Magnesium   Date Value Ref Range Status   08/28/2024 2.0 1.6 - 2.4 mg/dL Final   08/27/2024 1.9 1.6 - 2.4 mg/dL Final   08/26/2024 2.3 1.6 - 2.4 mg/dL Final      Uric Acid No results found for: \"URICACID\"     Medication Review: yes    Assessment & Plan       Dry gangrene    Coronary artery disease involving native coronary artery of native heart without angina pectoris    Essential hypertension    Hyperlipidemia LDL goal <70    Type 2 diabetes mellitus with kidney complication, with long-term current use of insulin    TIA (transient ischemic attack)    Ischemic heart disease    Stage 5 chronic kidney disease on chronic dialysis    Secondary pulmonary arterial hypertension    ESRD (end stage renal disease)    Iron deficiency anemia    Severe mitral valve regurgitation    Type 2 diabetes mellitus with diabetic chronic kidney disease    On home oxygen therapy    Severe malnutrition    Chronic diastolic (congestive) heart failure    Chronic systolic (congestive) heart failure      Assessment & Plan    ESRD: On HD MWF.  Follows [facility].    HTN: Blood pressure stable.  Monitor for now.     Hyperkalemia: Recurrent issue.      Hyponatremia: Sodium low at 129.  Likely due to underlying renal failure.  Continue fluid rate restriction and adjust with HD.     Metabolic acidosis: Stable.  Adjust with HD.     Volume: Hx of ascites and dependent edema. Requiring serial paracentesis as outpatient.      Peripheral vascular disease: Patient with nonhealing right transmetatarsal amputation site with dry gangrene and wound infection. MRI unable to rule out Osteo.   Plan for BKA 8/29/24      Thank you consulting us on [place]   We will follow along closely    Praveen Novoa MD  08/28/24  09:56 EDT            "

## 2024-08-28 NOTE — CASE MANAGEMENT/SOCIAL WORK
Discharge Planning Assessment  [facility]     Patient Name: Erlin Savage  MRN: [id number]  Today's Date: 8/28/2024    Admit Date: 8/26/2024    Plan: ONGOING   Discharge Needs Assessment    No documentation.                  Discharge Plan       Row Name 08/28/24 1056       Plan    Plan ONGOING    Plan Comments Plan for Right BKA tomorrow.  CM will cont to follow post-op course for any DC needs.  Anticipate a need for IRF if pt is agreeable.                  Continued Care and Services - Admitted Since 8/26/2024       Dialysis/Infusion       Service Provider Request Status Selected Services Address Phone Fax Patient Preferred    [facility]  [facility] Hemod[facility] [address] [facility] [address] [phone] --              Home Medical Care       Service Provider Request Status Selected Services Address Phone Fax Patient Preferred    [facility] Pending - No Request Sent N/A [address], [address] [address] [phone] --                  Expected Discharge Date and Time       Expected Discharge Date Expected Discharge Time    Sep 3, 2024            Demographic Summary    No documentation.                  Functional Status    No documentation.                  Psychosocial    No documentation.                  Abuse/Neglect    No documentation.                  Legal    No documentation.                  Substance Abuse    No documentation.                  Patient Forms    No documentation.                     Verona Stroud RN

## 2024-08-28 NOTE — PROGRESS NOTES
LOS: 2 days   Patient Care Team:  Joe Diallo MD as PCP - General (Family Medicine)  Fadi Garner MD as Consulting Physician (General Surgery)  Amor Celeste MD (Cardiology)  Praveen Novoa MD as Consulting Physician (Nephrology)  Kristin Moya MD as Consulting Physician (Pulmonary Disease)        Subjective   Patient seen in dialysis, doing well today. Discussed plans for RIGHT BKA tomorrow and he is agreeable to proceed. All questions were answered.     Subjective    History taken from: patient    Objective     Vital Signs  Temp:  [97.4 °F (36.3 °C)-98 °F (36.7 °C)] 97.6 °F (36.4 °C)  Heart Rate:  [80-97] 83  Resp:  [16-20] 16  BP: (113-139)/(66-96) 113/66    Objective:  Vital signs: (most recent): Blood pressure 114/70, pulse 86, temperature 97.6 °F (36.4 °C), temperature source Oral, resp. rate 16, SpO2 97%.            Physical Exam:   Patient seen in dialysis, no family or nursing at bedside  AAOx4, NAD, resting comfortably  Nonlabored respirations on supplemental O2  RIGHT TMA wound with dressing c/d/I, edema up to thigh  LEFT foot dressing c/d/I, edema up to the thigh  Cooperative  Speech normal and follows commands appropriately       Results Review:     I reviewed the patient's new clinical results.  CBC    Results from last 7 days   Lab Units 08/28/24  0523 08/27/24  0549 08/26/24  1145   WBC 10*3/mm3 10.76 9.73 11.95*   HEMOGLOBIN g/dL 9.8* 9.8* 10.3*   PLATELETS 10*3/mm3 271 245 251     BMP   Results from last 7 days   Lab Units 08/28/24  0523 08/27/24  0549 08/26/24  1145   SODIUM mmol/L 130* 132* 129*   POTASSIUM mmol/L 6.6* 5.6* 5.9*   CHLORIDE mmol/L 95* 95* 93*   CO2 mmol/L 21.0* 22.0 23.0   BUN mg/dL 40* 31* 43*   CREATININE mg/dL 5.16* 4.41* 5.63*   GLUCOSE mg/dL 178* 226* 239*   MAGNESIUM mg/dL 2.0 1.9 2.3          Current Facility-Administered Medications:     acetaminophen (TYLENOL) tablet 650 mg, 650 mg, Oral, Q6H PRN, Lisa Rowe, DO    aspirin EC tablet  81 mg, 81 mg, Oral, Daily, Lisa Rowe, , 81 mg at 08/27/24 0934    b complex-vitamin c-folic acid (NEPHRO-SEVERIANO) tablet 1 tablet, 1 tablet, Oral, Daily, Lisa Rowe, DO, 1 tablet at 08/27/24 0935    sennosides-docusate (PERICOLACE) 8.6-50 MG per tablet 2 tablet, 2 tablet, Oral, BID PRN **AND** polyethylene glycol (MIRALAX) packet 17 g, 17 g, Oral, Daily PRN **AND** bisacodyl (DULCOLAX) EC tablet 5 mg, 5 mg, Oral, Daily PRN **AND** bisacodyl (DULCOLAX) suppository 10 mg, 10 mg, Rectal, Daily PRN, Lisa Rowe,     calcitriol (ROCALTROL) capsule 0.5 mcg, 0.5 mcg, Oral, Daily, Lisa Rowe DO, 0.5 mcg at 08/27/24 0934    castor oil-balsam peru (VENELEX) ointment 1 Application, 1 Application, Topical, Daily, Maura Gaspar M II, DO, 1 Application at 08/27/24 1640    dextrose (D50W) (25 g/50 mL) IV injection 25 g, 25 g, Intravenous, Q15 Min PRN, Lisa Rowe DO    dextrose (GLUTOSE) oral gel 15 g, 15 g, Oral, Q15 Min PRN, Lisa Rowe DO    glucagon (GLUCAGEN) injection 1 mg, 1 mg, Intramuscular, Q15 Min PRN, Lisa Rowe,     heparin (porcine) 5000 UNIT/ML injection 5,000 Units, 5,000 Units, Subcutaneous, Q8H, Chastity Suarez PA-C, 5,000 Units at 08/28/24 0523    heparin (porcine) injection 2,000 Units, 2,000 Units, Intracatheter, PRN, Christophe Smith MD, 2,000 Units at 08/26/24 1602    hydrALAZINE (APRESOLINE) tablet 10 mg, 10 mg, Oral, TID, Lisa Rowe, , 10 mg at 08/27/24 2010    HYDROmorphone (DILAUDID) injection 0.5 mg, 0.5 mg, Intravenous, Q2H PRN, Maura Gaspar II, DO, 0.5 mg at 08/27/24 0942    meropenem (MERREM) 500 mg in sodium chloride 0.9 % 100 mL MBP, 500 mg, Intravenous, Q24H, Lisa Rowe, DO, 500 mg at 08/27/24 1634    nitroglycerin (NITROSTAT) SL tablet 0.4 mg, 0.4 mg, Sublingual, Q5 Min PRN, Lisa Rowe, DO    ondansetron ODT (ZOFRAN-ODT) disintegrating tablet 4 mg, 4 mg, Oral, Q6H PRN, 4 mg at 08/27/24 0938  **OR** ondansetron (ZOFRAN) injection 4 mg, 4 mg, Intravenous, Q6H PRN, Lisa Rowe DO    oxyCODONE (ROXICODONE) immediate release tablet 10 mg, 10 mg, Oral, Q4H, Maura Gaspar II, DO, 10 mg at 08/28/24 0332    pantoprazole (PROTONIX) EC tablet 40 mg, 40 mg, Oral, Daily, Lisa Rowe DO, 40 mg at 08/27/24 0935    Pharmacy to dose vancomycin, , Does not apply, Continuous PRN, Lisa Rowe,     sodium chloride 0.9 % flush 10 mL, 10 mL, Intravenous, Q12H, Lisa Rowe DO, 10 mL at 08/27/24 2011    sodium chloride 0.9 % flush 10 mL, 10 mL, Intravenous, PRN, Lisa Rowe,     sodium chloride 0.9 % infusion 40 mL, 40 mL, Intravenous, PRN, Lisa Rowe DO    vancomycin (dosing per levels), , Does not apply, Daily, Kyrie Walsh, PharmD     Assessment & Plan       Dry gangrene    Coronary artery disease involving native coronary artery of native heart without angina pectoris    Essential hypertension    Hyperlipidemia LDL goal <70    Type 2 diabetes mellitus with kidney complication, with long-term current use of insulin    TIA (transient ischemic attack)    Ischemic heart disease    Stage 5 chronic kidney disease on chronic dialysis    Secondary pulmonary arterial hypertension    ESRD (end stage renal disease)    Iron deficiency anemia    Severe mitral valve regurgitation    Type 2 diabetes mellitus with diabetic chronic kidney disease    On home oxygen therapy    Severe malnutrition    Chronic diastolic (congestive) heart failure    Chronic systolic (congestive) heart failure      Assessment & Plan  Assessment  Right Lower Extremity Critical Limb Ischemia  - 4/10/24 ([facility]): RIGHT lower extremity tibial angioplasty   - 5/14/24 ([facility]): RIGHT lower extremity arteriogram with angioplasty and drug coated balloon of RIGHT posterior tibial artery and   RIGHT peroneal artery, anterior tibial artery, proximal superficial femoral artery angioplasty.   - [facility] 8/14/24 XR: Right toe  metatarsal osteomyelitis is suspected.  - [facility] 8/15/24 arterial and venous studies with no DVT. LEFT TBI 0.38. Tibial disease noted.      Plan  - scheduled for RIGHT BKA on 8/29/24 with Dr. Schreiber  - NPO and consent ordered   - home Eliquis held in anticipation of OR   - start heparin DVT ppx, hold on morning of surgery   - Cont ASA   - pain control prn  - abx per ID  - local wound care to LEFT foot and BLE ACE compression, orders placed  - cardiology following for second opinion regarding aortic valve stenosis and replacement per patient request. Extensive cardiac history of CABG, NSTEMI, cardiac arrest.       Plan reviewed with the patient who verbalized understanding and agreement. All questions answered.         Asha Dowd PA-C  08/28/24  09:11 EDT

## 2024-08-28 NOTE — PLAN OF CARE
Problem: Device-Related Complication Risk (Hemodialysis)  Goal: Safe, Effective Therapy Delivery  Outcome: Ongoing, Progressing  Intervention: Optimize Device Care and Function  Recent Flowsheet Documentation  Taken 8/28/2024 1100 by Autumn Harman RN  Medication Review/Management:   medications reviewed   high-risk medications identified  Taken 8/28/2024 0730 by Autumn Harman, RN  Medication Review/Management:   medications reviewed   high-risk medications identified     Problem: Hemodynamic Instability (Hemodialysis)  Goal: Effective Tissue Perfusion  Outcome: Ongoing, Progressing     Problem: Infection (Hemodialysis)  Goal: Absence of Infection Signs and Symptoms  Outcome: Ongoing, Progressing   Goal Outcome Evaluation:            HD complete, blood reinfused, report given to primary RN Shayna.    Fluid removal: 3L

## 2024-08-28 NOTE — PROGRESS NOTES
"Erlin Savage  1950  [id number]      Evaluating Physician: Marlo Heaton MD    Chief Complaint: right foot infection    Reason for Consultation: right foot infection    History of present illness:     Patient is a 74 y.o.  Yr old male noncompliant previously, with prior history of  end-stage renal disease with intermittent hemodialysis via right chest central line.  He has known peripheral arterial disease with right leg angiogram on April 10 with recanalization of SFA/PTA; post procedure with continued worsening pain at the right distal leg, gangrene at his right second/third and fourth toes with second worse than third worse than fourth.   MRI right foot was soft tissue edema, no loculated collection with abnormality at the posterior calcaneus difficult to completely exclude osteomyelitis at that site as per radiology.     4/19/24 Dr Santos   \"Procedures: Procedure(s):  RIGHT Foot  Second toe amputation\"     4/23/24  Dr Santos .  \"Procedures: Procedure(s):  RIGHT Foot     Amputation hallux  Amputation third toe  Amputation fourth toe  Amputation fifth toe\"     Pathology from April 23, 2024 with no osteomyelitis.  Pathology had noted cellulitis and gangrenous necrosis.  Transitioned to IV vancomycin and oral Augmentin at discharge.     Revascularization on May 14, 2024 by Dr. Schreiber  \"PROCEDURES WITH LATERALITY:   Ultrasound guided access of left common femoral artery with micropuncture needle and images stored  Abd Aorotgram  Right lower extremity arteriogram  Balloon angioplasty of right posterior tibial artery with a 3 mm balloon  Balloon angioplasty of right posterior tibial artery origin4 mm balloon drug-coated  Balloon angioplasty of right peroneal artery with 3 mm balloon  Balloon angioplasty of right anterior tibial artery 3 mm balloon  Balloon angioplasty of right proximal superficial femoral artery with a 5 mm balloon\"     Readmitted August 14 with deterioration at the right foot over unspecified " period of time per patient, at least weeks if not longer with large wound, exposed bone on the medial side.     8/20/24 he  [facility]     8/26/24 readmitted on August 26 with reports to me that patient now agreeable to right side amputation by Dr. Schreiber    8/28/24 surgical team making plans.Right foot  pain is sharp, nonradiating, constant, worse with palpation, better with pain meds and 3-4/10     No headache photophobia or neck stiffness. No shortness of breath cough or hemoptysis. No nausea vomiting diarrhea or abdominal pain. No dysuria hematuria or pyuria. No other new skin rash.     Review of Systems     8/28/24      Constitutional-- No Fever, chills or sweats.  Appetite good. No malaise.  Heent-- No new vision, hearing or throat complaints.  No epistaxis or oral sores.  Denies odynophagia or dysphagia.  No headache, photophobia or neck stiffness.  CV-- No chest pain, palpitation or syncope  Resp-- No SOB, cough, or hemoptysis  GI- No nausea, vomiting, or diarrhea.   -- No dysuria, hematuria, or flank pain.  Denies hesitancy, urgency or flank pain.  Lymph- no swollen lymph nodes in neck, axilla or groin.   Heme- No active bruising or bleeding  MS-- no swelling or pain in the bones or joints of arms or legs.  No new back pain.  Neuro-- No acute focal weakness or numbness in the arms or legs.  Skin-- No rash    Past Medical History:   Diagnosis Date    Anxiety     Anxiety disorder     Back pain     Chronic back pain     work related injury    CKD (chronic kidney disease)     most recent creatinine 2.7 on 01/26/16    Coronary artery disease     Diabetes mellitus     insulin dependent diabetes Onset 2010    Dyslipidemia     Hyperlipidemia     Hypertension     Ischemic heart disease     Renal failure     Stroke     TIA / transient right vision loss age 42    TIA (transient ischemic attack)     Vertigo        Past Surgical History:   Procedure  "Laterality Date    APPENDECTOMY      CARDIAC CATHETERIZATION      CHOLECYSTECTOMY  03/2012    COLONOSCOPY      CORONARY ARTERY BYPASS GRAFT      ENDOSCOPY         Pediatric History   Patient Parents    Not on file     Other Topics Concern    Not on file   Social History Narrative    Not on file       family history is not on file.    Allergies   Allergen Reactions    Cefepime Other (See Comments) and Seizure     Myoclonus - Has received cefazolin    Doxycycline Other (See Comments)     Joint pain, tongue swelling    \"Body locks up\"    Gabapentin Confusion    Ranolazine Dizziness and Other (See Comments)     Severe tremors/ shakiness    Hydralazine Unknown - Low Severity    Augmentin [Amoxicillin-Pot Clavulanate] Palpitations    Biaxin [Clarithromycin] Palpitations    Cephalosporins Unknown - Low Severity     Has received cefazolin    Coreg [Carvedilol] Itching    Crestor [Rosuvastatin Calcium] Itching     Itching rash    Iodine Rash     \"pineda skin\"    Lipitor [Atorvastatin] Itching     And Crestor- generalized weakness and chest tightness    Lisinopril Cough     Cough /weakness, nauseas and dirrhea    Livalo [Pitavastatin] Unknown - Low Severity     Chest tightness    Nortriptyline Hcl Other (See Comments)     Arthralgias    Pravastatin Unknown - Low Severity     Chest , neck and arm discomfort    Questran [Cholestyramine] Unknown - Low Severity       Medication:  Current Facility-Administered Medications   Medication Dose Route Frequency Provider Last Rate Last Admin    acetaminophen (TYLENOL) tablet 650 mg  650 mg Oral Q6H PRN Lisa Rowe DO        aspirin EC tablet 81 mg  81 mg Oral Daily Lisa Rowe DO   81 mg at 08/27/24 0934    b complex-vitamin c-folic acid (NEPHRO-SEVERIANO) tablet 1 tablet  1 tablet Oral Daily Lisa Rowe DO   1 tablet at 08/27/24 0935    sennosides-docusate (PERICOLACE) 8.6-50 MG per tablet 2 tablet  2 tablet Oral BID PRN Lisa Rowe DO        And    " polyethylene glycol (MIRALAX) packet 17 g  17 g Oral Daily PRN Lisa Rowe DO        And    bisacodyl (DULCOLAX) EC tablet 5 mg  5 mg Oral Daily PRN Lisa Rowe DO        And    bisacodyl (DULCOLAX) suppository 10 mg  10 mg Rectal Daily PRN Lisa Rowe DO        calcitriol (ROCALTROL) capsule 0.5 mcg  0.5 mcg Oral Daily Lisa Rowe,    0.5 mcg at 08/27/24 0934    castor oil-balsam peru (VENELEX) ointment 1 Application  1 Application Topical Daily Maura Gaspar II, DO   1 Application at 08/27/24 1640    dextrose (D50W) (25 g/50 mL) IV injection 25 g  25 g Intravenous Q15 Min PRN Lisa Rowe DO        dextrose (GLUTOSE) oral gel 15 g  15 g Oral Q15 Min PRN Lisa Rowe DO        glucagon (GLUCAGEN) injection 1 mg  1 mg Intramuscular Q15 Min PRN Lisa Rowe DO        heparin (porcine) 5000 UNIT/ML injection 5,000 Units  5,000 Units Subcutaneous Q8H Chastity Suarez PA-C   5,000 Units at 08/28/24 0523    heparin (porcine) injection 2,000 Units  2,000 Units Intracatheter PRN Christophe Smith MD   2,000 Units at 08/26/24 1602    hydrALAZINE (APRESOLINE) tablet 10 mg  10 mg Oral TID Lisa Rowe DO   10 mg at 08/27/24 2010    HYDROmorphone (DILAUDID) injection 0.5 mg  0.5 mg Intravenous Q2H PRN Maura Gaspar II DO   0.5 mg at 08/27/24 0942    meropenem (MERREM) 500 mg in sodium chloride 0.9 % 100 mL MBP  500 mg Intravenous Q24H Lisa Rowe, DO   500 mg at 08/27/24 1634    nitroglycerin (NITROSTAT) SL tablet 0.4 mg  0.4 mg Sublingual Q5 Min PRN Lisa Rowe DO        ondansetron ODT (ZOFRAN-ODT) disintegrating tablet 4 mg  4 mg Oral Q6H PRN Lisa Rowe, DO   4 mg at 08/27/24 0938    Or    ondansetron (ZOFRAN) injection 4 mg  4 mg Intravenous Q6H PRN Lisa Rowe DO        oxyCODONE (ROXICODONE) immediate release tablet 10 mg  10 mg Oral Q4H Maura Gaspar II, DO   10 mg at 08/28/24 0332    pantoprazole (PROTONIX)  EC tablet 40 mg  40 mg Oral Daily Lisa Rowe DO   40 mg at 08/27/24 0935    Pharmacy to dose vancomycin   Does not apply Continuous PRN Lisa Rowe DO        sodium chloride 0.9 % flush 10 mL  10 mL Intravenous Q12H Lisa Rowe DO   10 mL at 08/27/24 2011    sodium chloride 0.9 % flush 10 mL  10 mL Intravenous PRN Lisa Rowe DO        sodium chloride 0.9 % infusion 40 mL  40 mL Intravenous PRN Lisa Rowe DO        vancomycin (dosing per levels)   Does not apply Daily Kyrie Walsh PharmD           Antibiotics:  Anti-Infectives (From admission, onward)      Ordered     Dose/Rate Route Frequency Start Stop    08/26/24 1304  meropenem (MERREM) 500 mg in sodium chloride 0.9 % 100 mL MBP        Ordering Provider: Lisa Rowe DO    500 mg  over 3 Hours Intravenous Every 24 Hours 08/27/24 1600 09/03/24 1559    08/26/24 1306  vancomycin IVPB 1750 mg in 0.9% Sodium Chloride (premix) 500 mL        Ordering Provider: Kyrie Walsh, Aniceto    1,750 mg  285.7 mL/hr over 105 Minutes Intravenous Once 08/26/24 1800 08/26/24 1957    08/26/24 1304  meropenem (MERREM) 1,000 mg in sodium chloride 0.9 % 100 mL MBP        Ordering Provider: Lisa Rowe DO    1,000 mg  over 30 Minutes Intravenous Once 08/26/24 1500 08/26/24 1604    08/26/24 1306  vancomycin (dosing per levels)        Ordering Provider: Kyrie Walsh PharmD     Does not apply Daily 08/26/24 1400 08/29/24 0859    08/26/24 1223  Pharmacy to dose vancomycin        Ordering Provider: Lisa Rowe DO     Does not apply Continuous PRN 08/26/24 1223 08/29/24 1222                 Physical Exam:   Vital Signs   /79 (BP Location: Right arm, Patient Position: Lying)   Pulse 82   Temp 97.6 °F (36.4 °C) (Oral)   Resp 16   SpO2 96%       GENERAL: sleepy.  Appears chronically debilitated  HEENT: Normocephalic, atraumatic.    No conjunctival injection. No icterus. Oropharynx clear without evidence of thrush or  exudate.   NECK: Supple without nuchal rigidity. No mass.  LYMPH: No cervical, axillary or inguinal lymphadenopathy.  HEART: RRR; No murmur.  LUNGS: Clear to auscultation bilaterally without wheezing, rales, rhonchi. Normal respiratory effort. Nonlabored.  ABDOMEN: Soft, nontender, nondistended. Positive bowel sounds. No rebound or guarding.  EXT:  No cyanosis, clubbing or edema.  MSK: FROM without joint effusions noted arms/legs.    SKIN: Warm and dry without cutaneous eruptions on Inspection/palpation.    NEURO: sleepy     Left foot first and third toe distally with small black areas,  toes with only minimal erythema, no discrete mass bulge or fluctuance.  No crepitus or bulla     Right foot distally with tissue defect, slough and black eschar and exposed bone.  Vague erythema and diffuse pain.  No discrete mass bulge or fluctuance.  No crepitus or bulla.     No peripheral stigmata/phenomenon of endocarditis    Laboratory Data    Results from last 7 days   Lab Units 08/28/24  0523 08/27/24  0549 08/26/24  1145   WBC 10*3/mm3 10.76 9.73 11.95*   HEMOGLOBIN g/dL 9.8* 9.8* 10.3*   HEMATOCRIT % 32.5* 32.9* 34.4*   PLATELETS 10*3/mm3 271 245 251     Results from last 7 days   Lab Units 08/28/24  0523   SODIUM mmol/L 130*   POTASSIUM mmol/L 6.6*   CHLORIDE mmol/L 95*   CO2 mmol/L 21.0*   BUN mg/dL 40*   CREATININE mg/dL 5.16*   GLUCOSE mg/dL 178*   CALCIUM mg/dL 9.3     Results from last 7 days   Lab Units 08/28/24  0523   ALK PHOS U/L 198*   BILIRUBIN mg/dL 0.5   ALT (SGPT) U/L 17   AST (SGOT) U/L 27               CrCl cannot be calculated (Unknown ideal weight.).      Microbiology:      Radiology:  Imaging Results (Last 72 Hours)       Procedure Component Value Units Date/Time    US Liver [[id number]] Collected: 08/27/24 0735     Updated: 08/27/24 0744    Narrative:      US LIVER    Date of Exam: 8/27/2024 5:00 AM EDT    Indication: possible cirrhosis.    Comparison: No comparisons available.    Technique: Grayscale  and color Doppler ultrasound evaluation of the right upper quadrant was performed. Elastography performed.    Findings:    Liver and hepatic vasculature: Echogenicity is within normal limits. No discrete contour nodularity. No focal liver lesions are displayed on this ultrasound. The imaged portal vein and hepatic veins demonstrate normal direction of flow and normal   waveform on spectral imaging. Elastography values below.    Gallbladder and biliary: Cholecystectomy. The common bile duct measures 9 mm, within normal limits given age and surgically absent gallbladder.    Right kidney: The right kidney measures 9 cm. No hydronephrosis.    Pancreas: Not well evaluated due to overlying bowel gas.     Free fluid: There is small volume ascites.    Liver elastography:  Measurement quality: Adequate  Median SWV: 0.73 m/s (see note below)  Standard deviation: 0.12  Interquartile range/median (IQR/M): 0.23 (quality metric; <0.3 supports good precision)    Note: SWV is a measure of liver stiffness, a surrogate marker for fibrosis.  SWV <1.37 m/s indicates a low probability of clinically significant fibrosis (METAVIR Stage F2 or below)  SWV >2.2 m/s indicates a high probability of clinically significant fibrosis (METAVIR Stage F3 or above)  Intermediate values are indeterminant and correlation with clinical risk factors, laboratory markers, or liver biopsy may be needed to determine appropriate follow-up.        Impression:      Impression:    SWV <1.37 m/s indicates a low probability of clinically significant fibrosis (METAVIR Stage F2 or below). No overt morphologic changes of cirrhosis by ultrasound.    Small volume ascites.    Cholecystectomy.      Electronically Signed: Kobe Phoenix MD    8/27/2024 7:41 AM EDT    Workstation ID: [id number]              Impression:     --Acute right foot surgical site/wound infection/cellulitis, exposed bone on the medial side consistent with first metatarsal osteomyelitis and likely  sequela to ischemia with peripheral vascular disease and other comorbidity as above. High risk for further serious morbidity and other serious sequela. They understand risk for persistent/recurrent or nonhealing wounds, persistent/progressive or recurrent infection and risk for further functional/limb loss, amputation, chronic pain etc. They know antibiotics and surgery not a guarantee for cure. These risks will persist. Timing/option threshold for surgery per surgical team at their discretion.  Vascular team has recommended amputation which patient has previously refused, left the hospital AGAINST MEDICAL ADVICE on August 20 but subsequently readmitted August 26 and agreeable to surgical recommendation    --Peripheral vascular disease.  Prior interventions as above.    --Left foot with small blackened areas at first/third toe likely sequela to ischemia.  Vascular team to help determine options and salvageability as above    --End-stage renal disease on hemodialysis Monday/Wednesday/Friday.    --Diabetes.  Glucose control per medicine team    --Cephalosporin and doxycycline intolerances in the past.  Has tolerated penicillin class    PLAN:        --IV vancomycin, merrem     **Cx [facility] with PSA x 2 (zosyn DD, not ideal EDWIN), enterococcus and corynebac      --check/review labs cultures and scans     --Partial history per nursing staff     --Discussed with microbiology     --vascular team making surgical plans     --Highly complex set of issues with high risk for further serious morbidity and other serious sequela     --Monitor IV and IV antibiotic with risk for systemic complication and potential drug interactions    Copied text in this note has been reviewed and is accurate as of 08/28/24.        Marlo Heaton MD  8/28/2024

## 2024-08-28 NOTE — PROGRESS NOTES
[facility] Cardiology Daily Note       LOS: 2 days   Patient Care Team:  Joe Diallo MD as PCP - General (Family Medicine)  Fadi Garner MD as Consulting Physician (General Surgery)  Amor Celeste MD (Cardiology)  Praveen Novoa MD as Consulting Physician (Nephrology)  Kristin Moya MD as Consulting Physician (Pulmonary Disease)    Chief Complaint: Mitral regurgitation    Subjective     Subjective: Sitting up eating dinner.  His wife Princess is visiting.  We had a long discussion regarding the MitraClip and his previous heart cath.  98% on 3.5 L nasal cannula.  Blood pressures have been good overnight.  Heart rates in the 80s.    Review of Systems:   As above.    Medications:  aspirin, 81 mg, Oral, Daily  b complex-vitamin c-folic acid, 1 tablet, Oral, Daily  calcitriol, 0.5 mcg, Oral, Daily  castor oil-balsam peru, 1 Application, Topical, Daily  heparin (porcine), 5,000 Units, Subcutaneous, Q8H  hydrALAZINE, 10 mg, Oral, TID  meropenem, 500 mg, Intravenous, Q24H  oxyCODONE, 10 mg, Oral, Q4H  pantoprazole, 40 mg, Oral, Daily  sodium chloride, 10 mL, Intravenous, Q12H  vancomycin (dosing per levels), , Does not apply, Daily        Objective     Vital Sign Min/Max for last 24 hours  Temp  Min: 97.4 °F (36.3 °C)  Max: 98 °F (36.7 °C)   BP  Min: 113/66  Max: 139/83   Pulse  Min: 80  Max: 97   Resp  Min: 16  Max: 20   SpO2  Min: 91 %  Max: 99 %   Flow (L/min)  Min: 2  Max: 3.5   No data recorded      Intake/Output Summary (Last 24 hours) at 8/28/2024 0950  Last data filed at 8/28/2024 0400  Gross per 24 hour   Intake 1380 ml   Output --   Net 1380 ml            Physical Exam:    General: Alert and oriented.   Cardiovascular: Heart has a nondisplaced focal PMI. Regular rate and rhythm with 1-2/6HS murmur, nogallop or rub.  Lungs: Clear without rales or wheezes. Equal expansion is noted.   Abdomen: Soft, nontender.  Extremities: Show 3+ pitting  edema up to the thigh.   Skin: warm  "and dry.     Results Review:    I reviewed the patient's new clinical results.  EKG:  Tele: Sinus rhythm    Labs:    Results from last 7 days   Lab Units 08/28/24 0523 08/27/24  0549 08/26/24  1145   SODIUM mmol/L 130* 132* 129*   POTASSIUM mmol/L 6.6* 5.6* 5.9*   CHLORIDE mmol/L 95* 95* 93*   CO2 mmol/L 21.0* 22.0 23.0   BUN mg/dL 40* 31* 43*   CREATININE mg/dL 5.16* 4.41* 5.63*   CALCIUM mg/dL 9.3 9.0 9.4   BILIRUBIN mg/dL 0.5 0.4 0.5   ALK PHOS U/L 198* 176* 199*   ALT (SGPT) U/L 17 14 14   AST (SGOT) U/L 27 20 19   GLUCOSE mg/dL 178* 226* 239*     Results from last 7 days   Lab Units 08/28/24 0523 08/27/24  0549 08/26/24  1145   WBC 10*3/mm3 10.76 9.73 11.95*   HEMOGLOBIN g/dL 9.8* 9.8* 10.3*   HEMATOCRIT % 32.5* 32.9* 34.4*   PLATELETS 10*3/mm3 271 245 251     Lab Results   Component Value Date    TROPONINI 57.89 (C) 12/30/2015    TROPONINI 72.15 (C) 12/30/2015    TROPONINT 403 (C) 02/24/2024    TROPONINT 415 (C) 02/24/2024    TROPONINT 177 (H) 12/31/2023     Lab Results   Component Value Date    CHOL 145 02/24/2024    CHOL 257 (H) 11/19/2019    CHOL 262 (H) 05/31/2019     Lab Results   Component Value Date    TRIG 109 02/24/2024    TRIG 232 (H) 11/19/2019    TRIG 202 (H) 05/31/2019     Lab Results   Component Value Date    HDL 49 02/24/2024    HDL 42 11/19/2019    HDL 40 05/31/2019     No components found for: \"LDLCALC\"  Lab Results   Component Value Date    INR 1.44 (H) 05/11/2024    INR 1.37 (H) 02/17/2024    INR 1.5 (H) 12/31/2023    PROTIME 15.4 (H) 05/11/2024    PROTIME 14.6 (H) 02/17/2024    PROTIME 13.2 (H) 10/28/2023         Ejection Fraction:    Assessment   Assessment:    CAD s/p CABG, without angina  Last [facility] 3/2024 with only 1/3 patent grafts  Coronary artery images reviewed.  No targets for revascularization.    Valvular heart disease  Last echo showing moderate MR and moderate TR  Chronic heart failure, mildly reduced EF of 43%  PAF  Eliquis currently on hold in preparation for BKA  PAD with " right lower extremity ischemia  Plan for right BKA 8/29 by Dr. Schreiber  Acute infection to right foot, managed by infectious disease.      Plan:    Echo today appears to have moderate or moderate to severe MR.  I will officially read this study later today.  We need to optimize the patient's volume status as I think this is contributing to his mitral regurgitation.  He has not had a transesophageal echo but this can be performed after his right BKA..  I do not think he needs any kind of coronary revascularization at this time.  I will see the patient back in follow-up in 4 weeks.  If he is still here next Wednesday I will see him then.      Laya Amaral MD  08/28/24  09:50 EDT

## 2024-08-28 NOTE — PLAN OF CARE
Goal Outcome Evaluation:                   Patient is alert and oriented self, place and situations. His vitals are stable. Safety measures such bed alarm, bed in lower position, call light in reach and bed upper side rails up. Patient refused dressing change this morning.

## 2024-08-28 NOTE — PROGRESS NOTES
[facility] Medicine Services  PROGRESS NOTE    Patient Name: Erlin Savage  : 1950  MRN: [id number]    Date of Admission: 2024  Primary Care Physician: Joe Diallo MD    Subjective   Subjective     CC: f/u foot wound    HPI: Seen in HD. Sleepy but doing okay. Pain controlled.      Objective   Objective     Vital Signs:   Temp:  [97.4 °F (36.3 °C)-98 °F (36.7 °C)] 97.6 °F (36.4 °C)  Heart Rate:  [80-97] 86  Resp:  [16-20] 16  BP: (113-139)/(66-96) 114/70  Flow (L/min):  [2-3.5] 3.5     Physical Exam:  Constitutional: No acute distress, awake, alert, elderly male, sleeping.  HENT: NCAT, mucous membranes moist  Respiratory: Clear to auscultation bilaterally, respiratory effort normal   Cardiovascular: RRR, no murmurs, rubs, or gallops  Gastrointestinal: Positive bowel sounds, soft, nontender, nondistended  Musculoskeletal: No bilateral ankle edema  Psychiatric: Appropriate affect, cooperative  Neurologic: Oriented x 3, strength symmetric in all extremities, Cranial Nerves grossly intact to confrontation, speech clear  Skin: No rashes     Results Reviewed:  LAB RESULTS:      Lab 24  0524  0549 24  1145 24  1144   WBC 10.76 9.73 11.95*  --    HEMOGLOBIN 9.8* 9.8* 10.3*  --    HEMATOCRIT 32.5* 32.9* 34.4*  --    PLATELETS 271 245 251  --    NEUTROS ABS 8.21* 7.66* 9.88*  --    IMMATURE GRANS (ABS) 0.07* 0.07* 0.09*  --    LYMPHS ABS 0.86 0.62* 0.63*  --    MONOS ABS 1.15* 1.00* 0.93*  --    EOS ABS 0.36 0.32 0.34  --    MCV 90.5 91.4 91.2  --    PROCALCITONIN  --   --  0.43*  --    LACTATE  --   --   --  1.1         Lab 24  0523 24  0549 24  1145   SODIUM 130* 132* 129*   POTASSIUM 6.6* 5.6* 5.9*   CHLORIDE 95* 95* 93*   CO2 21.0* 22.0 23.0   ANION GAP 14.0 15.0 13.0   BUN 40* 31* 43*   CREATININE 5.16* 4.41* 5.63*   EGFR 11.2* 13.5* 10.1*   GLUCOSE 178* 226* 239*   CALCIUM 9.3 9.0 9.4   MAGNESIUM 2.0 1.9 2.3   HEMOGLOBIN A1C   --   --  6.20*         Lab 08/28/24  0523 08/27/24  0549 08/26/24  1145   TOTAL PROTEIN 6.6 6.2 6.4   ALBUMIN 3.4* 3.1* 3.3*   GLOBULIN 3.2 3.1 3.1   ALT (SGPT) 17 14 14   AST (SGOT) 27 20 19   BILIRUBIN 0.5 0.4 0.5   ALK PHOS 198* 176* 199*                     Brief Urine Lab Results  (Last result in the past 365 days)        Color   Clarity   Blood   Leuk Est   Nitrite   Protein   CREAT   Urine HCG        01/01/24 0550 Yellow   Clear     Negative                       Microbiology Results Abnormal       Procedure Component Value - Date/Time    CANDIDA AURIS SCREEN - Swab, Axilla Right, Axilla Left and Groin [[id number]]  (Normal) Collected: 08/26/24 1715    Lab Status: Preliminary result Specimen: Swab from Axilla Right, Axilla Left and Groin Updated: 08/27/24 2231     Candida Auris Screen Culture No Candida auris isolated at 24 hours    Blood Culture - Blood, Hand, Right [[id number]]  (Normal) Collected: 08/26/24 1154    Lab Status: Preliminary result Specimen: Blood from Hand, Right Updated: 08/27/24 1215     Blood Culture No growth at 24 hours    Narrative:      Less than seven (7) mL's of blood was collected.  Insufficient quantity may yield false negative results.    Blood Culture - Blood, Arm, Right [[id number]]  (Normal) Collected: 08/26/24 1145    Lab Status: Preliminary result Specimen: Blood from Arm, Right Updated: 08/27/24 1215     Blood Culture No growth at 24 hours    Narrative:      Less than seven (7) mL's of blood was collected.  Insufficient quantity may yield false negative results.    MRSA Screen, PCR (Inpatient) - Swab, Nares [[id number]]  (Normal) Collected: 08/26/24 1715    Lab Status: Final result Specimen: Swab from Nares Updated: 08/26/24 2124     MRSA PCR Negative    Narrative:      The negative predictive value of this diagnostic test is high and should only be used to consider de-escalating anti-MRSA therapy. A positive result may indicate colonization with MRSA and must be correlated  clinically.  MRSA Negative            OUTSIDE INVASIVE CARDIOLOGY STUDY    Result Date: 8/27/2024  This procedure was auto-finalized with no dictation required.    US Liver    Result Date: 8/27/2024  US LIVER Date of Exam: 8/27/2024 5:00 AM EDT Indication: possible cirrhosis. Comparison: No comparisons available. Technique: Grayscale and color Doppler ultrasound evaluation of the right upper quadrant was performed. Elastography performed. Findings: Liver and hepatic vasculature: Echogenicity is within normal limits. No discrete contour nodularity. No focal liver lesions are displayed on this ultrasound. The imaged portal vein and hepatic veins demonstrate normal direction of flow and normal waveform on spectral imaging. Elastography values below. Gallbladder and biliary: Cholecystectomy. The common bile duct measures 9 mm, within normal limits given age and surgically absent gallbladder. Right kidney: The right kidney measures 9 cm. No hydronephrosis. Pancreas: Not well evaluated due to overlying bowel gas. Free fluid: There is small volume ascites. Liver elastography: Measurement quality: Adequate Median SWV: 0.73 m/s (see note below) Standard deviation: 0.12 Interquartile range/median (IQR/M): 0.23 (quality metric; <0.3 supports good precision) Note: SWV is a measure of liver stiffness, a surrogate marker for fibrosis. SWV <1.37 m/s indicates a low probability of clinically significant fibrosis (METAVIR Stage F2 or below) SWV >2.2 m/s indicates a high probability of clinically significant fibrosis (METAVIR Stage F3 or above) Intermediate values are indeterminant and correlation with clinical risk factors, laboratory markers, or liver biopsy may be needed to determine appropriate follow-up.      Impression: Impression: SWV <1.37 m/s indicates a low probability of clinically significant fibrosis (METAVIR Stage F2 or below). No overt morphologic changes of cirrhosis by ultrasound. Small volume ascites.  Cholecystectomy. Electronically Signed: Kobe Phoenix MD  8/27/2024 7:41 AM EDT  Workstation ID: [id number]         Current medications:  Scheduled Meds:aspirin, 81 mg, Oral, Daily  b complex-vitamin c-folic acid, 1 tablet, Oral, Daily  calcitriol, 0.5 mcg, Oral, Daily  castor oil-balsam peru, 1 Application, Topical, Daily  heparin (porcine), 5,000 Units, Subcutaneous, Q8H  hydrALAZINE, 10 mg, Oral, TID  meropenem, 500 mg, Intravenous, Q24H  oxyCODONE, 10 mg, Oral, Q4H  pantoprazole, 40 mg, Oral, Daily  sodium chloride, 10 mL, Intravenous, Q12H  vancomycin (dosing per levels), , Does not apply, Daily      Continuous Infusions:Pharmacy to dose vancomycin,       PRN Meds:.  acetaminophen    senna-docusate sodium **AND** polyethylene glycol **AND** bisacodyl **AND** bisacodyl    dextrose    dextrose    glucagon (human recombinant)    heparin (porcine)    HYDROmorphone    nitroglycerin    ondansetron ODT **OR** ondansetron    Pharmacy to dose vancomycin    sodium chloride    sodium chloride    Assessment & Plan   Assessment & Plan     Active Hospital Problems    Diagnosis  POA    **Dry gangrene [I96]  Yes    Chronic diastolic (congestive) heart failure [I50.32]  Yes    Chronic systolic (congestive) heart failure [I50.22]  Yes    Severe malnutrition [E43]  Yes    On home oxygen therapy [Z99.81]  Not Applicable    Type 2 diabetes mellitus with diabetic chronic kidney disease [E11.22]  Yes    Secondary pulmonary arterial hypertension [I27.21]  Yes    Severe mitral valve regurgitation [I34.0]  Yes    Iron deficiency anemia [D50.9]  Yes    ESRD (end stage renal disease) [N18.6]  Yes    Stage 5 chronic kidney disease on chronic dialysis [N18.6, Z99.2]  Not Applicable    Ischemic heart disease [I25.9]  Yes    Coronary artery disease involving native coronary artery of native heart without angina pectoris [I25.10]  Yes    Essential hypertension [I10]  Yes    Type 2 diabetes mellitus with kidney complication, with long-term  current use of insulin [E11.29, Z79.4]  Not Applicable    TIA (transient ischemic attack) [G45.9]  Yes    Hyperlipidemia LDL goal <70 [E78.5]  Yes      Resolved Hospital Problems   No resolved problems to display.        Brief Hospital Course to date:  Erlin Savage is a 74 y.o. male with a medical history significant for end-stage renal disease on HD MWF, coronary artery disease s/p CABG, A. Fib on OAC, aortic stenosis, diabetes mellitus, COPD on 2 to 4 L baseline, hyperlipidemia, hypertension and known peripheral arterial disease with nonhealing right foot transmetatarsal amputation site with dry gangrene.      Right lower extremity critical limb ischemia  Recent admission for dry gangrene  Right lower extremity cellulitis  History of nonhealing right TMA  L foot with ischemic changes of toes   BLE PVD  -My partner discussed with vascular on arrival. They are following - BKA with Dr. Schreiber 8/29.  -Followed by Dr. Heaton while admitted at [facility].  My partner discussed case with him. Continue vancomycin and Merrem.   -Schedule oxycodone q 4. Add prn dilaudid.     End-stage renal disease  Hyponatremia  Hyperkalemia  -On hemodialysis Monday/Wednesday/Friday  -Nephrology consulted.      CAD status post CABG  CHFmrEF  Valvular heart disease  -Most recent echo in system from 2023 with a EF of 43%, severe diastolic dysfunction, moderate mitral regurgitation, moderate tricuspid regurgitation.  -Dr Amaral follows - echo pending to evaluate for appropriateness for mitraclip.     Ascites  Lower extremity edema  -multifactorial secondary to CHF, end-stage renal disease  -Per patient, he gets monthly paracentesis at hospital in [place].  Denies any prior history of cirrhosis.     PAF  -Hold Eliquis in anticipation of surgery     DM2  -Basal bolus regimen  -Obtain hemoglobin A1c     Anemia of Chronic Disease      Chronic hypoxic respiratory failure  COPD on chronic home O2  -4L NC at baseline   -[facility]  PRN    Expected Discharge Location and Transportation:   Expected Discharge   Expected Discharge Date: 9/3/2024; Expected Discharge Time:      VTE Prophylaxis:  Pharmacologic & mechanical VTE prophylaxis orders are present.         AM-PAC 6 Clicks Score (PT): 20 (08/27/24 1939)    CODE STATUS:   Code Status and Medical Interventions: CPR (Attempt to Resuscitate); Full Support   Ordered at: 08/26/24 1218     Level Of Support Discussed With:    Patient     Code Status (Patient has no pulse and is not breathing):    CPR (Attempt to Resuscitate)     Medical Interventions (Patient has pulse or is breathing):    Full Support       Maura Gaspar II, DO  08/28/24

## 2024-08-28 NOTE — PROGRESS NOTES
Pharmacy Consult - Vancomycin Dosing and Monitoring (Renal Dysfunction / Dialysis)    Erlin Savage is a 74 y.o. male receiving vancomycin therapy.     Indication: SSTI  Consulting Provider: Hospitalist  ID Consult: Yes    Goal Trough: 15-20 mcg/mL    Current Antimicrobial Therapy  Anti-Infectives (From admission, onward)      Ordered     Dose/Rate Route Frequency Start Stop    08/28/24 1248  Vancomycin HCl 1,250 mg in sodium chloride 0.9 % 250 mL VTB        Ordering Provider: Kyrie Walsh PharmD    1,250 mg  200 mL/hr over 75 Minutes Intravenous Once 08/28/24 1500      08/28/24 1250  vancomycin (dosing per levels)        Ordering Provider: Marlo Heaton MD     Does not apply Daily 08/28/24 1345 09/03/24 0859    08/26/24 1304  meropenem (MERREM) 500 mg in sodium chloride 0.9 % 100 mL MBP        Ordering Provider: Lisa Rowe DO    500 mg  over 3 Hours Intravenous Every 24 Hours 08/27/24 1600 09/03/24 1559    08/26/24 1306  vancomycin IVPB 1750 mg in 0.9% Sodium Chloride (premix) 500 mL        Ordering Provider: Kyrie Walsh PharmD    1,750 mg  285.7 mL/hr over 105 Minutes Intravenous Once 08/26/24 1800 08/26/24 1957    08/26/24 1304  meropenem (MERREM) 1,000 mg in sodium chloride 0.9 % 100 mL MBP        Ordering Provider: Lisa Rowe DO    1,000 mg  over 30 Minutes Intravenous Once 08/26/24 1500 08/26/24 1604    08/26/24 1306  vancomycin (dosing per levels)        Ordering Provider: Kyrie Walsh PharmD     Does not apply Daily 08/26/24 1400 08/29/24 0859    08/26/24 1223  Pharmacy to dose vancomycin        Ordering Provider: Marlo Heaton MD     Does not apply Continuous PRN 08/26/24 1223 09/02/24 4731          Allergies  Allergies as of 08/26/2024 - Reviewed 08/26/2024   Allergen Reaction Noted    Cefepime Other (See Comments) and Seizure 12/31/2023    Doxycycline Other (See Comments) 06/08/2023    Gabapentin Confusion 11/16/2023    Ranolazine Dizziness and Other (See Comments) 12/11/2023     Cephalosporins Unknown - Low Severity 02/10/2024    Hydralazine Unknown - Low Severity 03/05/2024    Augmentin [amoxicillin-pot clavulanate] Palpitations 08/16/2016    Biaxin [clarithromycin] Palpitations 08/16/2016    Coreg [carvedilol] Itching 08/16/2016    Crestor [rosuvastatin calcium] Itching 01/09/2017    Iodine Rash 10/02/2023    Lipitor [atorvastatin] Itching 08/16/2016    Lisinopril Cough 08/16/2016    Livalo [pitavastatin] Unknown - Low Severity 08/16/2016    Nortriptyline hcl Other (See Comments) 12/05/2023    Pravastatin Unknown - Low Severity 08/16/2016    Questran [cholestyramine] Unknown - Low Severity 08/16/2016     Labs  Results from last 7 days   Lab Units 08/28/24  0523 08/27/24  0549 08/26/24  1145   BUN mg/dL 40* 31* 43*   CREATININE mg/dL 5.16* 4.41* 5.63*     Results from last 7 days   Lab Units 08/28/24  0523 08/27/24  0549 08/26/24  1145   WBC 10*3/mm3 10.76 9.73 11.95*     Evaluation of Dosing     Last Dose Received in the ED/Outside Facility:   Is Patient on Dialysis or Renal Replacement: HD, MWF outpatient    Height -    Weight -      CrCl cannot be calculated (Unknown ideal weight.).    Intake & Output (last 3 days)       None          Microbiology  Microbiology Results (last 10 days)       Procedure Component Value - Date/Time    CANDIDA AURIS SCREEN - Swab, Axilla Right, Axilla Left and Groin [[id number]]  (Normal) Collected: 08/26/24 1715    Lab Status: Preliminary result Specimen: Swab from Axilla Right, Axilla Left and Groin Updated: 08/27/24 2231     Candida Auris Screen Culture No Candida auris isolated at 24 hours    MRSA Screen, PCR (Inpatient) - Swab, Nares [[id number]]  (Normal) Collected: 08/26/24 1715    Lab Status: Final result Specimen: Swab from Nares Updated: 08/26/24 2124     MRSA PCR Negative    Narrative:      The negative predictive value of this diagnostic test is high and should only be used to consider de-escalating anti-MRSA therapy. A positive result may  indicate colonization with MRSA and must be correlated clinically.  MRSA Negative    Blood Culture - Blood, Hand, Right [[id number]]  (Normal) Collected: 08/26/24 1154    Lab Status: Preliminary result Specimen: Blood from Hand, Right Updated: 08/28/24 1215     Blood Culture No growth at 2 days    Narrative:      Less than seven (7) mL's of blood was collected.  Insufficient quantity may yield false negative results.    Blood Culture - Blood, Arm, Right [[id number]]  (Normal) Collected: 08/26/24 1145    Lab Status: Preliminary result Specimen: Blood from Arm, Right Updated: 08/28/24 1215     Blood Culture No growth at 2 days    Narrative:      Less than seven (7) mL's of blood was collected.  Insufficient quantity may yield false negative results.          Vancomycin Levels  Results from last 7 days   Lab Units 08/28/24  0523 08/27/24  0549   VANCOMYCIN RM mcg/mL 14.70 19.00             Assessment/Plan:  Pharmacy to dose vancomycin for SSTI. Goal trough 15-20 mcg/mL.  Pertinent labs: Afebrile, WBC 10.76, Scr 5.16 (HD MWF)  Vancomycin AM random on 8/28 is 14.7 mcg/mL  Will give vancomycin 1250 mg  (~15 mg/kg) IV x 1 on 8/28 @1500 after dialysis session.   Will order vancomycin AM random on 8/30 prior to next dialysis session.   Will continue to follow HD plan and dose vancomycin s/p sessions.   Monitor renal function, cultures and sensitivities, and clinical status, and adjust regimen as necessary.    Pharmacy will continue to follow.    Kyrie Walsh PharmD  8/28/2024  13:16 EDT

## 2024-08-29 ENCOUNTER — ANESTHESIA EVENT CONVERTED (OUTPATIENT)
Dept: ANESTHESIOLOGY | Facility: HOSPITAL | Age: 74
End: 2024-08-29
Payer: MEDICARE

## 2024-08-29 ENCOUNTER — ANESTHESIA (OUTPATIENT)
Dept: PERIOP | Facility: HOSPITAL | Age: 74
End: 2024-08-29
Payer: MEDICARE

## 2024-08-29 PROBLEM — I07.1 SEVERE TRICUSPID REGURGITATION: Status: RESOLVED | Noted: 2024-03-05 | Resolved: 2024-08-29

## 2024-08-29 PROBLEM — R79.89 ELEVATED TROPONIN: Status: RESOLVED | Noted: 2024-02-24 | Resolved: 2024-08-29

## 2024-08-29 PROBLEM — K65.9 PERITONITIS, UNSPECIFIED: Status: RESOLVED | Noted: 2023-12-27 | Resolved: 2024-08-29

## 2024-08-29 PROBLEM — I25.10 CAD (CORONARY ARTERY DISEASE): Chronic | Status: RESOLVED | Noted: 2021-01-29 | Resolved: 2024-08-29

## 2024-08-29 PROBLEM — Z79.4 LONG TERM (CURRENT) USE OF INSULIN: Status: RESOLVED | Noted: 2023-12-07 | Resolved: 2024-08-29

## 2024-08-29 LAB
ANION GAP SERPL CALCULATED.3IONS-SCNC: 14 MMOL/L (ref 5–15)
ANION GAP SERPL CALCULATED.3IONS-SCNC: 14 MMOL/L (ref 5–15)
APTT PPP: 29 SECONDS (ref 60–90)
ASCENDING AORTA: 3.3 CM
BASOPHILS # BLD AUTO: 0.09 10*3/MM3 (ref 0–0.2)
BASOPHILS NFR BLD AUTO: 0.8 % (ref 0–1.5)
BH CV ECHO MEAS - AO MAX PG: 5.4 MMHG
BH CV ECHO MEAS - AO MEAN PG: 3 MMHG
BH CV ECHO MEAS - AO ROOT DIAM: 3.3 CM
BH CV ECHO MEAS - AO V2 MAX: 115.8 CM/SEC
BH CV ECHO MEAS - AO V2 VTI: 23.8 CM
BH CV ECHO MEAS - EF(MOD-BP): 40 %
BH CV ECHO MEAS - IVS/LVPW: 1 CM
BH CV ECHO MEAS - IVSD: 1.2 CM
BH CV ECHO MEAS - LA DIMENSION: 4.5 CM
BH CV ECHO MEAS - LAT PEAK E' VEL: 12.5 CM/SEC
BH CV ECHO MEAS - LV MAX PG: 3.6 MMHG
BH CV ECHO MEAS - LV MEAN PG: 1.95 MMHG
BH CV ECHO MEAS - LV V1 MAX: 94.7 CM/SEC
BH CV ECHO MEAS - LV V1 VTI: 15.5 CM
BH CV ECHO MEAS - LVIDD: 4.8 CM
BH CV ECHO MEAS - LVIDS: 3.3 CM
BH CV ECHO MEAS - LVOT DIAM: 2.1 CM
BH CV ECHO MEAS - LVPWD: 1.2 CM
BH CV ECHO MEAS - MED PEAK E' VEL: 4.2 CM/SEC
BH CV ECHO MEAS - MR MAX PG: 91 MMHG
BH CV ECHO MEAS - MR MAX VEL: 476.9 CM/SEC
BH CV ECHO MEAS - MR MEAN PG: 56 MMHG
BH CV ECHO MEAS - MR VTI: 129 CM
BH CV ECHO MEAS - MV A MAX VEL: 52 CM/SEC
BH CV ECHO MEAS - MV E MAX VEL: 104 CM/SEC
BH CV ECHO MEAS - MV E/A: 2
BH CV ECHO MEAS - MV MAX PG: 5.7 MMHG
BH CV ECHO MEAS - MV MEAN PG: 2.4 MMHG
BH CV ECHO MEAS - MV P1/2T: 40 MSEC
BH CV ECHO MEAS - MV V2 VTI: 25.1 CM
BH CV ECHO MEAS - PA ACC TIME: 0.06 SEC
BH CV ECHO MEAS - PA V2 MAX: 81.9 CM/SEC
BH CV ECHO MEAS - RAP SYSTOLE: 15 MMHG
BH CV ECHO MEAS - RVSP: 73 MMHG
BH CV ECHO MEAS - TAPSE (>1.6): 0.98 CM
BH CV ECHO MEAS - TR MAX PG: 51.8 MMHG
BH CV ECHO MEAS - TR MAX VEL: 359.1 CM/SEC
BH CV ECHO MEASUREMENTS AVERAGE E/E' RATIO: 12.46
BH CV XLRA - RV BASE: 4.6 CM
BH CV XLRA - RV MID: 3.8 CM
BH CV XLRA - TDI S': 6 CM/SEC
BUN SERPL-MCNC: 30 MG/DL (ref 8–23)
BUN SERPL-MCNC: 32 MG/DL (ref 8–23)
BUN/CREAT SERPL: 7.5 (ref 7–25)
BUN/CREAT SERPL: 7.6 (ref 7–25)
CALCIUM SPEC-SCNC: 9.2 MG/DL (ref 8.6–10.5)
CALCIUM SPEC-SCNC: 9.4 MG/DL (ref 8.6–10.5)
CHLORIDE SERPL-SCNC: 95 MMOL/L (ref 98–107)
CHLORIDE SERPL-SCNC: 96 MMOL/L (ref 98–107)
CO2 SERPL-SCNC: 21 MMOL/L (ref 22–29)
CO2 SERPL-SCNC: 23 MMOL/L (ref 22–29)
CREAT SERPL-MCNC: 3.93 MG/DL (ref 0.76–1.27)
CREAT SERPL-MCNC: 4.26 MG/DL (ref 0.76–1.27)
DEPRECATED RDW RBC AUTO: 68.1 FL (ref 37–54)
EGFRCR SERPLBLD CKD-EPI 2021: 14 ML/MIN/1.73
EGFRCR SERPLBLD CKD-EPI 2021: 15.5 ML/MIN/1.73
EOSINOPHIL # BLD AUTO: 0.01 10*3/MM3 (ref 0–0.4)
EOSINOPHIL NFR BLD AUTO: 0.1 % (ref 0.3–6.2)
ERYTHROCYTE [DISTWIDTH] IN BLOOD BY AUTOMATED COUNT: 19.8 % (ref 12.3–15.4)
GEN 5 2HR TROPONIN T REFLEX: 201 NG/L
GLUCOSE BLDC GLUCOMTR-MCNC: 146 MG/DL (ref 70–130)
GLUCOSE BLDC GLUCOMTR-MCNC: 158 MG/DL (ref 70–130)
GLUCOSE BLDC GLUCOMTR-MCNC: 161 MG/DL (ref 70–130)
GLUCOSE BLDC GLUCOMTR-MCNC: 202 MG/DL (ref 70–130)
GLUCOSE SERPL-MCNC: 148 MG/DL (ref 65–99)
GLUCOSE SERPL-MCNC: 174 MG/DL (ref 65–99)
HCT VFR BLD AUTO: 28.7 % (ref 37.5–51)
HGB BLD-MCNC: 8.2 G/DL (ref 13–17.7)
IMM GRANULOCYTES # BLD AUTO: 0.07 10*3/MM3 (ref 0–0.05)
IMM GRANULOCYTES NFR BLD AUTO: 0.6 % (ref 0–0.5)
INR PPP: 1.56 (ref 0.89–1.12)
LEFT ATRIUM VOLUME INDEX: 36.9 ML/M2
LYMPHOCYTES # BLD AUTO: 0.47 10*3/MM3 (ref 0.7–3.1)
LYMPHOCYTES NFR BLD AUTO: 4 % (ref 19.6–45.3)
MAGNESIUM SERPL-MCNC: 1.9 MG/DL (ref 1.6–2.4)
MCH RBC QN AUTO: 27.2 PG (ref 26.6–33)
MCHC RBC AUTO-ENTMCNC: 28.6 G/DL (ref 31.5–35.7)
MCV RBC AUTO: 95 FL (ref 79–97)
MONOCYTES # BLD AUTO: 0.63 10*3/MM3 (ref 0.1–0.9)
MONOCYTES NFR BLD AUTO: 5.3 % (ref 5–12)
NEUTROPHILS NFR BLD AUTO: 10.59 10*3/MM3 (ref 1.7–7)
NEUTROPHILS NFR BLD AUTO: 89.2 % (ref 42.7–76)
NRBC BLD AUTO-RTO: 0 /100 WBC (ref 0–0.2)
PLATELET # BLD AUTO: 302 10*3/MM3 (ref 140–450)
PMV BLD AUTO: 9.8 FL (ref 6–12)
POTASSIUM SERPL-SCNC: 5.5 MMOL/L (ref 3.5–5.2)
POTASSIUM SERPL-SCNC: 5.8 MMOL/L (ref 3.5–5.2)
PROTHROMBIN TIME: 18.8 SECONDS (ref 12.2–14.5)
RBC # BLD AUTO: 3.02 10*6/MM3 (ref 4.14–5.8)
SODIUM SERPL-SCNC: 131 MMOL/L (ref 136–145)
SODIUM SERPL-SCNC: 132 MMOL/L (ref 136–145)
TROPONIN T DELTA: -2 NG/L
TROPONIN T SERPL HS-MCNC: 203 NG/L
UFH PPP CHRO-ACNC: 0.15 IU/ML (ref 0.3–0.7)
WBC NRBC COR # BLD AUTO: 11.86 10*3/MM3 (ref 3.4–10.8)

## 2024-08-29 PROCEDURE — 85520 HEPARIN ASSAY: CPT | Performed by: INTERNAL MEDICINE

## 2024-08-29 PROCEDURE — 82948 REAGENT STRIP/BLOOD GLUCOSE: CPT

## 2024-08-29 PROCEDURE — 93010 ELECTROCARDIOGRAM REPORT: CPT | Performed by: INTERNAL MEDICINE

## 2024-08-29 PROCEDURE — 84484 ASSAY OF TROPONIN QUANT: CPT | Performed by: INTERNAL MEDICINE

## 2024-08-29 PROCEDURE — 85730 THROMBOPLASTIN TIME PARTIAL: CPT | Performed by: INTERNAL MEDICINE

## 2024-08-29 PROCEDURE — 25010000002 FENTANYL CITRATE (PF) 50 MCG/ML SOLUTION: Performed by: NURSE ANESTHETIST, CERTIFIED REGISTERED

## 2024-08-29 PROCEDURE — 25810000003 SODIUM CHLORIDE 0.9 % SOLUTION: Performed by: SURGERY

## 2024-08-29 PROCEDURE — 25010000002 NITROGLYCERIN 200 MCG/ML SOLUTION: Performed by: INTERNAL MEDICINE

## 2024-08-29 PROCEDURE — 25010000002 DEXAMETHASONE SODIUM PHOSPHATE 10 MG/ML SOLUTION: Performed by: NURSE ANESTHETIST, CERTIFIED REGISTERED

## 2024-08-29 PROCEDURE — 83735 ASSAY OF MAGNESIUM: CPT | Performed by: INTERNAL MEDICINE

## 2024-08-29 PROCEDURE — 25010000002 ONDANSETRON PER 1 MG: Performed by: NURSE ANESTHETIST, CERTIFIED REGISTERED

## 2024-08-29 PROCEDURE — 99232 SBSQ HOSP IP/OBS MODERATE 35: CPT

## 2024-08-29 PROCEDURE — 93005 ELECTROCARDIOGRAM TRACING: CPT | Performed by: INTERNAL MEDICINE

## 2024-08-29 PROCEDURE — 25010000002 MIDAZOLAM PER 1 MG: Performed by: NURSE ANESTHETIST, CERTIFIED REGISTERED

## 2024-08-29 PROCEDURE — 25010000002 PROPOFOL 10 MG/ML EMULSION: Performed by: NURSE ANESTHETIST, CERTIFIED REGISTERED

## 2024-08-29 PROCEDURE — 80048 BASIC METABOLIC PNL TOTAL CA: CPT

## 2024-08-29 PROCEDURE — 88307 TISSUE EXAM BY PATHOLOGIST: CPT | Performed by: SURGERY

## 2024-08-29 PROCEDURE — 25010000002 HEPARIN (PORCINE) 25000-0.45 UT/250ML-% SOLUTION: Performed by: INTERNAL MEDICINE

## 2024-08-29 PROCEDURE — 85025 COMPLETE CBC W/AUTO DIFF WBC: CPT | Performed by: INTERNAL MEDICINE

## 2024-08-29 PROCEDURE — 99232 SBSQ HOSP IP/OBS MODERATE 35: CPT | Performed by: INTERNAL MEDICINE

## 2024-08-29 PROCEDURE — 0Y6H0Z1 DETACHMENT AT RIGHT LOWER LEG, HIGH, OPEN APPROACH: ICD-10-PCS | Performed by: SURGERY

## 2024-08-29 PROCEDURE — 80048 BASIC METABOLIC PNL TOTAL CA: CPT | Performed by: NURSE PRACTITIONER

## 2024-08-29 PROCEDURE — 25010000002 ROPIVACAINE PER 1 MG: Performed by: NURSE ANESTHETIST, CERTIFIED REGISTERED

## 2024-08-29 PROCEDURE — 85610 PROTHROMBIN TIME: CPT | Performed by: INTERNAL MEDICINE

## 2024-08-29 RX ORDER — ROPIVACAINE HYDROCHLORIDE 2 MG/ML
INJECTION, SOLUTION EPIDURAL; INFILTRATION; PERINEURAL CONTINUOUS
Status: DISCONTINUED | OUTPATIENT
Start: 2024-08-29 | End: 2024-08-29

## 2024-08-29 RX ORDER — PROMETHAZINE HYDROCHLORIDE 25 MG/1
25 TABLET ORAL ONCE AS NEEDED
Status: DISCONTINUED | OUTPATIENT
Start: 2024-08-29 | End: 2024-08-29 | Stop reason: HOSPADM

## 2024-08-29 RX ORDER — FENTANYL CITRATE 50 UG/ML
50 INJECTION, SOLUTION INTRAMUSCULAR; INTRAVENOUS
Status: DISCONTINUED | OUTPATIENT
Start: 2024-08-29 | End: 2024-08-29 | Stop reason: HOSPADM

## 2024-08-29 RX ORDER — ETOMIDATE 2 MG/ML
INJECTION INTRAVENOUS AS NEEDED
Status: DISCONTINUED | OUTPATIENT
Start: 2024-08-29 | End: 2024-08-29 | Stop reason: SURG

## 2024-08-29 RX ORDER — NALOXONE HCL 0.4 MG/ML
0.4 VIAL (ML) INJECTION AS NEEDED
Status: DISCONTINUED | OUTPATIENT
Start: 2024-08-29 | End: 2024-08-29 | Stop reason: HOSPADM

## 2024-08-29 RX ORDER — SODIUM CHLORIDE 0.9 % (FLUSH) 0.9 %
3 SYRINGE (ML) INJECTION EVERY 12 HOURS SCHEDULED
Status: DISCONTINUED | OUTPATIENT
Start: 2024-08-29 | End: 2024-08-29 | Stop reason: HOSPADM

## 2024-08-29 RX ORDER — ONDANSETRON 2 MG/ML
4 INJECTION INTRAMUSCULAR; INTRAVENOUS ONCE AS NEEDED
Status: DISCONTINUED | OUTPATIENT
Start: 2024-08-29 | End: 2024-08-29 | Stop reason: HOSPADM

## 2024-08-29 RX ORDER — ROPIVACAINE HYDROCHLORIDE 5 MG/ML
INJECTION, SOLUTION EPIDURAL; INFILTRATION; PERINEURAL
Status: COMPLETED | OUTPATIENT
Start: 2024-08-29 | End: 2024-08-29

## 2024-08-29 RX ORDER — METOPROLOL TARTRATE 25 MG/1
25 TABLET, FILM COATED ORAL EVERY 12 HOURS SCHEDULED
Status: DISCONTINUED | OUTPATIENT
Start: 2024-08-29 | End: 2024-09-02

## 2024-08-29 RX ORDER — SODIUM CHLORIDE 9 MG/ML
9 INJECTION, SOLUTION INTRAVENOUS CONTINUOUS
Status: DISCONTINUED | OUTPATIENT
Start: 2024-08-29 | End: 2024-09-05

## 2024-08-29 RX ORDER — LIDOCAINE HYDROCHLORIDE 10 MG/ML
0.5 INJECTION, SOLUTION EPIDURAL; INFILTRATION; INTRACAUDAL; PERINEURAL ONCE AS NEEDED
Status: DISCONTINUED | OUTPATIENT
Start: 2024-08-29 | End: 2024-08-29 | Stop reason: HOSPADM

## 2024-08-29 RX ORDER — HEPARIN SODIUM 1000 [USP'U]/ML
50 INJECTION, SOLUTION INTRAVENOUS; SUBCUTANEOUS AS NEEDED
Status: DISCONTINUED | OUTPATIENT
Start: 2024-08-29 | End: 2024-08-29

## 2024-08-29 RX ORDER — HYDROCODONE BITARTRATE AND ACETAMINOPHEN 5; 325 MG/1; MG/1
1 TABLET ORAL ONCE AS NEEDED
Status: DISCONTINUED | OUTPATIENT
Start: 2024-08-29 | End: 2024-08-29 | Stop reason: HOSPADM

## 2024-08-29 RX ORDER — HEPARIN SODIUM 10000 [USP'U]/100ML
18 INJECTION, SOLUTION INTRAVENOUS
Status: DISPENSED | OUTPATIENT
Start: 2024-08-29 | End: 2024-08-31

## 2024-08-29 RX ORDER — ROPIVACAINE HYDROCHLORIDE 2 MG/ML
INJECTION, SOLUTION EPIDURAL; INFILTRATION; PERINEURAL
Status: COMPLETED | OUTPATIENT
Start: 2024-08-29 | End: 2024-08-29

## 2024-08-29 RX ORDER — DEXAMETHASONE SODIUM PHOSPHATE 10 MG/ML
INJECTION, SOLUTION INTRAMUSCULAR; INTRAVENOUS
Status: COMPLETED | OUTPATIENT
Start: 2024-08-29 | End: 2024-08-29

## 2024-08-29 RX ORDER — SODIUM CHLORIDE 9 MG/ML
40 INJECTION, SOLUTION INTRAVENOUS AS NEEDED
Status: DISCONTINUED | OUTPATIENT
Start: 2024-08-29 | End: 2024-08-29 | Stop reason: HOSPADM

## 2024-08-29 RX ORDER — PROPOFOL 10 MG/ML
VIAL (ML) INTRAVENOUS CONTINUOUS PRN
Status: DISCONTINUED | OUTPATIENT
Start: 2024-08-29 | End: 2024-08-29 | Stop reason: SURG

## 2024-08-29 RX ORDER — HYDRALAZINE HYDROCHLORIDE 20 MG/ML
5 INJECTION INTRAMUSCULAR; INTRAVENOUS
Status: DISCONTINUED | OUTPATIENT
Start: 2024-08-29 | End: 2024-08-29 | Stop reason: HOSPADM

## 2024-08-29 RX ORDER — HEPARIN SODIUM 1000 [USP'U]/ML
25 INJECTION, SOLUTION INTRAVENOUS; SUBCUTANEOUS AS NEEDED
Status: DISCONTINUED | OUTPATIENT
Start: 2024-08-29 | End: 2024-08-29

## 2024-08-29 RX ORDER — NITROGLYCERIN 0.4 MG/1
0.4 TABLET SUBLINGUAL
Status: DISCONTINUED | OUTPATIENT
Start: 2024-08-29 | End: 2024-09-05 | Stop reason: HOSPADM

## 2024-08-29 RX ORDER — ONDANSETRON 2 MG/ML
INJECTION INTRAMUSCULAR; INTRAVENOUS AS NEEDED
Status: DISCONTINUED | OUTPATIENT
Start: 2024-08-29 | End: 2024-08-29 | Stop reason: SURG

## 2024-08-29 RX ORDER — MIDAZOLAM HYDROCHLORIDE 1 MG/ML
0.5 INJECTION INTRAMUSCULAR; INTRAVENOUS
Status: DISCONTINUED | OUTPATIENT
Start: 2024-08-29 | End: 2024-08-29 | Stop reason: HOSPADM

## 2024-08-29 RX ORDER — HYDROMORPHONE HYDROCHLORIDE 1 MG/ML
0.5 INJECTION, SOLUTION INTRAMUSCULAR; INTRAVENOUS; SUBCUTANEOUS
Status: DISCONTINUED | OUTPATIENT
Start: 2024-08-29 | End: 2024-08-29 | Stop reason: HOSPADM

## 2024-08-29 RX ORDER — IPRATROPIUM BROMIDE AND ALBUTEROL SULFATE 2.5; .5 MG/3ML; MG/3ML
3 SOLUTION RESPIRATORY (INHALATION) ONCE AS NEEDED
Status: DISCONTINUED | OUTPATIENT
Start: 2024-08-29 | End: 2024-08-29 | Stop reason: HOSPADM

## 2024-08-29 RX ORDER — MIDAZOLAM HYDROCHLORIDE 1 MG/ML
INJECTION INTRAMUSCULAR; INTRAVENOUS AS NEEDED
Status: DISCONTINUED | OUTPATIENT
Start: 2024-08-29 | End: 2024-08-29 | Stop reason: SURG

## 2024-08-29 RX ORDER — DIAZEPAM 2 MG
4 TABLET ORAL EVERY 6 HOURS PRN
Status: DISCONTINUED | OUTPATIENT
Start: 2024-08-29 | End: 2024-08-31

## 2024-08-29 RX ORDER — SODIUM CHLORIDE 0.9 % (FLUSH) 0.9 %
3-10 SYRINGE (ML) INJECTION AS NEEDED
Status: DISCONTINUED | OUTPATIENT
Start: 2024-08-29 | End: 2024-08-29 | Stop reason: HOSPADM

## 2024-08-29 RX ORDER — NITROGLYCERIN 20 MG/100ML
5-200 INJECTION INTRAVENOUS
Status: DISCONTINUED | OUTPATIENT
Start: 2024-08-29 | End: 2024-09-05

## 2024-08-29 RX ORDER — LABETALOL HYDROCHLORIDE 5 MG/ML
5 INJECTION, SOLUTION INTRAVENOUS
Status: DISCONTINUED | OUTPATIENT
Start: 2024-08-29 | End: 2024-08-29 | Stop reason: HOSPADM

## 2024-08-29 RX ORDER — FAMOTIDINE 20 MG/1
20 TABLET, FILM COATED ORAL ONCE
Status: COMPLETED | OUTPATIENT
Start: 2024-08-29 | End: 2024-08-29

## 2024-08-29 RX ORDER — PROMETHAZINE HYDROCHLORIDE 25 MG/1
25 SUPPOSITORY RECTAL ONCE AS NEEDED
Status: DISCONTINUED | OUTPATIENT
Start: 2024-08-29 | End: 2024-08-29 | Stop reason: HOSPADM

## 2024-08-29 RX ORDER — PHENYLEPHRINE HCL IN 0.9% NACL 1 MG/10 ML
SYRINGE (ML) INTRAVENOUS AS NEEDED
Status: DISCONTINUED | OUTPATIENT
Start: 2024-08-29 | End: 2024-08-29 | Stop reason: SURG

## 2024-08-29 RX ORDER — SIMETHICONE 80 MG
80 TABLET,CHEWABLE ORAL 4 TIMES DAILY PRN
Status: DISCONTINUED | OUTPATIENT
Start: 2024-08-29 | End: 2024-09-05 | Stop reason: HOSPADM

## 2024-08-29 RX ORDER — FENTANYL CITRATE 50 UG/ML
INJECTION, SOLUTION INTRAMUSCULAR; INTRAVENOUS
Status: COMPLETED | OUTPATIENT
Start: 2024-08-29 | End: 2024-08-29

## 2024-08-29 RX ADMIN — ONDANSETRON 4 MG: 2 INJECTION INTRAMUSCULAR; INTRAVENOUS at 11:22

## 2024-08-29 RX ADMIN — FAMOTIDINE 20 MG: 20 TABLET, FILM COATED ORAL at 09:25

## 2024-08-29 RX ADMIN — METOPROLOL TARTRATE 25 MG: 25 TABLET, FILM COATED ORAL at 20:37

## 2024-08-29 RX ADMIN — ETOMIDATE 8 MG: 40 INJECTION, SOLUTION INTRAVENOUS at 10:25

## 2024-08-29 RX ADMIN — ETOMIDATE 6 MG: 40 INJECTION, SOLUTION INTRAVENOUS at 10:32

## 2024-08-29 RX ADMIN — ROPIVACAINE HYDROCHLORIDE 12 ML: 2 INJECTION, SOLUTION EPIDURAL; INFILTRATION; PERINEURAL at 10:05

## 2024-08-29 RX ADMIN — Medication 20 MG: at 10:56

## 2024-08-29 RX ADMIN — Medication 200 MCG: at 11:11

## 2024-08-29 RX ADMIN — Medication 10 MG: at 11:06

## 2024-08-29 RX ADMIN — FENTANYL CITRATE 25 MCG: 50 INJECTION, SOLUTION INTRAMUSCULAR; INTRAVENOUS at 10:53

## 2024-08-29 RX ADMIN — FENTANYL CITRATE 100 MCG: 50 INJECTION, SOLUTION INTRAMUSCULAR; INTRAVENOUS at 09:54

## 2024-08-29 RX ADMIN — NITROGLYCERIN 10 MCG/MIN: 20 INJECTION INTRAVENOUS at 16:25

## 2024-08-29 RX ADMIN — NITROGLYCERIN 0.4 MG: 0.4 TABLET SUBLINGUAL at 15:29

## 2024-08-29 RX ADMIN — Medication 200 MCG: at 11:04

## 2024-08-29 RX ADMIN — SODIUM CHLORIDE 9 ML/HR: 9 INJECTION, SOLUTION INTRAVENOUS at 09:25

## 2024-08-29 RX ADMIN — OXYCODONE HYDROCHLORIDE 10 MG: 10 TABLET ORAL at 00:17

## 2024-08-29 RX ADMIN — Medication 100 MCG: at 11:25

## 2024-08-29 RX ADMIN — OXYCODONE HYDROCHLORIDE 10 MG: 10 TABLET ORAL at 20:37

## 2024-08-29 RX ADMIN — FENTANYL CITRATE 25 MCG: 50 INJECTION, SOLUTION INTRAMUSCULAR; INTRAVENOUS at 11:26

## 2024-08-29 RX ADMIN — ROPIVACAINE HYDROCHLORIDE 12 ML: 5 INJECTION, SOLUTION EPIDURAL; INFILTRATION; PERINEURAL at 10:05

## 2024-08-29 RX ADMIN — Medication 20 MG: at 11:01

## 2024-08-29 RX ADMIN — Medication 10 ML: at 20:37

## 2024-08-29 RX ADMIN — OXYCODONE HYDROCHLORIDE 10 MG: 10 TABLET ORAL at 05:02

## 2024-08-29 RX ADMIN — DEXAMETHASONE SODIUM PHOSPHATE 2 MG: 10 INJECTION, SOLUTION INTRAMUSCULAR; INTRAVENOUS at 09:54

## 2024-08-29 RX ADMIN — ROPIVACAINE HYDROCHLORIDE 12 ML: 5 INJECTION EPIDURAL; INFILTRATION; PERINEURAL at 09:54

## 2024-08-29 RX ADMIN — FENTANYL CITRATE 25 MCG: 50 INJECTION, SOLUTION INTRAMUSCULAR; INTRAVENOUS at 11:21

## 2024-08-29 RX ADMIN — ROPIVACAINE HYDROCHLORIDE 12 ML: 2 INJECTION, SOLUTION EPIDURAL; INFILTRATION at 09:54

## 2024-08-29 RX ADMIN — OXYCODONE HYDROCHLORIDE 10 MG: 10 TABLET ORAL at 15:33

## 2024-08-29 RX ADMIN — MIDAZOLAM HYDROCHLORIDE 2 MG: 1 INJECTION, SOLUTION INTRAMUSCULAR; INTRAVENOUS at 10:54

## 2024-08-29 RX ADMIN — HEPARIN SODIUM 12 UNITS/KG/HR: 10000 INJECTION, SOLUTION INTRAVENOUS at 16:53

## 2024-08-29 RX ADMIN — FENTANYL CITRATE 25 MCG: 50 INJECTION, SOLUTION INTRAMUSCULAR; INTRAVENOUS at 11:35

## 2024-08-29 RX ADMIN — PROPOFOL 100 MCG/KG/MIN: 10 INJECTION, EMULSION INTRAVENOUS at 10:27

## 2024-08-29 RX ADMIN — Medication 200 MCG: at 11:19

## 2024-08-29 NOTE — PROGRESS NOTES
"  [facility]  PROGRESS NOTE    Date of Admission: 8/26/2024  Date of Service: 08/29/24    Primary Care Physician: Joe Diallo MD    Chief Complaint: Chest Pain, PAD    Subjective      HPI:    The patient underwent below the knee amputation earlier today. Rapid response was called on the patient for 10/10 chest discomfort.  He described it is feeling as if there is gas built-up but it was severe and persistent.  Sublingual nitroglycerin was given with mild improvement to his chest pain.  EKG showed ST depression in v5-6.  Initial troponin 203.      Objective   Vitals: /74   Pulse 104   Temp 97.5 °F (36.4 °C) (Oral)   Resp 18   Ht 180.3 cm (71\")   Wt 78.9 kg (174 lb)   SpO2 100%   BMI 24.27 kg/m²     Physical Exam:   GENERAL: Alert, cooperative, in no acute distress.   HEART: Regular rate and rhythm; systolic murmur noted  LUNGS: Clear to auscultation bilaterally. No wheezing, rales or rhonchi. Nonlabored breathing  NEUROLOGIC: No gross focal abnormalities  EXTREMITIES: No obvious deformities, cyanosis, or edema noted.   PSYCH: normal mood, behavior    Results:  Results from last 7 days   Lab Units 08/28/24  0523 08/27/24  0549 08/26/24  1145   WBC 10*3/mm3 10.76 9.73 11.95*   HEMOGLOBIN g/dL 9.8* 9.8* 10.3*   HEMATOCRIT % 32.5* 32.9* 34.4*   PLATELETS 10*3/mm3 271 245 251     Results from last 7 days   Lab Units 08/29/24  1535 08/29/24  0623 08/28/24  0523   SODIUM mmol/L 131* 132* 130*   POTASSIUM mmol/L 5.8* 5.5* 6.6*   CHLORIDE mmol/L 96* 95* 95*   CO2 mmol/L 21.0* 23.0 21.0*   BUN mg/dL 32* 30* 40*   CREATININE mg/dL 4.26* 3.93* 5.16*   GLUCOSE mg/dL 174* 148* 178*      Lab Results   Component Value Date    CHOL 145 02/24/2024    TRIG 109 02/24/2024    HDL 49 02/24/2024    LDL 76 02/24/2024    AST 27 08/28/2024    ALT 17 08/28/2024     Results from last 7 days   Lab Units 08/26/24  1145   HEMOGLOBIN A1C % 6.20*                     Results from last 7 " days   Lab Units 08/29/24  1535   HSTROP T ng/L 203*             Intake/Output Summary (Last 24 hours) at 8/29/2024 1629  Last data filed at 8/29/2024 1153  Gross per 24 hour   Intake 1000 ml   Output 300 ml   Net 700 ml       I personally reviewed the patient's EKG/Telemetry data    Radiology Data:   US Liver    Result Date: 8/27/2024  Impression: SWV <1.37 m/s indicates a low probability of clinically significant fibrosis (METAVIR Stage F2 or below). No overt morphologic changes of cirrhosis by ultrasound. Small volume ascites. Cholecystectomy. Electronically Signed: Kobe Phoenix MD  8/27/2024 7:41 AM EDT  Workstation ID: [id number]       Current Medications:  aspirin, 81 mg, Oral, Daily  b complex-vitamin c-folic acid, 1 tablet, Oral, Daily  calcitriol, 0.5 mcg, Oral, Daily  castor oil-balsam peru, 1 Application, Topical, Daily  [Held by provider] hydrALAZINE, 10 mg, Oral, TID  meropenem, 500 mg, Intravenous, Q24H  metoprolol tartrate, 25 mg, Oral, Q12H  oxyCODONE, 10 mg, Oral, Q4H  pantoprazole, 40 mg, Oral, Daily  sodium chloride, 10 mL, Intravenous, Q12H  vancomycin (dosing per levels), , Does not apply, Daily      heparin, 12 Units/kg/hr  nitroglycerin, 5-200 mcg/min, Last Rate: 10 mcg/min (08/29/24 1625)  Pharmacy to Dose Heparin,   Pharmacy to dose vancomycin,   sodium chloride, 9 mL/hr, Last Rate: 250 mL/hr (08/29/24 1020)        Assessment:  CAD s/p CABG, without angina  [facility] with only 1/3 patent grafts  Coronary artery images reviewed.  No targets for revascularization.    Chronic HFrEF/valvular heart disease  TTE 8/2024: LVEF 40%, mild concentric LVH, moderately reduced RV systolic function, moderate to severe MR, moderate to severe TR, RVSP 73  PAF  Eliquis currently on hold in preparation for BKA  PAD with right lower extremity ischemia  Plan for right BKA 8/29 by Dr. Schreiber  Acute infection to right foot, managed by infectious disease.        Plan:  Patient with NSTEMI with 10/10 chest  pain slightly relieved with SL nitro, lateral ST changes concerning for ischemia, and initial troponin 200+ sign.  He underwent BTKA earlier today.  I discussed case with Dr. Darden who reviewed films from left heart cath in March.  There is not likely to be targets for revascularization on heart catheterization so we will proceed with medical therapy.   Nitro drip  Heparin drip, Dr. Gaspar confirmed this was okay with vascular  Trend troponins with EKGs. AM troponin  Cardiology to revisit tomorrow, tentatively n.p.o. after midnight in the event a cath is warranted (if so, Dr. Poole would likely be the provider)    Case discussed with Dr. Gaspar, Dr. Darden and Dr. Phillip.    Electronically signed by Andrea Coon PA-C, 08/29/24, 4:51 PM EDT.

## 2024-08-29 NOTE — ANESTHESIA PROCEDURE NOTES
Femoral block      Patient reassessed immediately prior to procedure    Start time: 8/29/2024 9:48 AM  Stop time: 8/29/2024 9:54 AM  Reason for block: at surgeon's request and post-op pain management  Performed by  CRNA/CAA: Beck Nieves, CRNA  Assisted by: Beverly Pardo RN  Preanesthetic Checklist  Completed: patient identified, IV checked, site marked, risks and benefits discussed, surgical consent, monitors and equipment checked, pre-op evaluation and timeout performed  Prep:  Pt Position: supine  Sterile barriers:gloves, cap, sterile barriers, mask and washed/disinfected hands  Prep: ChloraPrep  Patient monitoring: blood pressure monitoring, continuous pulse oximetry and EKG  Procedure    Guidance:ultrasound guided    ULTRASOUND INTERPRETATION.  Using ultrasound guidance a 20 G gauge needle was placed in close proximity to the femoral nerve, at which point, under ultrasound guidance anesthetic was injected in the area of the nerve and spread of the anesthesia was seen on ultrasound in close proximity thereto.  There were no abnormalities seen on ultrasound; a digital image was taken; and the patient tolerated the procedure with no complications. Images:still images obtained, printed/placed on chart    Laterality:right  Block Type:femoral  Injection Technique:single-shot  Needle Type:short-bevel  Needle Gauge:20 G  Resistance on Injection: none    Medications Used: fentaNYL citrate (PF) (SUBLIMAZE) injection - Intravenous   100 mcg - 8/29/2024 9:54:00 AM  ropivacaine (NAROPIN) 0.2 % injection - Peripheral Nerve   12 mL - 8/29/2024 9:54:00 AM  ropivacaine (NAROPIN) 0.5 % injection - Injection   12 mL - 8/29/2024 9:54:00 AM  dexamethasone sodium phosphate injection - Injection   2 mg - 8/29/2024 9:54:00 AM      Post Assessment  Injection Assessment: negative aspiration for heme, no paresthesia on injection and incremental injection  Patient Tolerance:comfortable throughout block  Complications:no  Additional  "Notes  SINGLE Shot  A high-frequency linear transducer, with sterile cover, was placed in the inguinal crease to visualize the Femoral Vein, Artery, and Nerve (medial to lateral). The insertion site was prepped in sterile fashion. Skin and cutaneous tissue was infiltrated with 2-5 ml of 1% Lidocaine. Using ultrasound-guidance, a 20-gauge B-Keller 4\" Ultraplex 360 non-stimulating echogenic needle was then inserted and advanced in-plane from lateral to medial with ultrasound guidance. The needle was directed below Fascia Iliacus towards the Femoral nerve. Preservative-free normal saline was utilized for hydro-dissection of tissue. Local anesthetic injection spread, in incremental 3-5 ml injections, was visualized lateral to the artery to surround the femoral nerve. Aspiration every 5 ml to prevent intravascular injection. Injection was completed with negative aspiration of blood and negative intravascular injection. Injection pressures were normal with minimal resistance.   Performed by: Beck Nieves, CRNA            "

## 2024-08-29 NOTE — ANESTHESIA POSTPROCEDURE EVALUATION
Patient: Erlin Savage    Procedure Summary       Date: 08/29/24 Room / Location:  [facility] /  [facility]    Anesthesia Start: 1020 Anesthesia Stop: 1153    Procedure: Right below the knee amputation and all other indicated procedures (Right: Leg Lower) Diagnosis:       Dry gangrene      (Dry gangrene [I96])    Surgeons: Junior Schreiber MD Provider: Scar Mayberry MD    Anesthesia Type: general ASA Status: 4            Anesthesia Type: general    Vitals  Vitals Value Taken Time   /56 08/29/24 1149   Temp     Pulse 85 08/29/24 1153   Resp     SpO2 98 % 08/29/24 1153   Vitals shown include unfiled device data.        Post Anesthesia Care and Evaluation    Patient location during evaluation: PACU  Patient participation: complete - patient cannot participate  Level of consciousness: obtunded/minimal responses  Pain management: adequate    Airway patency: patent  Anesthetic complications: No anesthetic complications  PONV Status: none  Cardiovascular status: hemodynamically stable and acceptable  Respiratory status: nonlabored ventilation, acceptable and nasal cannula  Hydration status: acceptable

## 2024-08-29 NOTE — PERIOPERATIVE NURSING NOTE
Discussed current antibiotic orders with Dr Schreiber who states he does not want an additional pre/cahru-op antibiotic for this procedure.

## 2024-08-29 NOTE — PROGRESS NOTES
"Erlin Savage  1950  [id number]      Evaluating Physician: Marlo Heaton MD    Chief Complaint: right foot infection    Reason for Consultation: right foot infection    History of present illness:     Patient is a 74 y.o.  Yr old male noncompliant previously, with prior history of  end-stage renal disease with intermittent hemodialysis via right chest central line.  He has known peripheral arterial disease with right leg angiogram on April 10 with recanalization of SFA/PTA; post procedure with continued worsening pain at the right distal leg, gangrene at his right second/third and fourth toes with second worse than third worse than fourth.   MRI right foot was soft tissue edema, no loculated collection with abnormality at the posterior calcaneus difficult to completely exclude osteomyelitis at that site as per radiology.     4/19/24 Dr Santos   \"Procedures: Procedure(s):  RIGHT Foot  Second toe amputation\"     4/23/24  Dr Santos .  \"Procedures: Procedure(s):  RIGHT Foot     Amputation hallux  Amputation third toe  Amputation fourth toe  Amputation fifth toe\"     Pathology from April 23, 2024 with no osteomyelitis.  Pathology had noted cellulitis and gangrenous necrosis.  Transitioned to IV vancomycin and oral Augmentin at discharge.     Revascularization on May 14, 2024 by Dr. Schreiber  \"PROCEDURES WITH LATERALITY:   Ultrasound guided access of left common femoral artery with micropuncture needle and images stored  Abd Aorotgram  Right lower extremity arteriogram  Balloon angioplasty of right posterior tibial artery with a 3 mm balloon  Balloon angioplasty of right posterior tibial artery origin4 mm balloon drug-coated  Balloon angioplasty of right peroneal artery with 3 mm balloon  Balloon angioplasty of right anterior tibial artery 3 mm balloon  Balloon angioplasty of right proximal superficial femoral artery with a 5 mm balloon\"     Readmitted August 14 with deterioration at the right foot over unspecified " period of time per patient, at least weeks if not longer with large wound, exposed bone on the medial side.     8/20/24 he  refused recommendation of amputation from surgical team and left [facility] AMA.     8/26/24 readmitted on August 26 with reports to me that patient now agreeable to right side amputation by Dr. Schreiber    8/29/24 surgical team making plans for today. Sleepy at my visit; Right foot  pain is sharp, nonradiating, constant, worse with palpation, better with pain meds and 3 /10     No headache photophobia or neck stiffness. No shortness of breath cough or hemoptysis. No nausea vomiting diarrhea or abdominal pain. No dysuria hematuria or pyuria. No other new skin rash.     Review of Systems     8/29/24  no adr to abx     Constitutional-- No Fever, chills or sweats.  Appetite good. No malaise.  Heent-- No new vision, hearing or throat complaints.  No epistaxis or oral sores.  Denies odynophagia or dysphagia.  No headache, photophobia or neck stiffness.  CV-- No chest pain, palpitation or syncope  Resp-- No SOB, cough, or hemoptysis  GI- No nausea, vomiting, or diarrhea.   -- No dysuria, hematuria, or flank pain.  Denies hesitancy, urgency or flank pain.  Lymph- no swollen lymph nodes in neck, axilla or groin.   Heme- No active bruising or bleeding  MS-- no swelling or pain in the bones or joints of arms or legs.  No new back pain.  Neuro-- No acute focal weakness or numbness in the arms or legs.  Skin-- No rash    Past Medical History:   Diagnosis Date    Anxiety     Anxiety disorder     Back pain     Chronic back pain     work related injury    CKD (chronic kidney disease)     most recent creatinine 2.7 on 01/26/16    Coronary artery disease     Diabetes mellitus     insulin dependent diabetes Onset 2010    Dyslipidemia     Hyperlipidemia     Hypertension     Ischemic heart disease     Renal failure     Stroke     TIA / transient right vision loss age 42    TIA (transient ischemic attack)     Vertigo   "      Past Surgical History:   Procedure Laterality Date    APPENDECTOMY      CARDIAC CATHETERIZATION      CHOLECYSTECTOMY  03/2012    COLONOSCOPY      CORONARY ARTERY BYPASS GRAFT      ENDOSCOPY         Pediatric History   Patient Parents    Not on file     Other Topics Concern    Not on file   Social History Narrative    Not on file       family history is not on file.    Allergies   Allergen Reactions    Cefepime Other (See Comments) and Seizure     Myoclonus - Has received cefazolin    Doxycycline Other (See Comments)     Joint pain, tongue swelling    \"Body locks up\"    Gabapentin Confusion    Ranolazine Dizziness and Other (See Comments)     Severe tremors/ shakiness    Hydralazine Unknown - Low Severity    Augmentin [Amoxicillin-Pot Clavulanate] Palpitations    Biaxin [Clarithromycin] Palpitations    Cephalosporins Unknown - Low Severity     Has received cefazolin    Coreg [Carvedilol] Itching    Crestor [Rosuvastatin Calcium] Itching     Itching rash    Iodine Rash     \"pineda skin\"    Lipitor [Atorvastatin] Itching     And Crestor- generalized weakness and chest tightness    Lisinopril Cough     Cough /weakness, nauseas and dirrhea    Livalo [Pitavastatin] Unknown - Low Severity     Chest tightness    Nortriptyline Hcl Other (See Comments)     Arthralgias    Pravastatin Unknown - Low Severity     Chest , neck and arm discomfort    Questran [Cholestyramine] Unknown - Low Severity       Medication:  Current Facility-Administered Medications   Medication Dose Route Frequency Provider Last Rate Last Admin    acetaminophen (TYLENOL) tablet 650 mg  650 mg Oral Q6H PRN Lisa Rowe DO        aspirin EC tablet 81 mg  81 mg Oral Daily Lisa Rowe DO   81 mg at 08/28/24 1126    b complex-vitamin c-folic acid (NEPHRO-SEVERIANO) tablet 1 tablet  1 tablet Oral Daily Lisa Rowe DO   1 tablet at 08/28/24 1126    sennosides-docusate (PERICOLACE) 8.6-50 MG per tablet 2 tablet  2 tablet Oral BID PRN " Lisa Rowe DO        And    polyethylene glycol (MIRALAX) packet 17 g  17 g Oral Daily PRN Lisa Rowe DO        And    bisacodyl (DULCOLAX) EC tablet 5 mg  5 mg Oral Daily PRN Lisa Rowe DO        And    bisacodyl (DULCOLAX) suppository 10 mg  10 mg Rectal Daily PRN Lisa Rowe DO        calcitriol (ROCALTROL) capsule 0.5 mcg  0.5 mcg Oral Daily Lisa Rowe DO   0.5 mcg at 08/28/24 1126    castor oil-balsam peru (VENELEX) ointment 1 Application  1 Application Topical Daily Maura Gaspar II, DO   1 Application at 08/28/24 1125    dextrose (D50W) (25 g/50 mL) IV injection 25 g  25 g Intravenous Q15 Min PRN Lisa Rowe DO        dextrose (GLUTOSE) oral gel 15 g  15 g Oral Q15 Min PRN Lisa Rowe DO        glucagon (GLUCAGEN) injection 1 mg  1 mg Intramuscular Q15 Min PRN Lisa Rowe DO        heparin (porcine) 5000 UNIT/ML injection 5,000 Units  5,000 Units Subcutaneous Q8H Chastity Suarez PA-C   5,000 Units at 08/28/24 1509    heparin (porcine) injection 2,000 Units  2,000 Units Intracatheter PRN Christophe Smith MD   2,000 Units at 08/28/24 0955    hydrALAZINE (APRESOLINE) tablet 10 mg  10 mg Oral TID Lisa Rowe DO   10 mg at 08/28/24 2045    HYDROmorphone (DILAUDID) injection 0.5 mg  0.5 mg Intravenous Q2H PRN Maura Gaspar II DO   0.5 mg at 08/28/24 1201    meropenem (MERREM) 500 mg in sodium chloride 0.9 % 100 mL MBP  500 mg Intravenous Q24H Lisa Rowe DO   500 mg at 08/28/24 1735    nitroglycerin (NITROSTAT) SL tablet 0.4 mg  0.4 mg Sublingual Q5 Min PRN Lisa Rowe DO        ondansetron ODT (ZOFRAN-ODT) disintegrating tablet 4 mg  4 mg Oral Q6H PRN Lisa Rowe DO   4 mg at 08/27/24 0938    Or    ondansetron (ZOFRAN) injection 4 mg  4 mg Intravenous Q6H PRN Lisa Rowe DO        oxyCODONE (ROXICODONE) immediate release tablet 10 mg  10 mg Oral Q4H Maura Gaspar II, DO   10 mg at  08/29/24 0502    pantoprazole (PROTONIX) EC tablet 40 mg  40 mg Oral Daily Lisa Rowe,    40 mg at 08/28/24 1126    Pharmacy to dose vancomycin   Does not apply Continuous PRN Marlo Heaton MD        sodium chloride 0.9 % flush 10 mL  10 mL Intravenous Q12H Lisa Rowe DO   10 mL at 08/28/24 2045    sodium chloride 0.9 % flush 10 mL  10 mL Intravenous PRN Lisa Rowe DO        sodium chloride 0.9 % infusion 40 mL  40 mL Intravenous PRN Lisa Rowe DO        vancomycin (dosing per levels)   Does not apply Daily Kyrie Walsh PharmD        vancomycin (dosing per levels)   Does not apply Daily Marlo Heaton MD           Antibiotics:  Anti-Infectives (From admission, onward)      Ordered     Dose/Rate Route Frequency Start Stop    08/28/24 1248  Vancomycin HCl 1,250 mg in sodium chloride 0.9 % 250 mL VTB        Ordering Provider: Kyrie Walsh PharmD    1,250 mg  200 mL/hr over 75 Minutes Intravenous Once 08/28/24 1500 08/28/24 1716    08/28/24 1250  vancomycin (dosing per levels)        Ordering Provider: Marlo Heaton MD     Does not apply Daily 08/28/24 1345 09/03/24 0859    08/26/24 1304  meropenem (MERREM) 500 mg in sodium chloride 0.9 % 100 mL MBP        Ordering Provider: Lisa Rowe DO    500 mg  over 3 Hours Intravenous Every 24 Hours 08/27/24 1600 09/03/24 1659    08/26/24 1306  vancomycin IVPB 1750 mg in 0.9% Sodium Chloride (premix) 500 mL        Ordering Provider: Kyrie Walsh PharmD    1,750 mg  285.7 mL/hr over 105 Minutes Intravenous Once 08/26/24 1800 08/26/24 1957    08/26/24 1304  meropenem (MERREM) 1,000 mg in sodium chloride 0.9 % 100 mL MBP        Ordering Provider: Lisa Rowe DO    1,000 mg  over 30 Minutes Intravenous Once 08/26/24 1500 08/26/24 1604    08/26/24 1306  vancomycin (dosing per levels)        Ordering Provider: Kyrie Walsh PharmD     Does not apply Daily 08/26/24 1400 08/29/24 0859    08/26/24 1223  Pharmacy to dose  "vancomycin        Ordering Provider: Marlo Heaton MD     Does not apply Continuous PRN 08/26/24 1223 09/02/24 [id number]                 Physical Exam:   Vital Signs   /76 (BP Location: Right arm, Patient Position: Lying)   Pulse 97   Temp 97.5 °F (36.4 °C) (Oral)   Resp 18   Ht 180.3 cm (71\")   Wt 78.9 kg (174 lb)   SpO2 94%   BMI 24.27 kg/m²       GENERAL: sleepy.  Appears chronically debilitated  HEENT: Normocephalic, atraumatic.    No conjunctival injection. No icterus. Oropharynx clear without evidence of thrush or exudate.   NECK: Supple without nuchal rigidity. No mass.   HEART: RRR; No murmur.  LUNGS: Clear to auscultation bilaterally without wheezing, rales, rhonchi. Normal respiratory effort. Nonlabored.  ABDOMEN: Soft, nontender, nondistended. Positive bowel sounds. No rebound or guarding.  EXT:  No cyanosis, clubbing or edema.  MSK: FROM without joint effusions noted arms/legs.    SKIN: Warm and dry without cutaneous eruptions on Inspection/palpation.    NEURO: sleepy     Left foot first and third toe distally with small black areas,  toes with only minimal erythema, no discrete mass bulge or fluctuance.  No crepitus or bulla     Right foot distally with tissue defect, slough and black eschar and exposed bone.  Vague erythema and diffuse pain.  No discrete mass bulge or fluctuance.  No crepitus or bulla.     No peripheral stigmata/phenomenon of endocarditis    Laboratory Data    Results from last 7 days   Lab Units 08/28/24  0523 08/27/24  0549 08/26/24  1145   WBC 10*3/mm3 10.76 9.73 11.95*   HEMOGLOBIN g/dL 9.8* 9.8* 10.3*   HEMATOCRIT % 32.5* 32.9* 34.4*   PLATELETS 10*3/mm3 271 245 251     Results from last 7 days   Lab Units 08/29/24  0623   SODIUM mmol/L 132*   POTASSIUM mmol/L 5.5*   CHLORIDE mmol/L 95*   CO2 mmol/L 23.0   BUN mg/dL 30*   CREATININE mg/dL 3.93*   GLUCOSE mg/dL 148*   CALCIUM mg/dL 9.4     Results from last 7 days   Lab Units 08/28/24  0523   ALK PHOS U/L 198* "   BILIRUBIN mg/dL 0.5   ALT (SGPT) U/L 17   AST (SGOT) U/L 27               Estimated Creatinine Clearance: 19 mL/min (A) (by C-G formula based on SCr of 3.93 mg/dL (H)).      Microbiology:      Radiology:  Imaging Results (Last 72 Hours)       Procedure Component Value Units Date/Time    US Liver [[id number]] Collected: 08/27/24 0735     Updated: 08/27/24 0744    Narrative:      US LIVER    Date of Exam: 8/27/2024 5:00 AM EDT    Indication: possible cirrhosis.    Comparison: No comparisons available.    Technique: Grayscale and color Doppler ultrasound evaluation of the right upper quadrant was performed. Elastography performed.    Findings:    Liver and hepatic vasculature: Echogenicity is within normal limits. No discrete contour nodularity. No focal liver lesions are displayed on this ultrasound. The imaged portal vein and hepatic veins demonstrate normal direction of flow and normal   waveform on spectral imaging. Elastography values below.    Gallbladder and biliary: Cholecystectomy. The common bile duct measures 9 mm, within normal limits given age and surgically absent gallbladder.    Right kidney: The right kidney measures 9 cm. No hydronephrosis.    Pancreas: Not well evaluated due to overlying bowel gas.     Free fluid: There is small volume ascites.    Liver elastography:  Measurement quality: Adequate  Median SWV: 0.73 m/s (see note below)  Standard deviation: 0.12  Interquartile range/median (IQR/M): 0.23 (quality metric; <0.3 supports good precision)    Note: SWV is a measure of liver stiffness, a surrogate marker for fibrosis.  SWV <1.37 m/s indicates a low probability of clinically significant fibrosis (METAVIR Stage F2 or below)  SWV >2.2 m/s indicates a high probability of clinically significant fibrosis (METAVIR Stage F3 or above)  Intermediate values are indeterminant and correlation with clinical risk factors, laboratory markers, or liver biopsy may be needed to determine appropriate follow-up.         Impression:      Impression:    SWV <1.37 m/s indicates a low probability of clinically significant fibrosis (METAVIR Stage F2 or below). No overt morphologic changes of cirrhosis by ultrasound.    Small volume ascites.    Cholecystectomy.      Electronically Signed: Kobe Phoenix MD    8/27/2024 7:41 AM EDT    Workstation ID: [id number]              Impression:     --Acute right foot surgical site/wound infection/cellulitis, exposed bone on the medial side consistent with first metatarsal osteomyelitis and likely sequela to ischemia with peripheral vascular disease and other comorbidity as above. High risk for further serious morbidity and other serious sequela. They understand risk for persistent/recurrent or nonhealing wounds, persistent/progressive or recurrent infection and risk for further functional/limb loss, amputation, chronic pain etc. They know antibiotics and surgery not a guarantee for cure. These risks will persist. Timing/option threshold for surgery per surgical team at their discretion.  Vascular team has recommended amputation which patient has previously refused, left the hospital AGAINST MEDICAL ADVICE on August 20 but subsequently readmitted August 26 and agreeable to surgical recommendation    --Peripheral vascular disease.  Prior interventions as above.    --Left foot with small blackened areas at first/third toe likely sequela to ischemia.  Vascular team to help determine options and salvageability as above    --End-stage renal disease on hemodialysis Monday/Wednesday/Friday.    --Diabetes.  Glucose control per medicine team    --Cephalosporin and doxycycline intolerances in the past.  Has tolerated penicillin class    PLAN:        --IV vancomycin, merrem     **Cx at [facility] with PSA x 2 (zosyn DD, not ideal EDWIN), enterococcus and corynebac      --check/review labs cultures and scans     --Partial history per nursing staff     --Discussed with microbiology     --vascular team making surgical  plans     --Highly complex set of issues with high risk for further serious morbidity and other serious sequela     --Monitor IV and IV antibiotic with risk for systemic complication and potential drug interactions    Copied text in this note has been reviewed and is accurate as of 08/29/24.        Marlo Heaton MD  8/29/2024

## 2024-08-29 NOTE — ANESTHESIA PROCEDURE NOTES
Popliteal cath      Patient reassessed immediately prior to procedure    Patient location during procedure: pre-op  Start time: 8/29/2024 9:55 AM  Stop time: 8/29/2024 10:05 AM  Reason for block: at surgeon's request and post-op pain management  Performed by  CRNA/CAA: Beck Nieves CRNA  Assisted by: Beverly Pardo RN  Preanesthetic Checklist  Completed: patient identified, IV checked, site marked, risks and benefits discussed, surgical consent, monitors and equipment checked, pre-op evaluation and timeout performed  Prep:  Pt Position: left lateral decubitus  Sterile barriers:cap, gloves, mask and washed/disinfected hands  Prep: ChloraPrep  Patient monitoring: blood pressure monitoring, continuous pulse oximetry and EKG  Procedure    Sedation: yes  Performed under: local infiltration  Guidance:ultrasound guided    ULTRASOUND INTERPRETATION.  Using ultrasound guidance a 20 G gauge needle was placed in close proximity to the nerve, at which point, under ultrasound guidance anesthetic was injected in the area of the nerve and spread of the anesthesia was seen on ultrasound in close proximity thereto.  There were no abnormalities seen on ultrasound; a digital image was taken; and the patient tolerated the procedure with no complications. Images:still images obtained, printed/placed on chart    Laterality:right  Block Type:popliteal  Injection Technique:catheter  Needle Type:echogenic and Tuohy  Needle Gauge:18 G  Resistance on Injection: none  Catheter Size:20 G  Cath Depth at skin: 8 cm    Medications Used: ropivacaine (NAROPIN) 0.2 % injection - Peripheral Nerve   12 mL - 8/29/2024 10:05:00 AM  ropivacaine (NAROPIN) 0.5 % injection - Injection   12 mL - 8/29/2024 10:05:00 AM      Post Assessment  Injection Assessment: negative aspiration for heme, no paresthesia on injection and incremental injection  Patient Tolerance:comfortable throughout block  Complications:no  Additional Notes  CATHETER                     "           A high-frequency linear transducer, with sterile cover, was placed in the popliteal fossa to identify the popliteal artery and vein, Tibial nerve (TN) and Common Peroneal nerve (CP). The transducer was then moved in a cephalad fashion to observe the TN and CP nerve bifurcation to form the Sciatic Nerve. The insertion site was prepped and draped in sterile fashion. Skin and cutaneous tissue was infiltrated with 2-5 ml of 1% Lidocaine. Using ultrasound-guidance, an 18-gauge Contiplex Ultra 360 Touhy needle was advanced in plane from lateral to medial. Preservative-free normal saline was utilized for hydro-dissection of tissue, advancement of Touhy needle, and to confirm final needle placement posterior to the nerves. Local anesthetic injection spread, in incremental 3-5 ml injections, to surround both nerve structures. Aspiration every 5 ml to prevent intravascular injection. Injection was completed with negative aspiration of blood and negative intravascular injection. Injection pressures were normal with minimal resistance. A 20-gauge Contiplex Echo catheter was placed through the needle and advance out the tip of the Touhy 1-3 cm. The Touhy needle was then removed, and final catheter position verified (Below/above or Anterior/Posterior) the nerve structures. The catheter was secured in the usual fashion with skin glue, benzoin, steri-strips, CHG tegaderm and Label noting \"Nerve Block Catheter\". Jerk tape applied at yellow connector and catheter connection.    Performed by: Beck Nieves, CRNA            "

## 2024-08-29 NOTE — PROGRESS NOTES
[facility] Medicine Services  PROGRESS NOTE    Patient Name: Erlin Savage  : 1950  MRN: [id number]    Date of Admission: 2024  Primary Care Physician: Joe Diallo MD    Subjective   Subjective     CC: f/u PAD    HPI: Up in bed with wife in room. Pain controlled but very anxious regarding procedure.      Objective   Objective     Vital Signs:   Temp:  [97.5 °F (36.4 °C)-98.3 °F (36.8 °C)] 98.3 °F (36.8 °C)  Heart Rate:  [] 98  Resp:  [16-18] 16  BP: (126-146)/(70-89) 146/89  Flow (L/min):  [2] 2     Physical Exam:  Constitutional: No acute distress, awake, alert, up in bed  HENT: NCAT, mucous membranes moist  Respiratory: Clear to auscultation bilaterally, respiratory effort normal   Cardiovascular: RRR, no murmurs, rubs, or gallops  Gastrointestinal: Positive bowel sounds, soft, nontender, nondistended  Musculoskeletal: BLE edema with feet dressed  Psychiatric: Appropriate affect, cooperative  Neurologic: Oriented x 3, strength symmetric in all extremities, Cranial Nerves grossly intact to confrontation, speech clear  Skin: No rashes     Results Reviewed:  LAB RESULTS:      Lab 24  0524  0549 24  1145 24  1144   WBC 10.76 9.73 11.95*  --    HEMOGLOBIN 9.8* 9.8* 10.3*  --    HEMATOCRIT 32.5* 32.9* 34.4*  --    PLATELETS 271 245 251  --    NEUTROS ABS 8.21* 7.66* 9.88*  --    IMMATURE GRANS (ABS) 0.07* 0.07* 0.09*  --    LYMPHS ABS 0.86 0.62* 0.63*  --    MONOS ABS 1.15* 1.00* 0.93*  --    EOS ABS 0.36 0.32 0.34  --    MCV 90.5 91.4 91.2  --    PROCALCITONIN  --   --  0.43*  --    LACTATE  --   --   --  1.1         Lab 24  0623 24  0523 24  0549 24  1145   SODIUM 132* 130* 132* 129*   POTASSIUM 5.5* 6.6* 5.6* 5.9*   CHLORIDE 95* 95* 95* 93*   CO2 23.0 21.0* 22.0 23.0   ANION GAP 14.0 14.0 15.0 13.0   BUN 30* 40* 31* 43*   CREATININE 3.93* 5.16* 4.41* 5.63*   EGFR 15.5* 11.2* 13.5* 10.1*   GLUCOSE 148* 178* 226*  239*   CALCIUM 9.4 9.3 9.0 9.4   MAGNESIUM  --  2.0 1.9 2.3   HEMOGLOBIN A1C  --   --   --  6.20*         Lab 08/28/24  0523 08/27/24  0549 08/26/24  1145   TOTAL PROTEIN 6.6 6.2 6.4   ALBUMIN 3.4* 3.1* 3.3*   GLOBULIN 3.2 3.1 3.1   ALT (SGPT) 17 14 14   AST (SGOT) 27 20 19   BILIRUBIN 0.5 0.4 0.5   ALK PHOS 198* 176* 199*                     Brief Urine Lab Results  (Last result in the past 365 days)        Color   Clarity   Blood   Leuk Est   Nitrite   Protein   CREAT   Urine HCG        01/01/24 0550 Yellow   Clear     Negative                       Microbiology Results Abnormal       Procedure Component Value - Date/Time    CANDIDA AURIS SCREEN - Swab, Axilla Right, Axilla Left and Groin [[id number]]  (Normal) Collected: 08/26/24 1715    Lab Status: Preliminary result Specimen: Swab from Axilla Right, Axilla Left and Groin Updated: 08/28/24 2231     Candida Auris Screen Culture No Candida auris isolated at 2 days    Blood Culture - Blood, Arm, Right [[id number]]  (Normal) Collected: 08/26/24 1145    Lab Status: Preliminary result Specimen: Blood from Arm, Right Updated: 08/28/24 1215     Blood Culture No growth at 2 days    Narrative:      Less than seven (7) mL's of blood was collected.  Insufficient quantity may yield false negative results.    Blood Culture - Blood, Hand, Right [[id number]]  (Normal) Collected: 08/26/24 1154    Lab Status: Preliminary result Specimen: Blood from Hand, Right Updated: 08/28/24 1215     Blood Culture No growth at 2 days    Narrative:      Less than seven (7) mL's of blood was collected.  Insufficient quantity may yield false negative results.    MRSA Screen, PCR (Inpatient) - Swab, Nares [[id number]]  (Normal) Collected: 08/26/24 1715    Lab Status: Final result Specimen: Swab from Nares Updated: 08/26/24 2124     MRSA PCR Negative    Narrative:      The negative predictive value of this diagnostic test is high and should only be used to consider de-escalating anti-MRSA therapy. A  positive result may indicate colonization with MRSA and must be correlated clinically.  MRSA Negative            Popliteal cath    Result Date: 8/29/2024  Beck Nieves CRNA     8/29/2024 10:13 AM Popliteal cath Patient reassessed immediately prior to procedure Patient location during procedure: pre-op Start time: 8/29/2024 9:55 AM Stop time: 8/29/2024 10:05 AM Reason for block: at surgeon's request and post-op pain management Performed by CRNA/CAA: Beck Nieves CRNA Assisted by: Beverly Pardo RN Preanesthetic Checklist Completed: patient identified, IV checked, site marked, risks and benefits discussed, surgical consent, monitors and equipment checked, pre-op evaluation and timeout performed Prep: Pt Position: left lateral decubitus Sterile barriers:cap, gloves, mask and washed/disinfected hands Prep: ChloraPrep Patient monitoring: blood pressure monitoring, continuous pulse oximetry and EKG Procedure Sedation: yes Performed under: local infiltration Guidance:ultrasound guided ULTRASOUND INTERPRETATION.  Using ultrasound guidance a 20 G gauge needle was placed in close proximity to the nerve, at which point, under ultrasound guidance anesthetic was injected in the area of the nerve and spread of the anesthesia was seen on ultrasound in close proximity thereto.  There were no abnormalities seen on ultrasound; a digital image was taken; and the patient tolerated the procedure with no complications. Images:still images obtained, printed/placed on chart Laterality:right Block Type:popliteal Injection Technique:catheter Needle Type:echogenic and Tuohy Needle Gauge:18 G Resistance on Injection: none Catheter Size:20 G Cath Depth at skin: 8 cm Medications Used: ropivacaine (NAROPIN) 0.2 % injection - Peripheral Nerve  12 mL - 8/29/2024 10:05:00 AM ropivacaine (NAROPIN) 0.5 % injection - Injection  12 mL - 8/29/2024 10:05:00 AM Post Assessment Injection Assessment: negative aspiration for heme, no paresthesia on  "injection and incremental injection Patient Tolerance:comfortable throughout block Complications:no Additional Notes CATHETER                             A high-frequency linear transducer, with sterile cover, was placed in the popliteal fossa to identify the popliteal artery and vein, Tibial nerve (TN) and Common Peroneal nerve (CP). The transducer was then moved in a cephalad fashion to observe the TN and CP nerve bifurcation to form the Sciatic Nerve. The insertion site was prepped and draped in sterile fashion. Skin and cutaneous tissue was infiltrated with 2-5 ml of 1% Lidocaine. Using ultrasound-guidance, an 18-gauge Contiplex Ultra 360 Touhy needle was advanced in plane from lateral to medial. Preservative-free normal saline was utilized for hydro-dissection of tissue, advancement of Touhy needle, and to confirm final needle placement posterior to the nerves. Local anesthetic injection spread, in incremental 3-5 ml injections, to surround both nerve structures. Aspiration every 5 ml to prevent intravascular injection. Injection was completed with negative aspiration of blood and negative intravascular injection. Injection pressures were normal with minimal resistance. A 20-gauge Contiplex Echo catheter was placed through the needle and advance out the tip of the Touhy 1-3 cm. The Touhy needle was then removed, and final catheter position verified (Below/above or Anterior/Posterior) the nerve structures. The catheter was secured in the usual fashion with skin glue, benzoin, steri-strips, CHG tegaderm and Label noting \"Nerve Block Catheter\". Jerk tape applied at yellow connector and catheter connection. Performed by: Beck Nieves CRNA     Femoral block    Result Date: 8/29/2024  Beck Nieves CRNA     8/29/2024 10:12 AM Femoral block Patient reassessed immediately prior to procedure Start time: 8/29/2024 9:48 AM Stop time: 8/29/2024 9:54 AM Reason for block: at surgeon's request and post-op pain management " Performed by CRNA/CAA: Beck Nieves CRNA Assisted by: Beverly Pardo RN Preanesthetic Checklist Completed: patient identified, IV checked, site marked, risks and benefits discussed, surgical consent, monitors and equipment checked, pre-op evaluation and timeout performed Prep: Pt Position: supine Sterile barriers:gloves, cap, sterile barriers, mask and washed/disinfected hands Prep: ChloraPrep Patient monitoring: blood pressure monitoring, continuous pulse oximetry and EKG Procedure Guidance:ultrasound guided ULTRASOUND INTERPRETATION.  Using ultrasound guidance a 20 G gauge needle was placed in close proximity to the femoral nerve, at which point, under ultrasound guidance anesthetic was injected in the area of the nerve and spread of the anesthesia was seen on ultrasound in close proximity thereto.  There were no abnormalities seen on ultrasound; a digital image was taken; and the patient tolerated the procedure with no complications. Images:still images obtained, printed/placed on chart Laterality:right Block Type:femoral Injection Technique:single-shot Needle Type:short-bevel Needle Gauge:20 G Resistance on Injection: none Medications Used: fentaNYL citrate (PF) (SUBLIMAZE) injection - Intravenous  100 mcg - 8/29/2024 9:54:00 AM ropivacaine (NAROPIN) 0.2 % injection - Peripheral Nerve  12 mL - 8/29/2024 9:54:00 AM ropivacaine (NAROPIN) 0.5 % injection - Injection  12 mL - 8/29/2024 9:54:00 AM dexamethasone sodium phosphate injection - Injection  2 mg - 8/29/2024 9:54:00 AM Post Assessment Injection Assessment: negative aspiration for heme, no paresthesia on injection and incremental injection Patient Tolerance:comfortable throughout block Complications:no Additional Notes SINGLE Shot A high-frequency linear transducer, with sterile cover, was placed in the inguinal crease to visualize the Femoral Vein, Artery, and Nerve (medial to lateral). The insertion site was prepped in sterile fashion. Skin and  "cutaneous tissue was infiltrated with 2-5 ml of 1% Lidocaine. Using ultrasound-guidance, a 20-gauge B-Keller 4\" Ultraplex 360 non-stimulating echogenic needle was then inserted and advanced in-plane from lateral to medial with ultrasound guidance. The needle was directed below Fascia Iliacus towards the Femoral nerve. Preservative-free normal saline was utilized for hydro-dissection of tissue. Local anesthetic injection spread, in incremental 3-5 ml injections, was visualized lateral to the artery to surround the femoral nerve. Aspiration every 5 ml to prevent intravascular injection. Injection was completed with negative aspiration of blood and negative intravascular injection. Injection pressures were normal with minimal resistance. Performed by: Beck Nieves CRNA     Adult Transthoracic Echo Complete W/ Cont if Necessary Per Protocol    Result Date: 8/29/2024    Left ventricular systolic function is mildly decreased. Calculated left ventricular EF = 40%   Left ventricular wall thickness is consistent with mild concentric hypertrophy.   Moderately reduced right ventricular systolic function noted.   There is bileaflet mitral valve thickening present.   Moderate to severe mitral valve regurgitation is present.   Moderate to severe tricuspid valve regurgitation is present.   Calculated right ventricular systolic pressure from tricuspid regurgitation is 73 mmHg.   There is a small (<1cm) pericardial effusion adjacent to the left ventricle.     OUTSIDE INVASIVE CARDIOLOGY STUDY    Result Date: 8/27/2024  This procedure was auto-finalized with no dictation required.     Results for orders placed during the hospital encounter of 08/26/24    Adult Transthoracic Echo Complete W/ Cont if Necessary Per Protocol    Interpretation Summary    Left ventricular systolic function is mildly decreased. Calculated left ventricular EF = 40%    Left ventricular wall thickness is consistent with mild concentric hypertrophy.    " Moderately reduced right ventricular systolic function noted.    There is bileaflet mitral valve thickening present.    Moderate to severe mitral valve regurgitation is present.    Moderate to severe tricuspid valve regurgitation is present.    Calculated right ventricular systolic pressure from tricuspid regurgitation is 73 mmHg.    There is a small (<1cm) pericardial effusion adjacent to the left ventricle.      Current medications:  Scheduled Meds:[Transfer Hold] aspirin, 81 mg, Oral, Daily  [Transfer Hold] b complex-vitamin c-folic acid, 1 tablet, Oral, Daily  [Transfer Hold] calcitriol, 0.5 mcg, Oral, Daily  [Transfer Hold] castor oil-balsam peru, 1 Application, Topical, Daily  [Transfer Hold] heparin (porcine), 5,000 Units, Subcutaneous, Q8H  hydrALAZINE, 10 mg, Oral, TID  meropenem, 500 mg, Intravenous, Q24H  [Transfer Hold] oxyCODONE, 10 mg, Oral, Q4H  [Transfer Hold] pantoprazole, 40 mg, Oral, Daily  [Transfer Hold] sodium chloride, 10 mL, Intravenous, Q12H  vancomycin (dosing per levels), , Does not apply, Daily      Continuous Infusions:Pharmacy to dose vancomycin,   ropivacaine,   sodium chloride, 9 mL/hr, Last Rate: 250 mL/hr (08/29/24 1020)      PRN Meds:.  [Transfer Hold] acetaminophen    [Transfer Hold] senna-docusate sodium **AND** [Transfer Hold] polyethylene glycol **AND** [Transfer Hold] bisacodyl **AND** [Transfer Hold] bisacodyl    chlorhexidine 0.05% in sterile water    [Transfer Hold] dextrose    [Transfer Hold] dextrose    [Transfer Hold] diazePAM    [Transfer Hold] glucagon (human recombinant)    [Transfer Hold] heparin (porcine)    [Transfer Hold] HYDROmorphone    lidocaine PF 1%    midazolam    [Transfer Hold] nitroglycerin    [Transfer Hold] ondansetron ODT **OR** [Transfer Hold] ondansetron    Pharmacy to dose vancomycin    [Transfer Hold] sodium chloride    [Transfer Hold] sodium chloride    Assessment & Plan   Assessment & Plan     Active Hospital Problems    Diagnosis  POA    **Dry  gangrene [I96]  Yes    Chronic diastolic (congestive) heart failure [I50.32]  Yes    Chronic systolic (congestive) heart failure [I50.22]  Yes    Severe malnutrition [E43]  Yes    On home oxygen therapy [Z99.81]  Not Applicable    Type 2 diabetes mellitus with diabetic chronic kidney disease [E11.22]  Yes    Secondary pulmonary arterial hypertension [I27.21]  Yes    Severe mitral valve regurgitation [I34.0]  Yes    Iron deficiency anemia [D50.9]  Yes    ESRD (end stage renal disease) [N18.6]  Yes    Stage 5 chronic kidney disease on chronic dialysis [N18.6, Z99.2]  Not Applicable    Ischemic heart disease [I25.9]  Yes    Coronary artery disease involving native coronary artery of native heart without angina pectoris [I25.10]  Yes    Essential hypertension [I10]  Yes    Type 2 diabetes mellitus with kidney complication, with long-term current use of insulin [E11.29, Z79.4]  Not Applicable    TIA (transient ischemic attack) [G45.9]  Yes    Hyperlipidemia LDL goal <70 [E78.5]  Yes      Resolved Hospital Problems   No resolved problems to display.        Brief Hospital Course to date:  Erlin Savage is a 74 y.o. male with a medical history significant for end-stage renal disease on HD MWF, coronary artery disease s/p CABG, A. Fib on OAC, aortic stenosis, diabetes mellitus, COPD on 2 to 4 L baseline, hyperlipidemia, hypertension and known peripheral arterial disease with nonhealing right foot transmetatarsal amputation site with dry gangrene.      Right lower extremity critical limb ischemia  Recent admission for dry gangrene  Right lower extremity cellulitis  History of nonhealing right TMA  L foot with ischemic changes of toes   BLE PVD  -My partner discussed with vascular on arrival. They are following - BKA with Dr. Schreiber 8/29.  -Followed by Dr. Heaton while admitted at [facility].  My partner discussed case with him. Continue vancomycin and Merrem.   -Schedule oxycodone q 4, cont prn dilaudid.  -Valium added for  anxiety     End-stage renal disease  Hyponatremia  Hyperkalemia  -On hemodialysis Monday/Wednesday/Friday  -Nephrology consulted.      CAD status post CABG  CHFmrEF  Valvular heart disease  -Most recent echo in system from 2023 with a EF of 43%, severe diastolic dysfunction, moderate mitral regurgitation, moderate tricuspid regurgitation.  -Dr Amaral follows - recd optimizing volume status with HD and outpt follow up.     Ascites  Lower extremity edema  -multifactorial secondary to CHF, end-stage renal disease  -Per patient, he gets monthly paracentesis at hospital in [place].  Denies any prior history of cirrhosis.     PAF  -Hold Eliquis in anticipation of surgery     DM2  -Basal bolus regimen  -Obtain hemoglobin A1c     Anemia of Chronic Disease      Chronic hypoxic respiratory failure  COPD on chronic home O2  -4L NC at baseline   -DuoNebs PRN    Expected Discharge Location and Transportation:   Expected Discharge   Expected Discharge Date: 9/3/2024; Expected Discharge Time:      VTE Prophylaxis:  Pharmacologic & mechanical VTE prophylaxis orders are present.         AM-PAC 6 Clicks Score (PT): 20 (08/28/24 2000)    CODE STATUS:   Code Status and Medical Interventions: CPR (Attempt to Resuscitate); Full Support   Ordered at: 08/26/24 1218     Level Of Support Discussed With:    Patient     Code Status (Patient has no pulse and is not breathing):    CPR (Attempt to Resuscitate)     Medical Interventions (Patient has pulse or is breathing):    Full Support       Maura Gaspar II, DO  08/29/24

## 2024-08-29 NOTE — OP NOTE
AMPUTATION BELOW KNEE  Procedure Report    Patient Name:  Erlin Savage  YOB: 1950    Date of Surgery:  8/29/2024     Indications: 74-year-old male with ischemic right lower extremity failed transmetatarsal amputation with gangrene here today for below the knee amputation    Pre-op Diagnosis:   Dry gangrene [I96]       Post-Op Diagnosis Codes:     * Dry gangrene [I96]    Procedure/CPT® Codes:      Procedure(s):  Right below the knee amputation and all other indicated procedures    Staff:  Surgeon(s):  Junior Schreiber MD    Circulator: Meera Jenkins RN  Scrub Person: Scar Christopher  Nursing Assistant: Alice Gonzalez PCT           Anesthesia: Monitored Anesthesia Care with [facility]    Estimated Blood Loss: 300 mL    Implants:    Nothing was implanted during the procedure    Specimen:          Specimens       ID Source Type Tests Collected By Collected At Frozen?    A Leg, Right Tissue TISSUE PATHOLOGY EXAM   Junior Schreiber MD 8/29/24 1104 No    Description: RIGHT BELOW KNEE AMPUTATION FOR PERMANENT                Findings: Successful right below the knee amputation    Complications: None    Description of Procedure: After informed consent was obtained risk and benefits discussed with the patient patient was brought back to the operating table in supine position patient's right leg was prepped and draped in usual sterile fashion preoperative antibiotics was given and a timeout performed.  Incision was made 4 fingerbreadths below the tibial tuberosity around the calf fashioning a posterior calf flap electrocautery was then used to incise through the fascia exposing down to the tibia.  Once the tibia was exposed more approximately the fibula was then exposed as well.  These were then transected with a Wardell saw making sure the fibula shorter than tibial transection.  I then used a long amputation knife and remove the remainder of the leg.  Silk suture ligation was then used to obtain hemostasis of  the tibial vessels which were suture-ligated.  Muscle bleeders were electrocautery.  I did use the tourniquet once I suture-ligated these the tourniquet was released to make sure hemostasis was achieved.  Sharp ends of the tibia were smooth with the saw.   Posterior flap was then rotated anteriorly the fascia was reapproximated with 0 Vicryl's.  The skin was then reapproximated with staples standard dressings applied.  Patient was aroused from MAC anesthesia and taken recovery in stable condition.  All counts were correct at the end of the case.        Junior Candie MD     Date: 8/29/2024  Time: 11:58 EDT

## 2024-08-29 NOTE — ANESTHESIA PREPROCEDURE EVALUATION
Anesthesia Evaluation                  Airway   Mallampati: I  TM distance: >3 FB  Neck ROM: full  No difficulty expected  Dental      Pulmonary    (+) COPD,  Cardiovascular     ECG reviewed    (+) hypertension, valvular problems/murmurs MR and TI, past MI , CAD, CABG, dysrhythmias, CHF , hyperlipidemia      Neuro/Psych  (+) TIA, CVA, headaches, dizziness/light headedness, psychiatric history  GI/Hepatic/Renal/Endo    (+) renal disease-, diabetes mellitus    Musculoskeletal     (+) back pain  Abdominal    Substance History      OB/GYN          Other                    Anesthesia Plan    ASA 4     general     (Blocks as needed)  intravenous induction     Anesthetic plan, risks, benefits, and alternatives have been provided, discussed and informed consent has been obtained with: patient.    Plan discussed with CRNA.    CODE STATUS:    Level Of Support Discussed With: Patient  Code Status (Patient has no pulse and is not breathing): CPR (Attempt to Resuscitate)  Medical Interventions (Patient has pulse or is breathing): Full Support

## 2024-08-29 NOTE — NURSING NOTE
1620: Pt complained of chest pain of 10/10 pressure and radiating down R arm.   Called rapid and obtained EKG.   See orders   1700: Educated pt on not putting pressure on fresh R BKA             Several times.

## 2024-08-29 NOTE — PROGRESS NOTES
" LOS: 3 days   Patient Care Team:  Joe Diallo MD as PCP - General (Family Medicine)  Fadi Garner MD as Consulting Physician (General Surgery)  Amor Celeste MD (Cardiology)  Praveen Novoa MD as Consulting Physician (Nephrology)  Kristin Moya MD as Consulting Physician (Pulmonary Disease)    Chief Complaint: ESRD  Right non healing foot wound  Wound infection.    Subjective     Seen and examined at bedside post right BKA.   Complains of chest discomfort.   Denies shortness of breath.     History taken from: patient    Objective     Vital Sign Min/Max for last 24 hours  Temp  Min: 97.5 °F (36.4 °C)  Max: 98.3 °F (36.8 °C)   BP  Min: 93/59  Max: 146/89   Pulse  Min: 80  Max: 101   Resp  Min: 12  Max: 18   SpO2  Min: 93 %  Max: 100 %   Flow (L/min)  Min: 2  Max: 4   No data recorded         I/O this shift:  In: 450 [I.V.:450]  Out: 300 [Blood:300]  I/O last 3 completed shifts:  In: 1760 [P.O.:1450; Other:310]  Out: 3320     Objective:  General Appearance:  Comfortable.    Vital signs: (most recent): Blood pressure 109/83, pulse 92, temperature 97.7 °F (36.5 °C), resp. rate 13, height 180.3 cm (71\"), weight 78.9 kg (174 lb), SpO2 100%.  Vital signs are normal.    HEENT: Normal HEENT exam.    Lungs:  Normal effort and normal respiratory rate.  Breath sounds clear to auscultation.  He is not in respiratory distress.  No decreased breath sounds or wheezes.    Heart: Normal rate.  Regular rhythm.  S1 normal and S2 normal.  No murmur or gallop.   Abdomen: Abdomen is soft and distended.  There are signs of ascites.   Extremities: There is dependent edema and local swelling.  (Right BKA )  Skin:  There is ulceration.                Results Review:     I reviewed the patient's new clinical results.    WBC WBC   Date Value Ref Range Status   08/28/2024 10.76 3.40 - 10.80 10*3/mm3 Final   08/27/2024 9.73 3.40 - 10.80 10*3/mm3 Final      HGB Hemoglobin   Date Value Ref Range Status   08/28/2024 9.8 " "(L) 13.0 - 17.7 g/dL Final   08/27/2024 9.8 (L) 13.0 - 17.7 g/dL Final      HCT Hematocrit   Date Value Ref Range Status   08/28/2024 32.5 (L) 37.5 - 51.0 % Final   08/27/2024 32.9 (L) 37.5 - 51.0 % Final      Platlets No results found for: \"LABPLAT\"   MCV MCV   Date Value Ref Range Status   08/28/2024 90.5 79.0 - 97.0 fL Final   08/27/2024 91.4 79.0 - 97.0 fL Final          Sodium Sodium   Date Value Ref Range Status   08/29/2024 132 (L) 136 - 145 mmol/L Final   08/28/2024 130 (L) 136 - 145 mmol/L Final   08/27/2024 132 (L) 136 - 145 mmol/L Final      Potassium Potassium   Date Value Ref Range Status   08/29/2024 5.5 (H) 3.5 - 5.2 mmol/L Final   08/28/2024 6.6 (C) 3.5 - 5.2 mmol/L Final   08/27/2024 5.6 (H) 3.5 - 5.2 mmol/L Final      Chloride Chloride   Date Value Ref Range Status   08/29/2024 95 (L) 98 - 107 mmol/L Final   08/28/2024 95 (L) 98 - 107 mmol/L Final   08/27/2024 95 (L) 98 - 107 mmol/L Final      CO2 CO2   Date Value Ref Range Status   08/29/2024 23.0 22.0 - 29.0 mmol/L Final   08/28/2024 21.0 (L) 22.0 - 29.0 mmol/L Final   08/27/2024 22.0 22.0 - 29.0 mmol/L Final      BUN BUN   Date Value Ref Range Status   08/29/2024 30 (H) 8 - 23 mg/dL Final   08/28/2024 40 (H) 8 - 23 mg/dL Final   08/27/2024 31 (H) 8 - 23 mg/dL Final      Creatinine Creatinine   Date Value Ref Range Status   08/29/2024 3.93 (H) 0.76 - 1.27 mg/dL Final   08/28/2024 5.16 (H) 0.76 - 1.27 mg/dL Final   08/27/2024 4.41 (H) 0.76 - 1.27 mg/dL Final      Calcium Calcium   Date Value Ref Range Status   08/29/2024 9.4 8.6 - 10.5 mg/dL Final   08/28/2024 9.3 8.6 - 10.5 mg/dL Final   08/27/2024 9.0 8.6 - 10.5 mg/dL Final      PO4 No results found for: \"CAPO4\"   Albumin Albumin   Date Value Ref Range Status   08/28/2024 3.4 (L) 3.5 - 5.2 g/dL Final   08/27/2024 3.1 (L) 3.5 - 5.2 g/dL Final      Magnesium Magnesium   Date Value Ref Range Status   08/28/2024 2.0 1.6 - 2.4 mg/dL Final   08/27/2024 1.9 1.6 - 2.4 mg/dL Final      Uric Acid No " "results found for: \"URICACID\"     Medication Review: yes    Assessment & Plan       Dry gangrene    Coronary artery disease involving native coronary artery of native heart without angina pectoris    Essential hypertension    Hyperlipidemia LDL goal <70    Type 2 diabetes mellitus with kidney complication, with long-term current use of insulin    TIA (transient ischemic attack)    Ischemic heart disease    Stage 5 chronic kidney disease on chronic dialysis    Secondary pulmonary arterial hypertension    ESRD (end stage renal disease)    Iron deficiency anemia    Severe mitral valve regurgitation    Type 2 diabetes mellitus with diabetic chronic kidney disease    On home oxygen therapy    Severe malnutrition    Chronic diastolic (congestive) heart failure    Chronic systolic (congestive) heart failure      Assessment & Plan    ESRD: On HD MWF.  Follows with [facility] @ [facility].  Hemodialysis tomorrow as per schedule.     HTN: Blood pressure stable.  Monitor for now.     Hyperkalemia: Recurrent issue.      Hyponatremia:  Continue fluid rate restriction and adjust with HD.     Metabolic acidosis: Adjust with HD.     Volume: Hx of ascites and dependent edema. Requiring serial paracentesis as outpatient.      Peripheral vascular disease: Patient with nonhealing right transmetatarsal amputation site with dry gangrene and wound infection. MRI unable to rule out Osteo. S/p BKA 8/29/24        Rohan Myers MD  08/29/24  15:32 EDT            "

## 2024-08-30 ENCOUNTER — APPOINTMENT (OUTPATIENT)
Dept: NEPHROLOGY | Facility: HOSPITAL | Age: 74
End: 2024-08-30
Payer: MEDICARE

## 2024-08-30 LAB
ABO GROUP BLD: NORMAL
ABO GROUP BLD: NORMAL
ALBUMIN SERPL-MCNC: 3.1 G/DL (ref 3.5–5.2)
ANION GAP SERPL CALCULATED.3IONS-SCNC: 13 MMOL/L (ref 5–15)
BASOPHILS # BLD AUTO: 0.07 10*3/MM3 (ref 0–0.2)
BASOPHILS NFR BLD AUTO: 0.6 % (ref 0–1.5)
BLD GP AB SCN SERPL QL: NEGATIVE
BUN SERPL-MCNC: 43 MG/DL (ref 8–23)
BUN/CREAT SERPL: 8.3 (ref 7–25)
CALCIUM SPEC-SCNC: 9.3 MG/DL (ref 8.6–10.5)
CHLORIDE SERPL-SCNC: 94 MMOL/L (ref 98–107)
CO2 SERPL-SCNC: 22 MMOL/L (ref 22–29)
CREAT SERPL-MCNC: 5.17 MG/DL (ref 0.76–1.27)
CYTO UR: NORMAL
DEPRECATED RDW RBC AUTO: 64.9 FL (ref 37–54)
EGFRCR SERPLBLD CKD-EPI 2021: 11.1 ML/MIN/1.73
EOSINOPHIL # BLD AUTO: 0.02 10*3/MM3 (ref 0–0.4)
EOSINOPHIL NFR BLD AUTO: 0.2 % (ref 0.3–6.2)
ERYTHROCYTE [DISTWIDTH] IN BLOOD BY AUTOMATED COUNT: 19.4 % (ref 12.3–15.4)
GLUCOSE BLDC GLUCOMTR-MCNC: 143 MG/DL (ref 70–130)
GLUCOSE BLDC GLUCOMTR-MCNC: 164 MG/DL (ref 70–130)
GLUCOSE BLDC GLUCOMTR-MCNC: 188 MG/DL (ref 70–130)
GLUCOSE BLDC GLUCOMTR-MCNC: 195 MG/DL (ref 70–130)
GLUCOSE SERPL-MCNC: 199 MG/DL (ref 65–99)
HCT VFR BLD AUTO: 24.9 % (ref 37.5–51)
HGB BLD-MCNC: 7.3 G/DL (ref 13–17.7)
IMM GRANULOCYTES # BLD AUTO: 0.06 10*3/MM3 (ref 0–0.05)
IMM GRANULOCYTES NFR BLD AUTO: 0.5 % (ref 0–0.5)
LAB AP CASE REPORT: NORMAL
LAB AP CLINICAL INFORMATION: NORMAL
LYMPHOCYTES # BLD AUTO: 0.66 10*3/MM3 (ref 0.7–3.1)
LYMPHOCYTES NFR BLD AUTO: 6 % (ref 19.6–45.3)
MCH RBC QN AUTO: 27.2 PG (ref 26.6–33)
MCHC RBC AUTO-ENTMCNC: 29.3 G/DL (ref 31.5–35.7)
MCV RBC AUTO: 92.9 FL (ref 79–97)
MONOCYTES # BLD AUTO: 1.41 10*3/MM3 (ref 0.1–0.9)
MONOCYTES NFR BLD AUTO: 12.8 % (ref 5–12)
NEUTROPHILS NFR BLD AUTO: 79.9 % (ref 42.7–76)
NEUTROPHILS NFR BLD AUTO: 8.83 10*3/MM3 (ref 1.7–7)
NRBC BLD AUTO-RTO: 0 /100 WBC (ref 0–0.2)
PATH REPORT.FINAL DX SPEC: NORMAL
PATH REPORT.GROSS SPEC: NORMAL
PHOSPHATE SERPL-MCNC: 7.9 MG/DL (ref 2.5–4.5)
PLATELET # BLD AUTO: 287 10*3/MM3 (ref 140–450)
PMV BLD AUTO: 10.4 FL (ref 6–12)
POTASSIUM SERPL-SCNC: 6.7 MMOL/L (ref 3.5–5.2)
QT INTERVAL: 348 MS
QT INTERVAL: 354 MS
QTC INTERVAL: 463 MS
QTC INTERVAL: 464 MS
RBC # BLD AUTO: 2.68 10*6/MM3 (ref 4.14–5.8)
RH BLD: POSITIVE
RH BLD: POSITIVE
SODIUM SERPL-SCNC: 129 MMOL/L (ref 136–145)
T&S EXPIRATION DATE: NORMAL
TROPONIN T SERPL HS-MCNC: 298 NG/L
UFH PPP CHRO-ACNC: 0.22 IU/ML (ref 0.3–0.7)
UFH PPP CHRO-ACNC: 0.24 IU/ML (ref 0.3–0.7)
UFH PPP CHRO-ACNC: 0.29 IU/ML (ref 0.3–0.7)
VANCOMYCIN TROUGH SERPL-MCNC: 20.8 MCG/ML (ref 5–20)
WBC NRBC COR # BLD AUTO: 11.05 10*3/MM3 (ref 3.4–10.8)

## 2024-08-30 PROCEDURE — 86901 BLOOD TYPING SEROLOGIC RH(D): CPT | Performed by: INTERNAL MEDICINE

## 2024-08-30 PROCEDURE — 25010000002 VANCOMYCIN 1 G RECONSTITUTED SOLUTION 1 EACH VIAL

## 2024-08-30 PROCEDURE — 84484 ASSAY OF TROPONIN QUANT: CPT | Performed by: INTERNAL MEDICINE

## 2024-08-30 PROCEDURE — 25010000002 HYDROMORPHONE PER 4 MG: Performed by: SURGERY

## 2024-08-30 PROCEDURE — 85520 HEPARIN ASSAY: CPT

## 2024-08-30 PROCEDURE — 86900 BLOOD TYPING SEROLOGIC ABO: CPT

## 2024-08-30 PROCEDURE — 82948 REAGENT STRIP/BLOOD GLUCOSE: CPT

## 2024-08-30 PROCEDURE — 25810000003 SODIUM CHLORIDE 0.9 % SOLUTION 250 ML FLEX CONT

## 2024-08-30 PROCEDURE — 25010000002 ONDANSETRON PER 1 MG: Performed by: SURGERY

## 2024-08-30 PROCEDURE — 86901 BLOOD TYPING SEROLOGIC RH(D): CPT

## 2024-08-30 PROCEDURE — 25010000002 MORPHINE PER 10 MG: Performed by: NURSE PRACTITIONER

## 2024-08-30 PROCEDURE — 85025 COMPLETE CBC W/AUTO DIFF WBC: CPT | Performed by: INTERNAL MEDICINE

## 2024-08-30 PROCEDURE — 86923 COMPATIBILITY TEST ELECTRIC: CPT

## 2024-08-30 PROCEDURE — 99232 SBSQ HOSP IP/OBS MODERATE 35: CPT | Performed by: PHYSICIAN ASSISTANT

## 2024-08-30 PROCEDURE — 99233 SBSQ HOSP IP/OBS HIGH 50: CPT | Performed by: INTERNAL MEDICINE

## 2024-08-30 PROCEDURE — P9016 RBC LEUKOCYTES REDUCED: HCPCS

## 2024-08-30 PROCEDURE — 80069 RENAL FUNCTION PANEL: CPT | Performed by: INTERNAL MEDICINE

## 2024-08-30 PROCEDURE — 86900 BLOOD TYPING SEROLOGIC ABO: CPT | Performed by: INTERNAL MEDICINE

## 2024-08-30 PROCEDURE — 25010000002 HEPARIN (PORCINE) 25000-0.45 UT/250ML-% SOLUTION: Performed by: PHYSICIAN ASSISTANT

## 2024-08-30 PROCEDURE — 80202 ASSAY OF VANCOMYCIN: CPT | Performed by: INTERNAL MEDICINE

## 2024-08-30 PROCEDURE — 86850 RBC ANTIBODY SCREEN: CPT | Performed by: INTERNAL MEDICINE

## 2024-08-30 RX ORDER — INSULIN LISPRO 100 [IU]/ML
2-7 INJECTION, SOLUTION INTRAVENOUS; SUBCUTANEOUS
Status: DISCONTINUED | OUTPATIENT
Start: 2024-08-30 | End: 2024-09-03

## 2024-08-30 RX ORDER — MORPHINE SULFATE 2 MG/ML
2 INJECTION, SOLUTION INTRAMUSCULAR; INTRAVENOUS ONCE
Status: COMPLETED | OUTPATIENT
Start: 2024-08-30 | End: 2024-08-30

## 2024-08-30 RX ORDER — DEXTROSE MONOHYDRATE 25 G/50ML
25 INJECTION, SOLUTION INTRAVENOUS
Status: DISCONTINUED | OUTPATIENT
Start: 2024-08-30 | End: 2024-09-05 | Stop reason: HOSPADM

## 2024-08-30 RX ORDER — NICOTINE POLACRILEX 4 MG
15 LOZENGE BUCCAL
Status: DISCONTINUED | OUTPATIENT
Start: 2024-08-30 | End: 2024-08-30

## 2024-08-30 RX ADMIN — Medication 1 APPLICATION: at 09:33

## 2024-08-30 RX ADMIN — OXYCODONE HYDROCHLORIDE 10 MG: 10 TABLET ORAL at 09:04

## 2024-08-30 RX ADMIN — ONDANSETRON 4 MG: 2 INJECTION INTRAMUSCULAR; INTRAVENOUS at 04:40

## 2024-08-30 RX ADMIN — CALCITRIOL CAPSULES 0.25 MCG 0.5 MCG: 0.25 CAPSULE ORAL at 09:04

## 2024-08-30 RX ADMIN — ACETAMINOPHEN 650 MG: 325 TABLET, FILM COATED ORAL at 03:05

## 2024-08-30 RX ADMIN — OXYCODONE HYDROCHLORIDE 10 MG: 10 TABLET ORAL at 00:39

## 2024-08-30 RX ADMIN — OXYCODONE HYDROCHLORIDE 10 MG: 10 TABLET ORAL at 23:44

## 2024-08-30 RX ADMIN — Medication 1 TABLET: at 09:04

## 2024-08-30 RX ADMIN — OXYCODONE HYDROCHLORIDE 10 MG: 10 TABLET ORAL at 11:57

## 2024-08-30 RX ADMIN — HYDROMORPHONE HYDROCHLORIDE 0.5 MG: 1 INJECTION, SOLUTION INTRAMUSCULAR; INTRAVENOUS; SUBCUTANEOUS at 08:59

## 2024-08-30 RX ADMIN — HYDROMORPHONE HYDROCHLORIDE 0.5 MG: 1 INJECTION, SOLUTION INTRAMUSCULAR; INTRAVENOUS; SUBCUTANEOUS at 13:07

## 2024-08-30 RX ADMIN — MORPHINE SULFATE 2 MG: 2 INJECTION, SOLUTION INTRAMUSCULAR; INTRAVENOUS at 04:44

## 2024-08-30 RX ADMIN — SERTRALINE 25 MG: 50 TABLET, FILM COATED ORAL at 11:57

## 2024-08-30 RX ADMIN — ASPIRIN 81 MG: 81 TABLET, COATED ORAL at 09:04

## 2024-08-30 RX ADMIN — HYDROMORPHONE HYDROCHLORIDE 0.5 MG: 1 INJECTION, SOLUTION INTRAMUSCULAR; INTRAVENOUS; SUBCUTANEOUS at 15:00

## 2024-08-30 RX ADMIN — ACETAMINOPHEN 650 MG: 325 TABLET, FILM COATED ORAL at 15:15

## 2024-08-30 RX ADMIN — Medication 10 ML: at 20:56

## 2024-08-30 RX ADMIN — HYDROMORPHONE HYDROCHLORIDE 0.5 MG: 1 INJECTION, SOLUTION INTRAMUSCULAR; INTRAVENOUS; SUBCUTANEOUS at 03:48

## 2024-08-30 RX ADMIN — HYDROMORPHONE HYDROCHLORIDE 0.5 MG: 1 INJECTION, SOLUTION INTRAMUSCULAR; INTRAVENOUS; SUBCUTANEOUS at 06:12

## 2024-08-30 RX ADMIN — HYDROMORPHONE HYDROCHLORIDE 0.5 MG: 1 INJECTION, SOLUTION INTRAMUSCULAR; INTRAVENOUS; SUBCUTANEOUS at 19:24

## 2024-08-30 RX ADMIN — PANTOPRAZOLE SODIUM 40 MG: 40 TABLET, DELAYED RELEASE ORAL at 09:04

## 2024-08-30 RX ADMIN — HYDROMORPHONE HYDROCHLORIDE 0.5 MG: 1 INJECTION, SOLUTION INTRAMUSCULAR; INTRAVENOUS; SUBCUTANEOUS at 01:41

## 2024-08-30 RX ADMIN — OXYCODONE HYDROCHLORIDE 10 MG: 10 TABLET ORAL at 20:53

## 2024-08-30 RX ADMIN — HEPARIN SODIUM 14 UNITS/KG/HR: 10000 INJECTION, SOLUTION INTRAVENOUS at 17:24

## 2024-08-30 RX ADMIN — VANCOMYCIN HYDROCHLORIDE 1000 MG: 1 INJECTION, POWDER, LYOPHILIZED, FOR SOLUTION INTRAVENOUS at 17:26

## 2024-08-30 RX ADMIN — OXYCODONE HYDROCHLORIDE 10 MG: 10 TABLET ORAL at 17:18

## 2024-08-30 RX ADMIN — OXYCODONE HYDROCHLORIDE 10 MG: 10 TABLET ORAL at 04:26

## 2024-08-30 NOTE — PROGRESS NOTES
"Erlin Savage  1950  [id number]      Evaluating Physician: Marlo Heaton MD    Chief Complaint: right foot infection    Reason for Consultation: right foot infection    History of present illness:     Patient is a 74 y.o.  Yr old male noncompliant previously, with prior history of  end-stage renal disease with intermittent hemodialysis via right chest central line.  He has known peripheral arterial disease with right leg angiogram on April 10 with recanalization of SFA/PTA; post procedure with continued worsening pain at the right distal leg, gangrene at his right second/third and fourth toes with second worse than third worse than fourth.   MRI right foot was soft tissue edema, no loculated collection with abnormality at the posterior calcaneus difficult to completely exclude osteomyelitis at that site as per radiology.     4/19/24 Dr Santos   \"Procedures: Procedure(s):  RIGHT Foot  Second toe amputation\"     4/23/24  Dr Santos .  \"Procedures: Procedure(s):  RIGHT Foot     Amputation hallux  Amputation third toe  Amputation fourth toe  Amputation fifth toe\"     Pathology from April 23, 2024 with no osteomyelitis.  Pathology had noted cellulitis and gangrenous necrosis.  Transitioned to IV vancomycin and oral Augmentin at discharge.     Revascularization on May 14, 2024 by Dr. Schreiber  \"PROCEDURES WITH LATERALITY:   Ultrasound guided access of left common femoral artery with micropuncture needle and images stored  Abd Aorotgram  Right lower extremity arteriogram  Balloon angioplasty of right posterior tibial artery with a 3 mm balloon  Balloon angioplasty of right posterior tibial artery origin4 mm balloon drug-coated  Balloon angioplasty of right peroneal artery with 3 mm balloon  Balloon angioplasty of right anterior tibial artery 3 mm balloon  Balloon angioplasty of right proximal superficial femoral artery with a 5 mm balloon\"     Readmitted August 14 with deterioration at the right foot over unspecified " "period of time per patient, at least weeks if not longer with large wound, exposed bone on the medial side.     8/20/24 he  refused recommendation of amputation from surgical team and left [facility].     8/26/24 readmitted on August 26 with reports to me that patient now agreeable to right side amputation by Dr. Schreiber    8/29/24   \"Procedure(s):  Right below the knee amputation and all other indicated procedures\"    8/30/24 Sleepy at my visit; Right stump pain is sharp, nonradiating, constant, worse with palpation, better with pain meds and 3 /10     No headache photophobia or neck stiffness. No shortness of breath cough or hemoptysis. No nausea vomiting diarrhea or abdominal pain. No dysuria hematuria or pyuria. No other new skin rash.     Review of Systems     8/3024  no adr to abx     Constitutional-- No Fever, chills or sweats.  Appetite good. No malaise.  Heent-- No new vision, hearing or throat complaints.  No epistaxis or oral sores.  Denies odynophagia or dysphagia.  No headache, photophobia or neck stiffness.  CV-- No chest pain, palpitation or syncope  Resp-- No SOB, cough, or hemoptysis  GI- No nausea, vomiting, or diarrhea.   -- No dysuria, hematuria, or flank pain.  Denies hesitancy, urgency or flank pain.  Lymph- no swollen lymph nodes in neck, axilla or groin.   Heme- No active bruising or bleeding  MS-- no swelling or pain in the bones or joints of arms or legs.  No new back pain.  Neuro-- No acute focal weakness or numbness in the arms or legs.  Skin-- No rash    Past Medical History:   Diagnosis Date    Anxiety     Anxiety disorder     Back pain     Chronic back pain     work related injury    CKD (chronic kidney disease)     most recent creatinine 2.7 on 01/26/16    Coronary artery disease     Diabetes mellitus     insulin dependent diabetes Onset 2010    Dyslipidemia     Hyperlipidemia     Hypertension     Ischemic heart disease     Renal failure     Stroke     TIA / transient right vision loss " "age 42    TIA (transient ischemic attack)     Vertigo        Past Surgical History:   Procedure Laterality Date    APPENDECTOMY      CARDIAC CATHETERIZATION      CHOLECYSTECTOMY  03/2012    COLONOSCOPY      CORONARY ARTERY BYPASS GRAFT      ENDOSCOPY         Pediatric History   Patient Parents    Not on file     Other Topics Concern    Not on file   Social History Narrative    Not on file       family history is not on file.    Allergies   Allergen Reactions    Cefepime Other (See Comments) and Seizure     Myoclonus - Has received cefazolin    Doxycycline Other (See Comments)     Joint pain, tongue swelling    \"Body locks up\"    Gabapentin Confusion    Ranolazine Dizziness and Other (See Comments)     Severe tremors/ shakiness    Hydralazine Unknown - Low Severity    Augmentin [Amoxicillin-Pot Clavulanate] Palpitations    Biaxin [Clarithromycin] Palpitations    Cephalosporins Unknown - Low Severity     Has received cefazolin    Coreg [Carvedilol] Itching    Crestor [Rosuvastatin Calcium] Itching     Itching rash    Iodine Rash     \"pineda skin\"    Lipitor [Atorvastatin] Itching     And Crestor- generalized weakness and chest tightness    Lisinopril Cough     Cough /weakness, nauseas and dirrhea    Livalo [Pitavastatin] Unknown - Low Severity     Chest tightness    Nortriptyline Hcl Other (See Comments)     Arthralgias    Pravastatin Unknown - Low Severity     Chest , neck and arm discomfort    Questran [Cholestyramine] Unknown - Low Severity       Medication:  Current Facility-Administered Medications   Medication Dose Route Frequency Provider Last Rate Last Admin    acetaminophen (TYLENOL) tablet 650 mg  650 mg Oral Q6H PRN Junior Schreiber MD   650 mg at 08/30/24 0305    aspirin EC tablet 81 mg  81 mg Oral Daily Junior Schreiber MD   81 mg at 08/28/24 1126    b complex-vitamin c-folic acid (NEPHRO-SEVERIANO) tablet 1 tablet  1 tablet Oral Daily Junior Schreiber MD   1 tablet at 08/28/24 1126    sennosides-docusate " (PERICOLACE) 8.6-50 MG per tablet 2 tablet  2 tablet Oral BID PRN Junior Schreiber MD        And    polyethylene glycol (MIRALAX) packet 17 g  17 g Oral Daily PRN Junior Schreiber MD        And    bisacodyl (DULCOLAX) EC tablet 5 mg  5 mg Oral Daily PRN Junior Schreiber MD        And    bisacodyl (DULCOLAX) suppository 10 mg  10 mg Rectal Daily PRN Junior Schreiber MD        calcitriol (ROCALTROL) capsule 0.5 mcg  0.5 mcg Oral Daily Junior Schreiber MD   0.5 mcg at 08/28/24 1126    castor oil-balsam peru (VENELEX) ointment 1 Application  1 Application Topical Daily Junior Schreiber MD   1 Application at 08/28/24 1125    dextrose (D50W) (25 g/50 mL) IV injection 25 g  25 g Intravenous Q15 Min PRN Junior Schreiber MD        dextrose (GLUTOSE) oral gel 15 g  15 g Oral Q15 Min PRN Junior Schreiber MD        diazePAM (VALIUM) tablet 4 mg  4 mg Oral Q6H PRN Junior Schreiber MD        glucagon (GLUCAGEN) injection 1 mg  1 mg Intramuscular Q15 Min PRN Junior Schreiber MD        heparin (porcine) injection 2,000 Units  2,000 Units Intracatheter PRN Junior Schreiber MD   2,000 Units at 08/28/24 0955    heparin 86547 units/250 mL (100 units/mL) in 0.45 % NaCl infusion  13 Units/kg/hr Intravenous Titrated Shruthi Sumner, PharmD 10.25 mL/hr at 08/30/24 0152 13 Units/kg/hr at 08/30/24 0152    [Held by provider] hydrALAZINE (APRESOLINE) tablet 10 mg  10 mg Oral TID Junior Schrieber MD   10 mg at 08/28/24 2045    HYDROmorphone (DILAUDID) injection 0.5 mg  0.5 mg Intravenous Q2H PRN Junior Schreiber MD   0.5 mg at 08/30/24 0612    meropenem (MERREM) 500 mg in sodium chloride 0.9 % 100 mL MBP  500 mg Intravenous Q24H Junior Schreiber MD   500 mg at 08/28/24 1735    metoprolol tartrate (LOPRESSOR) tablet 25 mg  25 mg Oral Q12H Dada Darden IV, MD   25 mg at 08/29/24 2037    nitroglycerin (NITROSTAT) SL tablet 0.4 mg  0.4 mg Sublingual Q5 Min PRN Junior Schreiber MD   0.4 mg at 08/29/24 1529    nitroglycerin  (NITROSTAT) SL tablet 0.4 mg  0.4 mg Sublingual Q5 Min PRN Junior Schreiber MD        nitroglycerin (TRIDIL) 200 mcg/ml infusion  5-200 mcg/min Intravenous Titrated Dada Darden IV, MD 1.5 mL/hr at 08/29/24 2342 5 mcg/min at 08/29/24 2342    ondansetron ODT (ZOFRAN-ODT) disintegrating tablet 4 mg  4 mg Oral Q6H PRN Junior Schreiber MD   4 mg at 08/27/24 0938    Or    ondansetron (ZOFRAN) injection 4 mg  4 mg Intravenous Q6H PRN Junior Schreiber MD   4 mg at 08/30/24 0440    oxyCODONE (ROXICODONE) immediate release tablet 10 mg  10 mg Oral Q4H Junior Schreiber MD   10 mg at 08/30/24 0426    pantoprazole (PROTONIX) EC tablet 40 mg  40 mg Oral Daily Junior Schreiber MD   40 mg at 08/28/24 1126    Pharmacy to Dose Heparin   Does not apply Continuous PRN Maura Gaspar II, DO        Pharmacy to dose vancomycin   Does not apply Continuous PRN Junior Schreiber MD        simethicone (MYLICON) chewable tablet 80 mg  80 mg Oral 4x Daily PRN Cassandra Wiseman APRN        sodium chloride 0.9 % flush 10 mL  10 mL Intravenous Q12H Junior Schreiber MD   10 mL at 08/29/24 2037    sodium chloride 0.9 % flush 10 mL  10 mL Intravenous PRN Junior Schreiber MD        sodium chloride 0.9 % infusion 40 mL  40 mL Intravenous PRN Junior Schreiber MD        sodium chloride 0.9 % infusion  9 mL/hr Intravenous Continuous Junior Schreiber  mL/hr at 08/29/24 1020 Rate Change at 08/29/24 1020    vancomycin (dosing per levels)   Does not apply Daily Junior Schreiber MD           Antibiotics:  Anti-Infectives (From admission, onward)      Ordered     Dose/Rate Route Frequency Start Stop    08/28/24 1248  Vancomycin HCl 1,250 mg in sodium chloride 0.9 % 250 mL VTB        Ordering Provider: Kyrie Walsh, ShannaD    1,250 mg  200 mL/hr over 75 Minutes Intravenous Once 08/28/24 1500 08/28/24 1716    08/28/24 1250  vancomycin (dosing per levels)        Ordering Provider: Junior Schreiber MD     Does not apply Daily 08/28/24 3192  "09/03/24 0859    08/26/24 1304  meropenem (MERREM) 500 mg in sodium chloride 0.9 % 100 mL MBP        Ordering Provider: Junior Schreiber MD    500 mg  over 3 Hours Intravenous Every 24 Hours 08/27/24 1600 09/03/24 1659    08/26/24 1306  vancomycin IVPB 1750 mg in 0.9% Sodium Chloride (premix) 500 mL        Ordering Provider: Kyrie Walsh PharmD    1,750 mg  285.7 mL/hr over 105 Minutes Intravenous Once 08/26/24 1800 08/26/24 1957    08/26/24 1304  meropenem (MERREM) 1,000 mg in sodium chloride 0.9 % 100 mL MBP        Ordering Provider: Lisa Rowe DO    1,000 mg  over 30 Minutes Intravenous Once 08/26/24 1500 08/26/24 1604    08/26/24 1306  vancomycin (dosing per levels)        Ordering Provider: Kyrie Walsh PharmD     Does not apply Daily 08/26/24 1400 08/29/24 0859    08/26/24 1223  Pharmacy to dose vancomycin        Ordering Provider: Junior Schreiber MD     Does not apply Continuous PRN 08/26/24 1223 09/02/24 2359                 Physical Exam:   Vital Signs   /66 (BP Location: Right arm, Patient Position: Lying)   Pulse 84   Temp 98 °F (36.7 °C) (Oral)   Resp 18   Ht 180.3 cm (71\")   Wt 78.9 kg (174 lb)   SpO2 97%   BMI 24.27 kg/m²       GENERAL: sleepy.  Appears chronically debilitated  HEENT: Normocephalic, atraumatic.    No conjunctival injection. No icterus. Oropharynx clear without evidence of thrush or exudate.   NECK: Supple without nuchal rigidity. No mass.   HEART: RRR; No murmur.  LUNGS: Clear to auscultation bilaterally without wheezing, rales, rhonchi. Normal respiratory effort. Nonlabored.  ABDOMEN: Soft, nontender, nondistended. Positive bowel sounds. No rebound or guarding.  EXT:  No cyanosis, clubbing or edema.  MSK: FROM without joint effusions noted arms/legs.    SKIN: Warm and dry without cutaneous eruptions on Inspection/palpation.    NEURO: sleepy     Left foot first and third toe distally with small black areas,  toes with only minimal erythema, no discrete mass bulge " or fluctuance.  No crepitus or bulla     Right LE amp noted;  no new visible redness/purulence.  No discrete mass bulge or fluctuance.  No crepitus or bulla.     No peripheral stigmata/phenomenon of endocarditis    Laboratory Data    Results from last 7 days   Lab Units 08/29/24  1633 08/28/24  0523 08/27/24  0549   WBC 10*3/mm3 11.86* 10.76 9.73   HEMOGLOBIN g/dL 8.2* 9.8* 9.8*   HEMATOCRIT % 28.7* 32.5* 32.9*   PLATELETS 10*3/mm3 302 271 245     Results from last 7 days   Lab Units 08/29/24  1535   SODIUM mmol/L 131*   POTASSIUM mmol/L 5.8*   CHLORIDE mmol/L 96*   CO2 mmol/L 21.0*   BUN mg/dL 32*   CREATININE mg/dL 4.26*   GLUCOSE mg/dL 174*   CALCIUM mg/dL 9.2     Results from last 7 days   Lab Units 08/28/24  0523   ALK PHOS U/L 198*   BILIRUBIN mg/dL 0.5   ALT (SGPT) U/L 17   AST (SGOT) U/L 27               Estimated Creatinine Clearance: 17.5 mL/min (A) (by C-G formula based on SCr of 4.26 mg/dL (H)).      Microbiology:      Radiology:  Imaging Results (Last 72 Hours)       ** No results found for the last 72 hours. **              Impression:     --Acute right foot surgical site/wound infection/cellulitis, exposed bone on the medial side consistent with first metatarsal osteomyelitis and likely sequela to ischemia with peripheral vascular disease and other comorbidity as above. High risk for further serious morbidity and other serious sequela. They understand risk for persistent/recurrent or nonhealing wounds, persistent/progressive or recurrent infection and risk for further functional/limb loss, amputation, chronic pain etc. They know antibiotics and surgery not a guarantee for cure. These risks will persist. Timing/option threshold for surgery per surgical team at their discretion.  Vascular team has recommended amputation which patient has previously refused, left the hospital AGAINST MEDICAL ADVICE on August 20 but subsequently readmitted August 26 and agreeable to surgical recommendation, UZAIR  8/29    --Peripheral vascular disease.  Prior interventions as above.    --Left foot with small blackened areas at first/third toe likely sequela to ischemia.  Vascular team to help determine options and salvageability as above    --End-stage renal disease on hemodialysis Monday/Wednesday/Friday.    --Diabetes.  Glucose control per medicine team    --Cephalosporin and doxycycline intolerances in the past.  Has tolerated penicillin class    PLAN:        --IV vancomycin, merrem; anticipate stop IV abx by discharge if steadily better     **Cx at [facility] with PSA x 2 (zosyn DD, not ideal EDWIN), enterococcus and corynebac      --check/review labs cultures and scans     --Partial history per nursing staff     --Discussed with microbiology     --vascular team making surgical plans     --Highly complex set of issues with high risk for further serious morbidity and other serious sequela     --Monitor IV and IV antibiotic with risk for systemic complication and potential drug interactions    Copied text in this note has been reviewed and is accurate as of 08/30/24.        Marlo Heaton MD  8/30/2024

## 2024-08-30 NOTE — PROGRESS NOTES
LOS: 4 days   Patient Care Team:  Joe Diallo MD as PCP - General (Family Medicine)  Fadi Garner MD as Consulting Physician (General Surgery)  Amor Celeset MD (Cardiology)  Praveen Novoa MD as Consulting Physician (Nephrology)  Kristin Moya MD as Consulting Physician (Pulmonary Disease)    Chief Complaint: ESRD  Right non healing foot wound  Wound infection.    Subjective   Patient sleepy but answering correct questions.  Blood pressure stable.  Significant edema is noted.    History taken from: patient    Objective     Vital Sign Min/Max for last 24 hours  Temp  Min: 97.1 °F (36.2 °C)  Max: 98.2 °F (36.8 °C)   BP  Min: 91/66  Max: 142/79   Pulse  Min: 79  Max: 107   Resp  Min: 13  Max: 18   SpO2  Min: 90 %  Max: 100 %   Flow (L/min)  Min: 2  Max: 3   No data recorded         No intake/output data recorded.  I/O last 3 completed shifts:  In: 1400 [P.O.:950; I.V.:450]  Out: 300 [Blood:300]  Objective:  General Appearance: Alert, oriented, no obvious distress.  Eyes: PER, EOMI.  Neck: Supple no JVD.  Lungs: Clear auscultation, no rales rhonchi's, equal chest movement, nonlabored.  Heart: No gallop, murmur, rub, RRR.  Abdomen: Soft, nontender, positive bowel sounds, no organomegaly.  Extremities: 2-3+ lower extremity edema, no cyanosis.  Right BKA  Neuro: No focal deficit, moving all extremities, alert oriented X 3      Results Review:     I reviewed the patient's new clinical results.    WBC WBC   Date Value Ref Range Status   08/30/2024 11.05 (H) 3.40 - 10.80 10*3/mm3 Final   08/29/2024 11.86 (H) 3.40 - 10.80 10*3/mm3 Final   08/28/2024 10.76 3.40 - 10.80 10*3/mm3 Final      HGB Hemoglobin   Date Value Ref Range Status   08/30/2024 7.3 (L) 13.0 - 17.7 g/dL Final   08/29/2024 8.2 (L) 13.0 - 17.7 g/dL Final   08/28/2024 9.8 (L) 13.0 - 17.7 g/dL Final      HCT Hematocrit   Date Value Ref Range Status   08/30/2024 24.9 (L) 37.5 - 51.0 % Final   08/29/2024 28.7 (L) 37.5 - 51.0 % Final  "  08/28/2024 32.5 (L) 37.5 - 51.0 % Final      Platlets No results found for: \"LABPLAT\"   MCV MCV   Date Value Ref Range Status   08/30/2024 92.9 79.0 - 97.0 fL Final   08/29/2024 95.0 79.0 - 97.0 fL Final   08/28/2024 90.5 79.0 - 97.0 fL Final          Sodium Sodium   Date Value Ref Range Status   08/29/2024 131 (L) 136 - 145 mmol/L Final   08/29/2024 132 (L) 136 - 145 mmol/L Final   08/28/2024 130 (L) 136 - 145 mmol/L Final      Potassium Potassium   Date Value Ref Range Status   08/29/2024 5.8 (H) 3.5 - 5.2 mmol/L Final   08/29/2024 5.5 (H) 3.5 - 5.2 mmol/L Final   08/28/2024 6.6 (C) 3.5 - 5.2 mmol/L Final      Chloride Chloride   Date Value Ref Range Status   08/29/2024 96 (L) 98 - 107 mmol/L Final   08/29/2024 95 (L) 98 - 107 mmol/L Final   08/28/2024 95 (L) 98 - 107 mmol/L Final      CO2 CO2   Date Value Ref Range Status   08/29/2024 21.0 (L) 22.0 - 29.0 mmol/L Final   08/29/2024 23.0 22.0 - 29.0 mmol/L Final   08/28/2024 21.0 (L) 22.0 - 29.0 mmol/L Final      BUN BUN   Date Value Ref Range Status   08/29/2024 32 (H) 8 - 23 mg/dL Final   08/29/2024 30 (H) 8 - 23 mg/dL Final   08/28/2024 40 (H) 8 - 23 mg/dL Final      Creatinine Creatinine   Date Value Ref Range Status   08/29/2024 4.26 (H) 0.76 - 1.27 mg/dL Final   08/29/2024 3.93 (H) 0.76 - 1.27 mg/dL Final   08/28/2024 5.16 (H) 0.76 - 1.27 mg/dL Final      Calcium Calcium   Date Value Ref Range Status   08/29/2024 9.2 8.6 - 10.5 mg/dL Final   08/29/2024 9.4 8.6 - 10.5 mg/dL Final   08/28/2024 9.3 8.6 - 10.5 mg/dL Final      PO4 No results found for: \"CAPO4\"   Albumin Albumin   Date Value Ref Range Status   08/28/2024 3.4 (L) 3.5 - 5.2 g/dL Final      Magnesium Magnesium   Date Value Ref Range Status   08/29/2024 1.9 1.6 - 2.4 mg/dL Final   08/28/2024 2.0 1.6 - 2.4 mg/dL Final      Uric Acid No results found for: \"URICACID\"     Medication Review: yes    Assessment & Plan       Dry gangrene    Coronary artery disease involving native coronary artery of native " heart with angina pectoris    Essential hypertension    Hyperlipidemia LDL goal <70    Type 2 diabetes mellitus with kidney complication, with long-term current use of insulin    TIA (transient ischemic attack)    Stage 5 chronic kidney disease on chronic dialysis    Secondary pulmonary arterial hypertension    ESRD (end stage renal disease)    Iron deficiency anemia    Severe mitral valve regurgitation    NSTEMI (non-ST elevated myocardial infarction)    Type 2 diabetes mellitus with diabetic chronic kidney disease    On home oxygen therapy    Severe malnutrition    Chronic diastolic (congestive) heart failure    Chronic systolic (congestive) heart failure      Assessment & Plan    1.  ESRD: On HD MWF.  Follows with [facility].  Hemodialysis in progress at this time    2.  HTN: Blood pressure stable.  Monitor for now.     3.  Hyperkalemia: Recurrent issue.      4.  Hyponatremia:  Continue fluid rate restriction and adjust with HD.     5.  Metabolic acidosis: Adjust with HD.     6.  Volume: Hx of ascites and dependent edema. Requiring serial paracentesis as outpatient.      Peripheral vascular disease: Patient with nonhealing right transmetatarsal amputation site with dry gangrene and wound infection. MRI unable to rule out Osteo. S/p BKA 8/29/24   Discussed with AMOS Rodriguez MD  08/30/24  13:42 EDT

## 2024-08-30 NOTE — PROGRESS NOTES
"  [facility] at [facility]  INPATIENT PROGRESS NOTE    Date of Admission: 8/26/2024  Date of Service: 08/30/24    Identification: Erlin Savage is a 74 y.o. 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  Patient was placed on heparin drip and nitroglycerin with improvement of his symptoms.    Family is in the room this afternoon concerned about his pain to his right leg. Patient states he is having very mild pain in his chest right now.             Objective   Vitals:  /66 (BP Location: Right arm, Patient Position: Lying)   Pulse 84   Temp 98 °F (36.7 °C) (Oral)   Resp 18   Ht 180.3 cm (71\")   Wt 78.9 kg (174 lb)   SpO2 97%   BMI 24.27 kg/m²     Intake/Output Summary (Last 24 hours) at 8/30/2024 1056  Last data filed at 8/29/2024 2100  Gross per 24 hour   Intake 1150 ml   Output 300 ml   Net 850 ml       Current Medications:  aspirin, 81 mg, Oral, Daily  b complex-vitamin c-folic acid, 1 tablet, Oral, Daily  calcitriol, 0.5 mcg, Oral, " Daily  castor oil-balsam peru, 1 Application, Topical, Daily  [Held by provider] hydrALAZINE, 10 mg, Oral, TID  insulin lispro, 2-7 Units, Subcutaneous, TID With Meals  meropenem, 500 mg, Intravenous, Q24H  metoprolol tartrate, 25 mg, Oral, Q12H  oxyCODONE, 10 mg, Oral, Q4H  pantoprazole, 40 mg, Oral, Daily  pharmacy consult - [place], , Does not apply, Daily  sertraline, 25 mg, Oral, Daily  sodium chloride, 10 mL, Intravenous, Q12H  vancomycin (dosing per levels), , Does not apply, Daily      heparin, 14 Units/kg/hr, Last Rate: 14 Units/kg/hr (08/30/24 0940)  nitroglycerin, 5-200 mcg/min, Last Rate: 5 mcg/min (08/29/24 2342)  Pharmacy to Dose Heparin,   Pharmacy to dose vancomycin,   sodium chloride, 9 mL/hr, Last Rate: 250 mL/hr (08/29/24 1020)      Constitutional:       Appearance: Chronically ill-appearing and acutely ill-appearing.   Cardiovascular:      Normal rate. Regular rhythm.      Murmurs: There is a high frequency blowing holosystolic murmur at the apex.   Edema:     Peripheral edema absent.   Abdominal:      General: There is no distension.   Skin:     General: Skin is warm and dry.   Neurological:      Comments: Drowsy          Data Review:  Results from last 7 days   Lab Units 08/29/24  1633 08/28/24  0523 08/27/24  0549   WBC 10*3/mm3 11.86* 10.76 9.73   HEMOGLOBIN g/dL 8.2* 9.8* 9.8*   HEMATOCRIT % 28.7* 32.5* 32.9*   PLATELETS 10*3/mm3 302 271 245     Results from last 7 days   Lab Units 08/29/24  1535 08/29/24  0623 08/28/24  0523   SODIUM mmol/L 131* 132* 130*   POTASSIUM mmol/L 5.8* 5.5* 6.6*   CHLORIDE mmol/L 96* 95* 95*   CO2 mmol/L 21.0* 23.0 21.0*   BUN mg/dL 32* 30* 40*   CREATININE mg/dL 4.26* 3.93* 5.16*   GLUCOSE mg/dL 174* 148* 178*      Results from last 7 days   Lab Units 08/26/24  1145   HEMOGLOBIN A1C % 6.20*                 Results from last 7 days   Lab Units 08/29/24  1633   PROTIME Seconds 18.8*   INR  1.56*   APTT seconds 29.0*     Results from last 7 days   Lab Units  08/29/24  1825 08/29/24  1535   HSTROP T ng/L 201* 203*           No radiology results for the last day    Results for orders placed during the hospital encounter of 08/26/24    Adult Transthoracic Echo Complete W/ Cont if Necessary Per Protocol    Interpretation Summary    Left ventricular systolic function is mildly decreased. Calculated left ventricular EF = 40%    Left ventricular wall thickness is consistent with mild concentric hypertrophy.    Moderately reduced right ventricular systolic function noted.    There is bileaflet mitral valve thickening present.    Moderate to severe mitral valve regurgitation is present.    Moderate to severe tricuspid valve regurgitation is present.    Calculated right ventricular systolic pressure from tricuspid regurgitation is 73 mmHg.    There is a small (<1cm) pericardial effusion adjacent to the left ventricle.      RESULTS REVIEW:    I reviewed the patient's new clinical results.    I personally reviewed the patient's EKG/Telemetry data              Assessment:   CAD s/p CABG, NSTEMI   C 3/2024 with 1/3 patent grafts with no targets.  Coronary artery images reviewed.  No targets for revascularization.   Chest pain 8/29 after surgery with flat troponins, likely troponin leak.  Valvular heart disease  Echo moderate to severe MR, moderate to severe TR  Chronic heart failure with mildly reduced EF  EF 40%, previously 43%  PAF  PAD s/p right BKA 8/29  Right lower leg cellulitis per  ESRD            Plan:   Continue Heparin drip for 48 hours after the start of chest pain. Can consider starting patient on Plavix tomorrow for DAPT but will continue to follow H&H.  Will need to determine when Eliquis can be restarted for stroke prophylaxis per vascular surgery.  Continue ASA 81 mg.  Continue NTG drip for chest pain control and BP control for SBP less than 140. Blood pressure starting to come back up, likely secondary to patient's increased leg pain.  Hydralazine was discontinued  due to active chest pain.Will continue metoprolol now for an anti-anginal. Patient's wife expressed previous allergy to coreg, but we will look for any signs of allergy to the medication.   Will Obtain a CXR in the morning after chest pain.  I discussed that this patient is very sick and critical with the family. They expressed understanding.     This case was discussed with Dr. Poole after seeing the patient.          Berta Ackerman PA-C       Seen independently by JEAN Solis MD, FACC

## 2024-08-30 NOTE — CASE MANAGEMENT/SOCIAL WORK
Continued Stay Note  [facility]     Patient Name: Erlin Savage  MRN: [id number]  Today's Date: 8/30/2024    Admit Date: 8/26/2024    Plan: Ongoing   Discharge Plan       Row Name 08/30/24 1302       Plan    Plan Ongoing    Patient/Family in Agreement with Plan yes    Plan Comments Spoke to patient's wife at bedside. Patient is in dialysis. Plan is ongoing. Patient is not receptive to inpatient rehab at this time. Wife and faamily are going to try to talk him into rehab. Patient was current with [facility] prior to admission. CM will continue to follow.    Final Discharge Disposition Code 01 - home or self-care                   Discharge Codes    No documentation.                 Expected Discharge Date and Time       Expected Discharge Date Expected Discharge Time    Sep 3, 2024               Adithya Blanco RN

## 2024-08-30 NOTE — PROGRESS NOTES
Pharmacy Consult - Vancomycin Dosing and Monitoring (Renal Dysfunction / Dialysis)    Erlin Savage is a 74 y.o. male receiving vancomycin therapy.     Indication: SSTI  Consulting Provider: Hospitalist  ID Consult: Yes    Goal Trough: 15-20 mcg/mL    Current Antimicrobial Therapy  Anti-Infectives (From admission, onward)      Ordered     Dose/Rate Route Frequency Start Stop    08/28/24 1248  Vancomycin HCl 1,250 mg in sodium chloride 0.9 % 250 mL VTB        Ordering Provider: Kyrie Walsh PharmD    1,250 mg  200 mL/hr over 75 Minutes Intravenous Once 08/28/24 1500 08/28/24 1716    08/28/24 1250  vancomycin (dosing per levels)        Ordering Provider: Junior Schreiber MD     Does not apply Daily 08/28/24 1345 09/03/24 0859    08/26/24 1304  meropenem (MERREM) 500 mg in sodium chloride 0.9 % 100 mL MBP        Ordering Provider: Junior Schreiber MD    500 mg  over 3 Hours Intravenous Every 24 Hours 08/27/24 1600 09/03/24 1659    08/26/24 1306  vancomycin IVPB 1750 mg in 0.9% Sodium Chloride (premix) 500 mL        Ordering Provider: Kyrie Walsh PharmD    1,750 mg  285.7 mL/hr over 105 Minutes Intravenous Once 08/26/24 1800 08/26/24 1957    08/26/24 1304  meropenem (MERREM) 1,000 mg in sodium chloride 0.9 % 100 mL MBP        Ordering Provider: Lisa Rowe DO    1,000 mg  over 30 Minutes Intravenous Once 08/26/24 1500 08/26/24 1604    08/26/24 1306  vancomycin (dosing per levels)        Ordering Provider: Kyrie Walsh PharmD     Does not apply Daily 08/26/24 1400 08/29/24 0859    08/26/24 1223  Pharmacy to dose vancomycin        Ordering Provider: Junior Schreiber MD     Does not apply Continuous PRN 08/26/24 1223 09/02/24 4204          Allergies  Allergies as of 08/26/2024 - Reviewed 08/26/2024   Allergen Reaction Noted    Cefepime Other (See Comments) and Seizure 12/31/2023    Doxycycline Other (See Comments) 06/08/2023    Gabapentin Confusion 11/16/2023    Ranolazine Dizziness and Other (See Comments)  "12/11/2023    Cephalosporins Unknown - Low Severity 02/10/2024    Hydralazine Unknown - Low Severity 03/05/2024    Augmentin [amoxicillin-pot clavulanate] Palpitations 08/16/2016    Biaxin [clarithromycin] Palpitations 08/16/2016    Coreg [carvedilol] Itching 08/16/2016    Crestor [rosuvastatin calcium] Itching 01/09/2017    Iodine Rash 10/02/2023    Lipitor [atorvastatin] Itching 08/16/2016    Lisinopril Cough 08/16/2016    Livalo [pitavastatin] Unknown - Low Severity 08/16/2016    Nortriptyline hcl Other (See Comments) 12/05/2023    Pravastatin Unknown - Low Severity 08/16/2016    Questran [cholestyramine] Unknown - Low Severity 08/16/2016     Labs  Results from last 7 days   Lab Units 08/30/24  1253 08/29/24  1535 08/29/24  0623   BUN mg/dL 43* 32* 30*   CREATININE mg/dL 5.17* 4.26* 3.93*     Results from last 7 days   Lab Units 08/30/24  1253 08/29/24  1633 08/28/24  0523   WBC 10*3/mm3 11.05* 11.86* 10.76     Evaluation of Dosing     Last Dose Received in the ED/Outside Facility:   Is Patient on Dialysis or Renal Replacement: HD, MWF outpatient    Height - 180.3 cm (71\")  Weight - 78.9 kg (174 lb)    Estimated Creatinine Clearance: 14.4 mL/min (A) (by C-G formula based on SCr of 5.17 mg/dL (H)).    Intake & Output (last 3 days)       None          Microbiology  Microbiology Results (last 10 days)       Procedure Component Value - Date/Time    CANDIDA AURIS SCREEN - Swab, Axilla Right, Axilla Left and Groin [[id number]]  (Normal) Collected: 08/26/24 1715    Lab Status: Preliminary result Specimen: Swab from Axilla Right, Axilla Left and Groin Updated: 08/29/24 2231     Candida Auris Screen Culture No Candida auris isolated at 3 days    MRSA Screen, PCR (Inpatient) - Swab, Nares [[id number]]  (Normal) Collected: 08/26/24 1715    Lab Status: Final result Specimen: Swab from Nares Updated: 08/26/24 2124     MRSA PCR Negative    Narrative:      The negative predictive value of this diagnostic test is high and should " only be used to consider de-escalating anti-MRSA therapy. A positive result may indicate colonization with MRSA and must be correlated clinically.  MRSA Negative    Blood Culture - Blood, Hand, Right [[id number]]  (Normal) Collected: 08/26/24 1154    Lab Status: Preliminary result Specimen: Blood from Hand, Right Updated: 08/30/24 1215     Blood Culture No growth at 4 days    Narrative:      Less than seven (7) mL's of blood was collected.  Insufficient quantity may yield false negative results.    Blood Culture - Blood, Arm, Right [[id number]]  (Normal) Collected: 08/26/24 1145    Lab Status: Preliminary result Specimen: Blood from Arm, Right Updated: 08/30/24 1215     Blood Culture No growth at 4 days    Narrative:      Less than seven (7) mL's of blood was collected.  Insufficient quantity may yield false negative results.          Vancomycin Levels  Results from last 7 days   Lab Units 08/28/24  0523 08/27/24  0549   VANCOMYCIN RM mcg/mL 14.70 19.00         Results from last 7 days   Lab Units 08/30/24  1253   VANCOMYCIN TR mcg/mL 20.80*     Assessment/Plan:  Pharmacy to dose vancomycin for SSTI. Goal trough 15-20 mcg/mL.  Patient received Vancomycin 1250 mg (~15mg/kg) IV x1 on 8/28 after HD  Vancomycin random level ordered for 8/30 prior to dialysis session: 20.80 mcg/mL  Will order Vancomycin 1000 mg IV once. Vancomycin random level ordered for 9/2 prior to dialysis  Will continue to follow HD plan and dose vancomycin s/p sessions.   Monitor renal function, cultures and sensitivities, and clinical status, and adjust regimen as necessary.    Pharmacy will continue to follow.    Phillip Escoto RPH  8/30/2024  15:34 EDT

## 2024-08-30 NOTE — PROGRESS NOTES
"   LOS: 4 days   Patient Care Team:  Joe Diallo MD as PCP - General (Family Medicine)  Fadi Garner MD as Consulting Physician (General Surgery)  Amor Celeste MD (Cardiology)  Praveen Novoa MD as Consulting Physician (Nephrology)  Kristin Moya MD as Consulting Physician (Pulmonary Disease)      Subjective   POD#1 s/p RT BKA (8/29/24, [facility]). Patient unfortunately suffered NSTEMI overnight but denies any chest pain at this time. During discussion, he rated his RLE pain 40/10. Conversation with patient, family at bedside and nursing in the room to stay ahead of his pain and ensure that he is receiving pain medications when due over the next few days will be important for adequate pain control. Patient made comment about not being willing to live in this pain - comment was discussed with nursing and medicine team as well.     Subjective    History taken from: patient family RN    Objective     Vital Signs  Temp:  [97.5 °F (36.4 °C)-98.2 °F (36.8 °C)] 98 °F (36.7 °C)  Heart Rate:  [] 84  Resp:  [12-18] 18  BP: ()/(36-83) 113/66    Objective:  Vital signs: (most recent): Blood pressure 113/66, pulse 84, temperature 98 °F (36.7 °C), temperature source Oral, resp. rate 18, height 180.3 cm (71\"), weight 78.9 kg (174 lb), SpO2 97%.            Physical Exam:  Significant other, daughter, nursing at bedside   NAD, AAOx3, uncomfortably sitting on the edge of the bed  Normal respiratory effort, on 3L supplemental O2  RLE: s/p BKA, dressing c/d/I, knee immobilizer in place with mild knee contracture noted   Expressing suicidal thoughts without intent or plan     Results Review:     I reviewed the patient's new clinical results.  CBC    Results from last 7 days   Lab Units 08/29/24  1633 08/28/24  0523 08/27/24  0549 08/26/24  1145   WBC 10*3/mm3 11.86* 10.76 9.73 11.95*   HEMOGLOBIN g/dL 8.2* 9.8* 9.8* 10.3*   PLATELETS 10*3/mm3 302 271 245 251     BMP   Results from last 7 days   Lab " [FreeTextEntry1] : Problem\par Pain in left foot\par Markedly elevated sedimentation rate\par Anticardiolipin antibodies\par Status post pulmonary embolus\par Assessment\par The differential diagnosis in this patient with a high sed rate includes an autoimmune disorder with a vasculitis, infectious disorder, paraneoplastic syndrome.  Bloods were drawn to assess his immune system and to check for possible lupus and vasculitis.  The fact that he has no fever and has a normal white count I think ruled out the possibility of infectious disease.  Bloods were drawn today I would like to see what his sedimentation rate is today and his immune system. Units 08/29/24  1535 08/29/24  0623 08/28/24  0523 08/27/24  0549 08/26/24  1145   SODIUM mmol/L 131* 132* 130* 132* 129*   POTASSIUM mmol/L 5.8* 5.5* 6.6* 5.6* 5.9*   CHLORIDE mmol/L 96* 95* 95* 95* 93*   CO2 mmol/L 21.0* 23.0 21.0* 22.0 23.0   BUN mg/dL 32* 30* 40* 31* 43*   CREATININE mg/dL 4.26* 3.93* 5.16* 4.41* 5.63*   GLUCOSE mg/dL 174* 148* 178* 226* 239*   MAGNESIUM mg/dL 1.9  --  2.0 1.9 2.3          Current Facility-Administered Medications:     acetaminophen (TYLENOL) tablet 650 mg, 650 mg, Oral, Q6H PRN, Junior Schreiber MD, 650 mg at 08/30/24 0305    aspirin EC tablet 81 mg, 81 mg, Oral, Daily, Junior Schreiber MD, 81 mg at 08/30/24 0904    b complex-vitamin c-folic acid (NEPHRO-SEVERIANO) tablet 1 tablet, 1 tablet, Oral, Daily, Junior Schreiber MD, 1 tablet at 08/30/24 0904    sennosides-docusate (PERICOLACE) 8.6-50 MG per tablet 2 tablet, 2 tablet, Oral, BID PRN **AND** polyethylene glycol (MIRALAX) packet 17 g, 17 g, Oral, Daily PRN **AND** bisacodyl (DULCOLAX) EC tablet 5 mg, 5 mg, Oral, Daily PRN **AND** bisacodyl (DULCOLAX) suppository 10 mg, 10 mg, Rectal, Daily PRN, Junior Schreiber MD    calcitriol (ROCALTROL) capsule 0.5 mcg, 0.5 mcg, Oral, Daily, Junior Schreiber MD, 0.5 mcg at 08/30/24 0904    castor oil-balsam peru (VENELEX) ointment 1 Application, 1 Application, Topical, Daily, Junior Schreiber MD, 1 Application at 08/28/24 1125    dextrose (D50W) (25 g/50 mL) IV injection 25 g, 25 g, Intravenous, Q15 Min PRN, Junior Schreiber MD    dextrose (GLUTOSE) oral gel 15 g, 15 g, Oral, Q15 Min PRN, Junior Schreiber MD    diazePAM (VALIUM) tablet 4 mg, 4 mg, Oral, Q6H PRN, Junior Schreiber MD    glucagon (GLUCAGEN) injection 1 mg, 1 mg, Intramuscular, Q15 Min PRN, Junior Schrieber MD    heparin (porcine) injection 2,000 Units, 2,000 Units, Intracatheter, PRN, Junior Schreiber MD, 2,000 Units at 08/28/24 0955    heparin 06943 units/250 mL (100 units/mL) in 0.45 % NaCl infusion, 13 Units/kg/hr,  Intravenous, Titrated, Shruthi Sumner, PharmD, Last Rate: 10.25 mL/hr at 08/30/24 0152, 13 Units/kg/hr at 08/30/24 0152    [Held by provider] hydrALAZINE (APRESOLINE) tablet 10 mg, 10 mg, Oral, TID, Junior Schreiber MD, 10 mg at 08/28/24 2045    HYDROmorphone (DILAUDID) injection 0.5 mg, 0.5 mg, Intravenous, Q2H PRN, Junior Schreiber MD, 0.5 mg at 08/30/24 0859    meropenem (MERREM) 500 mg in sodium chloride 0.9 % 100 mL MBP, 500 mg, Intravenous, Q24H, Junior Schreiber MD, 500 mg at 08/28/24 1735    metoprolol tartrate (LOPRESSOR) tablet 25 mg, 25 mg, Oral, Q12H, Dada Darden IV, MD, 25 mg at 08/30/24 0904    nitroglycerin (NITROSTAT) SL tablet 0.4 mg, 0.4 mg, Sublingual, Q5 Min PRN, Junior Schreiber MD, 0.4 mg at 08/29/24 1529    nitroglycerin (NITROSTAT) SL tablet 0.4 mg, 0.4 mg, Sublingual, Q5 Min PRN, Junior Schreiber MD    nitroglycerin (TRIDIL) 200 mcg/ml infusion, 5-200 mcg/min, Intravenous, Titrated, Dada Darden IV, MD, Last Rate: 1.5 mL/hr at 08/29/24 2342, 5 mcg/min at 08/29/24 2342    ondansetron ODT (ZOFRAN-ODT) disintegrating tablet 4 mg, 4 mg, Oral, Q6H PRN, 4 mg at 08/27/24 0938 **OR** ondansetron (ZOFRAN) injection 4 mg, 4 mg, Intravenous, Q6H PRN, Junior Schreiber MD, 4 mg at 08/30/24 0440    oxyCODONE (ROXICODONE) immediate release tablet 10 mg, 10 mg, Oral, Q4H, Junior Schreiber MD, 10 mg at 08/30/24 0904    pantoprazole (PROTONIX) EC tablet 40 mg, 40 mg, Oral, Daily, Junior Schreiber MD, 40 mg at 08/30/24 0904    Pharmacy to Dose Heparin, , Does not apply, Continuous PRN, Maura Gaspar II, DO    Pharmacy to dose vancomycin, , Does not apply, Continuous PRN, Junior Schreiber MD    simethicone (MYLICON) chewable tablet 80 mg, 80 mg, Oral, 4x Daily PRN, Cassandra Wiseman APRN    sodium chloride 0.9 % flush 10 mL, 10 mL, Intravenous, Q12H, Junior Schreiber MD, 10 mL at 08/29/24 2037    sodium chloride 0.9 % flush 10 mL, 10 mL, Intravenous, PRN, Junior Schreiber MD     sodium chloride 0.9 % infusion 40 mL, 40 mL, Intravenous, PRN, Junior Schreiber MD    sodium chloride 0.9 % infusion, 9 mL/hr, Intravenous, Continuous, Junior Schreiber MD, Last Rate: 250 mL/hr at 08/29/24 1020, Rate Change at 08/29/24 1020    vancomycin (dosing per levels), , Does not apply, Daily, Junior Schreiber MD     Assessment & Plan       Dry gangrene    Coronary artery disease involving native coronary artery of native heart with angina pectoris    Essential hypertension    Hyperlipidemia LDL goal <70    Type 2 diabetes mellitus with kidney complication, with long-term current use of insulin    TIA (transient ischemic attack)    Stage 5 chronic kidney disease on chronic dialysis    Secondary pulmonary arterial hypertension    ESRD (end stage renal disease)    Iron deficiency anemia    Severe mitral valve regurgitation    NSTEMI (non-ST elevated myocardial infarction)    Type 2 diabetes mellitus with diabetic chronic kidney disease    On home oxygen therapy    Severe malnutrition    Chronic diastolic (congestive) heart failure    Chronic systolic (congestive) heart failure      Assessment & Plan  Right Lower Extremity Critical Limb Ischemia  - 4/10/24 ([facility]): RIGHT lower extremity tibial angioplasty   - 5/14/24 ([facility]): RIGHT lower extremity arteriogram with angioplasty and drug coated balloon of RIGHT posterior tibial artery and   RIGHT peroneal artery, anterior tibial artery, proximal superficial femoral artery angioplasty.   - [facility] 8/14/24 XR: Right toe metatarsal osteomyelitis is suspected.  - [facility] 8/15/24 arterial and venous studies with no DVT. LEFT TBI 0.38. Tibial disease noted.   - 8/29/24 ([facility]): RT BKA    - pain control prn, discussion with nursing regarding importance of passing pain medications when due  - PT/OT to mobilize as tolerated  - RLE BKA dressing to remain in place, to be changed by vascular team on 9/3/24.   - Knee immobilizer to remain in place at all times   - I discussed  patient's comments regarding not wanting to live and being unable to live in this pain with nursing and medicine and recommend psych consultation  - Patient is stable from vascular perspective and will not be seen over the weekend. Vascular will re-evaluate on Tuesday, 9/3/24. Please call on-call physician with any questions or concerns.     Plan of care discussed with Dr. Schreiber. Plan also reviewed with patient, nursing and family at bedside who verbalized understanding and agreement.     Asha Dowd PA-C  08/30/24  09:18 EDT

## 2024-08-30 NOTE — PLAN OF CARE
Problem: Device-Related Complication Risk (Hemodialysis)  Goal: Safe, Effective Therapy Delivery  Outcome: Ongoing, Progressing  Intervention: Optimize Device Care and Function  Recent Flowsheet Documentation  Taken 8/30/2024 1630 by Autumn Harman, RN  Medication Review/Management:   medications reviewed   high-risk medications identified     Problem: Hemodynamic Instability (Hemodialysis)  Goal: Effective Tissue Perfusion  Outcome: Ongoing, Progressing     Problem: Infection (Hemodialysis)  Goal: Absence of Infection Signs and Symptoms  Outcome: Ongoing, Progressing   Goal Outcome Evaluation:            HD ended 30 minutes early due to patient request, patient c/o of  severe pain during treatment. PRN given per MAR. Blood reinfused. 1u PRBC given during tx. Report given to primary RN Kitty. MD Myers called and informed of patient end time early, no new orders.        Fluid removal: 3.1L

## 2024-08-30 NOTE — PROGRESS NOTES
[facility] Medicine Services  PROGRESS NOTE    Patient Name: Erlin Savage  : 1950  MRN: [id number]    Date of Admission: 2024  Primary Care Physician: Joe Diallo MD    Subjective   Subjective     CC: f/u PAD    HPI:  Last night had chest pain after his right bka, initiated on nitro & heparin drip. This morning no chest pain. No increased dyspnea from baseline. Right leg hurts, getting pain meds now    Objective   Objective     Vital Signs:   Temp:  [97.5 °F (36.4 °C)-98.2 °F (36.8 °C)] 98 °F (36.7 °C)  Heart Rate:  [] 84  Resp:  [12-18] 18  BP: ()/(36-83) 113/66  Flow (L/min):  [2-4] 3     Physical Exam:  Constitutional: No acute distress, awake, alert, up in bed; on 4Lnc  HENT: NCAT, mucous membranes moist  Respiratory:ctab  Cardiovascular: rrr, no murmurs, rubs, or gallops  Gastrointestinal: Positive bowel sounds, soft, nontender, nondistended  Psychiatric: Appropriate affect, cooperative  Neurologic: Oriented x 3, strength symmetric in all extremities, Cranial Nerves grossly intact to confrontation, speech clear  Skin/msk: right bka site cleanly dressed    Results Reviewed:  LAB RESULTS:      Lab 24  0856 24  0046 24  1633 24  0523 24  0549 24  1145 24  1144   WBC  --   --  11.86* 10.76 9.73 11.95*  --    HEMOGLOBIN  --   --  8.2* 9.8* 9.8* 10.3*  --    HEMATOCRIT  --   --  28.7* 32.5* 32.9* 34.4*  --    PLATELETS  --   --  302 271 245 251  --    NEUTROS ABS  --   --  10.59* 8.21* 7.66* 9.88*  --    IMMATURE GRANS (ABS)  --   --  0.07* 0.07* 0.07* 0.09*  --    LYMPHS ABS  --   --  0.47* 0.86 0.62* 0.63*  --    MONOS ABS  --   --  0.63 1.15* 1.00* 0.93*  --    EOS ABS  --   --  0.01 0.36 0.32 0.34  --    MCV  --   --  95.0 90.5 91.4 91.2  --    PROCALCITONIN  --   --   --   --   --  0.43*  --    LACTATE  --   --   --   --   --   --  1.1   PROTIME  --   --  18.8*  --   --   --   --    APTT  --   --  29.0*  --    --   --   --    HEPARIN ANTI-XA 0.29* 0.22* 0.15*  --   --   --   --          Lab 08/29/24  1535 08/29/24  0623 08/28/24  0523 08/27/24  0549 08/26/24  1145   SODIUM 131* 132* 130* 132* 129*   POTASSIUM 5.8* 5.5* 6.6* 5.6* 5.9*   CHLORIDE 96* 95* 95* 95* 93*   CO2 21.0* 23.0 21.0* 22.0 23.0   ANION GAP 14.0 14.0 14.0 15.0 13.0   BUN 32* 30* 40* 31* 43*   CREATININE 4.26* 3.93* 5.16* 4.41* 5.63*   EGFR 14.0* 15.5* 11.2* 13.5* 10.1*   GLUCOSE 174* 148* 178* 226* 239*   CALCIUM 9.2 9.4 9.3 9.0 9.4   MAGNESIUM 1.9  --  2.0 1.9 2.3   HEMOGLOBIN A1C  --   --   --   --  6.20*         Lab 08/28/24  0523 08/27/24  0549 08/26/24  1145   TOTAL PROTEIN 6.6 6.2 6.4   ALBUMIN 3.4* 3.1* 3.3*   GLOBULIN 3.2 3.1 3.1   ALT (SGPT) 17 14 14   AST (SGOT) 27 20 19   BILIRUBIN 0.5 0.4 0.5   ALK PHOS 198* 176* 199*         Lab 08/29/24  1825 08/29/24  1633 08/29/24  1535   HSTROP T 201*  --  203*   PROTIME  --  18.8*  --    INR  --  1.56*  --                  Brief Urine Lab Results  (Last result in the past 365 days)        Color   Clarity   Blood   Leuk Est   Nitrite   Protein   CREAT   Urine HCG        01/01/24 0550 Yellow   Clear     Negative                       Microbiology Results Abnormal       Procedure Component Value - Date/Time    CANDIDA AURIS SCREEN - Swab, Axilla Right, Axilla Left and Groin [[id number]]  (Normal) Collected: 08/26/24 1715    Lab Status: Preliminary result Specimen: Swab from Axilla Right, Axilla Left and Groin Updated: 08/29/24 2231     Candida Auris Screen Culture No Candida auris isolated at 3 days    Blood Culture - Blood, Hand, Right [[id number]]  (Normal) Collected: 08/26/24 1154    Lab Status: Preliminary result Specimen: Blood from Hand, Right Updated: 08/29/24 1215     Blood Culture No growth at 3 days    Narrative:      Less than seven (7) mL's of blood was collected.  Insufficient quantity may yield false negative results.    Blood Culture - Blood, Arm, Right [[id number]]  (Normal) Collected:  08/26/24 1145    Lab Status: Preliminary result Specimen: Blood from Arm, Right Updated: 08/29/24 1215     Blood Culture No growth at 3 days    Narrative:      Less than seven (7) mL's of blood was collected.  Insufficient quantity may yield false negative results.    MRSA Screen, PCR (Inpatient) - Swab, Nares [[id number]]  (Normal) Collected: 08/26/24 1715    Lab Status: Final result Specimen: Swab from Nares Updated: 08/26/24 2124     MRSA PCR Negative    Narrative:      The negative predictive value of this diagnostic test is high and should only be used to consider de-escalating anti-MRSA therapy. A positive result may indicate colonization with MRSA and must be correlated clinically.  MRSA Negative            Popliteal cath    Result Date: 8/29/2024  Beck Nieves CRNA     8/29/2024 10:13 AM Popliteal cath Patient reassessed immediately prior to procedure Patient location during procedure: pre-op Start time: 8/29/2024 9:55 AM Stop time: 8/29/2024 10:05 AM Reason for block: at surgeon's request and post-op pain management Performed by CRNA/CAA: Beck Nieves CRNA Assisted by: Beverly Pardo RN Preanesthetic Checklist Completed: patient identified, IV checked, site marked, risks and benefits discussed, surgical consent, monitors and equipment checked, pre-op evaluation and timeout performed Prep: Pt Position: left lateral decubitus Sterile barriers:cap, gloves, mask and washed/disinfected hands Prep: ChloraPrep Patient monitoring: blood pressure monitoring, continuous pulse oximetry and EKG Procedure Sedation: yes Performed under: local infiltration Guidance:ultrasound guided ULTRASOUND INTERPRETATION.  Using ultrasound guidance a 20 G gauge needle was placed in close proximity to the nerve, at which point, under ultrasound guidance anesthetic was injected in the area of the nerve and spread of the anesthesia was seen on ultrasound in close proximity thereto.  There were no abnormalities seen on ultrasound;  a digital image was taken; and the patient tolerated the procedure with no complications. Images:still images obtained, printed/placed on chart Laterality:right Block Type:popliteal Injection Technique:catheter Needle Type:echogenic and Tuohy Needle Gauge:18 G Resistance on Injection: none Catheter Size:20 G Cath Depth at skin: 8 cm Medications Used: ropivacaine (NAROPIN) 0.2 % injection - Peripheral Nerve  12 mL - 8/29/2024 10:05:00 AM ropivacaine (NAROPIN) 0.5 % injection - Injection  12 mL - 8/29/2024 10:05:00 AM Post Assessment Injection Assessment: negative aspiration for heme, no paresthesia on injection and incremental injection Patient Tolerance:comfortable throughout block Complications:no Additional Notes CATHETER                             A high-frequency linear transducer, with sterile cover, was placed in the popliteal fossa to identify the popliteal artery and vein, Tibial nerve (TN) and Common Peroneal nerve (CP). The transducer was then moved in a cephalad fashion to observe the TN and CP nerve bifurcation to form the Sciatic Nerve. The insertion site was prepped and draped in sterile fashion. Skin and cutaneous tissue was infiltrated with 2-5 ml of 1% Lidocaine. Using ultrasound-guidance, an 18-gauge Contiplex Ultra 360 Touhy needle was advanced in plane from lateral to medial. Preservative-free normal saline was utilized for hydro-dissection of tissue, advancement of Touhy needle, and to confirm final needle placement posterior to the nerves. Local anesthetic injection spread, in incremental 3-5 ml injections, to surround both nerve structures. Aspiration every 5 ml to prevent intravascular injection. Injection was completed with negative aspiration of blood and negative intravascular injection. Injection pressures were normal with minimal resistance. A 20-gauge Contiplex Echo catheter was placed through the needle and advance out the tip of the Touhy 1-3 cm. The Touhy needle was then removed,  "and final catheter position verified (Below/above or Anterior/Posterior) the nerve structures. The catheter was secured in the usual fashion with skin glue, benzoin, steri-strips, CHG tegaderm and Label noting \"Nerve Block Catheter\". Jerk tape applied at yellow connector and catheter connection. Performed by: Beck Nieves CRNA     Femoral block    Result Date: 8/29/2024  Beck Nieves CRNA     8/29/2024 10:12 AM Femoral block Patient reassessed immediately prior to procedure Start time: 8/29/2024 9:48 AM Stop time: 8/29/2024 9:54 AM Reason for block: at surgeon's request and post-op pain management Performed by CRNA/CAA: Beck Nieves CRNA Assisted by: Beverly Pardo RN Preanesthetic Checklist Completed: patient identified, IV checked, site marked, risks and benefits discussed, surgical consent, monitors and equipment checked, pre-op evaluation and timeout performed Prep: Pt Position: supine Sterile barriers:gloves, cap, sterile barriers, mask and washed/disinfected hands Prep: ChloraPrep Patient monitoring: blood pressure monitoring, continuous pulse oximetry and EKG Procedure Guidance:ultrasound guided ULTRASOUND INTERPRETATION.  Using ultrasound guidance a 20 G gauge needle was placed in close proximity to the femoral nerve, at which point, under ultrasound guidance anesthetic was injected in the area of the nerve and spread of the anesthesia was seen on ultrasound in close proximity thereto.  There were no abnormalities seen on ultrasound; a digital image was taken; and the patient tolerated the procedure with no complications. Images:still images obtained, printed/placed on chart Laterality:right Block Type:femoral Injection Technique:single-shot Needle Type:short-bevel Needle Gauge:20 G Resistance on Injection: none Medications Used: fentaNYL citrate (PF) (SUBLIMAZE) injection - Intravenous  100 mcg - 8/29/2024 9:54:00 AM ropivacaine (NAROPIN) 0.2 % injection - Peripheral Nerve  12 mL - 8/29/2024 " "9:54:00 AM ropivacaine (NAROPIN) 0.5 % injection - Injection  12 mL - 8/29/2024 9:54:00 AM dexamethasone sodium phosphate injection - Injection  2 mg - 8/29/2024 9:54:00 AM Post Assessment Injection Assessment: negative aspiration for heme, no paresthesia on injection and incremental injection Patient Tolerance:comfortable throughout block Complications:no Additional Notes SINGLE Shot A high-frequency linear transducer, with sterile cover, was placed in the inguinal crease to visualize the Femoral Vein, Artery, and Nerve (medial to lateral). The insertion site was prepped in sterile fashion. Skin and cutaneous tissue was infiltrated with 2-5 ml of 1% Lidocaine. Using ultrasound-guidance, a 20-gauge B-Keller 4\" Ultraplex 360 non-stimulating echogenic needle was then inserted and advanced in-plane from lateral to medial with ultrasound guidance. The needle was directed below Fascia Iliacus towards the Femoral nerve. Preservative-free normal saline was utilized for hydro-dissection of tissue. Local anesthetic injection spread, in incremental 3-5 ml injections, was visualized lateral to the artery to surround the femoral nerve. Aspiration every 5 ml to prevent intravascular injection. Injection was completed with negative aspiration of blood and negative intravascular injection. Injection pressures were normal with minimal resistance. Performed by: Beck Nieves CRNA     Adult Transthoracic Echo Complete W/ Cont if Necessary Per Protocol    Result Date: 8/29/2024    Left ventricular systolic function is mildly decreased. Calculated left ventricular EF = 40%   Left ventricular wall thickness is consistent with mild concentric hypertrophy.   Moderately reduced right ventricular systolic function noted.   There is bileaflet mitral valve thickening present.   Moderate to severe mitral valve regurgitation is present.   Moderate to severe tricuspid valve regurgitation is present.   Calculated right ventricular systolic " pressure from tricuspid regurgitation is 73 mmHg.   There is a small (<1cm) pericardial effusion adjacent to the left ventricle.      Results for orders placed during the hospital encounter of 08/26/24    Adult Transthoracic Echo Complete W/ Cont if Necessary Per Protocol    Interpretation Summary    Left ventricular systolic function is mildly decreased. Calculated left ventricular EF = 40%    Left ventricular wall thickness is consistent with mild concentric hypertrophy.    Moderately reduced right ventricular systolic function noted.    There is bileaflet mitral valve thickening present.    Moderate to severe mitral valve regurgitation is present.    Moderate to severe tricuspid valve regurgitation is present.    Calculated right ventricular systolic pressure from tricuspid regurgitation is 73 mmHg.    There is a small (<1cm) pericardial effusion adjacent to the left ventricle.      Current medications:  Scheduled Meds:aspirin, 81 mg, Oral, Daily  b complex-vitamin c-folic acid, 1 tablet, Oral, Daily  calcitriol, 0.5 mcg, Oral, Daily  castor oil-balsam peru, 1 Application, Topical, Daily  [Held by provider] hydrALAZINE, 10 mg, Oral, TID  meropenem, 500 mg, Intravenous, Q24H  metoprolol tartrate, 25 mg, Oral, Q12H  oxyCODONE, 10 mg, Oral, Q4H  pantoprazole, 40 mg, Oral, Daily  sertraline, 25 mg, Oral, Daily  sodium chloride, 10 mL, Intravenous, Q12H  vancomycin (dosing per levels), , Does not apply, Daily      Continuous Infusions:heparin, 13 Units/kg/hr, Last Rate: 13 Units/kg/hr (08/30/24 0152)  nitroglycerin, 5-200 mcg/min, Last Rate: 5 mcg/min (08/29/24 2342)  Pharmacy to Dose Heparin,   Pharmacy to dose vancomycin,   sodium chloride, 9 mL/hr, Last Rate: 250 mL/hr (08/29/24 1020)      PRN Meds:.  acetaminophen    senna-docusate sodium **AND** polyethylene glycol **AND** bisacodyl **AND** bisacodyl    dextrose    dextrose    diazePAM    glucagon (human recombinant)    heparin (porcine)    HYDROmorphone     Magnesium Standard Dose Replacement - Follow Nurse / BPA Driven Protocol    nitroglycerin    nitroglycerin    ondansetron ODT **OR** ondansetron    Pharmacy to Dose Heparin    Pharmacy to dose vancomycin    simethicone    sodium chloride    sodium chloride    Assessment & Plan   Assessment & Plan     Active Hospital Problems    Diagnosis  POA    **Dry gangrene [I96]  Yes    Chronic diastolic (congestive) heart failure [I50.32]  Yes    Chronic systolic (congestive) heart failure [I50.22]  Yes    Severe malnutrition [E43]  Yes    On home oxygen therapy [Z99.81]  Not Applicable    Type 2 diabetes mellitus with diabetic chronic kidney disease [E11.22]  Yes    NSTEMI (non-ST elevated myocardial infarction) [I21.4]  Yes    Secondary pulmonary arterial hypertension [I27.21]  Yes    Severe mitral valve regurgitation [I34.0]  Yes    Iron deficiency anemia [D50.9]  Yes    ESRD (end stage renal disease) [N18.6]  Yes    Stage 5 chronic kidney disease on chronic dialysis [N18.6, Z99.2]  Not Applicable    Coronary artery disease involving native coronary artery of native heart with angina pectoris [I25.119]  Yes    Essential hypertension [I10]  Yes    Type 2 diabetes mellitus with kidney complication, with long-term current use of insulin [E11.29, Z79.4]  Not Applicable    TIA (transient ischemic attack) [G45.9]  Yes    Hyperlipidemia LDL goal <70 [E78.5]  Yes      Resolved Hospital Problems    Diagnosis Date Resolved POA    Ischemic heart disease [I25.9] 08/29/2024 Yes        Brief Hospital Course to date:  Erlin Savage is a 74 y.o. male with a medical history significant for end-stage renal disease on HD MWF, coronary artery disease s/p CABG, A. Fib on OAC, aortic stenosis, diabetes mellitus, COPD on 2 to 4 L baseline, hyperlipidemia, hypertension and known peripheral arterial disease with nonhealing right foot transmetatarsal amputation site with dry gangrene.      Right lower extremity critical limb ischemia (s/p right bka  8/29/24)  Recent admission for dry gangrene  Right lower extremity cellulitis  History of nonhealing right TMA  L foot with ischemic changes of toes   BLE PVD  -My partner discussed with vascular on arrival.   -**s/p right BKA Dr. Schreiber 8/29/24  -Vascular following; post-op management; currently on heparin drip due to post-op NSTEMI  -pain control (oxycodone, prn dilaudid), valium for anxiety  -Dr. Heaton (ID) following: continue iv vanc, merrem (anticipate stopping iv antibiotics soon/when ok w/ Dr. Heaton)     NSTEMI  CAD status post CABG  CHFmrEF  Valvular heart disease (mod-severe MR, mod-severe tR)  Parox afib  -Most recent echo in system from 2023 with a EF of 43%, severe diastolic dysfunction, moderate mitral regurgitation, moderate tricuspid regurgitation.  -Echo 8/28/24: LV ef 40%, mod reduced RV fxn; mod-severe MR, mod-severe TR  -evening of 8/29/24 (after right bka), patient developed 10/10 chest pain, relieved w/ sl nitro; EKG w/ lateral ischemic changes; marked elevated troponin.   -eliquis had been on hold for surgery  -Cards following: cardiology reviewed heart cath films from [facility] in march, not likely to be targets for revascularization, proceeded w/ medical therapy (heparin drip x 48 hours from marquise pain, nitro drip, etc); cards following    Post-op anemia  -hgb 7.3 today (down from 8.5)  -transfuse 1 unit prbc w/ dialysis today (due to ongoing coronary ischemic)  -recheck hgb in a.m.     End-stage renal disease   Hyponatremia  Hyperkalemia  -On hemodialysis Monday/Wednesday/Friday; Nephrology follows          Ascites  Lower extremity edema  -multifactorial secondary to CHF, end-stage renal disease  -Per patient, he gets monthly paracentesis at hospital in [place].  Denies any prior history of cirrhosis.     PAF  -Hold Eliquis in anticipation of surgery     DM2  -A1c 6.2 on 8/26  -SSI w/ meals for now, monitor      -Obtain hemoglobin A1c    Chronic hypoxic respiratory failure  COPD on chronic  home O2  -4L NC at baseline   -DuoNebs PRN    Depression  -start zoloft     Anemia of Chronic Disease           5am labs: cbc,renal panel    Expected Discharge Location and Transportation:   Expected Discharge   Expected Discharge Date: 9/3/2024; Expected Discharge Time:      VTE Prophylaxis:  Pharmacologic & mechanical VTE prophylaxis orders are present.         AM-PAC 6 Clicks Score (PT): 11 (08/29/24 2000)    CODE STATUS:   Code Status and Medical Interventions: CPR (Attempt to Resuscitate); Full Support   Ordered at: 08/26/24 1218     Level Of Support Discussed With:    Patient     Code Status (Patient has no pulse and is not breathing):    CPR (Attempt to Resuscitate)     Medical Interventions (Patient has pulse or is breathing):    Full Support       Brandon Crook MD  08/30/24

## 2024-08-30 NOTE — PROGRESS NOTES
HEPARIN INFUSION  Erlin Savage is a  74 y.o. male receiving heparin infusion.     Therapy for (VTE/Cardiac): Cardiac  Patient Weight: 78.9 kg  Initial Bolus (Y/N): No  Any Bolus (Y/N): Yes      Signs or Symptoms of Bleeding: None noted per RN     Cardiac or Other (Not VTE)  Initial rate: 12 units/kg/hr (Max 1,000 units/hr)   Anti Xa Rebolus Infusion Hold time Change infusion Dose (Units/kg/hr) Next Anti Xa or aPTT Level Due   < 0.11 50 Units/kg  (4000 Units Max) None Increase by  3 Units/kg/hr 6 hours   0.11- 0.19 25 Units/kg  (2000 Units Max) None Increase by  2 Units/kg/hr 6 hours   0.2 - 0.29 0 None Increase by  1 Units/kg/hr 6 hours   0.3 - 0.5 0 None No Change 6 hours (after 2 consecutive levels in range check qAM)   0.51 - 0.6 0 None Decrease by  1 Units/kg/hr 6 hours   0.61 - 0.8 0 30 Minutes Decrease by  2 Units/kg/hr 6 hours   0.81 - 1 0 60 Minutes Decrease by  3 Units/kg/hr 6 hours   >1 0 Hold  After Anti Xa less than 0.5 decrease previous rate by  4 Units/kg/hr  Every 2 hours until Anti Xa  less than 0.5 then when infusion restarts in 6 hours   Recommend Xa every 6 hours.     Results from last 7 days   Lab Units 08/29/24  1633 08/28/24  0523 08/27/24  0549   INR  1.56*  --   --    HEMOGLOBIN g/dL 8.2* 9.8* 9.8*   HEMATOCRIT % 28.7* 32.5* 32.9*   PLATELETS 10*3/mm3 302 271 245        Date   Time   Anti-Xa Current Rate (Unit/kg/hr) Bolus   (Units) Rate Change   (Unit/kg/hr) New Rate (Unit/kg/hr) Next   Anti-Xa Comments  Pump Check Daily   8/29  16:33 0.15 --- --- +12 12 2300 Discussed with RN.   8/29 0046 0.22 12 -- +1 13 0800 D/w RN   8/30 0856 0.29 13 -- +1 14 1600 DW RN, rate verified                                                                                                                                                                                                         Thank you,    Phillip Escoto, [facility]  8/30/2024  09:26 EDT

## 2024-08-31 ENCOUNTER — APPOINTMENT (OUTPATIENT)
Dept: GENERAL RADIOLOGY | Facility: HOSPITAL | Age: 74
End: 2024-08-31
Payer: MEDICARE

## 2024-08-31 ENCOUNTER — APPOINTMENT (OUTPATIENT)
Dept: NEPHROLOGY | Facility: HOSPITAL | Age: 74
End: 2024-08-31
Payer: MEDICARE

## 2024-08-31 LAB
ALBUMIN SERPL-MCNC: 3.2 G/DL (ref 3.5–5.2)
ANION GAP SERPL CALCULATED.3IONS-SCNC: 14 MMOL/L (ref 5–15)
BACTERIA ISLT: NORMAL
BACTERIA SPEC AEROBE CULT: NORMAL
BACTERIA SPEC AEROBE CULT: NORMAL
BH BB BLOOD EXPIRATION DATE: NORMAL
BH BB BLOOD TYPE BARCODE: 5100
BH BB DISPENSE STATUS: NORMAL
BH BB PRODUCT CODE: NORMAL
BH BB UNIT NUMBER: NORMAL
BUN SERPL-MCNC: 30 MG/DL (ref 8–23)
BUN/CREAT SERPL: 7.4 (ref 7–25)
CALCIUM SPEC-SCNC: 9.1 MG/DL (ref 8.6–10.5)
CHLORIDE SERPL-SCNC: 98 MMOL/L (ref 98–107)
CO2 SERPL-SCNC: 23 MMOL/L (ref 22–29)
CREAT SERPL-MCNC: 4.07 MG/DL (ref 0.76–1.27)
CROSSMATCH INTERPRETATION: NORMAL
DEPRECATED RDW RBC AUTO: 61.8 FL (ref 37–54)
EGFRCR SERPLBLD CKD-EPI 2021: 14.8 ML/MIN/1.73
ERYTHROCYTE [DISTWIDTH] IN BLOOD BY AUTOMATED COUNT: 18.8 % (ref 12.3–15.4)
GLUCOSE BLDC GLUCOMTR-MCNC: 103 MG/DL (ref 70–130)
GLUCOSE BLDC GLUCOMTR-MCNC: 105 MG/DL (ref 70–130)
GLUCOSE BLDC GLUCOMTR-MCNC: 110 MG/DL (ref 70–130)
GLUCOSE BLDC GLUCOMTR-MCNC: 121 MG/DL (ref 70–130)
GLUCOSE BLDC GLUCOMTR-MCNC: 153 MG/DL (ref 70–130)
GLUCOSE BLDC GLUCOMTR-MCNC: 156 MG/DL (ref 70–130)
GLUCOSE SERPL-MCNC: 105 MG/DL (ref 65–99)
HCT VFR BLD AUTO: 25.1 % (ref 37.5–51)
HGB BLD-MCNC: 7.6 G/DL (ref 13–17.7)
MAGNESIUM SERPL-MCNC: 1.9 MG/DL (ref 1.6–2.4)
MCH RBC QN AUTO: 27.8 PG (ref 26.6–33)
MCHC RBC AUTO-ENTMCNC: 30.3 G/DL (ref 31.5–35.7)
MCV RBC AUTO: 91.9 FL (ref 79–97)
PHOSPHATE SERPL-MCNC: 6.6 MG/DL (ref 2.5–4.5)
PLATELET # BLD AUTO: 221 10*3/MM3 (ref 140–450)
PMV BLD AUTO: 10.6 FL (ref 6–12)
POTASSIUM SERPL-SCNC: 5.3 MMOL/L (ref 3.5–5.2)
RBC # BLD AUTO: 2.73 10*6/MM3 (ref 4.14–5.8)
SODIUM SERPL-SCNC: 135 MMOL/L (ref 136–145)
UFH PPP CHRO-ACNC: 0.2 IU/ML (ref 0.3–0.7)
UFH PPP CHRO-ACNC: 0.21 IU/ML (ref 0.3–0.7)
UNIT  ABO: NORMAL
UNIT  RH: NORMAL
WBC NRBC COR # BLD AUTO: 9.76 10*3/MM3 (ref 3.4–10.8)

## 2024-08-31 PROCEDURE — 25010000002 HEPARIN (PORCINE) PER 1000 UNITS: Performed by: SURGERY

## 2024-08-31 PROCEDURE — 99232 SBSQ HOSP IP/OBS MODERATE 35: CPT | Performed by: INTERNAL MEDICINE

## 2024-08-31 PROCEDURE — 63710000001 INSULIN LISPRO (HUMAN) PER 5 UNITS: Performed by: INTERNAL MEDICINE

## 2024-08-31 PROCEDURE — 82948 REAGENT STRIP/BLOOD GLUCOSE: CPT

## 2024-08-31 PROCEDURE — 85520 HEPARIN ASSAY: CPT

## 2024-08-31 PROCEDURE — 25810000003 SODIUM CHLORIDE 0.9 % SOLUTION 250 ML FLEX CONT

## 2024-08-31 PROCEDURE — 83735 ASSAY OF MAGNESIUM: CPT | Performed by: INTERNAL MEDICINE

## 2024-08-31 PROCEDURE — 99233 SBSQ HOSP IP/OBS HIGH 50: CPT | Performed by: INTERNAL MEDICINE

## 2024-08-31 PROCEDURE — 80069 RENAL FUNCTION PANEL: CPT | Performed by: INTERNAL MEDICINE

## 2024-08-31 PROCEDURE — 25010000002 MEROPENEM PER 100 MG: Performed by: SURGERY

## 2024-08-31 PROCEDURE — 25010000002 HEPARIN (PORCINE) 25000-0.45 UT/250ML-% SOLUTION

## 2024-08-31 PROCEDURE — 71045 X-RAY EXAM CHEST 1 VIEW: CPT

## 2024-08-31 PROCEDURE — 85027 COMPLETE CBC AUTOMATED: CPT | Performed by: INTERNAL MEDICINE

## 2024-08-31 PROCEDURE — 25010000002 VANCOMYCIN 750 MG RECONSTITUTED SOLUTION 1 EACH VIAL

## 2024-08-31 PROCEDURE — 25010000002 HYDROMORPHONE PER 4 MG: Performed by: SURGERY

## 2024-08-31 RX ORDER — OXYCODONE HYDROCHLORIDE 15 MG/1
7.5 TABLET ORAL EVERY 4 HOURS PRN
Status: DISCONTINUED | OUTPATIENT
Start: 2024-08-31 | End: 2024-09-03

## 2024-08-31 RX ORDER — DIAZEPAM 5 MG
2.5 TABLET ORAL EVERY 6 HOURS PRN
Status: ACTIVE | OUTPATIENT
Start: 2024-08-31 | End: 2024-09-03

## 2024-08-31 RX ORDER — OXYCODONE HYDROCHLORIDE 15 MG/1
7.5 TABLET ORAL EVERY 6 HOURS PRN
Status: DISCONTINUED | OUTPATIENT
Start: 2024-08-31 | End: 2024-08-31

## 2024-08-31 RX ADMIN — SERTRALINE 25 MG: 50 TABLET, FILM COATED ORAL at 08:10

## 2024-08-31 RX ADMIN — PANTOPRAZOLE SODIUM 40 MG: 40 TABLET, DELAYED RELEASE ORAL at 08:10

## 2024-08-31 RX ADMIN — Medication 1 TABLET: at 08:10

## 2024-08-31 RX ADMIN — HYDROMORPHONE HYDROCHLORIDE 0.5 MG: 1 INJECTION, SOLUTION INTRAMUSCULAR; INTRAVENOUS; SUBCUTANEOUS at 01:15

## 2024-08-31 RX ADMIN — MEROPENEM 500 MG: 500 INJECTION, POWDER, FOR SOLUTION INTRAVENOUS at 22:55

## 2024-08-31 RX ADMIN — HYDROMORPHONE HYDROCHLORIDE 0.5 MG: 1 INJECTION, SOLUTION INTRAMUSCULAR; INTRAVENOUS; SUBCUTANEOUS at 05:44

## 2024-08-31 RX ADMIN — Medication 1 APPLICATION: at 08:18

## 2024-08-31 RX ADMIN — ASPIRIN 81 MG: 81 TABLET, COATED ORAL at 08:10

## 2024-08-31 RX ADMIN — OXYCODONE HYDROCHLORIDE 7.5 MG: 15 TABLET ORAL at 15:56

## 2024-08-31 RX ADMIN — OXYCODONE HYDROCHLORIDE 7.5 MG: 15 TABLET ORAL at 23:13

## 2024-08-31 RX ADMIN — ACETAMINOPHEN 650 MG: 325 TABLET, FILM COATED ORAL at 08:10

## 2024-08-31 RX ADMIN — ACETAMINOPHEN 650 MG: 325 TABLET, FILM COATED ORAL at 18:36

## 2024-08-31 RX ADMIN — OXYCODONE HYDROCHLORIDE 10 MG: 10 TABLET ORAL at 07:59

## 2024-08-31 RX ADMIN — CALCITRIOL CAPSULES 0.25 MCG 0.5 MCG: 0.25 CAPSULE ORAL at 08:10

## 2024-08-31 RX ADMIN — VANCOMYCIN HYDROCHLORIDE 750 MG: 750 INJECTION, POWDER, LYOPHILIZED, FOR SOLUTION INTRAVENOUS at 14:45

## 2024-08-31 RX ADMIN — HEPARIN SODIUM 2000 UNITS: 1000 INJECTION INTRAVENOUS; SUBCUTANEOUS at 10:49

## 2024-08-31 RX ADMIN — HEPARIN SODIUM 18 UNITS/KG/HR: 10000 INJECTION, SOLUTION INTRAVENOUS at 14:03

## 2024-08-31 RX ADMIN — OXYCODONE HYDROCHLORIDE 10 MG: 10 TABLET ORAL at 04:10

## 2024-08-31 RX ADMIN — Medication 10 ML: at 08:14

## 2024-08-31 NOTE — PLAN OF CARE
Problem: Device-Related Complication Risk (Hemodialysis)  Goal: Safe, Effective Therapy Delivery  Outcome: Ongoing, Progressing  Intervention: Optimize Device Care and Function  Recent Flowsheet Documentation  Taken 8/31/2024 1115 by Autumn Harman RN  Medication Review/Management:   medications reviewed   high-risk medications identified  Taken 8/31/2024 0915 by Autumn Harman RN  Medication Review/Management:   medications reviewed   high-risk medications identified     Problem: Hemodynamic Instability (Hemodialysis)  Goal: Effective Tissue Perfusion  Outcome: Ongoing, Progressing     Problem: Infection (Hemodialysis)  Goal: Absence of Infection Signs and Symptoms  Outcome: Ongoing, Progressing   Goal Outcome Evaluation:         HD complete, per patient demand for treatment time to be 2 hours instead of ordered 2.5 hours and for UF goal to be 2L instead of ordered 2.5L. MD Rodriguez and MD Crook aware of patient demand regarding HD treatment, no new orders. Blood reinfused. Report given to primary AMOS Doe.    Fluid removal: 2L

## 2024-08-31 NOTE — PROGRESS NOTES
"[facility]  IP Progress Note    PROBLEM LIST:  CAD s/p CABG, NSTEMI   [facility] 3/2024 with 1/3 patent grafts with no targets.  Coronary artery images reviewed.  No targets for revascularization.   Chest pain 8/29 after surgery with flat troponins, likely troponin leak.  Valvular heart disease  Echo moderate to severe MR, moderate to severe TR  Chronic heart failure with mildly reduced EF  EF 40%, previously 43%  PAF  PAD s/p right BKA 8/29  Right lower leg cellulitis per  ESRD      HOSPITAL COURSE:  74 years old with history of coronary artery disease status postcoronary artery bypass graft admitted for right lower extremity.  He is starting having chest pain and had mild troponin released.  Patient was started on heparin.  Patient has been doing fine.      CHIEF COMPLAINTS:  Chest pain.      Subjective   Hurting in the right lower extremity with amputation is otherwise feeling good.      Objective     Blood pressure 116/67, pulse 85, temperature 98 °F (36.7 °C), temperature source Oral, resp. rate 22, height 180.3 cm (71\"), weight 78.9 kg (174 lb), SpO2 95%.     Intake/Output Summary (Last 24 hours) at 8/31/2024 0840  Last data filed at 8/30/2024 1726  Gross per 24 hour   Intake 910 ml   Output 3410 ml   Net -2500 ml       PHYSICAL EXAM:  Constitutional:       General: Not in acute distress.     Appearance: Healthy appearance. Not in distress.     Neck:     JVP:Not elevated     Carotid artery: Normal    Pulmonary:      Effort: Pulmonary effort is normal.      Breath sounds: Normal breath sounds. No wheezing. No rhonchi. No rales.     Cardiovascular:      Normal rate. Regular rhythm. Normal S1. Normal S2.      Murmurs: There is 3/6 systolic murmur.      No gallop. No click. No rub.     Abdominal:      General: Bowel sounds are normal.      Palpations: Abdomen is soft.      Tenderness: There is no abdominal tenderness.    Extremities:     Pulses:Normal radial and pedal pulses     " Edema:no edema    MEDICATIONS:    aspirin, 81 mg, Oral, Daily  b complex-vitamin c-folic acid, 1 tablet, Oral, Daily  calcitriol, 0.5 mcg, Oral, Daily  castor oil-balsam peru, 1 Application, Topical, Daily  insulin lispro, 2-7 Units, Subcutaneous, TID With Meals  meropenem, 500 mg, Intravenous, Q24H  metoprolol tartrate, 25 mg, Oral, Q12H  oxyCODONE, 10 mg, Oral, Q4H  pantoprazole, 40 mg, Oral, Daily  pharmacy consult - MTM, , Does not apply, Daily  sertraline, 25 mg, Oral, Daily  sodium chloride, 10 mL, Intravenous, Q12H  vancomycin (dosing per levels), , Does not apply, Daily          Results from last 7 days   Lab Units 08/31/24  0759   WBC 10*3/mm3 9.76   HEMOGLOBIN g/dL 7.6*   HEMATOCRIT % 25.1*   PLATELETS 10*3/mm3 221     Results from last 7 days   Lab Units 08/31/24  0759 08/29/24  0623 08/28/24  0523   SODIUM mmol/L 135*   < > 130*   POTASSIUM mmol/L 5.3*   < > 6.6*   CHLORIDE mmol/L 98   < > 95*   CO2 mmol/L 23.0   < > 21.0*   BUN mg/dL 30*   < > 40*   CREATININE mg/dL 4.07*   < > 5.16*   CALCIUM mg/dL 9.1   < > 9.3   BILIRUBIN mg/dL  --   --  0.5   ALK PHOS U/L  --   --  198*   ALT (SGPT) U/L  --   --  17   AST (SGOT) U/L  --   --  27   GLUCOSE mg/dL 105*   < > 178*    < > = values in this interval not displayed.     Results from last 7 days   Lab Units 08/29/24  1633   INR  1.56*     Lab Results   Component Value Date    TROPONINI 57.89 (C) 12/30/2015    TROPONINT 298 (C) 08/30/2024                 Iron   Date Value Ref Range Status   06/10/2023 26.0 (L) 65.0 - 175.0 ug/dL Final     Ferritin   Date Value Ref Range Status   08/14/2024 1,150.60 (H) 26.00 - 388.00 ng/mL Final     Iron Saturation (TSAT)   Date Value Ref Range Status   10/06/2020 17 (L) 20 - 50 % Final     TIBC   Date Value Ref Range Status   10/06/2020 335 298 - 536 mcg/dL Final      Hemoglobin A1C   Date Value Ref Range Status   08/26/2024 6.20 (H) 4.80 - 5.60 % Final     Magnesium   Date Value Ref Range Status   08/31/2024 1.9 1.6 - 2.4  mg/dL Final        RESULT REVIEW:    I reviewed the patient's new clinical results.    Tele: Sinus Rythym with PVC's      ASSESSMENT:     Coronary artery disease status postcoronary artery bypass graft, 1 out of 3 grafts patent  Chronic heart failure with mildly reduced ejection fraction: 43%  Paroxysmal atrial fibrillation  PAD status post right BKA  ESRD    PLAN:     Patient has been stable on his coronary artery disease.  Patient can be started on Eliquis if it is okay for the surgery.  Aspirin or Plavix when Eliquis will be started.  Stable cardiac wise.  Continue monitor H&H please

## 2024-08-31 NOTE — PROGRESS NOTES
"Erlin Savage  1950  [id number]      Evaluating Physician: Marlo Heaton MD    Chief Complaint: right foot infection    Reason for Consultation: right foot infection    History of present illness:     Patient is a 74 y.o.  Yr old male noncompliant previously, with prior history of  end-stage renal disease with intermittent hemodialysis via right chest central line.  He has known peripheral arterial disease with right leg angiogram on April 10 with recanalization of SFA/PTA; post procedure with continued worsening pain at the right distal leg, gangrene at his right second/third and fourth toes with second worse than third worse than fourth.   MRI right foot was soft tissue edema, no loculated collection with abnormality at the posterior calcaneus difficult to completely exclude osteomyelitis at that site as per radiology.     4/19/24 Dr Santos   \"Procedures: Procedure(s):  RIGHT Foot  Second toe amputation\"     4/23/24  Dr Santos .  \"Procedures: Procedure(s):  RIGHT Foot     Amputation hallux  Amputation third toe  Amputation fourth toe  Amputation fifth toe\"     Pathology from April 23, 2024 with no osteomyelitis.  Pathology had noted cellulitis and gangrenous necrosis.  Transitioned to IV vancomycin and oral Augmentin at discharge.     Revascularization on May 14, 2024 by Dr. Schreiber  \"PROCEDURES WITH LATERALITY:   Ultrasound guided access of left common femoral artery with micropuncture needle and images stored  Abd Aorotgram  Right lower extremity arteriogram  Balloon angioplasty of right posterior tibial artery with a 3 mm balloon  Balloon angioplasty of right posterior tibial artery origin4 mm balloon drug-coated  Balloon angioplasty of right peroneal artery with 3 mm balloon  Balloon angioplasty of right anterior tibial artery 3 mm balloon  Balloon angioplasty of right proximal superficial femoral artery with a 5 mm balloon\"     Readmitted August 14 with deterioration at the right foot over unspecified " "period of time per patient, at least weeks if not longer with large wound, exposed bone on the medial side.     8/20/24 he  refused recommendation of amputation from surgical team and left [facility].     8/26/24 readmitted on August 26 with reports to me that patient now agreeable to right side amputation by Dr. Schreiber    8/29/24   \"Procedure(s):  Right below the knee amputation and all other indicated procedures\"    8/31/24 Sleepy ; no new pain; Right stump pain is sharp, nonradiating, constant, worse with palpation, better with pain meds and 3 /10     No headache photophobia or neck stiffness. No shortness of breath cough or hemoptysis. No nausea vomiting diarrhea or abdominal pain. No dysuria hematuria or pyuria. No other new skin rash.     Review of Systems     8/31/24  no adr to abx     Constitutional-- No Fever, chills or sweats.  Appetite good. No malaise.  Heent-- No new vision, hearing or throat complaints.  No epistaxis or oral sores.  Denies odynophagia or dysphagia.  No headache, photophobia or neck stiffness.  CV-- No chest pain, palpitation or syncope  Resp-- No SOB, cough, or hemoptysis  GI- No nausea, vomiting, or diarrhea.   -- No dysuria, hematuria, or flank pain.  Denies hesitancy, urgency or flank pain.  Lymph- no swollen lymph nodes in neck, axilla or groin.   Heme- No active bruising or bleeding  MS-- no swelling or pain in the bones or joints of arms or legs.  No new back pain.  Neuro-- No acute focal weakness or numbness in the arms or legs.  Skin-- No rash    Past Medical History:   Diagnosis Date    Anxiety     Anxiety disorder     Back pain     Chronic back pain     work related injury    CKD (chronic kidney disease)     most recent creatinine 2.7 on 01/26/16    Coronary artery disease     Diabetes mellitus     insulin dependent diabetes Onset 2010    Dyslipidemia     Hyperlipidemia     Hypertension     Ischemic heart disease     Renal failure     Stroke     TIA / transient right vision " "loss age 42    TIA (transient ischemic attack)     Vertigo        Past Surgical History:   Procedure Laterality Date    APPENDECTOMY      BELOW KNEE AMPUTATION Right 8/29/2024    Procedure: below the knee amputation Right;  Surgeon: Junior Schreiber MD;  [facility];  Service: Vascular;  Laterality: Right;    CARDIAC CATHETERIZATION      CHOLECYSTECTOMY  03/2012    COLONOSCOPY      CORONARY ARTERY BYPASS GRAFT      ENDOSCOPY         Pediatric History   Patient Parents    Not on file     Other Topics Concern    Not on file   Social History Narrative    Not on file       family history is not on file.    Allergies   Allergen Reactions    Cefepime Other (See Comments) and Seizure     Myoclonus - Has received cefazolin    Doxycycline Other (See Comments)     Joint pain, tongue swelling    \"Body locks up\"    Gabapentin Confusion    Ranolazine Dizziness and Other (See Comments)     Severe tremors/ shakiness    Hydralazine Unknown - Low Severity    Augmentin [Amoxicillin-Pot Clavulanate] Palpitations    Biaxin [Clarithromycin] Palpitations    Cephalosporins Unknown - Low Severity     Has received cefazolin    Coreg [Carvedilol] Itching    Crestor [Rosuvastatin Calcium] Itching     Itching rash    Iodine Rash     \"pineda skin\"    Lipitor [Atorvastatin] Itching     And Crestor- generalized weakness and chest tightness    Lisinopril Cough     Cough /weakness, nauseas and dirrhea    Livalo [Pitavastatin] Unknown - Low Severity     Chest tightness    Nortriptyline Hcl Other (See Comments)     Arthralgias    Pravastatin Unknown - Low Severity     Chest , neck and arm discomfort    Questran [Cholestyramine] Unknown - Low Severity       Medication:  Current Facility-Administered Medications   Medication Dose Route Frequency Provider Last Rate Last Admin    acetaminophen (TYLENOL) tablet 650 mg  650 mg Oral Q6H PRN Junior Schreiber MD   650 mg at 08/31/24 0810    aspirin EC tablet 81 mg  81 mg Oral Daily Junior Schreiber MD  "  81 mg at 08/31/24 0810    b complex-vitamin c-folic acid (NEPHRO-SEVERIANO) tablet 1 tablet  1 tablet Oral Daily Junior Schreiber MD   1 tablet at 08/31/24 0810    sennosides-docusate (PERICOLACE) 8.6-50 MG per tablet 2 tablet  2 tablet Oral BID PRN Junior Schreiber MD        And    polyethylene glycol (MIRALAX) packet 17 g  17 g Oral Daily PRN Junior Schreiber MD        And    bisacodyl (DULCOLAX) EC tablet 5 mg  5 mg Oral Daily PRN Junior Schreiber MD        And    bisacodyl (DULCOLAX) suppository 10 mg  10 mg Rectal Daily PRN Junior Schreiber MD        calcitriol (ROCALTROL) capsule 0.5 mcg  0.5 mcg Oral Daily Junior Schreiber MD   0.5 mcg at 08/31/24 0810    castor oil-balsam peru (VENELEX) ointment 1 Application  1 Application Topical Daily Junior Schreiber MD   1 Application at 08/31/24 0818    dextrose (D50W) (25 g/50 mL) IV injection 25 g  25 g Intravenous Q15 Min PRN Junior Schreiber MD        dextrose (D50W) (25 g/50 mL) IV injection 25 g  25 g Intravenous Q15 Min PRN Brandon Crook MD        dextrose (GLUTOSE) oral gel 15 g  15 g Oral Q15 Min PRN Junior Schreiber MD        diazePAM (VALIUM) tablet 4 mg  4 mg Oral Q6H PRN Junior Schreiber MD        glucagon (GLUCAGEN) injection 1 mg  1 mg Intramuscular Q15 Min PRN Junior Schreiber MD        heparin (porcine) injection 2,000 Units  2,000 Units Intracatheter PRN Junior Schreiber MD   2,000 Units at 08/31/24 1049    heparin 83710 units/250 mL (100 units/mL) in 0.45 % NaCl infusion  18 Units/kg/hr Intravenous Titrated Bear Win, PharmD 12.62 mL/hr at 08/31/24 0205 16 Units/kg/hr at 08/31/24 0205    HYDROmorphone (DILAUDID) injection 0.5 mg  0.5 mg Intravenous Q2H PRN Junior Schreiber MD   0.5 mg at 08/31/24 0544    Insulin Lispro (humaLOG) injection 2-7 Units  2-7 Units Subcutaneous TID With Meals Brandon Crook MD        Magnesium Standard Dose Replacement - Follow Nurse / BPA Driven Protocol   Does not apply PRN Brandon Crook MD         meropenem (MERREM) 500 mg in sodium chloride 0.9 % 100 mL MBP  500 mg Intravenous Q24H Junior Schreiber MD   500 mg at 08/28/24 1735    metoprolol tartrate (LOPRESSOR) tablet 25 mg  25 mg Oral Q12H Dada Darden IV, MD   25 mg at 08/29/24 2037    nitroglycerin (NITROSTAT) SL tablet 0.4 mg  0.4 mg Sublingual Q5 Min PRN Junior Schreiber MD   0.4 mg at 08/29/24 1529    nitroglycerin (NITROSTAT) SL tablet 0.4 mg  0.4 mg Sublingual Q5 Min PRN Junior Schreiber MD        nitroglycerin (TRIDIL) 200 mcg/ml infusion  5-200 mcg/min Intravenous Titrated Dada Darden IV, MD 1.5 mL/hr at 08/29/24 2342 5 mcg/min at 08/29/24 2342    ondansetron ODT (ZOFRAN-ODT) disintegrating tablet 4 mg  4 mg Oral Q6H PRN Junior Schreiber MD   4 mg at 08/27/24 0938    Or    ondansetron (ZOFRAN) injection 4 mg  4 mg Intravenous Q6H PRN Junior Schreiber MD   4 mg at 08/30/24 0440    oxyCODONE (ROXICODONE) immediate release tablet 10 mg  10 mg Oral Q4H Junior Schreiber MD   10 mg at 08/31/24 0759    pantoprazole (PROTONIX) EC tablet 40 mg  40 mg Oral Daily Junior Schreiber MD   40 mg at 08/31/24 0810    Pharmacy Consult - MTM   Does not apply Daily Phillip Escoto [facility]        Pharmacy to Dose Heparin   Does not apply Continuous PRN Bear Win, PharmD        Pharmacy to dose vancomycin   Does not apply Continuous PRN Junior Schreiber MD        sertraline (ZOLOFT) tablet 25 mg  25 mg Oral Daily Brandon Crook MD   25 mg at 08/31/24 0810    simethicone (MYLICON) chewable tablet 80 mg  80 mg Oral 4x Daily PRN Cassandra Wiseman APRN        sodium chloride 0.9 % flush 10 mL  10 mL Intravenous Q12H Junior Schreiber MD   10 mL at 08/31/24 0814    sodium chloride 0.9 % flush 10 mL  10 mL Intravenous PRN Junior Schreiber MD        sodium chloride 0.9 % infusion 40 mL  40 mL Intravenous PRN Junior Schreiber MD        sodium chloride 0.9 % infusion  9 mL/hr Intravenous Continuous Junior Schreiber  mL/hr at 08/29/24  "1020 Rate Change at 08/29/24 1020    vancomycin (dosing per levels)   Does not apply Daily Scar Garzon [facility]           Antibiotics:  Anti-Infectives (From admission, onward)      Ordered     Dose/Rate Route Frequency Start Stop    08/30/24 1739  vancomycin (dosing per levels)        Ordering Provider: Scar Garzon RPH     Does not apply Daily 08/30/24 1830 09/05/24 0859    08/30/24 1546  vancomycin (VANCOCIN) 1,000 mg in sodium chloride 0.9 % 250 mL IVPB-VTB        Ordering Provider: Phillip Escoto RPH    1,000 mg  250 mL/hr over 60 Minutes Intravenous Once 08/30/24 1800 08/30/24 1826    08/28/24 1248  Vancomycin HCl 1,250 mg in sodium chloride 0.9 % 250 mL VTB        Ordering Provider: Kyrie Walsh PharmD    1,250 mg  200 mL/hr over 75 Minutes Intravenous Once 08/28/24 1500 08/28/24 1716    08/26/24 1304  meropenem (MERREM) 500 mg in sodium chloride 0.9 % 100 mL MBP        Ordering Provider: Junior Schreiber MD    500 mg  over 3 Hours Intravenous Every 24 Hours 08/27/24 1600 09/03/24 1659    08/26/24 1306  vancomycin IVPB 1750 mg in 0.9% Sodium Chloride (premix) 500 mL        Ordering Provider: Kyrie Walsh PharmD    1,750 mg  285.7 mL/hr over 105 Minutes Intravenous Once 08/26/24 1800 08/26/24 1957    08/26/24 1304  meropenem (MERREM) 1,000 mg in sodium chloride 0.9 % 100 mL MBP        Ordering Provider: Lisa Rowe DO    1,000 mg  over 30 Minutes Intravenous Once 08/26/24 1500 08/26/24 1604    08/26/24 1306  vancomycin (dosing per levels)        Ordering Provider: Kyrie Walsh PharmD     Does not apply Daily 08/26/24 1400 08/29/24 0859    08/26/24 1223  Pharmacy to dose vancomycin        Ordering Provider: Junior Schreiber MD     Does not apply Continuous PRN 08/26/24 1223 09/02/24 6859                 Physical Exam:   Vital Signs   /71 (BP Location: Right arm, Patient Position: Lying)   Pulse 86   Temp 97.7 °F (36.5 °C) (Oral)   Resp 15   Ht 180.3 cm (71\")   Wt 78.9 kg (174 lb)   SpO2 " 100%   BMI 24.27 kg/m²       GENERAL: sleepy.  Appears chronically debilitated  HEENT: Normocephalic, atraumatic.    No conjunctival injection. No icterus. Oropharynx clear without evidence of thrush or exudate.   NECK: Supple without nuchal rigidity. No mass.   HEART: RRR; No murmur.  LUNGS: Clear to auscultation bilaterally without wheezing, rales, rhonchi. Normal respiratory effort. Nonlabored.  ABDOMEN: Soft, nontender, nondistended. Positive bowel sounds. No rebound or guarding.  EXT:  No cyanosis, clubbing or edema.  MSK: FROM without joint effusions noted arms/legs.    SKIN: Warm and dry without cutaneous eruptions on Inspection/palpation.    NEURO: sleepy     Left foot first and third toe distally with small black areas,  toes with only minimal erythema, no discrete mass bulge or fluctuance.  No crepitus or bulla     Right LE amp noted;  no new visible redness/purulence.  No discrete mass bulge or fluctuance.  No crepitus or bulla.     No peripheral stigmata/phenomenon of endocarditis    Laboratory Data    Results from last 7 days   Lab Units 08/31/24  0759 08/30/24  1253 08/29/24  1633   WBC 10*3/mm3 9.76 11.05* 11.86*   HEMOGLOBIN g/dL 7.6* 7.3* 8.2*   HEMATOCRIT % 25.1* 24.9* 28.7*   PLATELETS 10*3/mm3 221 287 302     Results from last 7 days   Lab Units 08/31/24  0759   SODIUM mmol/L 135*   POTASSIUM mmol/L 5.3*   CHLORIDE mmol/L 98   CO2 mmol/L 23.0   BUN mg/dL 30*   CREATININE mg/dL 4.07*   GLUCOSE mg/dL 105*   CALCIUM mg/dL 9.1     Results from last 7 days   Lab Units 08/28/24  0523   ALK PHOS U/L 198*   BILIRUBIN mg/dL 0.5   ALT (SGPT) U/L 17   AST (SGOT) U/L 27               Estimated Creatinine Clearance: 18.3 mL/min (A) (by C-G formula based on SCr of 4.07 mg/dL (H)).      Microbiology:      Radiology:  Imaging Results (Last 72 Hours)       Procedure Component Value Units Date/Time    XR Chest 1 View [[id number]] Collected: 08/31/24 0814     Updated: 08/31/24 0820    Narrative:      XR CHEST 1  VW    Date of Exam: 8/31/2024 2:08 AM EDT    Indication: chest pain    Comparison: 2/24/2024    Findings:  Cardiomediastinal silhouette is stable. There is atherosclerosis of the aorta. There is pulmonary vascular congestion with diffuse interstitial thickening and patchy bibasilar opacities. There are small bilateral pleural effusions. A right IJ tunneled   hemodialysis catheter terminates in the right atrium. Median sternotomy wires are present.      Impression:      Impression:  Cardiomegaly with pulmonary vascular congestion and mixed interstitial/airspace opacities with small bilateral effusions. Findings suggestive of CHF/pulmonary edema.      Electronically Signed: Shama Chairez MD    8/31/2024 8:17 AM EDT    Workstation ID: [id number]              Impression:     --Acute right foot surgical site/wound infection/cellulitis, exposed bone on the medial side consistent with first metatarsal osteomyelitis and likely sequela to ischemia with peripheral vascular disease and other comorbidity as above. High risk for further serious morbidity and other serious sequela. They understand risk for persistent/recurrent or nonhealing wounds, persistent/progressive or recurrent infection and risk for further functional/limb loss, amputation, chronic pain etc. They know antibiotics and surgery not a guarantee for cure. These risks will persist. Timing/option threshold for surgery per surgical team at their discretion.  Vascular team has recommended amputation which patient has previously refused, left the hospital AGAINST MEDICAL ADVICE on August 20 but subsequently readmitted August 26 and agreeable to surgical recommendation, BKSANDI 8/29    --Peripheral vascular disease.  Prior interventions as above.    --Left foot with small blackened areas at first/third toe likely sequela to ischemia.  Vascular team to help determine options and salvageability as above    --End-stage renal disease on hemodialysis  Monday/Wednesday/Friday.    --Diabetes.  Glucose control per medicine team    --Cephalosporin and doxycycline intolerances in the past.  Has tolerated penicillin class    PLAN:        --IV vancomycin, merrem; anticipate stop IV abx by discharge if steadily better     **Cx at [facility] with PSA x 2 (zosyn DD, not ideal EDWIN), enterococcus and corynebac      --check/review labs cultures and scans     --Partial history per nursing staff     --Discussed with microbiology     --vascular team making surgical plans     --Highly complex set of issues with high risk for further serious morbidity and other serious sequela     --Monitor IV and IV antibiotic with risk for systemic complication and potential drug interactions    Copied text in this note has been reviewed and is accurate as of 08/31/24.        Marlo Heaton MD  8/31/2024

## 2024-08-31 NOTE — PROGRESS NOTES
[facility] Medicine Services  PROGRESS NOTE    Patient Name: Erlin Savage  : 1950  MRN: [id number]    Date of Admission: 2024  Primary Care Physician: Joe Diallo MD    Subjective   Subjective     CC: f/u PAD    HPI:  No chest pain  Bka site pain  Seen on dialysis    Objective   Objective     Vital Signs:   Temp:  [96.8 °F (36 °C)-98.4 °F (36.9 °C)] 97.7 °F (36.5 °C)  Heart Rate:  [81-92] 86  Resp:  [14-22] 15  BP: ()/(53-88) 130/71  Flow (L/min):  [3-4] 4     Physical Exam:  Constitutional: No acute distress, awake, alert, up in bed; on 4Lnc  HENT: NCAT, mucous membranes moist  Respiratory: ctab  Cardiovascular: rrr  Gastrointestinal: Positive bowel sounds, soft, nontender, nondistended  Psychiatric: Appropriate affect, cooperative  Neurologic: Oriented x 3, strength symmetric in all extremities, Cranial Nerves grossly intact to confrontation, speech clear  Skin/msk: right bka site cleanly dressed    Results Reviewed:  LAB RESULTS:      Lab 24  0759 24  0057 24  1821 24  1253 24  0856 24  0046 24  1633 24  1633 24  0523 24  0549 24  1145 24  1145 24  1144   WBC 9.76  --   --  11.05*  --   --   --  11.86* 10.76 9.73   < > 11.95*  --    HEMOGLOBIN 7.6*  --   --  7.3*  --   --   --  8.2* 9.8* 9.8*   < > 10.3*  --    HEMATOCRIT 25.1*  --   --  24.9*  --   --   --  28.7* 32.5* 32.9*   < > 34.4*  --    PLATELETS 221  --   --  287  --   --   --  302 271 245   < > 251  --    NEUTROS ABS  --   --   --  8.83*  --   --   --  10.59* 8.21* 7.66*  --  9.88*  --    IMMATURE GRANS (ABS)  --   --   --  0.06*  --   --   --  0.07* 0.07* 0.07*  --  0.09*  --    LYMPHS ABS  --   --   --  0.66*  --   --   --  0.47* 0.86 0.62*  --  0.63*  --    MONOS ABS  --   --   --  1.41*  --   --   --  0.63 1.15* 1.00*  --  0.93*  --    EOS ABS  --   --   --  0.02  --   --   --  0.01 0.36 0.32  --  0.34  --    MCV  91.9  --   --  92.9  --   --   --  95.0 90.5 91.4   < > 91.2  --    PROCALCITONIN  --   --   --   --   --   --   --   --   --   --   --  0.43*  --    LACTATE  --   --   --   --   --   --   --   --   --   --   --   --  1.1   PROTIME  --   --   --   --   --   --   --  18.8*  --   --   --   --   --    APTT  --   --   --   --   --   --   --  29.0*  --   --   --   --   --    HEPARIN ANTI-XA 0.20* 0.21* 0.24*  --  0.29* 0.22*   < > 0.15*  --   --   --   --   --     < > = values in this interval not displayed.         Lab 08/31/24  0759 08/30/24  1253 08/29/24  1535 08/29/24  0623 08/28/24  0523 08/27/24  0549 08/26/24  1145   SODIUM 135* 129* 131* 132* 130* 132* 129*   POTASSIUM 5.3* 6.7* 5.8* 5.5* 6.6* 5.6* 5.9*   CHLORIDE 98 94* 96* 95* 95* 95* 93*   CO2 23.0 22.0 21.0* 23.0 21.0* 22.0 23.0   ANION GAP 14.0 13.0 14.0 14.0 14.0 15.0 13.0   BUN 30* 43* 32* 30* 40* 31* 43*   CREATININE 4.07* 5.17* 4.26* 3.93* 5.16* 4.41* 5.63*   EGFR 14.8* 11.1* 14.0* 15.5* 11.2* 13.5* 10.1*   GLUCOSE 105* 199* 174* 148* 178* 226* 239*   CALCIUM 9.1 9.3 9.2 9.4 9.3 9.0 9.4   MAGNESIUM 1.9  --  1.9  --  2.0 1.9 2.3   PHOSPHORUS 6.6* 7.9*  --   --   --   --   --    HEMOGLOBIN A1C  --   --   --   --   --   --  6.20*         Lab 08/31/24  0759 08/30/24  1253 08/28/24  0523 08/27/24  0549 08/26/24  1145   TOTAL PROTEIN  --   --  6.6 6.2 6.4   ALBUMIN 3.2* 3.1* 3.4* 3.1* 3.3*   GLOBULIN  --   --  3.2 3.1 3.1   ALT (SGPT)  --   --  17 14 14   AST (SGOT)  --   --  27 20 19   BILIRUBIN  --   --  0.5 0.4 0.5   ALK PHOS  --   --  198* 176* 199*         Lab 08/30/24  1253 08/29/24  1825 08/29/24  1633 08/29/24  1535   HSTROP T 298* 201*  --  203*   PROTIME  --   --  18.8*  --    INR  --   --  1.56*  --              Lab 08/30/24  1359   ABO TYPING O   RH TYPING Positive   ANTIBODY SCREEN Negative         Brief Urine Lab Results  (Last result in the past 365 days)        Color   Clarity   Blood   Leuk Est   Nitrite   Protein   CREAT   Urine HCG         01/01/24 0550 Yellow   Clear     Negative                       Microbiology Results Abnormal       Procedure Component Value - Date/Time    CANDIDA AURIS SCREEN - Swab, Axilla Right, Axilla Left and Groin [[id number]]  (Normal) Collected: 08/26/24 1715    Lab Status: Preliminary result Specimen: Swab from Axilla Right, Axilla Left and Groin Updated: 08/30/24 2231     Candida Auris Screen Culture No Candida auris isolated at 4 days    Blood Culture - Blood, Hand, Right [[id number]]  (Normal) Collected: 08/26/24 1154    Lab Status: Preliminary result Specimen: Blood from Hand, Right Updated: 08/30/24 1215     Blood Culture No growth at 4 days    Narrative:      Less than seven (7) mL's of blood was collected.  Insufficient quantity may yield false negative results.    Blood Culture - Blood, Arm, Right [[id number]]  (Normal) Collected: 08/26/24 1145    Lab Status: Preliminary result Specimen: Blood from Arm, Right Updated: 08/30/24 1215     Blood Culture No growth at 4 days    Narrative:      Less than seven (7) mL's of blood was collected.  Insufficient quantity may yield false negative results.    MRSA Screen, PCR (Inpatient) - Swab, Nares [[id number]]  (Normal) Collected: 08/26/24 1715    Lab Status: Final result Specimen: Swab from Nares Updated: 08/26/24 2124     MRSA PCR Negative    Narrative:      The negative predictive value of this diagnostic test is high and should only be used to consider de-escalating anti-MRSA therapy. A positive result may indicate colonization with MRSA and must be correlated clinically.  MRSA Negative            XR Chest 1 View    Result Date: 8/31/2024  XR CHEST 1 VW Date of Exam: 8/31/2024 2:08 AM EDT Indication: chest pain Comparison: 2/24/2024 Findings: Cardiomediastinal silhouette is stable. There is atherosclerosis of the aorta. There is pulmonary vascular congestion with diffuse interstitial thickening and patchy bibasilar opacities. There are small bilateral pleural effusions. A  right IJ tunneled hemodialysis catheter terminates in the right atrium. Median sternotomy wires are present.     Impression: Impression: Cardiomegaly with pulmonary vascular congestion and mixed interstitial/airspace opacities with small bilateral effusions. Findings suggestive of CHF/pulmonary edema. Electronically Signed: Shama Chairez MD  8/31/2024 8:17 AM EDT  Workstation ID: [id number]     Results for orders placed during the hospital encounter of 08/26/24    Adult Transthoracic Echo Complete W/ Cont if Necessary Per Protocol    Interpretation Summary    Left ventricular systolic function is mildly decreased. Calculated left ventricular EF = 40%    Left ventricular wall thickness is consistent with mild concentric hypertrophy.    Moderately reduced right ventricular systolic function noted.    There is bileaflet mitral valve thickening present.    Moderate to severe mitral valve regurgitation is present.    Moderate to severe tricuspid valve regurgitation is present.    Calculated right ventricular systolic pressure from tricuspid regurgitation is 73 mmHg.    There is a small (<1cm) pericardial effusion adjacent to the left ventricle.      Current medications:  Scheduled Meds:aspirin, 81 mg, Oral, Daily  b complex-vitamin c-folic acid, 1 tablet, Oral, Daily  calcitriol, 0.5 mcg, Oral, Daily  castor oil-balsam peru, 1 Application, Topical, Daily  insulin lispro, 2-7 Units, Subcutaneous, TID With Meals  meropenem, 500 mg, Intravenous, Q24H  metoprolol tartrate, 25 mg, Oral, Q12H  oxyCODONE, 10 mg, Oral, Q4H  pantoprazole, 40 mg, Oral, Daily  pharmacy consult - [place], , Does not apply, Daily  sertraline, 25 mg, Oral, Daily  sodium chloride, 10 mL, Intravenous, Q12H  vancomycin (dosing per levels), , Does not apply, Daily      Continuous Infusions:heparin, 18 Units/kg/hr, Last Rate: 16 Units/kg/hr (08/31/24 0205)  nitroglycerin, 5-200 mcg/min, Last Rate: 5 mcg/min (08/29/24 8905)  Pharmacy to Dose Heparin,   Pharmacy  to dose vancomycin,   sodium chloride, 9 mL/hr, Last Rate: 250 mL/hr (08/29/24 1020)      PRN Meds:.  acetaminophen    senna-docusate sodium **AND** polyethylene glycol **AND** bisacodyl **AND** bisacodyl    dextrose    dextrose    dextrose    diazePAM    glucagon (human recombinant)    heparin (porcine)    HYDROmorphone    Magnesium Standard Dose Replacement - Follow Nurse / BPA Driven Protocol    nitroglycerin    nitroglycerin    ondansetron ODT **OR** ondansetron    Pharmacy to Dose Heparin    Pharmacy to dose vancomycin    simethicone    sodium chloride    sodium chloride    Assessment & Plan   Assessment & Plan     Active Hospital Problems    Diagnosis  POA    **Dry gangrene [I96]  Yes    Chronic diastolic (congestive) heart failure [I50.32]  Yes    Chronic systolic (congestive) heart failure [I50.22]  Yes    Severe malnutrition [E43]  Yes    On home oxygen therapy [Z99.81]  Not Applicable    Type 2 diabetes mellitus with diabetic chronic kidney disease [E11.22]  Yes    NSTEMI (non-ST elevated myocardial infarction) [I21.4]  Yes    Secondary pulmonary arterial hypertension [I27.21]  Yes    Severe mitral valve regurgitation [I34.0]  Yes    Iron deficiency anemia [D50.9]  Yes    ESRD (end stage renal disease) [N18.6]  Yes    Stage 5 chronic kidney disease on chronic dialysis [N18.6, Z99.2]  Not Applicable    Coronary artery disease involving native coronary artery of native heart with angina pectoris [I25.119]  Yes    Essential hypertension [I10]  Yes    Type 2 diabetes mellitus with kidney complication, with long-term current use of insulin [E11.29, Z79.4]  Not Applicable    TIA (transient ischemic attack) [G45.9]  Yes    Hyperlipidemia LDL goal <70 [E78.5]  Yes      Resolved Hospital Problems    Diagnosis Date Resolved POA    Ischemic heart disease [I25.9] 08/29/2024 Yes        Brief Hospital Course to date:  Erlin Savage is a 74 y.o. male with a medical history significant for end-stage renal disease on HD  MWF, coronary artery disease s/p CABG, A. Fib on OAC, aortic stenosis, diabetes mellitus, COPD on 2 to 4 L baseline, hyperlipidemia, hypertension and known peripheral arterial disease with nonhealing right foot transmetatarsal amputation site with dry gangrene.      Right lower extremity critical limb ischemia, recent non-healing right TMA (s/p right bka 8/29/24)  Recent admission for dry gangrene/cellulitis  L foot with ischemic changes of toes   BLE PVD  -My partner discussed with vascular on arrival.   -**s/p right BKA  [facility] 8/29/24; Vascular following; post-op management; currently on heparin drip due to post-op NSTEMI; discussed w/ vascular extender on 8/31/24, ok  -pain control (oxycodone, prn dilaudid), valium for anxiety... decreased oxy & valium due to some sedation    -Dr. Heaton ([facility]) following: continue iv vanc, merrem (anticipate stopping iv antibiotics soon/when ok w/ Dr. Heaton)     NSTEMI  CAD status post CABG  CHFmrEF  Valvular heart disease (mod-severe MR, mod-severe tR)  Parox afib  -Most recent echo in system from 2023 with a EF of 43%, severe diastolic dysfunction, moderate mitral regurgitation, moderate tricuspid regurgitation.  -Echo 8/28/24: LV ef 40%, mod reduced RV fxn; mod-severe MR, mod-severe TR  -evening of 8/29/24 (after right bka), patient developed 10/10 chest pain, relieved w/ sl nitro; EKG w/ lateral ischemic changes; marked elevated troponin.   -eliquis had been on hold for surgery  -Cards following: cardiology reviewed heart cath films from [facility] in march, not likely to be targets for revascularization, proceeded w/ medical therapy (heparin drip x 48 hours from marquise pain, nitro drip, etc); continue asa 81, heparin drip currently (per my discussion w/ vascular surgery on 8/30/24 they are ok for all antiplatelet/anticoagulants that cardiology recommends)    Post-op anemia  **s/p 1 unit prbc on 8/30/24 w/ dialysis (had drop in hgb from 8.5 to 7.3 post-op)  -goal hgb >7.5 in  light of nstemi  -no overt bleeding overrnight    End-stage renal disease   Hyponatremia  Hyperkalemia  -On hemodialysis Monday/Wednesday/Friday; got extra dialysis Saturday 8/31 as well (hyperkalemia & volume overload); Nephrology follows. Evaluating daily for further dialysis        Ascites  Lower extremity edema  -multifactorial secondary to CHF, end-stage renal disease  -Per patient, he gets monthly paracentesis at hospital in [place].  Denies any prior history of cirrhosis.     PAF  -Held Eliquis in anticipation of surgery, currently on heparin     DM2  -A1c 6.2 on 8/26  -SSI w/ meals for now, monitor      -Obtain hemoglobin A1c    Chronic hypoxic respiratory failure  COPD on chronic home O2  -4L NC at baseline   -DuoNebs PRN    Depression  -start zoloft     Anemia of Chronic Disease        4am labs: cbc, renal panel (cxr)      Expected Discharge Location and Transportation:   Expected Discharge   Expected Discharge Date: 9/3/2024; Expected Discharge Time:      VTE Prophylaxis:  Pharmacologic & mechanical VTE prophylaxis orders are present.         AM-PAC 6 Clicks Score (PT): 13 (08/31/24 0800)    CODE STATUS:   Code Status and Medical Interventions: CPR (Attempt to Resuscitate); Full Support   Ordered at: 08/26/24 1218     Level Of Support Discussed With:    Patient     Code Status (Patient has no pulse and is not breathing):    CPR (Attempt to Resuscitate)     Medical Interventions (Patient has pulse or is breathing):    Full Support       Brandon Crook MD  08/31/24

## 2024-08-31 NOTE — PROGRESS NOTES
LOS: 5 days   Patient Care Team:  Joe Diallo MD as PCP - General (Family Medicine)  Fadi Garner MD as Consulting Physician (General Surgery)  Amor Celeste MD (Cardiology)  Praveen Novoa MD as Consulting Physician (Nephrology)  Kristin Moya MD as Consulting Physician (Pulmonary Disease)    Chief Complaint: ESRD  Right non healing foot wound  Wound infection.    Subjective   Patient seen in the dialysis unit.  Agreed on getting dialyzed ultrafiltrate.  High potassium is noted.  Continue to have pain for which management has been done.  Objective     Vital Sign Min/Max for last 24 hours  Temp  Min: 96.8 °F (36 °C)  Max: 98.4 °F (36.9 °C)   BP  Min: 97/69  Max: 144/80   Pulse  Min: 81  Max: 92   Resp  Min: 14  Max: 22   SpO2  Min: 90 %  Max: 100 %   Flow (L/min)  Min: 3  Max: 4   No data recorded         No intake/output data recorded.  I/O last 3 completed shifts:  In: 1610 [P.O.:700; Blood:360; Other:300; IV Piggyback:250]  Out: 3410   Objective:  General Appearance: Alert, oriented, no obvious distress.  Eyes: PER, EOMI.  Neck: Supple no JVD.  Lungs: Clear auscultation, no rales rhonchi's, equal chest movement, nonlabored.  Heart: No gallop, murmur, rub, RRR.  Abdomen: Soft, nontender, positive bowel sounds, no organomegaly.  Extremities: 2+ lower extremity edema.  Right BKA  Neuro: No focal deficit, moving all extremities, alert oriented X 3  Tunneled dialysis catheter right IJ    Results Review:     I reviewed the patient's new clinical results.    WBC WBC   Date Value Ref Range Status   08/31/2024 9.76 3.40 - 10.80 10*3/mm3 Final   08/30/2024 11.05 (H) 3.40 - 10.80 10*3/mm3 Final   08/29/2024 11.86 (H) 3.40 - 10.80 10*3/mm3 Final      HGB Hemoglobin   Date Value Ref Range Status   08/31/2024 7.6 (L) 13.0 - 17.7 g/dL Final   08/30/2024 7.3 (L) 13.0 - 17.7 g/dL Final   08/29/2024 8.2 (L) 13.0 - 17.7 g/dL Final      HCT Hematocrit   Date Value Ref Range Status   08/31/2024 25.1 (L)  "37.5 - 51.0 % Final   08/30/2024 24.9 (L) 37.5 - 51.0 % Final   08/29/2024 28.7 (L) 37.5 - 51.0 % Final      Platlets No results found for: \"LABPLAT\"   MCV MCV   Date Value Ref Range Status   08/31/2024 91.9 79.0 - 97.0 fL Final   08/30/2024 92.9 79.0 - 97.0 fL Final   08/29/2024 95.0 79.0 - 97.0 fL Final          Sodium Sodium   Date Value Ref Range Status   08/31/2024 135 (L) 136 - 145 mmol/L Final   08/30/2024 129 (L) 136 - 145 mmol/L Final   08/29/2024 131 (L) 136 - 145 mmol/L Final   08/29/2024 132 (L) 136 - 145 mmol/L Final      Potassium Potassium   Date Value Ref Range Status   08/31/2024 5.3 (H) 3.5 - 5.2 mmol/L Final   08/30/2024 6.7 (C) 3.5 - 5.2 mmol/L Final   08/29/2024 5.8 (H) 3.5 - 5.2 mmol/L Final   08/29/2024 5.5 (H) 3.5 - 5.2 mmol/L Final      Chloride Chloride   Date Value Ref Range Status   08/31/2024 98 98 - 107 mmol/L Final   08/30/2024 94 (L) 98 - 107 mmol/L Final   08/29/2024 96 (L) 98 - 107 mmol/L Final   08/29/2024 95 (L) 98 - 107 mmol/L Final      CO2 CO2   Date Value Ref Range Status   08/31/2024 23.0 22.0 - 29.0 mmol/L Final   08/30/2024 22.0 22.0 - 29.0 mmol/L Final   08/29/2024 21.0 (L) 22.0 - 29.0 mmol/L Final   08/29/2024 23.0 22.0 - 29.0 mmol/L Final      BUN BUN   Date Value Ref Range Status   08/31/2024 30 (H) 8 - 23 mg/dL Final   08/30/2024 43 (H) 8 - 23 mg/dL Final   08/29/2024 32 (H) 8 - 23 mg/dL Final   08/29/2024 30 (H) 8 - 23 mg/dL Final      Creatinine Creatinine   Date Value Ref Range Status   08/31/2024 4.07 (H) 0.76 - 1.27 mg/dL Final   08/30/2024 5.17 (H) 0.76 - 1.27 mg/dL Final   08/29/2024 4.26 (H) 0.76 - 1.27 mg/dL Final   08/29/2024 3.93 (H) 0.76 - 1.27 mg/dL Final      Calcium Calcium   Date Value Ref Range Status   08/31/2024 9.1 8.6 - 10.5 mg/dL Final   08/30/2024 9.3 8.6 - 10.5 mg/dL Final   08/29/2024 9.2 8.6 - 10.5 mg/dL Final   08/29/2024 9.4 8.6 - 10.5 mg/dL Final      PO4 No results found for: \"CAPO4\"   Albumin Albumin   Date Value Ref Range Status " "  08/31/2024 3.2 (L) 3.5 - 5.2 g/dL Final   08/30/2024 3.1 (L) 3.5 - 5.2 g/dL Final      Magnesium Magnesium   Date Value Ref Range Status   08/31/2024 1.9 1.6 - 2.4 mg/dL Final   08/29/2024 1.9 1.6 - 2.4 mg/dL Final      Uric Acid No results found for: \"URICACID\"     Medication Review: yes    Assessment & Plan      1.  ESRD: On HD MWF.  Follows with [facility] @ [facility].    2.  HTN: Blood pressure stable.  Monitor for now.     3.  Hyperkalemia: Recurrent issue.      4.  Hyponatremia:  Continue fluid rate restriction and adjust with HD.     5.  Metabolic acidosis: Adjust with HD.     6.  Volume: Hx of ascites and dependent edema. Requiring serial paracentesis as outpatient.     7.  Peripheral vascular disease: Patient with nonhealing right transmetatarsal amputation site with dry gangrene and wound infection. MRI unable to rule out Osteo. S/p BKA 8/29/24       Hyperkalemia has become a recurrent issue, we will dialyze him today as he cath time yesterday.  This will control the volume as well as potassium and electrolytes.  Will try to ultrafiltrate as 2-1/2 to 3 L today.    Gabriel Rodriguez MD  08/31/24  09:05 EDT            "

## 2024-09-01 ENCOUNTER — APPOINTMENT (OUTPATIENT)
Dept: GENERAL RADIOLOGY | Facility: HOSPITAL | Age: 74
End: 2024-09-01
Payer: MEDICARE

## 2024-09-01 LAB
ALBUMIN SERPL-MCNC: 3 G/DL (ref 3.5–5.2)
ANION GAP SERPL CALCULATED.3IONS-SCNC: 15 MMOL/L (ref 5–15)
BUN SERPL-MCNC: 25 MG/DL (ref 8–23)
BUN/CREAT SERPL: 7.1 (ref 7–25)
CALCIUM SPEC-SCNC: 9.4 MG/DL (ref 8.6–10.5)
CHLORIDE SERPL-SCNC: 96 MMOL/L (ref 98–107)
CO2 SERPL-SCNC: 24 MMOL/L (ref 22–29)
CREAT SERPL-MCNC: 3.51 MG/DL (ref 0.76–1.27)
DEPRECATED RDW RBC AUTO: 62.3 FL (ref 37–54)
EGFRCR SERPLBLD CKD-EPI 2021: 17.7 ML/MIN/1.73
ERYTHROCYTE [DISTWIDTH] IN BLOOD BY AUTOMATED COUNT: 18.5 % (ref 12.3–15.4)
GLUCOSE BLDC GLUCOMTR-MCNC: 147 MG/DL (ref 70–130)
GLUCOSE BLDC GLUCOMTR-MCNC: 187 MG/DL (ref 70–130)
GLUCOSE BLDC GLUCOMTR-MCNC: 198 MG/DL (ref 70–130)
GLUCOSE BLDC GLUCOMTR-MCNC: 300 MG/DL (ref 70–130)
GLUCOSE SERPL-MCNC: 141 MG/DL (ref 65–99)
HCT VFR BLD AUTO: 24.2 % (ref 37.5–51)
HGB BLD-MCNC: 7.3 G/DL (ref 13–17.7)
IRON 24H UR-MRATE: 24 MCG/DL (ref 59–158)
IRON SATN MFR SERPL: 13 % (ref 20–50)
MCH RBC QN AUTO: 28 PG (ref 26.6–33)
MCHC RBC AUTO-ENTMCNC: 30.2 G/DL (ref 31.5–35.7)
MCV RBC AUTO: 92.7 FL (ref 79–97)
PHOSPHATE SERPL-MCNC: 6.1 MG/DL (ref 2.5–4.5)
PLATELET # BLD AUTO: 208 10*3/MM3 (ref 140–450)
PMV BLD AUTO: 10.1 FL (ref 6–12)
POTASSIUM SERPL-SCNC: 4.7 MMOL/L (ref 3.5–5.2)
RBC # BLD AUTO: 2.61 10*6/MM3 (ref 4.14–5.8)
SODIUM SERPL-SCNC: 135 MMOL/L (ref 136–145)
TIBC SERPL-MCNC: 180 MCG/DL (ref 298–536)
TRANSFERRIN SERPL-MCNC: 121 MG/DL (ref 200–360)
WBC NRBC COR # BLD AUTO: 9.37 10*3/MM3 (ref 3.4–10.8)

## 2024-09-01 PROCEDURE — 85027 COMPLETE CBC AUTOMATED: CPT | Performed by: INTERNAL MEDICINE

## 2024-09-01 PROCEDURE — 84466 ASSAY OF TRANSFERRIN: CPT | Performed by: INTERNAL MEDICINE

## 2024-09-01 PROCEDURE — 99233 SBSQ HOSP IP/OBS HIGH 50: CPT | Performed by: INTERNAL MEDICINE

## 2024-09-01 PROCEDURE — 71045 X-RAY EXAM CHEST 1 VIEW: CPT

## 2024-09-01 PROCEDURE — 83540 ASSAY OF IRON: CPT | Performed by: INTERNAL MEDICINE

## 2024-09-01 PROCEDURE — 63710000001 INSULIN LISPRO (HUMAN) PER 5 UNITS: Performed by: INTERNAL MEDICINE

## 2024-09-01 PROCEDURE — 25010000002 MEROPENEM PER 100 MG: Performed by: SURGERY

## 2024-09-01 PROCEDURE — 80069 RENAL FUNCTION PANEL: CPT | Performed by: INTERNAL MEDICINE

## 2024-09-01 PROCEDURE — 82948 REAGENT STRIP/BLOOD GLUCOSE: CPT

## 2024-09-01 RX ADMIN — INSULIN LISPRO 2 UNITS: 100 INJECTION, SOLUTION INTRAVENOUS; SUBCUTANEOUS at 12:48

## 2024-09-01 RX ADMIN — METOPROLOL TARTRATE 25 MG: 25 TABLET, FILM COATED ORAL at 08:56

## 2024-09-01 RX ADMIN — Medication 1 TABLET: at 08:56

## 2024-09-01 RX ADMIN — ACETAMINOPHEN 650 MG: 325 TABLET, FILM COATED ORAL at 21:39

## 2024-09-01 RX ADMIN — ACETAMINOPHEN 650 MG: 325 TABLET, FILM COATED ORAL at 01:24

## 2024-09-01 RX ADMIN — MEROPENEM 500 MG: 500 INJECTION, POWDER, FOR SOLUTION INTRAVENOUS at 23:08

## 2024-09-01 RX ADMIN — ASPIRIN 81 MG: 81 TABLET, COATED ORAL at 08:56

## 2024-09-01 RX ADMIN — PANTOPRAZOLE SODIUM 40 MG: 40 TABLET, DELAYED RELEASE ORAL at 08:56

## 2024-09-01 RX ADMIN — CALCITRIOL CAPSULES 0.25 MCG 0.5 MCG: 0.25 CAPSULE ORAL at 08:56

## 2024-09-01 RX ADMIN — OXYCODONE HYDROCHLORIDE 7.5 MG: 15 TABLET ORAL at 19:19

## 2024-09-01 RX ADMIN — Medication 10 ML: at 21:40

## 2024-09-01 RX ADMIN — Medication 10 ML: at 08:57

## 2024-09-01 RX ADMIN — OXYCODONE HYDROCHLORIDE 7.5 MG: 15 TABLET ORAL at 05:20

## 2024-09-01 RX ADMIN — OXYCODONE HYDROCHLORIDE 7.5 MG: 15 TABLET ORAL at 10:36

## 2024-09-01 RX ADMIN — ACETAMINOPHEN 650 MG: 325 TABLET, FILM COATED ORAL at 10:36

## 2024-09-01 NOTE — PROGRESS NOTES
"Erlin Savage  1950  [id number]      Evaluating Physician: Marlo Heaton MD    Chief Complaint: right foot infection    Reason for Consultation: right foot infection    History of present illness:     Patient is a 74 y.o.  Yr old male noncompliant previously, with prior history of  end-stage renal disease with intermittent hemodialysis via right chest central line.  He has known peripheral arterial disease with right leg angiogram on April 10 with recanalization of SFA/PTA; post procedure with continued worsening pain at the right distal leg, gangrene at his right second/third and fourth toes with second worse than third worse than fourth.   MRI right foot was soft tissue edema, no loculated collection with abnormality at the posterior calcaneus difficult to completely exclude osteomyelitis at that site as per radiology.     4/19/24 Dr Santos   \"Procedures: Procedure(s):  RIGHT Foot  Second toe amputation\"     4/23/24  Dr Santos .  \"Procedures: Procedure(s):  RIGHT Foot     Amputation hallux  Amputation third toe  Amputation fourth toe  Amputation fifth toe\"     Pathology from April 23, 2024 with no osteomyelitis.  Pathology had noted cellulitis and gangrenous necrosis.  Transitioned to IV vancomycin and oral Augmentin at discharge.     Revascularization on May 14, 2024 by Dr. Schreiber  \"PROCEDURES WITH LATERALITY:   Ultrasound guided access of left common femoral artery with micropuncture needle and images stored  Abd Aorotgram  Right lower extremity arteriogram  Balloon angioplasty of right posterior tibial artery with a 3 mm balloon  Balloon angioplasty of right posterior tibial artery origin4 mm balloon drug-coated  Balloon angioplasty of right peroneal artery with 3 mm balloon  Balloon angioplasty of right anterior tibial artery 3 mm balloon  Balloon angioplasty of right proximal superficial femoral artery with a 5 mm balloon\"     Readmitted August 14 with deterioration at the right foot over unspecified " "period of time per patient, at least weeks if not longer with large wound, exposed bone on the medial side.     8/20/24 he  refused recommendation of amputation from surgical team and left [facility].     8/26/24 readmitted on August 26 with reports to me that patient now agreeable to right side amputation by Dr. Schreiber    8/29/24   \"Procedure(s):  Right below the knee amputation and all other indicated procedures\"    9/1/24 Sleepy ; no fever; no new pain; Right stump pain is sharp, nonradiating, constant, worse with palpation, better with pain meds and 3 /10     No headache photophobia or neck stiffness. No shortness of breath cough or hemoptysis. No nausea vomiting diarrhea or abdominal pain. No dysuria hematuria or pyuria. No other new skin rash.     Review of Systems     9/1/24  no adr to abx     Constitutional-- No Fever, chills or sweats.  Appetite good. No malaise.  Heent-- No new vision, hearing or throat complaints.  No epistaxis or oral sores.  Denies odynophagia or dysphagia.  No headache, photophobia or neck stiffness.  CV-- No chest pain, palpitation or syncope  Resp-- No SOB, cough, or hemoptysis  GI- No nausea, vomiting, or diarrhea.   -- No dysuria, hematuria, or flank pain.  Denies hesitancy, urgency or flank pain.  Lymph- no swollen lymph nodes in neck, axilla or groin.   Heme- No active bruising or bleeding  MS-- no swelling or pain in the bones or joints of arms or legs.  No new back pain.  Neuro-- No acute focal weakness or numbness in the arms or legs.  Skin-- No rash    Past Medical History:   Diagnosis Date    Anxiety     Anxiety disorder     Back pain     Chronic back pain     work related injury    CKD (chronic kidney disease)     most recent creatinine 2.7 on 01/26/16    Coronary artery disease     Diabetes mellitus     insulin dependent diabetes Onset 2010    Dyslipidemia     Hyperlipidemia     Hypertension     Ischemic heart disease     Renal failure     Stroke     TIA / transient right " "vision loss age 42    TIA (transient ischemic attack)     Vertigo        Past Surgical History:   Procedure Laterality Date    APPENDECTOMY      BELOW KNEE AMPUTATION Right 8/29/2024    Procedure: below the knee amputation Right;  Surgeon: Junior Schreiber MD;  Location: [facility];  Service: Vascular;  Laterality: Right;    CARDIAC CATHETERIZATION      CHOLECYSTECTOMY  03/2012    COLONOSCOPY      CORONARY ARTERY BYPASS GRAFT      ENDOSCOPY         Pediatric History   Patient Parents    Not on file     Other Topics Concern    Not on file   Social History Narrative    Not on file       family history is not on file.    Allergies   Allergen Reactions    Cefepime Other (See Comments) and Seizure     Myoclonus - Has received cefazolin    Doxycycline Other (See Comments)     Joint pain, tongue swelling    \"Body locks up\"    Gabapentin Confusion    Ranolazine Dizziness and Other (See Comments)     Severe tremors/ shakiness    Hydralazine Unknown - Low Severity    Augmentin [Amoxicillin-Pot Clavulanate] Palpitations    Biaxin [Clarithromycin] Palpitations    Cephalosporins Unknown - Low Severity     Has received cefazolin    Coreg [Carvedilol] Itching    Crestor [Rosuvastatin Calcium] Itching     Itching rash    Iodine Rash     \"pineda skin\"    Lipitor [Atorvastatin] Itching     And Crestor- generalized weakness and chest tightness    Lisinopril Cough     Cough /weakness, nauseas and dirrhea    Livalo [Pitavastatin] Unknown - Low Severity     Chest tightness    Nortriptyline Hcl Other (See Comments)     Arthralgias    Pravastatin Unknown - Low Severity     Chest , neck and arm discomfort    Questran [Cholestyramine] Unknown - Low Severity       Medication:  Current Facility-Administered Medications   Medication Dose Route Frequency Provider Last Rate Last Admin    acetaminophen (TYLENOL) tablet 650 mg  650 mg Oral Q6H PRN Junior Schreiber MD   650 mg at 09/01/24 0124    aspirin EC tablet 81 mg  81 mg Oral Daily Candie" MD Osvaldo   81 mg at 08/31/24 0810    b complex-vitamin c-folic acid (NEPHRO-SEVERIANO) tablet 1 tablet  1 tablet Oral Daily Junior Schreiber MD   1 tablet at 08/31/24 0810    sennosides-docusate (PERICOLACE) 8.6-50 MG per tablet 2 tablet  2 tablet Oral BID PRN Junior Schreiber MD        And    polyethylene glycol (MIRALAX) packet 17 g  17 g Oral Daily PRN Junior Schreiber MD        And    bisacodyl (DULCOLAX) EC tablet 5 mg  5 mg Oral Daily PRN Junior Schreiber MD        And    bisacodyl (DULCOLAX) suppository 10 mg  10 mg Rectal Daily PRN Junior Schreiber MD        calcitriol (ROCALTROL) capsule 0.5 mcg  0.5 mcg Oral Daily Junior Schreiber MD   0.5 mcg at 08/31/24 0810    castor oil-balsam peru (VENELEX) ointment 1 Application  1 Application Topical Daily Junior Schreiber MD   1 Application at 08/31/24 0818    dextrose (D50W) (25 g/50 mL) IV injection 25 g  25 g Intravenous Q15 Min PRN Junior Schreiber MD        dextrose (D50W) (25 g/50 mL) IV injection 25 g  25 g Intravenous Q15 Min PRN Brandon Crook MD        dextrose (GLUTOSE) oral gel 15 g  15 g Oral Q15 Min PRN Junior Schreiber MD        diazePAM (VALIUM) tablet 2.5 mg  2.5 mg Oral Q6H PRN Brandon Crook MD        glucagon (GLUCAGEN) injection 1 mg  1 mg Intramuscular Q15 Min PRN Junior Schreiber MD        heparin (porcine) injection 2,000 Units  2,000 Units Intracatheter PRN Junior Schreiber MD   2,000 Units at 08/31/24 1049    HYDROmorphone (DILAUDID) injection 0.5 mg  0.5 mg Intravenous Q2H PRN Junior Schreiber MD   0.5 mg at 08/31/24 0544    Insulin Lispro (humaLOG) injection 2-7 Units  2-7 Units Subcutaneous TID With Meals Brandon Crook MD        Magnesium Standard Dose Replacement - Follow Nurse / BPA Driven Protocol   Does not apply PRN Brandon Crook MD        meropenem (MERREM) 500 mg in sodium chloride 0.9 % 100 mL MBP  500 mg Intravenous Q24H Junior Schreiber MD   500 mg at 08/31/24 8315    metoprolol tartrate (LOPRESSOR)  tablet 25 mg  25 mg Oral Q12H Dada Darden IV, MD   25 mg at 08/29/24 2037    nitroglycerin (NITROSTAT) SL tablet 0.4 mg  0.4 mg Sublingual Q5 Min PRN Junior Schreiber MD   0.4 mg at 08/29/24 1529    nitroglycerin (NITROSTAT) SL tablet 0.4 mg  0.4 mg Sublingual Q5 Min PRN Junior Schreiber MD        nitroglycerin (TRIDIL) 200 mcg/ml infusion  5-200 mcg/min Intravenous Titrated Dada Darden IV, MD 1.5 mL/hr at 08/29/24 2342 5 mcg/min at 08/29/24 2342    ondansetron ODT (ZOFRAN-ODT) disintegrating tablet 4 mg  4 mg Oral Q6H PRN Junior Schreiber MD   4 mg at 08/27/24 0938    Or    ondansetron (ZOFRAN) injection 4 mg  4 mg Intravenous Q6H PRN Junior Schreiber MD   4 mg at 08/30/24 0440    oxyCODONE (ROXICODONE) immediate release tablet 7.5 mg  7.5 mg Oral Q4H PRN Brandon Crook MD   7.5 mg at 09/01/24 0520    pantoprazole (PROTONIX) EC tablet 40 mg  40 mg Oral Daily Junior Schreiber MD   40 mg at 08/31/24 0810    Pharmacy Consult - MTM   Does not apply Daily Phillip Escoto CHRISTINA        Pharmacy to dose vancomycin   Does not apply Continuous PRN Junior Schreiber MD        sertraline (ZOLOFT) tablet 25 mg  25 mg Oral Daily Brandon Crook MD   25 mg at 08/31/24 0810    simethicone (MYLICON) chewable tablet 80 mg  80 mg Oral 4x Daily PRN Cassandra Wiseman APRN        sodium chloride 0.9 % flush 10 mL  10 mL Intravenous Q12H Junior Schreiber MD   10 mL at 08/31/24 0814    sodium chloride 0.9 % flush 10 mL  10 mL Intravenous PRN Junior Schreiber MD        sodium chloride 0.9 % infusion 40 mL  40 mL Intravenous PRN Junior Schreiber MD        sodium chloride 0.9 % infusion  9 mL/hr Intravenous Continuous Junior Schreiber  mL/hr at 08/29/24 1020 Rate Change at 08/29/24 1020    vancomycin (dosing per levels)   Does not apply Daily Scar Garzon, [facility]           Antibiotics:  Anti-Infectives (From admission, onward)      Ordered     Dose/Rate Route Frequency Start Stop    08/31/24 2945   "vancomycin 750 mg in sodium chloride 0.9 % 250 mL IVPB-VTB        Ordering Provider: Bear Win, PharmD    10 mg/kg × 78.9 kg  333.3 mL/hr over 45 Minutes Intravenous Once 08/31/24 1315 08/31/24 1530    08/30/24 1739  vancomycin (dosing per levels)        Ordering Provider: Scar Garzon RP     Does not apply Daily 08/30/24 1830 09/05/24 0859    08/30/24 1546  vancomycin (VANCOCIN) 1,000 mg in sodium chloride 0.9 % 250 mL IVPB-VTB        Ordering Provider: Phillip Escoto RPH    1,000 mg  250 mL/hr over 60 Minutes Intravenous Once 08/30/24 1800 08/30/24 1826    08/28/24 1248  Vancomycin HCl 1,250 mg in sodium chloride 0.9 % 250 mL VTB        Ordering Provider: Kyrie Walsh PharmD    1,250 mg  200 mL/hr over 75 Minutes Intravenous Once 08/28/24 1500 08/28/24 1716    08/26/24 1304  meropenem (MERREM) 500 mg in sodium chloride 0.9 % 100 mL MBP        Ordering Provider: Junior Schreiber MD    500 mg  over 3 Hours Intravenous Every 24 Hours 08/27/24 1600 09/02/24 2259    08/26/24 1306  vancomycin IVPB 1750 mg in 0.9% Sodium Chloride (premix) 500 mL        Ordering Provider: Kyrie Walsh PharmD    1,750 mg  285.7 mL/hr over 105 Minutes Intravenous Once 08/26/24 1800 08/26/24 1957    08/26/24 1304  meropenem (MERREM) 1,000 mg in sodium chloride 0.9 % 100 mL MBP        Ordering Provider: Lisa Rowe DO    1,000 mg  over 30 Minutes Intravenous Once 08/26/24 1500 08/26/24 1604    08/26/24 1306  vancomycin (dosing per levels)        Ordering Provider: Kyrie Walsh PharmD     Does not apply Daily 08/26/24 1400 08/29/24 0859    08/26/24 1223  Pharmacy to dose vancomycin        Ordering Provider: Junior Schreiber MD     Does not apply Continuous PRN 08/26/24 1223 09/02/24 2359                 Physical Exam:   Vital Signs   /72 (BP Location: Right arm, Patient Position: Sitting)   Pulse 90   Temp 97.1 °F (36.2 °C) (Oral)   Resp 16   Ht 180.3 cm (71\")   Wt 83.8 kg (184 lb 12.8 oz)   SpO2 100%   BMI " 25.77 kg/m²       GENERAL: sleepy.  Appears chronically debilitated  HEENT: Normocephalic, atraumatic.    No conjunctival injection. No icterus. Oropharynx clear without evidence of thrush or exudate.   NECK: Supple without nuchal rigidity. No mass.   HEART: RRR; No murmur.  LUNGS: Clear to auscultation bilaterally without wheezing, rales, rhonchi. Normal respiratory effort. Nonlabored.  ABDOMEN: Soft, nontender, nondistended. Positive bowel sounds. No rebound or guarding.  EXT:  No cyanosis, clubbing or edema.  MSK: FROM without joint effusions noted arms/legs.    SKIN: Warm and dry without cutaneous eruptions on Inspection/palpation.    NEURO: sleepy     Left foot first and third toe distally with small black areas,  toes with only minimal erythema, no discrete mass bulge or fluctuance.  No crepitus or bulla     Right LE amp noted;  no new visible redness/purulence.  No discrete mass bulge or fluctuance.  No crepitus or bulla.     No peripheral stigmata/phenomenon of endocarditis    Laboratory Data    Results from last 7 days   Lab Units 09/01/24  0501 08/31/24  0759 08/30/24  1253   WBC 10*3/mm3 9.37 9.76 11.05*   HEMOGLOBIN g/dL 7.3* 7.6* 7.3*   HEMATOCRIT % 24.2* 25.1* 24.9*   PLATELETS 10*3/mm3 208 221 287     Results from last 7 days   Lab Units 09/01/24  0501   SODIUM mmol/L 135*   POTASSIUM mmol/L 4.7   CHLORIDE mmol/L 96*   CO2 mmol/L 24.0   BUN mg/dL 25*   CREATININE mg/dL 3.51*   GLUCOSE mg/dL 141*   CALCIUM mg/dL 9.4     Results from last 7 days   Lab Units 08/28/24  0523   ALK PHOS U/L 198*   BILIRUBIN mg/dL 0.5   ALT (SGPT) U/L 17   AST (SGOT) U/L 27               Estimated Creatinine Clearance: 22.5 mL/min (A) (by C-G formula based on SCr of 3.51 mg/dL (H)).      Microbiology:      Radiology:  Imaging Results (Last 72 Hours)       Procedure Component Value Units Date/Time    XR Chest 1 View [[id number]] Resulted: 09/01/24 0245     Updated: 09/01/24 0408    XR Chest 1 View [[id number]] Collected:  08/31/24 0814     Updated: 08/31/24 0820    Narrative:      XR CHEST 1 VW    Date of Exam: 8/31/2024 2:08 AM EDT    Indication: chest pain    Comparison: 2/24/2024    Findings:  Cardiomediastinal silhouette is stable. There is atherosclerosis of the aorta. There is pulmonary vascular congestion with diffuse interstitial thickening and patchy bibasilar opacities. There are small bilateral pleural effusions. A right IJ tunneled   hemodialysis catheter terminates in the right atrium. Median sternotomy wires are present.      Impression:      Impression:  Cardiomegaly with pulmonary vascular congestion and mixed interstitial/airspace opacities with small bilateral effusions. Findings suggestive of CHF/pulmonary edema.      Electronically Signed: Shama Chairez MD    8/31/2024 8:17 AM EDT    Workstation ID: [id number]              Impression:     --Acute right foot surgical site/wound infection/cellulitis, exposed bone on the medial side consistent with first metatarsal osteomyelitis and likely sequela to ischemia with peripheral vascular disease and other comorbidity as above. High risk for further serious morbidity and other serious sequela. They understand risk for persistent/recurrent or nonhealing wounds, persistent/progressive or recurrent infection and risk for further functional/limb loss, amputation, chronic pain etc. They know antibiotics and surgery not a guarantee for cure. These risks will persist. Timing/option threshold for surgery per surgical team at their discretion.  Vascular team has recommended amputation which patient has previously refused, left the hospital AGAINST MEDICAL ADVICE on August 20 but subsequently readmitted August 26 and agreeable to surgical recommendation, BKSANDI 8/29    --Peripheral vascular disease.  Prior interventions as above.    --Left foot with small blackened areas at first/third toe likely sequela to ischemia.  Vascular team to help determine options and salvageability as  above    --End-stage renal disease on hemodialysis Monday/Wednesday/Friday.    --Diabetes.  Glucose control per medicine team    --Cephalosporin and doxycycline intolerances in the past.  Has tolerated penicillin class    PLAN:        --IV vancomycin, merrem; anticipate stop IV abx by discharge if steadily better     **Cx at [facility] with PSA x 2 (zosyn DD, not ideal EDWIN), enterococcus and corynebac      --check/review labs cultures and scans     --Partial history per nursing staff     --Discussed with microbiology      --Highly complex set of issues with high risk for further serious morbidity and other serious sequela     --Monitor IV and IV antibiotic with risk for systemic complication and potential drug interactions    Copied text in this note has been reviewed and is accurate as of 09/01/24.        Marlo Heaton MD  9/1/2024

## 2024-09-01 NOTE — PROGRESS NOTES
LOS: 6 days   Patient Care Team:  Jeo Diallo MD as PCP - General (Family Medicine)  Fadi Garner MD as Consulting Physician (General Surgery)  Amor Celeste MD (Cardiology)  Praveen Novoa MD as Consulting Physician (Nephrology)  Kristin Moya MD as Consulting Physician (Pulmonary Disease)    Chief Complaint: ESRD  Right non healing foot wound  Wound infection.    Subjective   No new events noted distress.  Continue to have some pain in the legs  Last dialyzed yesterday  Objective     Vital Sign Min/Max for last 24 hours  Temp  Min: 97.1 °F (36.2 °C)  Max: 97.6 °F (36.4 °C)   BP  Min: 85/51  Max: 126/72   Pulse  Min: 80  Max: 92   Resp  Min: 16  Max: 17   SpO2  Min: 95 %  Max: 100 %   Flow (L/min)  Min: 3  Max: 4   Weight  Min: 83.8 kg (184 lb 12.8 oz)  Max: 83.8 kg (184 lb 12.8 oz)         I/O this shift:  In: 120 [P.O.:120]  Out: -   I/O last 3 completed shifts:  In: 540 [P.O.:240; Other:300]  Out: 2370 [Urine:50]  Objective:  General Appearance: Alert, oriented, no obvious distress.  Eyes: PER, EOMI.  Neck: Supple no JVD.  Lungs: Clear auscultation, no rales rhonchi's, equal chest movement, nonlabored.  Heart: No gallop, murmur, rub, RRR.  Abdomen: Soft, nontender, positive bowel sounds, no organomegaly.  Extremities: 3+ bilateral lower extremity edema right BKA  Neuro: No focal deficit, moving all extremities, alert oriented X 3  Tunneled dialysis catheter right IJ    Results Review:     I reviewed the patient's new clinical results.    WBC WBC   Date Value Ref Range Status   09/01/2024 9.37 3.40 - 10.80 10*3/mm3 Final   08/31/2024 9.76 3.40 - 10.80 10*3/mm3 Final   08/30/2024 11.05 (H) 3.40 - 10.80 10*3/mm3 Final   08/29/2024 11.86 (H) 3.40 - 10.80 10*3/mm3 Final      HGB Hemoglobin   Date Value Ref Range Status   09/01/2024 7.3 (L) 13.0 - 17.7 g/dL Final   08/31/2024 7.6 (L) 13.0 - 17.7 g/dL Final   08/30/2024 7.3 (L) 13.0 - 17.7 g/dL Final   08/29/2024 8.2 (L) 13.0 - 17.7 g/dL  "Final      HCT Hematocrit   Date Value Ref Range Status   09/01/2024 24.2 (L) 37.5 - 51.0 % Final   08/31/2024 25.1 (L) 37.5 - 51.0 % Final   08/30/2024 24.9 (L) 37.5 - 51.0 % Final   08/29/2024 28.7 (L) 37.5 - 51.0 % Final      Platlets No results found for: \"LABPLAT\"   MCV MCV   Date Value Ref Range Status   09/01/2024 92.7 79.0 - 97.0 fL Final   08/31/2024 91.9 79.0 - 97.0 fL Final   08/30/2024 92.9 79.0 - 97.0 fL Final   08/29/2024 95.0 79.0 - 97.0 fL Final          Sodium Sodium   Date Value Ref Range Status   09/01/2024 135 (L) 136 - 145 mmol/L Final   08/31/2024 135 (L) 136 - 145 mmol/L Final   08/30/2024 129 (L) 136 - 145 mmol/L Final   08/29/2024 131 (L) 136 - 145 mmol/L Final      Potassium Potassium   Date Value Ref Range Status   09/01/2024 4.7 3.5 - 5.2 mmol/L Final   08/31/2024 5.3 (H) 3.5 - 5.2 mmol/L Final   08/30/2024 6.7 (C) 3.5 - 5.2 mmol/L Final   08/29/2024 5.8 (H) 3.5 - 5.2 mmol/L Final      Chloride Chloride   Date Value Ref Range Status   09/01/2024 96 (L) 98 - 107 mmol/L Final   08/31/2024 98 98 - 107 mmol/L Final   08/30/2024 94 (L) 98 - 107 mmol/L Final   08/29/2024 96 (L) 98 - 107 mmol/L Final      CO2 CO2   Date Value Ref Range Status   09/01/2024 24.0 22.0 - 29.0 mmol/L Final   08/31/2024 23.0 22.0 - 29.0 mmol/L Final   08/30/2024 22.0 22.0 - 29.0 mmol/L Final   08/29/2024 21.0 (L) 22.0 - 29.0 mmol/L Final      BUN BUN   Date Value Ref Range Status   09/01/2024 25 (H) 8 - 23 mg/dL Final   08/31/2024 30 (H) 8 - 23 mg/dL Final   08/30/2024 43 (H) 8 - 23 mg/dL Final   08/29/2024 32 (H) 8 - 23 mg/dL Final      Creatinine Creatinine   Date Value Ref Range Status   09/01/2024 3.51 (H) 0.76 - 1.27 mg/dL Final   08/31/2024 4.07 (H) 0.76 - 1.27 mg/dL Final   08/30/2024 5.17 (H) 0.76 - 1.27 mg/dL Final   08/29/2024 4.26 (H) 0.76 - 1.27 mg/dL Final      Calcium Calcium   Date Value Ref Range Status   09/01/2024 9.4 8.6 - 10.5 mg/dL Final   08/31/2024 9.1 8.6 - 10.5 mg/dL Final   08/30/2024 9.3 8.6 " "- 10.5 mg/dL Final   08/29/2024 9.2 8.6 - 10.5 mg/dL Final      PO4 No results found for: \"CAPO4\"   Albumin Albumin   Date Value Ref Range Status   09/01/2024 3.0 (L) 3.5 - 5.2 g/dL Final   08/31/2024 3.2 (L) 3.5 - 5.2 g/dL Final   08/30/2024 3.1 (L) 3.5 - 5.2 g/dL Final      Magnesium Magnesium   Date Value Ref Range Status   08/31/2024 1.9 1.6 - 2.4 mg/dL Final   08/29/2024 1.9 1.6 - 2.4 mg/dL Final      Uric Acid No results found for: \"URICACID\"     Medication Review: yes    Assessment & Plan      1.  ESRD: On HD MWF.  Follows with [facility] @ [facility].    2.  Hypertension: Blood pressure stable.  Monitor for now.     3.  Hyperkalemia: Recurrent issue.      4.  Hyponatremia: Continue fluid rate restriction and adjust with HD.     5.  Metabolic acidosis: Adjust with HD.     6.  Volume: Hx of ascites and dependent edema. Requiring serial paracentesis as outpatient.     7.  Peripheral vascular disease: Patient with nonhealing right transmetatarsal amputation site with dry gangrene and wound infection. MRI unable to rule out Osteo. S/p BKA 8/29/24       Plan for dialysis tomorrow orders written.    Gabriel Rodriguez MD  09/01/24  11:44 EDT            "

## 2024-09-01 NOTE — PROGRESS NOTES
[facility]  PROGRESS NOTE    Patient Name: Erlin Savage  : 1950  MRN: [id number]    Date of Admission: 2024  Primary Care Physician: Joe Diallo MD    Subjective   Subjective     CC: f/u PAD    HPI:  Mild dyspnea unchanged from yesterday after dialysis  No chest pain currently  No overt post-op bleeding  Feeling ok currently    Objective   Objective     Vital Signs:   Temp:  [97.1 °F (36.2 °C)-97.6 °F (36.4 °C)] 97.1 °F (36.2 °C)  Heart Rate:  [80-92] 88  Resp:  [16-17] 16  BP: ()/(58-79) 110/61  Flow (L/min):  [3] 3     Physical Exam:  Constitutional: No acute distress, awake, alert, up in bed; on 3Lnc  HENT: NCAT, mucous membranes moist  Respiratory: faint bibasilar crackles, normal work of breathing at rest  Cardiovascular: rrr  Gastrointestinal: Positive bowel sounds, soft, nontender, nondistended  Psychiatric: Appropriate affect, cooperative  Neurologic: Oriented x 3, strength symmetric in all extremities, Cranial Nerves grossly intact to confrontation, speech clear  Skin/msk: right bka site cleanly dressed    Results Reviewed:  LAB RESULTS:      Lab 24  0501 24  0759 24  0057 24  1821 24  1253 24  0856 24  0046 24  1633 24  1633 24  0523 24  0549 24  1145 24  1145 24  1144   WBC 9.37 9.76  --   --  11.05*  --   --   --  11.86* 10.76 9.73   < > 11.95*  --    HEMOGLOBIN 7.3* 7.6*  --   --  7.3*  --   --   --  8.2* 9.8* 9.8*   < > 10.3*  --    HEMATOCRIT 24.2* 25.1*  --   --  24.9*  --   --   --  28.7* 32.5* 32.9*   < > 34.4*  --    PLATELETS 208 221  --   --  287  --   --   --  302 271 245   < > 251  --    NEUTROS ABS  --   --   --   --  8.83*  --   --   --  10.59* 8.21* 7.66*  --  9.88*  --    IMMATURE GRANS (ABS)  --   --   --   --  0.06*  --   --   --  0.07* 0.07* 0.07*  --  0.09*  --    LYMPHS ABS  --   --   --   --  0.66*  --   --   --  0.47* 0.86  0.62*  --  0.63*  --    MONOS ABS  --   --   --   --  1.41*  --   --   --  0.63 1.15* 1.00*  --  0.93*  --    EOS ABS  --   --   --   --  0.02  --   --   --  0.01 0.36 0.32  --  0.34  --    MCV 92.7 91.9  --   --  92.9  --   --   --  95.0 90.5 91.4   < > 91.2  --    PROCALCITONIN  --   --   --   --   --   --   --   --   --   --   --   --  0.43*  --    LACTATE  --   --   --   --   --   --   --   --   --   --   --   --   --  1.1   PROTIME  --   --   --   --   --   --   --   --  18.8*  --   --   --   --   --    APTT  --   --   --   --   --   --   --   --  29.0*  --   --   --   --   --    HEPARIN ANTI-XA  --  0.20* 0.21* 0.24*  --  0.29* 0.22*   < > 0.15*  --   --   --   --   --     < > = values in this interval not displayed.         Lab 09/01/24  0501 08/31/24  0759 08/30/24  1253 08/29/24  1535 08/29/24  0623 08/28/24  0523 08/27/24  0549 08/26/24  1145   SODIUM 135* 135* 129* 131* 132* 130* 132* 129*   POTASSIUM 4.7 5.3* 6.7* 5.8* 5.5* 6.6* 5.6* 5.9*   CHLORIDE 96* 98 94* 96* 95* 95* 95* 93*   CO2 24.0 23.0 22.0 21.0* 23.0 21.0* 22.0 23.0   ANION GAP 15.0 14.0 13.0 14.0 14.0 14.0 15.0 13.0   BUN 25* 30* 43* 32* 30* 40* 31* 43*   CREATININE 3.51* 4.07* 5.17* 4.26* 3.93* 5.16* 4.41* 5.63*   EGFR 17.7* 14.8* 11.1* 14.0* 15.5* 11.2* 13.5* 10.1*   GLUCOSE 141* 105* 199* 174* 148* 178* 226* 239*   CALCIUM 9.4 9.1 9.3 9.2 9.4 9.3 9.0 9.4   MAGNESIUM  --  1.9  --  1.9  --  2.0 1.9 2.3   PHOSPHORUS 6.1* 6.6* 7.9*  --   --   --   --   --    HEMOGLOBIN A1C  --   --   --   --   --   --   --  6.20*         Lab 09/01/24  0501 08/31/24  0759 08/30/24  1253 08/28/24  0523 08/27/24  0549 08/26/24  1145   TOTAL PROTEIN  --   --   --  6.6 6.2 6.4   ALBUMIN 3.0* 3.2* 3.1* 3.4* 3.1* 3.3*   GLOBULIN  --   --   --  3.2 3.1 3.1   ALT (SGPT)  --   --   --  17 14 14   AST (SGOT)  --   --   --  27 20 19   BILIRUBIN  --   --   --  0.5 0.4 0.5   ALK PHOS  --   --   --  198* 176* 199*         Lab 08/30/24  1253 08/29/24  1825 08/29/24  1633  08/29/24  1535   HSTROP T 298* 201*  --  203*   PROTIME  --   --  18.8*  --    INR  --   --  1.56*  --              Lab 09/01/24  0501 08/30/24  1359   IRON 24*  --    IRON SATURATION (TSAT) 13*  --    TIBC 180*  --    TRANSFERRIN 121*  --    ABO TYPING  --  O   RH TYPING  --  Positive   ANTIBODY SCREEN  --  Negative         Brief Urine Lab Results  (Last result in the past 365 days)        Color   Clarity   Blood   Leuk Est   Nitrite   Protein   CREAT   Urine HCG        01/01/24 0550 Yellow   Clear     Negative                       Microbiology Results Abnormal       Procedure Component Value - Date/Time    CANDIDA AURIS SCREEN - Swab, Axilla Right, Axilla Left and Groin [[id number]]  (Normal) Collected: 08/26/24 1715    Lab Status: Final result Specimen: Swab from Axilla Right, Axilla Left and Groin Updated: 08/31/24 2231     Candida Auris Screen Culture No Candida auris isolated at 5 days    Blood Culture - Blood, Hand, Right [[id number]]  (Normal) Collected: 08/26/24 1154    Lab Status: Final result Specimen: Blood from Hand, Right Updated: 08/31/24 1215     Blood Culture No growth at 5 days    Narrative:      Less than seven (7) mL's of blood was collected.  Insufficient quantity may yield false negative results.    Blood Culture - Blood, Arm, Right [[id number]]  (Normal) Collected: 08/26/24 1145    Lab Status: Final result Specimen: Blood from Arm, Right Updated: 08/31/24 1215     Blood Culture No growth at 5 days    Narrative:      Less than seven (7) mL's of blood was collected.  Insufficient quantity may yield false negative results.    MRSA Screen, PCR (Inpatient) - Swab, Nares [[id number]]  (Normal) Collected: 08/26/24 1715    Lab Status: Final result Specimen: Swab from Nares Updated: 08/26/24 2124     MRSA PCR Negative    Narrative:      The negative predictive value of this diagnostic test is high and should only be used to consider de-escalating anti-MRSA therapy. A positive result may indicate  colonization with MRSA and must be correlated clinically.  MRSA Negative            XR Chest 1 View    Result Date: 9/1/2024  XR CHEST 1 VW Date of Exam: 9/1/2024 2:44 AM EDT Indication: follow up pulm edema/effusions Comparison: Chest radiograph 8/31/2024. Findings: Unchanged position of right IJ approach central venous catheter. Sternotomy and CABG. Cardiomediastinal silhouette is unchanged. Decreased diffuse mixed interstitial and airspace disease. Similar small/moderate bilateral pleural effusions with adjacent atelectasis. No pneumothorax. Osseous structures are unchanged.     Impression: Impression: Decreased but persistent pulmonary edema and pulmonary vascular congestion. Similar small/moderate bilateral pleural effusions with adjacent atelectasis. Electronically Signed: Kobe Phoenix MD  9/1/2024 7:50 AM EDT  Workstation ID: [id number]    XR Chest 1 View    Result Date: 8/31/2024  XR CHEST 1 VW Date of Exam: 8/31/2024 2:08 AM EDT Indication: chest pain Comparison: 2/24/2024 Findings: Cardiomediastinal silhouette is stable. There is atherosclerosis of the aorta. There is pulmonary vascular congestion with diffuse interstitial thickening and patchy bibasilar opacities. There are small bilateral pleural effusions. A right IJ tunneled hemodialysis catheter terminates in the right atrium. Median sternotomy wires are present.     Impression: Impression: Cardiomegaly with pulmonary vascular congestion and mixed interstitial/airspace opacities with small bilateral effusions. Findings suggestive of CHF/pulmonary edema. Electronically Signed: Shama Chairez MD  8/31/2024 8:17 AM EDT  Workstation ID: [id number]     Results for orders placed during the hospital encounter of 08/26/24    Adult Transthoracic Echo Complete W/ Cont if Necessary Per Protocol    Interpretation Summary    Left ventricular systolic function is mildly decreased. Calculated left ventricular EF = 40%    Left ventricular wall thickness is consistent  with mild concentric hypertrophy.    Moderately reduced right ventricular systolic function noted.    There is bileaflet mitral valve thickening present.    Moderate to severe mitral valve regurgitation is present.    Moderate to severe tricuspid valve regurgitation is present.    Calculated right ventricular systolic pressure from tricuspid regurgitation is 73 mmHg.    There is a small (<1cm) pericardial effusion adjacent to the left ventricle.      Current medications:  Scheduled Meds:aspirin, 81 mg, Oral, Daily  b complex-vitamin c-folic acid, 1 tablet, Oral, Daily  calcitriol, 0.5 mcg, Oral, Daily  castor oil-balsam peru, 1 Application, Topical, Daily  insulin lispro, 2-7 Units, Subcutaneous, TID With Meals  meropenem, 500 mg, Intravenous, Q24H  metoprolol tartrate, 25 mg, Oral, Q12H  pantoprazole, 40 mg, Oral, Daily  pharmacy consult - MTM, , Does not apply, Daily  sodium chloride, 10 mL, Intravenous, Q12H  vancomycin (dosing per levels), , Does not apply, Daily      Continuous Infusions:nitroglycerin, 5-200 mcg/min, Last Rate: 5 mcg/min (08/29/24 2342)  Pharmacy to dose vancomycin,   sodium chloride, 9 mL/hr, Last Rate: 250 mL/hr (08/29/24 1020)      PRN Meds:.  acetaminophen    senna-docusate sodium **AND** polyethylene glycol **AND** bisacodyl **AND** bisacodyl    dextrose    dextrose    dextrose    diazePAM    glucagon (human recombinant)    heparin (porcine)    Magnesium Standard Dose Replacement - Follow Nurse / BPA Driven Protocol    nitroglycerin    nitroglycerin    ondansetron ODT **OR** ondansetron    oxyCODONE    Pharmacy to dose vancomycin    simethicone    sodium chloride    sodium chloride    Assessment & Plan   Assessment & Plan     Active Hospital Problems    Diagnosis  POA    **Dry gangrene [I96]  Yes    Chronic diastolic (congestive) heart failure [I50.32]  Yes    Chronic systolic (congestive) heart failure [I50.22]  Yes    Severe malnutrition [E43]  Yes    On home oxygen therapy [Z99.81]  Not  Applicable    Type 2 diabetes mellitus with diabetic chronic kidney disease [E11.22]  Yes    NSTEMI (non-ST elevated myocardial infarction) [I21.4]  Yes    Secondary pulmonary arterial hypertension [I27.21]  Yes    Severe mitral valve regurgitation [I34.0]  Yes    Iron deficiency anemia [D50.9]  Yes    ESRD (end stage renal disease) [N18.6]  Yes    Stage 5 chronic kidney disease on chronic dialysis [N18.6, Z99.2]  Not Applicable    Coronary artery disease involving native coronary artery of native heart with angina pectoris [I25.119]  Yes    Essential hypertension [I10]  Yes    Type 2 diabetes mellitus with kidney complication, with long-term current use of insulin [E11.29, Z79.4]  Not Applicable    TIA (transient ischemic attack) [G45.9]  Yes    Hyperlipidemia LDL goal <70 [E78.5]  Yes      Resolved Hospital Problems    Diagnosis Date Resolved POA    Ischemic heart disease [I25.9] 08/29/2024 Yes        Brief Hospital Course to date:  Erlin Savage is a 74 y.o. male with a medical history significant for end-stage renal disease on HD MWF, coronary artery disease s/p CABG, A. Fib on OAC, aortic stenosis, diabetes mellitus, COPD on 2 to 4 L baseline, hyperlipidemia, hypertension and known peripheral arterial disease with nonhealing right foot transmetatarsal amputation site with dry gangrene.      Right lower extremity critical limb ischemia, recent non-healing right TMA (s/p right bka 8/29/24)  Recent admission for dry gangrene/cellulitis  L foot with ischemic changes of toes   BLE PVD  -My partner discussed with vascular on arrival.   -**s/p right HAIA Dr. Schreiber 8/29/24; Vascular following; ; discussed w/ vascular extender on 8/31/24; per that discussion,  ok to use all blood thinners as recommended by cards. Currently on asa 81mg & heparin drip (for post-op nstemi)  -continue  pain control (oxycodone, prn dilaudid), valium for anxiety (doses were slightly decreased on 8/31/24 due to some over sedation)  -  Sudarshan (ID) following: continue iv vanc, merrem (anticipate stopping iv antibiotics soon/when ok w/ Dr. Heaton)     NSTEMI  CAD status post CABG  CHFmrEF  Valvular heart disease (mod-severe MR, mod-severe tR)  Parox afib  -Most recent echo in system from 2023 with a EF of 43%, severe diastolic dysfunction, moderate mitral regurgitation, moderate tricuspid regurgitation.  -Echo 8/28/24: LV ef 40%, mod reduced RV fxn; mod-severe MR, mod-severe TR  -evening of 8/29/24 (after right bka), patient developed 10/10 chest pain, relieved w/ sl nitro; EKG w/ lateral ischemic changes; marked elevated troponin.   -eliquis had been on hold for surgery  -Cards following: cardiology reviewed heart cath films from [facility] in march, not likely to be targets for revascularization, proceeded w/ medical therapy (heparin drip x 48 hours from marquise pain, nitro drip, etc); continue asa 81, heparin drip currently (per my discussion w/ vascular surgery on 8/30/24 they are ok for all antiplatelet/anticoagulants that cardiology recommends)    Anemia (chronic renal dz, & post-op blood loss)  **s/p 1 unit prbc on 8/30/24 w/ dialysis (had drop in hgb from 8.5 to 7.3 post-op)  -goal hgb >7.5 in light of nstemi  -no overt bleeding overrnight  -9/1/24: hgb 7.3 today; plan to transfuse 1 unit prbc w/ dialysis tomorrow (9/2) morning    End-stage renal disease   Hyponatremia, improved  Hyperkalemia, improved  -s/p extra dialysis session Saturday 8/31 (hyperkalemia & volume overload)  -nephrology following: planning HD tomorrow 9/2/24 (to get blood w/ dialysis)        Ascites  Lower extremity edema  -multifactorial secondary to CHF, end-stage renal disease  -Per patient, he gets monthly paracentesis at hospital in [place].  Denies any prior history of cirrhosis.     PAF  -Held Eliquis in anticipation of surgery, currently on heparin     DM2  -A1c 6.2 on 8/26  -SSI w/ meals for now, monitor      Chronic hypoxic respiratory failure  COPD on chronic home  O2  -4L NC at baseline, currently on 3LNc  -DuoNebs PRN    Depression  -started zoloft on 8/31, stopped it on 9/1/24 per patient wife request, said it made patient too sleepy       Am labs: cbc,renal panel       Expected Discharge Location and Transportation:   Expected Discharge   Expected Discharge Date: 9/3/2024; Expected Discharge Time:      VTE Prophylaxis:  Pharmacologic & mechanical VTE prophylaxis orders are present.         AM-PAC 6 Clicks Score (PT): 13 (08/31/24 2000)    CODE STATUS:   Code Status and Medical Interventions: CPR (Attempt to Resuscitate); Full Support   Ordered at: 08/26/24 1218     Level Of Support Discussed With:    Patient     Code Status (Patient has no pulse and is not breathing):    CPR (Attempt to Resuscitate)     Medical Interventions (Patient has pulse or is breathing):    Full Support       Brandon Crook MD  09/01/24

## 2024-09-02 ENCOUNTER — APPOINTMENT (OUTPATIENT)
Dept: NEPHROLOGY | Facility: HOSPITAL | Age: 74
End: 2024-09-02
Payer: MEDICARE

## 2024-09-02 LAB
ABO GROUP BLD: NORMAL
ALBUMIN SERPL-MCNC: 2.8 G/DL (ref 3.5–5.2)
ANION GAP SERPL CALCULATED.3IONS-SCNC: 11 MMOL/L (ref 5–15)
BASOPHILS # BLD AUTO: 0.04 10*3/MM3 (ref 0–0.2)
BASOPHILS NFR BLD AUTO: 0.5 % (ref 0–1.5)
BH BB BLOOD EXPIRATION DATE: NORMAL
BH BB BLOOD TYPE BARCODE: 5100
BH BB DISPENSE STATUS: NORMAL
BH BB PRODUCT CODE: NORMAL
BH BB UNIT NUMBER: NORMAL
BLD GP AB SCN SERPL QL: NEGATIVE
BUN SERPL-MCNC: 29 MG/DL (ref 8–23)
BUN/CREAT SERPL: 6.3 (ref 7–25)
CALCIUM SPEC-SCNC: 9.1 MG/DL (ref 8.6–10.5)
CHLORIDE SERPL-SCNC: 97 MMOL/L (ref 98–107)
CO2 SERPL-SCNC: 24 MMOL/L (ref 22–29)
CREAT SERPL-MCNC: 4.58 MG/DL (ref 0.76–1.27)
CROSSMATCH INTERPRETATION: NORMAL
DEPRECATED RDW RBC AUTO: 63 FL (ref 37–54)
EGFRCR SERPLBLD CKD-EPI 2021: 12.9 ML/MIN/1.73
EOSINOPHIL # BLD AUTO: 0.18 10*3/MM3 (ref 0–0.4)
EOSINOPHIL NFR BLD AUTO: 2.3 % (ref 0.3–6.2)
ERYTHROCYTE [DISTWIDTH] IN BLOOD BY AUTOMATED COUNT: 18.7 % (ref 12.3–15.4)
GLUCOSE BLDC GLUCOMTR-MCNC: 122 MG/DL (ref 70–130)
GLUCOSE BLDC GLUCOMTR-MCNC: 174 MG/DL (ref 70–130)
GLUCOSE BLDC GLUCOMTR-MCNC: 264 MG/DL (ref 70–130)
GLUCOSE BLDC GLUCOMTR-MCNC: 291 MG/DL (ref 70–130)
GLUCOSE SERPL-MCNC: 226 MG/DL (ref 65–99)
HCT VFR BLD AUTO: 25.5 % (ref 37.5–51)
HGB BLD-MCNC: 7.5 G/DL (ref 13–17.7)
IMM GRANULOCYTES # BLD AUTO: 0.04 10*3/MM3 (ref 0–0.05)
IMM GRANULOCYTES NFR BLD AUTO: 0.5 % (ref 0–0.5)
LYMPHOCYTES # BLD AUTO: 0.73 10*3/MM3 (ref 0.7–3.1)
LYMPHOCYTES NFR BLD AUTO: 9.5 % (ref 19.6–45.3)
MCH RBC QN AUTO: 27.8 PG (ref 26.6–33)
MCHC RBC AUTO-ENTMCNC: 29.4 G/DL (ref 31.5–35.7)
MCV RBC AUTO: 94.4 FL (ref 79–97)
MONOCYTES # BLD AUTO: 1.13 10*3/MM3 (ref 0.1–0.9)
MONOCYTES NFR BLD AUTO: 14.7 % (ref 5–12)
NEUTROPHILS NFR BLD AUTO: 5.56 10*3/MM3 (ref 1.7–7)
NEUTROPHILS NFR BLD AUTO: 72.5 % (ref 42.7–76)
NRBC BLD AUTO-RTO: 0 /100 WBC (ref 0–0.2)
PHOSPHATE SERPL-MCNC: 6.4 MG/DL (ref 2.5–4.5)
PLATELET # BLD AUTO: 225 10*3/MM3 (ref 140–450)
PMV BLD AUTO: 10.1 FL (ref 6–12)
POTASSIUM SERPL-SCNC: 5.1 MMOL/L (ref 3.5–5.2)
RBC # BLD AUTO: 2.7 10*6/MM3 (ref 4.14–5.8)
RH BLD: POSITIVE
SODIUM SERPL-SCNC: 132 MMOL/L (ref 136–145)
T&S EXPIRATION DATE: NORMAL
UNIT  ABO: NORMAL
UNIT  RH: NORMAL
VANCOMYCIN SERPL-MCNC: 25.3 MCG/ML (ref 5–40)
WBC NRBC COR # BLD AUTO: 7.68 10*3/MM3 (ref 3.4–10.8)

## 2024-09-02 PROCEDURE — 25010000002 HEPARIN (PORCINE) PER 1000 UNITS: Performed by: SURGERY

## 2024-09-02 PROCEDURE — 82948 REAGENT STRIP/BLOOD GLUCOSE: CPT

## 2024-09-02 PROCEDURE — 86900 BLOOD TYPING SEROLOGIC ABO: CPT | Performed by: INTERNAL MEDICINE

## 2024-09-02 PROCEDURE — 25010000002 MEROPENEM PER 100 MG: Performed by: INTERNAL MEDICINE

## 2024-09-02 PROCEDURE — 86850 RBC ANTIBODY SCREEN: CPT | Performed by: INTERNAL MEDICINE

## 2024-09-02 PROCEDURE — 85025 COMPLETE CBC W/AUTO DIFF WBC: CPT | Performed by: INTERNAL MEDICINE

## 2024-09-02 PROCEDURE — 63710000001 INSULIN LISPRO (HUMAN) PER 5 UNITS: Performed by: INTERNAL MEDICINE

## 2024-09-02 PROCEDURE — 25010000002 ONDANSETRON PER 1 MG: Performed by: SURGERY

## 2024-09-02 PROCEDURE — 86923 COMPATIBILITY TEST ELECTRIC: CPT

## 2024-09-02 PROCEDURE — 25010000002 NITROGLYCERIN 200 MCG/ML SOLUTION: Performed by: INTERNAL MEDICINE

## 2024-09-02 PROCEDURE — 86900 BLOOD TYPING SEROLOGIC ABO: CPT

## 2024-09-02 PROCEDURE — 25010000002 HYDROMORPHONE PER 4 MG: Performed by: INTERNAL MEDICINE

## 2024-09-02 PROCEDURE — P9016 RBC LEUKOCYTES REDUCED: HCPCS

## 2024-09-02 PROCEDURE — 80069 RENAL FUNCTION PANEL: CPT | Performed by: INTERNAL MEDICINE

## 2024-09-02 PROCEDURE — 80202 ASSAY OF VANCOMYCIN: CPT

## 2024-09-02 PROCEDURE — 86901 BLOOD TYPING SEROLOGIC RH(D): CPT | Performed by: INTERNAL MEDICINE

## 2024-09-02 PROCEDURE — 99232 SBSQ HOSP IP/OBS MODERATE 35: CPT | Performed by: INTERNAL MEDICINE

## 2024-09-02 RX ORDER — HYDROMORPHONE HYDROCHLORIDE 1 MG/ML
0.5 INJECTION, SOLUTION INTRAMUSCULAR; INTRAVENOUS; SUBCUTANEOUS EVERY 4 HOURS PRN
Status: DISCONTINUED | OUTPATIENT
Start: 2024-09-02 | End: 2024-09-04

## 2024-09-02 RX ADMIN — PANTOPRAZOLE SODIUM 40 MG: 40 TABLET, DELAYED RELEASE ORAL at 12:31

## 2024-09-02 RX ADMIN — MEROPENEM 500 MG: 500 INJECTION, POWDER, FOR SOLUTION INTRAVENOUS at 12:32

## 2024-09-02 RX ADMIN — HYDROMORPHONE HYDROCHLORIDE 0.5 MG: 1 INJECTION, SOLUTION INTRAMUSCULAR; INTRAVENOUS; SUBCUTANEOUS at 14:11

## 2024-09-02 RX ADMIN — HEPARIN SODIUM 2000 UNITS: 1000 INJECTION INTRAVENOUS; SUBCUTANEOUS at 08:51

## 2024-09-02 RX ADMIN — ASPIRIN 81 MG: 81 TABLET, COATED ORAL at 12:31

## 2024-09-02 RX ADMIN — ONDANSETRON 4 MG: 2 INJECTION INTRAMUSCULAR; INTRAVENOUS at 08:51

## 2024-09-02 RX ADMIN — Medication 10 ML: at 12:28

## 2024-09-02 RX ADMIN — CALCITRIOL CAPSULES 0.25 MCG 0.5 MCG: 0.25 CAPSULE ORAL at 12:31

## 2024-09-02 RX ADMIN — ACETAMINOPHEN 650 MG: 325 TABLET, FILM COATED ORAL at 08:59

## 2024-09-02 RX ADMIN — OXYCODONE HYDROCHLORIDE 7.5 MG: 15 TABLET ORAL at 08:51

## 2024-09-02 RX ADMIN — INSULIN LISPRO 2 UNITS: 100 INJECTION, SOLUTION INTRAVENOUS; SUBCUTANEOUS at 17:30

## 2024-09-02 RX ADMIN — Medication 1 TABLET: at 12:31

## 2024-09-02 RX ADMIN — OXYCODONE HYDROCHLORIDE 7.5 MG: 15 TABLET ORAL at 20:51

## 2024-09-02 RX ADMIN — Medication 1 APPLICATION: at 12:32

## 2024-09-02 RX ADMIN — NITROGLYCERIN 5 MCG/MIN: 20 INJECTION INTRAVENOUS at 14:12

## 2024-09-02 RX ADMIN — OXYCODONE HYDROCHLORIDE 7.5 MG: 15 TABLET ORAL at 15:51

## 2024-09-02 NOTE — PROGRESS NOTES
[facility] Medicine Services  PROGRESS NOTE    Patient Name: Erlin Savage  : 1950  MRN: [id number]    Date of Admission: 2024  Primary Care Physician: Joe Diallo MD    Subjective   Subjective     CC: f/u PAD    HPI:  Seen on dialysis  Still some dyspnea unchanged   No chest pain  No overt bleeding    Objective   Objective     Vital Signs:   Temp:  [97.1 °F (36.2 °C)-97.7 °F (36.5 °C)] 97.6 °F (36.4 °C)  Heart Rate:  [74-85] 76  Resp:  [16-17] 16  BP: ()/(41-79) 139/68  Flow (L/min):  [2-3.5] 3.5     Physical Exam:  Constitutional: No acute distress, awake, alert, up in bed; on 3.5Lnc. seen in dialysis  HENT: NCAT, mucous membranes moist  Respiratory: faint bibasilar crackles  Cardiovascular: rrr  Gastrointestinal: Positive bowel sounds, soft, nontender, nondistended  Psychiatric: Appropriate affect, cooperative  Neurologic: Oriented x 3, strength symmetric in all extremities, Cranial Nerves grossly intact to confrontation, speech clear  Skin/msk: right bka site cleanly dressed    Results Reviewed:  LAB RESULTS:      Lab 24  0436 24  0501 24  0759 24  0057 24  1821 24  1253 24  0856 24  0046 24  1633 24  0523 24  0523 24  0549   WBC 7.68 9.37 9.76  --   --  11.05*  --   --  11.86*  --  10.76 9.73   HEMOGLOBIN 7.5* 7.3* 7.6*  --   --  7.3*  --   --  8.2*  --  9.8* 9.8*   HEMATOCRIT 25.5* 24.2* 25.1*  --   --  24.9*  --   --  28.7*  --  32.5* 32.9*   PLATELETS 225 208 221  --   --  287  --   --  302  --  271 245   NEUTROS ABS 5.56  --   --   --   --  8.83*  --   --  10.59*  --  8.21* 7.66*   IMMATURE GRANS (ABS) 0.04  --   --   --   --  0.06*  --   --  0.07*  --  0.07* 0.07*   LYMPHS ABS 0.73  --   --   --   --  0.66*  --   --  0.47*  --  0.86 0.62*   MONOS ABS 1.13*  --   --   --   --  1.41*  --   --  0.63  --  1.15* 1.00*   EOS ABS 0.18  --   --   --   --  0.02  --   --  0.01  --  0.36  0.32   MCV 94.4 92.7 91.9  --   --  92.9  --   --  95.0  --  90.5 91.4   PROTIME  --   --   --   --   --   --   --   --  18.8*  --   --   --    APTT  --   --   --   --   --   --   --   --  29.0*  --   --   --    HEPARIN ANTI-XA  --   --  0.20* 0.21* 0.24*  --  0.29* 0.22* 0.15*   < >  --   --     < > = values in this interval not displayed.         Lab 09/02/24 0436 09/01/24 0501 08/31/24  0759 08/30/24  1253 08/29/24  1535 08/29/24  0623 08/28/24  0523 08/27/24  0549   SODIUM 132* 135* 135* 129* 131*   < > 130* 132*   POTASSIUM 5.1 4.7 5.3* 6.7* 5.8*   < > 6.6* 5.6*   CHLORIDE 97* 96* 98 94* 96*   < > 95* 95*   CO2 24.0 24.0 23.0 22.0 21.0*   < > 21.0* 22.0   ANION GAP 11.0 15.0 14.0 13.0 14.0   < > 14.0 15.0   BUN 29* 25* 30* 43* 32*   < > 40* 31*   CREATININE 4.58* 3.51* 4.07* 5.17* 4.26*   < > 5.16* 4.41*   EGFR 12.9* 17.7* 14.8* 11.1* 14.0*   < > 11.2* 13.5*   GLUCOSE 226* 141* 105* 199* 174*   < > 178* 226*   CALCIUM 9.1 9.4 9.1 9.3 9.2   < > 9.3 9.0   MAGNESIUM  --   --  1.9  --  1.9  --  2.0 1.9   PHOSPHORUS 6.4* 6.1* 6.6* 7.9*  --   --   --   --     < > = values in this interval not displayed.         Lab 09/02/24 0436 09/01/24 0501 08/31/24  0759 08/30/24  1253 08/28/24  0523 08/27/24  0549   TOTAL PROTEIN  --   --   --   --  6.6 6.2   ALBUMIN 2.8* 3.0* 3.2* 3.1* 3.4* 3.1*   GLOBULIN  --   --   --   --  3.2 3.1   ALT (SGPT)  --   --   --   --  17 14   AST (SGOT)  --   --   --   --  27 20   BILIRUBIN  --   --   --   --  0.5 0.4   ALK PHOS  --   --   --   --  198* 176*         Lab 08/30/24  1253 08/29/24  1825 08/29/24  1633 08/29/24  1535   HSTROP T 298* 201*  --  203*   PROTIME  --   --  18.8*  --    INR  --   --  1.56*  --              Lab 09/02/24  0848 09/01/24  0501 08/30/24  1359   IRON  --  24*  --    IRON SATURATION (TSAT)  --  13*  --    TIBC  --  180*  --    TRANSFERRIN  --  121*  --    ABO TYPING O  --  O   RH TYPING Positive  --  Positive   ANTIBODY SCREEN Negative  --  Negative          Brief Urine Lab Results  (Last result in the past 365 days)        Color   Clarity   Blood   Leuk Est   Nitrite   Protein   CREAT   Urine HCG        01/01/24 0550 Yellow   Clear     Negative                       Microbiology Results Abnormal       Procedure Component Value - Date/Time    CANDIDA AURIS SCREEN - Swab, Axilla Right, Axilla Left and Groin [[id number]]  (Normal) Collected: 08/26/24 1715    Lab Status: Final result Specimen: Swab from Axilla Right, Axilla Left and Groin Updated: 08/31/24 2231     Candida Auris Screen Culture No Candida auris isolated at 5 days    Blood Culture - Blood, Hand, Right [[id number]]  (Normal) Collected: 08/26/24 1154    Lab Status: Final result Specimen: Blood from Hand, Right Updated: 08/31/24 1215     Blood Culture No growth at 5 days    Narrative:      Less than seven (7) mL's of blood was collected.  Insufficient quantity may yield false negative results.    Blood Culture - Blood, Arm, Right [[id number]]  (Normal) Collected: 08/26/24 1145    Lab Status: Final result Specimen: Blood from Arm, Right Updated: 08/31/24 1215     Blood Culture No growth at 5 days    Narrative:      Less than seven (7) mL's of blood was collected.  Insufficient quantity may yield false negative results.    MRSA Screen, PCR (Inpatient) - Swab, Nares [[id number]]  (Normal) Collected: 08/26/24 1715    Lab Status: Final result Specimen: Swab from Nares Updated: 08/26/24 2124     MRSA PCR Negative    Narrative:      The negative predictive value of this diagnostic test is high and should only be used to consider de-escalating anti-MRSA therapy. A positive result may indicate colonization with MRSA and must be correlated clinically.  MRSA Negative            XR Chest 1 View    Result Date: 9/1/2024  XR CHEST 1 VW Date of Exam: 9/1/2024 2:44 AM EDT Indication: follow up pulm edema/effusions Comparison: Chest radiograph 8/31/2024. Findings: Unchanged position of right IJ approach central venous catheter.  Sternotomy and CABG. Cardiomediastinal silhouette is unchanged. Decreased diffuse mixed interstitial and airspace disease. Similar small/moderate bilateral pleural effusions with adjacent atelectasis. No pneumothorax. Osseous structures are unchanged.     Impression: Impression: Decreased but persistent pulmonary edema and pulmonary vascular congestion. Similar small/moderate bilateral pleural effusions with adjacent atelectasis. Electronically Signed: Kobe Phoenix MD  9/1/2024 7:50 AM EDT  Workstation ID: [id number]     Results for orders placed during the hospital encounter of 08/26/24    Adult Transthoracic Echo Complete W/ Cont if Necessary Per Protocol    Interpretation Summary    Left ventricular systolic function is mildly decreased. Calculated left ventricular EF = 40%    Left ventricular wall thickness is consistent with mild concentric hypertrophy.    Moderately reduced right ventricular systolic function noted.    There is bileaflet mitral valve thickening present.    Moderate to severe mitral valve regurgitation is present.    Moderate to severe tricuspid valve regurgitation is present.    Calculated right ventricular systolic pressure from tricuspid regurgitation is 73 mmHg.    There is a small (<1cm) pericardial effusion adjacent to the left ventricle.      Current medications:  Scheduled Meds:aspirin, 81 mg, Oral, Daily  b complex-vitamin c-folic acid, 1 tablet, Oral, Daily  calcitriol, 0.5 mcg, Oral, Daily  castor oil-balsam peru, 1 Application, Topical, Daily  insulin lispro, 2-7 Units, Subcutaneous, TID With Meals  meropenem, 500 mg, Intravenous, Q24H  metoprolol tartrate, 25 mg, Oral, Q12H  pantoprazole, 40 mg, Oral, Daily  pharmacy consult - [place], , Does not apply, Daily  sodium chloride, 10 mL, Intravenous, Q12H  vancomycin (dosing per levels), , Does not apply, Daily      Continuous Infusions:nitroglycerin, 5-200 mcg/min, Last Rate: 5 mcg/min (08/29/24 6528)  Pharmacy to dose vancomycin,   sodium  chloride, 9 mL/hr, Last Rate: 250 mL/hr (08/29/24 1020)      PRN Meds:.  acetaminophen    senna-docusate sodium **AND** polyethylene glycol **AND** bisacodyl **AND** bisacodyl    dextrose    dextrose    dextrose    diazePAM    glucagon (human recombinant)    heparin (porcine)    Magnesium Standard Dose Replacement - Follow Nurse / BPA Driven Protocol    nitroglycerin    nitroglycerin    ondansetron ODT **OR** ondansetron    oxyCODONE    Pharmacy to dose vancomycin    simethicone    sodium chloride    sodium chloride    Assessment & Plan   Assessment & Plan     Active Hospital Problems    Diagnosis  POA    **Dry gangrene [I96]  Yes    Chronic diastolic (congestive) heart failure [I50.32]  Yes    Chronic systolic (congestive) heart failure [I50.22]  Yes    Severe malnutrition [E43]  Yes    On home oxygen therapy [Z99.81]  Not Applicable    Type 2 diabetes mellitus with diabetic chronic kidney disease [E11.22]  Yes    NSTEMI (non-ST elevated myocardial infarction) [I21.4]  Yes    Secondary pulmonary arterial hypertension [I27.21]  Yes    Severe mitral valve regurgitation [I34.0]  Yes    Iron deficiency anemia [D50.9]  Yes    ESRD (end stage renal disease) [N18.6]  Yes    Stage 5 chronic kidney disease on chronic dialysis [N18.6, Z99.2]  Not Applicable    Coronary artery disease involving native coronary artery of native heart with angina pectoris [I25.119]  Yes    Essential hypertension [I10]  Yes    Type 2 diabetes mellitus with kidney complication, with long-term current use of insulin [E11.29, Z79.4]  Not Applicable    TIA (transient ischemic attack) [G45.9]  Yes    Hyperlipidemia LDL goal <70 [E78.5]  Yes      Resolved Hospital Problems    Diagnosis Date Resolved POA    Ischemic heart disease [I25.9] 08/29/2024 Yes        Brief Hospital Course to date:  Erlin Savage is a 74 y.o. male with a medical history significant for end-stage renal disease on HD MWF, coronary artery disease s/p CABG, A. Fib on OAC, aortic  stenosis, diabetes mellitus, COPD on 2 to 4 L baseline, hyperlipidemia, hypertension and known peripheral arterial disease with nonhealing right foot transmetatarsal amputation site with dry gangrene.      Right lower extremity critical limb ischemia, recent non-healing right TMA (s/p right bka 8/29/24)  Recent admission for dry gangrene/cellulitis  L foot with ischemic changes of toes   BLE PVD  -My partner discussed with vascular on arrival.   -**s/p right BKA Dr. Schreiber 8/29/24; Vascular following; ; discussed w/ vascular extender on 8/31/24; per that discussion,  ok to use all blood thinners as recommended by cards. Currently on asa 81mg & heparin drip (for post-op nstemi)  -continue  pain control (oxycodone, prn dilaudid), valium for anxiety (doses were slightly decreased on 8/31/24 due to some over sedation)  -Dr. Heaton (ID) following: continue iv vanc, merrem (anticipate stopping iv antibiotics soon/when ok w/ Dr. Heaton)     NSTEMI  CAD status post CABG  CHFmrEF  Valvular heart disease (mod-severe MR, mod-severe tR)  Parox afib  -Most recent echo in system from 2023 with a EF of 43%, severe diastolic dysfunction, moderate mitral regurgitation, moderate tricuspid regurgitation.  -Echo 8/28/24: LV ef 40%, mod reduced RV fxn; mod-severe MR, mod-severe TR  -evening of 8/29/24 (after right bka), patient developed 10/10 chest pain, relieved w/ sl nitro; EKG w/ lateral ischemic changes; marked elevated troponin.   -eliquis had been on hold for surgery  -Cards following: cardiology reviewed heart cath films from [facility] in march, not likely to be targets for revascularization, proceeded w/ medical therapy (heparin drip x 48 hours from marquise pain, nitro drip, etc): s/p heparin drip; prn nitro gtt; continue asa 81, metoprolol(per my discussion w/ vascular surgery on 8/30/24 they are ok for all antiplatelet/anticoagulants that cardiology recommends)    Anemia (chronic renal dz, & post-op blood loss)  **s/p 1 unit prbc  on 8/30/24 w/ dialysis (had drop in hgb from 8.5 to 7.3 post-op)  -goal hgb >7.5 in light of nstemi  -no overt bleeding overrnight  -hgb 7.3 on 9/1  **s/p 1 unit prbc on 9/2/24 w/ dialysis (in light of cardiac ischemic & anemia)  -monitor hgb , goal hgb >8 or symptoms    End-stage renal disease   Hyponatremia, improved  Hyperkalemia, improved  -s/p extra dialysis session Saturday 8/31 (hyperkalemia & volume overload)  -Nephrology following: M/W/F DIALYSIS     Ascites  Lower extremity edema  -multifactorial secondary to CHF, end-stage renal disease  -Per patient, he gets monthly paracentesis at hospital in [place].  Denies any prior history of cirrhosis.     PAF  -Held Eliquis in anticipation of surgery, currently on heparin     DM2  -A1c 6.2 on 8/26  -SSI w/ meals for now, monitor      Chronic hypoxic respiratory failure  COPD on chronic home O2  -4L NC at baseline, currently on 3LNc  -DuoNebs PRN    Depression  -started zoloft on 8/31, stopped it on 9/1/24 per patient wife request, said it made patient too sleepy       Am labs: cbc, renal       Expected Discharge Location and Transportation: ? home  Expected Discharge   Expected Discharge Date: 9/3/2024; Expected Discharge Time:      VTE Prophylaxis:  Pharmacologic & mechanical VTE prophylaxis orders are present.         AM-PAC 6 Clicks Score (PT): 13 (09/01/24 0988)    CODE STATUS:   Code Status and Medical Interventions: CPR (Attempt to Resuscitate); Full Support   Ordered at: 08/26/24 1218     Level Of Support Discussed With:    Patient     Code Status (Patient has no pulse and is not breathing):    CPR (Attempt to Resuscitate)     Medical Interventions (Patient has pulse or is breathing):    Full Support       Brandon Crook MD  09/02/24

## 2024-09-02 NOTE — PROGRESS NOTES
"Pharmacy Consult - Vancomycin Dosing and Monitoring (Renal Dysfunction / Dialysis)    Erlin Savage is a 74 y.o. male receiving vancomycin therapy.     Indication: SSTI - R foot infection   Consulting Provider: Hospitalist  ID Consult: Yes - Dr. Heaton     Goal Trough: 15-20 mcg/mL    Allergies  Allergies as of 08/26/2024 - Reviewed 08/26/2024   Allergen Reaction Noted    Cefepime Other (See Comments) and Seizure 12/31/2023    Doxycycline Other (See Comments) 06/08/2023    Gabapentin Confusion 11/16/2023    Ranolazine Dizziness and Other (See Comments) 12/11/2023    Cephalosporins Unknown - Low Severity 02/10/2024    Hydralazine Unknown - Low Severity 03/05/2024    Augmentin [amoxicillin-pot clavulanate] Palpitations 08/16/2016    Biaxin [clarithromycin] Palpitations 08/16/2016    Coreg [carvedilol] Itching 08/16/2016    Crestor [rosuvastatin calcium] Itching 01/09/2017    Iodine Rash 10/02/2023    Lipitor [atorvastatin] Itching 08/16/2016    Lisinopril Cough 08/16/2016    Livalo [pitavastatin] Unknown - Low Severity 08/16/2016    Nortriptyline hcl Other (See Comments) 12/05/2023    Pravastatin Unknown - Low Severity 08/16/2016    Questran [cholestyramine] Unknown - Low Severity 08/16/2016     Labs  Results from last 7 days   Lab Units 09/02/24  0436 09/01/24  0501 08/31/24  0759   BUN mg/dL 29* 25* 30*   CREATININE mg/dL 4.58* 3.51* 4.07*     Results from last 7 days   Lab Units 09/02/24  0436 09/01/24  0501 08/31/24  0759   WBC 10*3/mm3 7.68 9.37 9.76     Evaluation of Dosing     Last Dose Received in the ED/Outside Facility: N/A  Is Patient on Dialysis or Renal Replacement: HD, MWF outpatient    Height - 180.3 cm (71\")  Weight - 87.9 kg (193 lb 11.2 oz)    Estimated Creatinine Clearance: 18.1 mL/min (A) (by C-G formula based on SCr of 4.58 mg/dL (H)).    Intake & Output (last 3 days)       None          Microbiology  Microbiology Results (last 10 days)       Procedure Component Value - Date/Time    MARK AURIS " SCREEN - Swab, Axilla Right, Axilla Left and Groin [[id number]]  (Normal) Collected: 08/26/24 1715    Lab Status: Final result Specimen: Swab from Axilla Right, Axilla Left and Groin Updated: 08/31/24 2231     Candida Auris Screen Culture No Candida auris isolated at 5 days    MRSA Screen, PCR (Inpatient) - Swab, Nares [[id number]]  (Normal) Collected: 08/26/24 1715    Lab Status: Final result Specimen: Swab from Nares Updated: 08/26/24 2124     MRSA PCR Negative    Narrative:      The negative predictive value of this diagnostic test is high and should only be used to consider de-escalating anti-MRSA therapy. A positive result may indicate colonization with MRSA and must be correlated clinically.  MRSA Negative    Blood Culture - Blood, Hand, Right [[id number]]  (Normal) Collected: 08/26/24 1154    Lab Status: Final result Specimen: Blood from Hand, Right Updated: 08/31/24 1215     Blood Culture No growth at 5 days    Narrative:      Less than seven (7) mL's of blood was collected.  Insufficient quantity may yield false negative results.    Blood Culture - Blood, Arm, Right [[id number]]  (Normal) Collected: 08/26/24 1145    Lab Status: Final result Specimen: Blood from Arm, Right Updated: 08/31/24 1215     Blood Culture No growth at 5 days    Narrative:      Less than seven (7) mL's of blood was collected.  Insufficient quantity may yield false negative results.          Vancomycin Levels  Results from last 7 days   Lab Units 09/02/24  0436 08/28/24  0523 08/27/24  0549   VANCOMYCIN RM mcg/mL 25.30 14.70 19.00       Results from last 7 days   Lab Units 08/30/24  1253   VANCOMYCIN TR mcg/mL 20.80*     Assessment/Plan:  Pharmacy to dose vancomycin for SSTI. Patient with ESRD on HD MWF, nephrology adding extra HD sessions PRN (last HD 8/31). Goal trough 15-20mcg/mL. Dosing per pre-HD levels.   Last dose of vancomycin 750mg IV given post-HD on 8/31 @ 1445. Random pre-HD level this AM resulted 25.3mcg/mL.   Will not order  supplemental dose to be given post-HD today.   Reassess clearance by random pre-HD level on 9/4 @ 0600.   Pharmacy will continue to monitor renal function, cultures and sensitivities, and clinical status to adjust regimen as necessary.    Taylor Riedel-Rogers, PharmD, BCPS  9/2/2024  09:27 EDT

## 2024-09-02 NOTE — PROGRESS NOTES
LOS: 7 days   Patient Care Team:  Joe Diallo MD as PCP - General (Family Medicine)  Fadi Garner MD as Consulting Physician (General Surgery)  Amor Celeste MD (Cardiology)  Praveen Novoa MD as Consulting Physician (Nephrology)  Kristin Moya MD as Consulting Physician (Pulmonary Disease)    Chief Complaint: ESRD  Right non healing foot wound  Wound infection.    Subjective   Patient seen on dialysis.  Anemia is noted supposed to get another blood transfusion today on dialysis.  Objective     Vital Sign Min/Max for last 24 hours  Temp  Min: 97.1 °F (36.2 °C)  Max: 97.7 °F (36.5 °C)   BP  Min: 97/58  Max: 144/73   Pulse  Min: 74  Max: 88   Resp  Min: 16  Max: 17   SpO2  Min: 94 %  Max: 100 %   Flow (L/min)  Min: 2  Max: 3.5   Weight  Min: 87.9 kg (193 lb 11.2 oz)  Max: 87.9 kg (193 lb 11.2 oz)         No intake/output data recorded.  I/O last 3 completed shifts:  In: 1656.4 [P.O.:720; I.V.:936.4]  Out: -   Objective:  General Appearance: Awake alert oriented mild distress due to pain  Eyes: PERR supple JVD  Neck: Supple no JVD.  Lungs: Clear to auscultation, equal chest movement, nonlabored.  Heart: No gallop, murmur, rub, RRR.  Abdomen: Soft, nontender, positive bowel sounds, no organomegaly.  Extremities: 1-2+ bilateral lower extremity edema, right BKA.  Neuro: No focal deficit, moving all extremities, alert oriented X 3  Tunneled dialysis catheter right IJ    Results Review:     I reviewed the patient's new clinical results.    WBC WBC   Date Value Ref Range Status   09/02/2024 7.68 3.40 - 10.80 10*3/mm3 Final   09/01/2024 9.37 3.40 - 10.80 10*3/mm3 Final   08/31/2024 9.76 3.40 - 10.80 10*3/mm3 Final   08/30/2024 11.05 (H) 3.40 - 10.80 10*3/mm3 Final      HGB Hemoglobin   Date Value Ref Range Status   09/02/2024 7.5 (L) 13.0 - 17.7 g/dL Final   09/01/2024 7.3 (L) 13.0 - 17.7 g/dL Final   08/31/2024 7.6 (L) 13.0 - 17.7 g/dL Final   08/30/2024 7.3 (L) 13.0 - 17.7 g/dL Final      HCT  "Hematocrit   Date Value Ref Range Status   09/02/2024 25.5 (L) 37.5 - 51.0 % Final   09/01/2024 24.2 (L) 37.5 - 51.0 % Final   08/31/2024 25.1 (L) 37.5 - 51.0 % Final   08/30/2024 24.9 (L) 37.5 - 51.0 % Final      Platlets No results found for: \"LABPLAT\"   MCV MCV   Date Value Ref Range Status   09/02/2024 94.4 79.0 - 97.0 fL Final   09/01/2024 92.7 79.0 - 97.0 fL Final   08/31/2024 91.9 79.0 - 97.0 fL Final   08/30/2024 92.9 79.0 - 97.0 fL Final          Sodium Sodium   Date Value Ref Range Status   09/02/2024 132 (L) 136 - 145 mmol/L Final   09/01/2024 135 (L) 136 - 145 mmol/L Final   08/31/2024 135 (L) 136 - 145 mmol/L Final   08/30/2024 129 (L) 136 - 145 mmol/L Final      Potassium Potassium   Date Value Ref Range Status   09/02/2024 5.1 3.5 - 5.2 mmol/L Final   09/01/2024 4.7 3.5 - 5.2 mmol/L Final   08/31/2024 5.3 (H) 3.5 - 5.2 mmol/L Final   08/30/2024 6.7 (C) 3.5 - 5.2 mmol/L Final      Chloride Chloride   Date Value Ref Range Status   09/02/2024 97 (L) 98 - 107 mmol/L Final   09/01/2024 96 (L) 98 - 107 mmol/L Final   08/31/2024 98 98 - 107 mmol/L Final   08/30/2024 94 (L) 98 - 107 mmol/L Final      CO2 CO2   Date Value Ref Range Status   09/02/2024 24.0 22.0 - 29.0 mmol/L Final   09/01/2024 24.0 22.0 - 29.0 mmol/L Final   08/31/2024 23.0 22.0 - 29.0 mmol/L Final   08/30/2024 22.0 22.0 - 29.0 mmol/L Final      BUN BUN   Date Value Ref Range Status   09/02/2024 29 (H) 8 - 23 mg/dL Final   09/01/2024 25 (H) 8 - 23 mg/dL Final   08/31/2024 30 (H) 8 - 23 mg/dL Final   08/30/2024 43 (H) 8 - 23 mg/dL Final      Creatinine Creatinine   Date Value Ref Range Status   09/02/2024 4.58 (H) 0.76 - 1.27 mg/dL Final   09/01/2024 3.51 (H) 0.76 - 1.27 mg/dL Final   08/31/2024 4.07 (H) 0.76 - 1.27 mg/dL Final   08/30/2024 5.17 (H) 0.76 - 1.27 mg/dL Final      Calcium Calcium   Date Value Ref Range Status   09/02/2024 9.1 8.6 - 10.5 mg/dL Final   09/01/2024 9.4 8.6 - 10.5 mg/dL Final   08/31/2024 9.1 8.6 - 10.5 mg/dL Final " "  08/30/2024 9.3 8.6 - 10.5 mg/dL Final      PO4 No results found for: \"CAPO4\"   Albumin Albumin   Date Value Ref Range Status   09/02/2024 2.8 (L) 3.5 - 5.2 g/dL Final   09/01/2024 3.0 (L) 3.5 - 5.2 g/dL Final   08/31/2024 3.2 (L) 3.5 - 5.2 g/dL Final   08/30/2024 3.1 (L) 3.5 - 5.2 g/dL Final      Magnesium Magnesium   Date Value Ref Range Status   08/31/2024 1.9 1.6 - 2.4 mg/dL Final      Uric Acid No results found for: \"URICACID\"     Medication Review: yes    Assessment & Plan      1.  ESRD: On HD MWF.  Follows with [facility] @ [facility].    2.  Hypertension: Blood pressure stable.  Monitor for now.     3.  Hyperkalemia: Recurrent issue.      4.  Hyponatremia: Continue fluid rate restriction and adjust with HD.     5.  Metabolic acidosis: Adjust with HD.     6.  Volume: Hx of ascites and dependent edema. Requiring serial paracentesis as outpatient.     7.  Peripheral vascular disease: Patient with nonhealing right transmetatarsal amputation site with dry gangrene and wound infection. MRI unable to rule out Osteo. S/p BKA 8/29/24       Dialysis been progress, if blood is available blood transfusion will be given Case discussed with Dr. Crook.    Gabriel Rodriguez MD  09/02/24  08:21 EDT            "

## 2024-09-02 NOTE — PLAN OF CARE
Goal Outcome Evaluation:  HD completed. Tolerated well. Access functioned well. UF goal reached of 4500 mL. Blood returned. Report given to primary AMOS Villanueva.       Problem: Device-Related Complication Risk (Hemodialysis)  Goal: Safe, Effective Therapy Delivery  Outcome: Ongoing, Progressing  Intervention: Optimize Device Care and Function  Recent Flowsheet Documentation  Taken 9/2/2024 0700 by Maura Gutiérrez, RN  Medication Review/Management: medications reviewed     Problem: Hemodynamic Instability (Hemodialysis)  Goal: Effective Tissue Perfusion  Outcome: Ongoing, Progressing     Problem: Infection (Hemodialysis)  Goal: Absence of Infection Signs and Symptoms  Outcome: Ongoing, Progressing

## 2024-09-02 NOTE — PROGRESS NOTES
"Erlin Savage  1950  [id number]      Evaluating Physician: Marlo Heaton MD    Chief Complaint: right foot infection    Reason for Consultation: right foot infection    History of present illness:     Patient is a 74 y.o.  Yr old male noncompliant previously, with prior history of  end-stage renal disease with intermittent hemodialysis via right chest central line.  He has known peripheral arterial disease with right leg angiogram on April 10 with recanalization of SFA/PTA; post procedure with continued worsening pain at the right distal leg, gangrene at his right second/third and fourth toes with second worse than third worse than fourth.   MRI right foot was soft tissue edema, no loculated collection with abnormality at the posterior calcaneus difficult to completely exclude osteomyelitis at that site as per radiology.     4/19/24 Dr Santos   \"Procedures: Procedure(s):  RIGHT Foot  Second toe amputation\"     4/23/24  Dr Santos .  \"Procedures: Procedure(s):  RIGHT Foot     Amputation hallux  Amputation third toe  Amputation fourth toe  Amputation fifth toe\"     Pathology from April 23, 2024 with no osteomyelitis.  Pathology had noted cellulitis and gangrenous necrosis.  Transitioned to IV vancomycin and oral Augmentin at discharge.     Revascularization on May 14, 2024 by Dr. Schreiber  \"PROCEDURES WITH LATERALITY:   Ultrasound guided access of left common femoral artery with micropuncture needle and images stored  Abd Aorotgram  Right lower extremity arteriogram  Balloon angioplasty of right posterior tibial artery with a 3 mm balloon  Balloon angioplasty of right posterior tibial artery origin4 mm balloon drug-coated  Balloon angioplasty of right peroneal artery with 3 mm balloon  Balloon angioplasty of right anterior tibial artery 3 mm balloon  Balloon angioplasty of right proximal superficial femoral artery with a 5 mm balloon\"     Readmitted August 14 with deterioration at the right foot over unspecified " "period of time per patient, at least weeks if not longer with large wound, exposed bone on the medial side.     8/20/24 he  refused recommendation of amputation from surgical team and left [facility].     8/26/24 readmitted on August 26 with reports to me that patient now agreeable to right side amputation by Dr. Schreiber    8/29/24   \"Procedure(s):  Right below the knee amputation and all other indicated procedures\"    9/2/24 Sleepy ; neph following; no fever; no new pain; Right stump pain is sharp, nonradiating, constant, worse with palpation, better with pain meds and 3 /10     No headache photophobia or neck stiffness. No shortness of breath cough or hemoptysis. No nausea vomiting diarrhea or abdominal pain. No dysuria hematuria or pyuria. No other new skin rash.     Review of Systems     9/2/24  no adr to abx     Constitutional-- No Fever, chills or sweats.  Appetite good. No malaise.  Heent-- No new vision, hearing or throat complaints.  No epistaxis or oral sores.  Denies odynophagia or dysphagia.  No headache, photophobia or neck stiffness.  CV-- No chest pain, palpitation or syncope  Resp-- No SOB, cough, or hemoptysis  GI- No nausea, vomiting, or diarrhea.   -- No dysuria, hematuria, or flank pain.  Denies hesitancy, urgency or flank pain.  Lymph- no swollen lymph nodes in neck, axilla or groin.   Heme- No active bruising or bleeding  MS-- no swelling or pain in the bones or joints of arms or legs.  No new back pain.  Neuro-- No acute focal weakness or numbness in the arms or legs.  Skin-- No rash    Past Medical History:   Diagnosis Date    Anxiety     Anxiety disorder     Back pain     Chronic back pain     work related injury    CKD (chronic kidney disease)     most recent creatinine 2.7 on 01/26/16    Coronary artery disease     Diabetes mellitus     insulin dependent diabetes Onset 2010    Dyslipidemia     Hyperlipidemia     Hypertension     Ischemic heart disease     Renal failure     Stroke     TIA / " "transient right vision loss age 42    TIA (transient ischemic attack)     Vertigo        Past Surgical History:   Procedure Laterality Date    APPENDECTOMY      BELOW KNEE AMPUTATION Right 8/29/2024    Procedure: below the knee amputation Right;  Surgeon: Junior Schreiber MD;  [facility];  Service: Vascular;  Laterality: Right;    CARDIAC CATHETERIZATION      CHOLECYSTECTOMY  03/2012    COLONOSCOPY      CORONARY ARTERY BYPASS GRAFT      ENDOSCOPY         Pediatric History   Patient Parents    Not on file     Other Topics Concern    Not on file   Social History Narrative    Not on file       family history is not on file.    Allergies   Allergen Reactions    Cefepime Other (See Comments) and Seizure     Myoclonus - Has received cefazolin    Doxycycline Other (See Comments)     Joint pain, tongue swelling    \"Body locks up\"    Metoprolol Hives, Shortness Of Breath and Itching    Gabapentin Confusion    Ranolazine Dizziness and Other (See Comments)     Severe tremors/ shakiness    Hydralazine Unknown - Low Severity    Augmentin [Amoxicillin-Pot Clavulanate] Palpitations    Biaxin [Clarithromycin] Palpitations    Cephalosporins Unknown - Low Severity     Has received cefazolin    Coreg [Carvedilol] Itching    Crestor [Rosuvastatin Calcium] Itching     Itching rash    Iodine Rash     \"pineda skin\"    Lipitor [Atorvastatin] Itching     And Crestor- generalized weakness and chest tightness    Lisinopril Cough     Cough /weakness, nauseas and dirrhea    Livalo [Pitavastatin] Unknown - Low Severity     Chest tightness    Nortriptyline Hcl Other (See Comments)     Arthralgias    Pravastatin Unknown - Low Severity     Chest , neck and arm discomfort    Questran [Cholestyramine] Unknown - Low Severity       Medication:  Current Facility-Administered Medications   Medication Dose Route Frequency Provider Last Rate Last Admin    acetaminophen (TYLENOL) tablet 650 mg  650 mg Oral Q6H PRN Junior Schreiber MD   650 mg at " 09/01/24 2139    aspirin EC tablet 81 mg  81 mg Oral Daily Junior Schreiber MD   81 mg at 09/01/24 0856    b complex-vitamin c-folic acid (NEPHRO-SEVERIANO) tablet 1 tablet  1 tablet Oral Daily Junior Schreiber MD   1 tablet at 09/01/24 0856    sennosides-docusate (PERICOLACE) 8.6-50 MG per tablet 2 tablet  2 tablet Oral BID PRN Junior Schreiber MD        And    polyethylene glycol (MIRALAX) packet 17 g  17 g Oral Daily PRN Junior Schreiber MD        And    bisacodyl (DULCOLAX) EC tablet 5 mg  5 mg Oral Daily PRN Junior Schreiber MD        And    bisacodyl (DULCOLAX) suppository 10 mg  10 mg Rectal Daily PRN Junior Schreiber MD        calcitriol (ROCALTROL) capsule 0.5 mcg  0.5 mcg Oral Daily Junior Schreiber MD   0.5 mcg at 09/01/24 0856    castor oil-balsam peru (VENELEX) ointment 1 Application  1 Application Topical Daily Junior Schreiber MD   1 Application at 08/31/24 0818    dextrose (D50W) (25 g/50 mL) IV injection 25 g  25 g Intravenous Q15 Min PRN Junior Schreiber MD        dextrose (D50W) (25 g/50 mL) IV injection 25 g  25 g Intravenous Q15 Min PRN Brandon Crook MD        dextrose (GLUTOSE) oral gel 15 g  15 g Oral Q15 Min PRN Junior Schreiber MD        diazePAM (VALIUM) tablet 2.5 mg  2.5 mg Oral Q6H PRN Brandon Crook MD        glucagon (GLUCAGEN) injection 1 mg  1 mg Intramuscular Q15 Min PRN Junior Schreiber MD        heparin (porcine) injection 2,000 Units  2,000 Units Intracatheter PRN Junior Schreiber MD   2,000 Units at 08/31/24 1049    Insulin Lispro (humaLOG) injection 2-7 Units  2-7 Units Subcutaneous TID With Meals Brandon Crook MD   2 Units at 09/01/24 1248    Magnesium Standard Dose Replacement - Follow Nurse / BPA Driven Protocol   Does not apply PRN Brandon Crook MD        meropenem (MERREM) 500 mg in sodium chloride 0.9 % 100 mL MBP  500 mg Intravenous Q24H Junior Schreiber MD   500 mg at 09/01/24 2415    metoprolol tartrate (LOPRESSOR) tablet 25 mg  25 mg Oral Q12H  Dada Darden IV, MD   25 mg at 09/01/24 0856    nitroglycerin (NITROSTAT) SL tablet 0.4 mg  0.4 mg Sublingual Q5 Min PRN Junior Schreiber MD   0.4 mg at 08/29/24 1529    nitroglycerin (NITROSTAT) SL tablet 0.4 mg  0.4 mg Sublingual Q5 Min PRN Junior Schreiber MD        nitroglycerin (TRIDIL) 200 mcg/ml infusion  5-200 mcg/min Intravenous Titrated Dada Darden IV, MD 1.5 mL/hr at 08/29/24 2342 5 mcg/min at 08/29/24 2342    ondansetron ODT (ZOFRAN-ODT) disintegrating tablet 4 mg  4 mg Oral Q6H PRN Junior Schreiber MD   4 mg at 08/27/24 0938    Or    ondansetron (ZOFRAN) injection 4 mg  4 mg Intravenous Q6H PRN Junior Schreiber MD   4 mg at 08/30/24 0440    oxyCODONE (ROXICODONE) immediate release tablet 7.5 mg  7.5 mg Oral Q4H PRN Brandon Crook MD   7.5 mg at 09/01/24 1919    pantoprazole (PROTONIX) EC tablet 40 mg  40 mg Oral Daily Junior Schreiber MD   40 mg at 09/01/24 0856    Pharmacy Consult - MTM   Does not apply Daily Phillip Escoto [facility]        Pharmacy to dose vancomycin   Does not apply Continuous PRN Junior Schreiber MD        simethicone (MYLICON) chewable tablet 80 mg  80 mg Oral 4x Daily PRN Cassandra Wiseman APRN        sodium chloride 0.9 % flush 10 mL  10 mL Intravenous Q12H Junior Schreiber MD   10 mL at 09/01/24 2140    sodium chloride 0.9 % flush 10 mL  10 mL Intravenous PRN Junior Schreiber MD        sodium chloride 0.9 % infusion 40 mL  40 mL Intravenous PRN Junior Schreiber MD        sodium chloride 0.9 % infusion  9 mL/hr Intravenous Continuous Junior Schreiber  mL/hr at 08/29/24 1020 Rate Change at 08/29/24 1020    vancomycin (dosing per levels)   Does not apply Daily Scar Garzon [facility]           Antibiotics:  Anti-Infectives (From admission, onward)      Ordered     Dose/Rate Route Frequency Start Stop    08/31/24 1216  vancomycin 750 mg in sodium chloride 0.9 % 250 mL IVPB-VTB        Ordering Provider: Bear Win, PharmD    10 mg/kg × 78.9  "kg  333.3 mL/hr over 45 Minutes Intravenous Once 08/31/24 1315 08/31/24 1530    08/30/24 1739  vancomycin (dosing per levels)        Ordering Provider: Scar Garzon RPH     Does not apply Daily 08/30/24 1830 09/05/24 0859    08/30/24 1546  vancomycin (VANCOCIN) 1,000 mg in sodium chloride 0.9 % 250 mL IVPB-VTB        Ordering Provider: Phillip Escoto RPH    1,000 mg  250 mL/hr over 60 Minutes Intravenous Once 08/30/24 1800 08/30/24 1826    08/28/24 1248  Vancomycin HCl 1,250 mg in sodium chloride 0.9 % 250 mL VTB        Ordering Provider: Kyrie Walsh PharmD    1,250 mg  200 mL/hr over 75 Minutes Intravenous Once 08/28/24 1500 08/28/24 1716    08/26/24 1304  meropenem (MERREM) 500 mg in sodium chloride 0.9 % 100 mL MBP        Ordering Provider: Junior Schreiber MD    500 mg  over 3 Hours Intravenous Every 24 Hours 08/27/24 1600 09/02/24 2259    08/26/24 1306  vancomycin IVPB 1750 mg in 0.9% Sodium Chloride (premix) 500 mL        Ordering Provider: Kyrie Walsh PharmD    1,750 mg  285.7 mL/hr over 105 Minutes Intravenous Once 08/26/24 1800 08/26/24 1957    08/26/24 1304  meropenem (MERREM) 1,000 mg in sodium chloride 0.9 % 100 mL MBP        Ordering Provider: Lisa Rowe DO    1,000 mg  over 30 Minutes Intravenous Once 08/26/24 1500 08/26/24 1604    08/26/24 1306  vancomycin (dosing per levels)        Ordering Provider: Kyrie Walsh PharmD     Does not apply Daily 08/26/24 1400 08/29/24 0859    08/26/24 1223  Pharmacy to dose vancomycin        Ordering Provider: Junior Schreiber MD     Does not apply Continuous PRN 08/26/24 1223 09/02/24 2359                 Physical Exam:   Vital Signs   /60   Pulse 79   Temp 97.6 °F (36.4 °C) (Oral)   Resp 16   Ht 180.3 cm (71\")   Wt 87.9 kg (193 lb 11.2 oz)   SpO2 100%   BMI 27.02 kg/m²       GENERAL: sleepy.   chronically debilitated  HEENT: Normocephalic, atraumatic.    No conjunctival injection. No icterus. Oropharynx clear without evidence of thrush or " exudate.   NECK: Supple without nuchal rigidity. No mass.   HEART: RRR; No murmur.  LUNGS: Clear to auscultation bilaterally without wheezing, rales, rhonchi. Normal respiratory effort. Nonlabored.  ABDOMEN: Soft, nontender, nondistended. Positive bowel sounds. No rebound or guarding.  EXT:  No cyanosis, clubbing or edema.  MSK: FROM without joint effusions noted arms/legs.    SKIN: Warm and dry without cutaneous eruptions on Inspection/palpation.    NEURO: sleepy     Left foot first and third toe distally with small black areas,  toes with only minimal erythema, no discrete mass bulge or fluctuance.  No crepitus or bulla     Right LE amp noted;  no new visible redness/purulence.  No discrete mass bulge or fluctuance.  No crepitus or bulla.     No peripheral stigmata/phenomenon of endocarditis    Laboratory Data    Results from last 7 days   Lab Units 09/02/24  0436 09/01/24  0501 08/31/24  0759   WBC 10*3/mm3 7.68 9.37 9.76   HEMOGLOBIN g/dL 7.5* 7.3* 7.6*   HEMATOCRIT % 25.5* 24.2* 25.1*   PLATELETS 10*3/mm3 225 208 221     Results from last 7 days   Lab Units 09/02/24  0436   SODIUM mmol/L 132*   POTASSIUM mmol/L 5.1   CHLORIDE mmol/L 97*   CO2 mmol/L 24.0   BUN mg/dL 29*   CREATININE mg/dL 4.58*   GLUCOSE mg/dL 226*   CALCIUM mg/dL 9.1     Results from last 7 days   Lab Units 08/28/24  0523   ALK PHOS U/L 198*   BILIRUBIN mg/dL 0.5   ALT (SGPT) U/L 17   AST (SGOT) U/L 27               Estimated Creatinine Clearance: 18.1 mL/min (A) (by C-G formula based on SCr of 4.58 mg/dL (H)).      Microbiology:      Radiology:  Imaging Results (Last 72 Hours)       Procedure Component Value Units Date/Time    XR Chest 1 View [[id number]] Collected: 09/01/24 0749     Updated: 09/01/24 0753    Narrative:      XR CHEST 1 VW    Date of Exam: 9/1/2024 2:44 AM EDT    Indication: follow up pulm edema/effusions    Comparison: Chest radiograph 8/31/2024.    Findings:  Unchanged position of right IJ approach central venous catheter.  Sternotomy and CABG. Cardiomediastinal silhouette is unchanged. Decreased diffuse mixed interstitial and airspace disease. Similar small/moderate bilateral pleural effusions with adjacent   atelectasis. No pneumothorax. Osseous structures are unchanged.      Impression:      Impression:  Decreased but persistent pulmonary edema and pulmonary vascular congestion. Similar small/moderate bilateral pleural effusions with adjacent atelectasis.      Electronically Signed: Kobe Phoenix MD    9/1/2024 7:50 AM EDT    Workstation ID: [id number]    XR Chest 1 View [[id number]] Collected: 08/31/24 0814     Updated: 08/31/24 0820    Narrative:      XR CHEST 1 VW    Date of Exam: 8/31/2024 2:08 AM EDT    Indication: chest pain    Comparison: 2/24/2024    Findings:  Cardiomediastinal silhouette is stable. There is atherosclerosis of the aorta. There is pulmonary vascular congestion with diffuse interstitial thickening and patchy bibasilar opacities. There are small bilateral pleural effusions. A right IJ tunneled   hemodialysis catheter terminates in the right atrium. Median sternotomy wires are present.      Impression:      Impression:  Cardiomegaly with pulmonary vascular congestion and mixed interstitial/airspace opacities with small bilateral effusions. Findings suggestive of CHF/pulmonary edema.      Electronically Signed: Shama Chairez MD    8/31/2024 8:17 AM EDT    Workstation ID: [id number]              Impression:     --Acute right foot surgical site/wound infection/cellulitis, exposed bone on the medial side consistent with first metatarsal osteomyelitis and likely sequela to ischemia with peripheral vascular disease and other comorbidity as above. High risk for further serious morbidity and other serious sequela. They understand risk for persistent/recurrent or nonhealing wounds, persistent/progressive or recurrent infection and risk for further functional/limb loss, amputation, chronic pain etc. They know antibiotics and  surgery not a guarantee for cure. These risks will persist. Timing/option threshold for surgery per surgical team at their discretion.  Vascular team has recommended amputation which patient has previously refused, left the hospital AGAINST MEDICAL ADVICE on August 20 but subsequently readmitted August 26 and agreeable to surgical recommendation, UZAIR 8/29    --Peripheral vascular disease.  Prior interventions as above.    --Left foot with small blackened areas at first/third toe likely sequela to ischemia.  Vascular team to help determine options and salvageability as above    --End-stage renal disease on hemodialysis Monday/Wednesday/Friday.    --Diabetes.  Glucose control per medicine team    --Cephalosporin and doxycycline intolerances in the past.  Has tolerated penicillin class    PLAN:        --IV vancomycin, merrem; anticipate stop IV abx by discharge if steadily better     **Cx at [facility] with PSA x 2 (zosyn DD, not ideal EDWIN), enterococcus and corynebac      --check/review labs cultures and scans     --Partial history per nursing staff     --Discussed with microbiology      --Highly complex set of issues with high risk for further serious morbidity and other serious sequela     --Monitor IV and IV antibiotic with risk for systemic complication and potential drug interactions    Copied text in this note has been reviewed and is accurate as of 09/02/24.        Marlo Heaton MD  9/2/2024

## 2024-09-03 LAB
ALBUMIN SERPL-MCNC: 2.8 G/DL (ref 3.5–5.2)
ANION GAP SERPL CALCULATED.3IONS-SCNC: 9 MMOL/L (ref 5–15)
BH BB BLOOD EXPIRATION DATE: NORMAL
BH BB BLOOD TYPE BARCODE: 9500
BH BB DISPENSE STATUS: NORMAL
BH BB PRODUCT CODE: NORMAL
BH BB UNIT NUMBER: NORMAL
BUN SERPL-MCNC: 23 MG/DL (ref 8–23)
BUN/CREAT SERPL: 6.9 (ref 7–25)
CALCIUM SPEC-SCNC: 9 MG/DL (ref 8.6–10.5)
CHLORIDE SERPL-SCNC: 97 MMOL/L (ref 98–107)
CO2 SERPL-SCNC: 25 MMOL/L (ref 22–29)
CREAT SERPL-MCNC: 3.35 MG/DL (ref 0.76–1.27)
CROSSMATCH INTERPRETATION: NORMAL
DEPRECATED RDW RBC AUTO: 61.6 FL (ref 37–54)
EGFRCR SERPLBLD CKD-EPI 2021: 18.7 ML/MIN/1.73
ERYTHROCYTE [DISTWIDTH] IN BLOOD BY AUTOMATED COUNT: 18.6 % (ref 12.3–15.4)
GLUCOSE BLDC GLUCOMTR-MCNC: 187 MG/DL (ref 70–130)
GLUCOSE BLDC GLUCOMTR-MCNC: 232 MG/DL (ref 70–130)
GLUCOSE BLDC GLUCOMTR-MCNC: 249 MG/DL (ref 70–130)
GLUCOSE BLDC GLUCOMTR-MCNC: 255 MG/DL (ref 70–130)
GLUCOSE BLDC GLUCOMTR-MCNC: 304 MG/DL (ref 70–130)
GLUCOSE SERPL-MCNC: 190 MG/DL (ref 65–99)
HBV CORE IGM SERPL QL IA: NORMAL
HBV SURFACE AG SERPL QL IA: NORMAL
HCT VFR BLD AUTO: 27.7 % (ref 37.5–51)
HGB BLD-MCNC: 8.3 G/DL (ref 13–17.7)
MCH RBC QN AUTO: 28.1 PG (ref 26.6–33)
MCHC RBC AUTO-ENTMCNC: 30 G/DL (ref 31.5–35.7)
MCV RBC AUTO: 93.9 FL (ref 79–97)
PHOSPHATE SERPL-MCNC: 4.5 MG/DL (ref 2.5–4.5)
PLATELET # BLD AUTO: 220 10*3/MM3 (ref 140–450)
PMV BLD AUTO: 10 FL (ref 6–12)
POTASSIUM SERPL-SCNC: 4.9 MMOL/L (ref 3.5–5.2)
RBC # BLD AUTO: 2.95 10*6/MM3 (ref 4.14–5.8)
SODIUM SERPL-SCNC: 131 MMOL/L (ref 136–145)
UNIT  ABO: NORMAL
UNIT  RH: NORMAL
WBC NRBC COR # BLD AUTO: 7.04 10*3/MM3 (ref 3.4–10.8)

## 2024-09-03 PROCEDURE — 80069 RENAL FUNCTION PANEL: CPT | Performed by: INTERNAL MEDICINE

## 2024-09-03 PROCEDURE — 82948 REAGENT STRIP/BLOOD GLUCOSE: CPT

## 2024-09-03 PROCEDURE — 63710000001 INSULIN LISPRO (HUMAN) PER 5 UNITS: Performed by: INTERNAL MEDICINE

## 2024-09-03 PROCEDURE — 86705 HEP B CORE ANTIBODY IGM: CPT | Performed by: INTERNAL MEDICINE

## 2024-09-03 PROCEDURE — 97110 THERAPEUTIC EXERCISES: CPT

## 2024-09-03 PROCEDURE — 25010000002 MEROPENEM PER 100 MG: Performed by: INTERNAL MEDICINE

## 2024-09-03 PROCEDURE — 87340 HEPATITIS B SURFACE AG IA: CPT | Performed by: INTERNAL MEDICINE

## 2024-09-03 PROCEDURE — 99233 SBSQ HOSP IP/OBS HIGH 50: CPT | Performed by: INTERNAL MEDICINE

## 2024-09-03 PROCEDURE — 85027 COMPLETE CBC AUTOMATED: CPT | Performed by: INTERNAL MEDICINE

## 2024-09-03 PROCEDURE — 97164 PT RE-EVAL EST PLAN CARE: CPT

## 2024-09-03 PROCEDURE — 86707 HEPATITIS BE ANTIBODY: CPT | Performed by: INTERNAL MEDICINE

## 2024-09-03 PROCEDURE — 97168 OT RE-EVAL EST PLAN CARE: CPT

## 2024-09-03 PROCEDURE — 97530 THERAPEUTIC ACTIVITIES: CPT

## 2024-09-03 RX ORDER — LIDOCAINE 50 MG/G
1 PATCH TOPICAL EVERY 24 HOURS
COMMUNITY
End: 2024-09-05 | Stop reason: HOSPADM

## 2024-09-03 RX ORDER — INSULIN LISPRO 100 [IU]/ML
1-5 INJECTION, SOLUTION INTRAVENOUS; SUBCUTANEOUS
Status: DISCONTINUED | OUTPATIENT
Start: 2024-09-04 | End: 2024-09-05 | Stop reason: HOSPADM

## 2024-09-03 RX ORDER — OXYCODONE HYDROCHLORIDE 10 MG/1
10 TABLET ORAL EVERY 4 HOURS PRN
Status: DISCONTINUED | OUTPATIENT
Start: 2024-09-03 | End: 2024-09-05 | Stop reason: HOSPADM

## 2024-09-03 RX ORDER — LACTULOSE 10 G/15ML
30 SOLUTION ORAL 2 TIMES DAILY
COMMUNITY
End: 2024-09-05 | Stop reason: HOSPADM

## 2024-09-03 RX ADMIN — Medication 1 TABLET: at 09:12

## 2024-09-03 RX ADMIN — OXYCODONE HYDROCHLORIDE 7.5 MG: 15 TABLET ORAL at 05:38

## 2024-09-03 RX ADMIN — CALCITRIOL CAPSULES 0.25 MCG 0.5 MCG: 0.25 CAPSULE ORAL at 09:11

## 2024-09-03 RX ADMIN — Medication 1 APPLICATION: at 20:42

## 2024-09-03 RX ADMIN — MEROPENEM 500 MG: 500 INJECTION, POWDER, FOR SOLUTION INTRAVENOUS at 13:00

## 2024-09-03 RX ADMIN — INSULIN LISPRO 2 UNITS: 100 INJECTION, SOLUTION INTRAVENOUS; SUBCUTANEOUS at 12:32

## 2024-09-03 RX ADMIN — APIXABAN 2.5 MG: 2.5 TABLET, FILM COATED ORAL at 20:42

## 2024-09-03 RX ADMIN — ACETAMINOPHEN 650 MG: 325 TABLET, FILM COATED ORAL at 12:33

## 2024-09-03 RX ADMIN — OXYCODONE HYDROCHLORIDE 7.5 MG: 15 TABLET ORAL at 10:05

## 2024-09-03 RX ADMIN — INSULIN LISPRO 2 UNITS: 100 INJECTION, SOLUTION INTRAVENOUS; SUBCUTANEOUS at 17:47

## 2024-09-03 RX ADMIN — Medication 1 APPLICATION: at 09:13

## 2024-09-03 RX ADMIN — PANTOPRAZOLE SODIUM 40 MG: 40 TABLET, DELAYED RELEASE ORAL at 09:11

## 2024-09-03 RX ADMIN — Medication 10 ML: at 09:12

## 2024-09-03 RX ADMIN — ASPIRIN 81 MG: 81 TABLET, COATED ORAL at 09:11

## 2024-09-03 NOTE — PROGRESS NOTES
"          Clinical Nutrition Assessment     Patient Name: Erlin Savage  YOB: 1950  MRN: [id number]  Date of Encounter: 09/03/24 10:09 EDT  Admission date: 8/26/2024  Reason for Visit: Follow-up protocol    Assessment   Nutrition Assessment   Admission Diagnosis:  Dry gangrene [I96]    Problem List:    Dry gangrene    Coronary artery disease involving native coronary artery of native heart with angina pectoris    Essential hypertension    Hyperlipidemia LDL goal <70    Type 2 diabetes mellitus with kidney complication, with long-term current use of insulin    TIA (transient ischemic attack)    Stage 5 chronic kidney disease on chronic dialysis    Secondary pulmonary arterial hypertension    ESRD (end stage renal disease)    Iron deficiency anemia    Severe mitral valve regurgitation    NSTEMI (non-ST elevated myocardial infarction)    Type 2 diabetes mellitus with diabetic chronic kidney disease    On home oxygen therapy    Severe malnutrition    Chronic diastolic (congestive) heart failure    Chronic systolic (congestive) heart failure      PMH:   He  has a past medical history of Anxiety, Anxiety disorder, Back pain, Chronic back pain, CKD (chronic kidney disease), Coronary artery disease, Diabetes mellitus, Dyslipidemia, Hyperlipidemia, Hypertension, Ischemic heart disease, Renal failure, Stroke, TIA (transient ischemic attack), and Vertigo.    PSH:  He  has a past surgical history that includes Coronary artery bypass graft; Cardiac catheterization; Cholecystectomy (03/2012); Appendectomy; Colonoscopy; Esophagogastroduodenoscopy; and Leg amputation, lower tibia/fibula (Right, 8/29/2024).    Applicable Nutrition History:   Stage II coccyx PI  8/29 BKA    Anthropometrics     Height: Height: 180.3 cm (71\")180.3 cm, 71 in  Last Filed Weight: Weight: 87.9 kg (193 lb 11.2 oz) (09/02/24 0500)78.9 Kg 174 lbs  Method: Weight Method: Bed scale  BMI: BMI (Calculated): 2724.3    UBW:  Per EMR post dialysis wt " of 187 lbs on 8/23 Note 167 lbs on 1/29/24 and 175 lbs on 2/26/24    Weight change: uncertain 2/2 fluid status/dialysis    Nutrition Focused Physical Exam    Date: 8/27, 9/4    Pt does not meet criteria for malnutrition diagnosis, at this time.  Likely as unable to perform full exam 2/2 LE edema; edema ongoing 9/4    Subcutaneous fat: MILD  Orbital Moderate   Triceps/Biceps No fat loss identified   Thoracic and lumbar region- ribs lower back, midaxillary line Mild     Muscle:  Temple/temporalis Mild   Clavicle- pectoralis, deltoid, trapezius Moderate   Clavicle and acromion process  Mild   Scapular bone region Moderate   Dorsal hand No muscle loss identified   Patellar  Unable to determine at this time   Quadriceps Unable to determine at this time   Calf Unable to determine at this time     Note prev dx of severe acute malnutrition in February this year.     Subjective   Reported/Observed/Food/Nutrition Related History:     9/4  Patient stated he's not been eating well just today, reported that he came back from dialysis this morning and has been nauseous, no vomiting. Reported that he's unsure if he's been getting ONS with breakfast, willing to drink them.     8/27  Pt allows UBW of 177lbs. Allows eats ok at home. Not liking food sent to him. W menu available will use to get well liked choices including fish. Has tried suppl - sometimes does not tolerate, but sometimes can use.    Current Nutrition Prescription   PO: Diet: Cardiac, Renal; Healthy Heart (2-3 Na+); Low Potassium, Low Sodium (2-3g); Fluid Consistency: Thin (IDDSI 0)  Oral Nutrition Supplement:   Intake:  8 days  61% x 11 meals recorded    Assessment & Plan   Nutrition Diagnosis   Date:  8/26            Updated: 9/3   Problem Increased nutrient needs    Etiology Skin breakdown    Signs/Symptoms PI, s/p BKA   Status: Active    Goal / Objectives:   Nutrition to support treatment and Increase intake      Nutrition Intervention      Follow treatment  progress, Care plan reviewed, Advise alternate selection, Menu provided, Supplement provided    Continue Novasource renal w breakfast    Monitoring/Evaluation:   Per protocol, I&O, PO intake, Supplement intake, Pertinent labs, Weight, Skin status, Symptoms    Jackie Fiore RD eligible  Time Spent: 25 min

## 2024-09-03 NOTE — PROGRESS NOTES
"Erlin Savage  1950  [id number]      Evaluating Physician: Marlo Heaton MD    Chief Complaint: right foot infection    Reason for Consultation: right foot infection    History of present illness:     Patient is a 74 y.o.  Yr old male noncompliant previously, with prior history of  end-stage renal disease with intermittent hemodialysis via right chest central line.  He has known peripheral arterial disease with right leg angiogram on April 10 with recanalization of SFA/PTA; post procedure with continued worsening pain at the right distal leg, gangrene at his right second/third and fourth toes with second worse than third worse than fourth.   MRI right foot was soft tissue edema, no loculated collection with abnormality at the posterior calcaneus difficult to completely exclude osteomyelitis at that site as per radiology.     4/19/24 Dr Santos   \"Procedures: Procedure(s):  RIGHT Foot  Second toe amputation\"     4/23/24  Dr Santos .  \"Procedures: Procedure(s):  RIGHT Foot     Amputation hallux  Amputation third toe  Amputation fourth toe  Amputation fifth toe\"     Pathology from April 23, 2024 with no osteomyelitis.  Pathology had noted cellulitis and gangrenous necrosis.  Transitioned to IV vancomycin and oral Augmentin at discharge.     Revascularization on May 14, 2024 by Dr. Schreiber  \"PROCEDURES WITH LATERALITY:   Ultrasound guided access of left common femoral artery with micropuncture needle and images stored  Abd Aorotgram  Right lower extremity arteriogram  Balloon angioplasty of right posterior tibial artery with a 3 mm balloon  Balloon angioplasty of right posterior tibial artery origin4 mm balloon drug-coated  Balloon angioplasty of right peroneal artery with 3 mm balloon  Balloon angioplasty of right anterior tibial artery 3 mm balloon  Balloon angioplasty of right proximal superficial femoral artery with a 5 mm balloon\"     Readmitted August 14 with deterioration at the right foot over unspecified " "period of time per patient, at least weeks if not longer with large wound, exposed bone on the medial side.     8/20/24 he  refused recommendation of amputation from surgical team and left [facility].     8/26/24 readmitted on August 26 with reports to me that patient now agreeable to right side amputation by Dr. Schreiber    8/29/24   \"Procedure(s):  Right below the knee amputation and all other indicated procedures\"    9/3/24 Sleepy ; neph following; no fever; no new pain; Right stump pain is sharp, nonradiating, constant, worse with palpation, better with pain meds and 3 /10     No headache photophobia or neck stiffness. No shortness of breath cough or hemoptysis. No nausea vomiting diarrhea or abdominal pain. No dysuria hematuria or pyuria. No other new skin rash.     Review of Systems     9/3/24  no adr to abx     Constitutional-- No Fever, chills or sweats.  Appetite good. No malaise.  Heent-- No new vision, hearing or throat complaints.  No epistaxis or oral sores.  Denies odynophagia or dysphagia.  No headache, photophobia or neck stiffness.  CV-- No chest pain, palpitation or syncope  Resp-- No SOB, cough, or hemoptysis  GI- No nausea, vomiting, or diarrhea.   -- No dysuria, hematuria, or flank pain.  Denies hesitancy, urgency or flank pain.  Lymph- no swollen lymph nodes in neck, axilla or groin.   Heme- No active bruising or bleeding  MS-- no swelling or pain in the bones or joints of arms or legs.  No new back pain.  Neuro-- No acute focal weakness or numbness in the arms or legs.  Skin-- No rash    Past Medical History:   Diagnosis Date    Anxiety     Anxiety disorder     Back pain     Chronic back pain     work related injury    CKD (chronic kidney disease)     most recent creatinine 2.7 on 01/26/16    Coronary artery disease     Diabetes mellitus     insulin dependent diabetes Onset 2010    Dyslipidemia     Hyperlipidemia     Hypertension     Ischemic heart disease     Renal failure     Stroke     TIA / " "transient right vision loss age 42    TIA (transient ischemic attack)     Vertigo        Past Surgical History:   Procedure Laterality Date    APPENDECTOMY      BELOW KNEE AMPUTATION Right 8/29/2024    Procedure: below the knee amputation Right;  Surgeon: Junior Schreiber MD;  [facility];  Service: Vascular;  Laterality: Right;    CARDIAC CATHETERIZATION      CHOLECYSTECTOMY  03/2012    COLONOSCOPY      CORONARY ARTERY BYPASS GRAFT      ENDOSCOPY         Pediatric History   Patient Parents    Not on file     Other Topics Concern    Not on file   Social History Narrative    Not on file       family history is not on file.    Allergies   Allergen Reactions    Cefepime Other (See Comments) and Seizure     Myoclonus - Has received cefazolin    Doxycycline Other (See Comments)     Joint pain, tongue swelling    \"Body locks up\"    Metoprolol Hives, Shortness Of Breath and Itching    Gabapentin Confusion    Ranolazine Dizziness and Other (See Comments)     Severe tremors/ shakiness    Hydralazine Unknown - Low Severity    Augmentin [Amoxicillin-Pot Clavulanate] Palpitations    Biaxin [Clarithromycin] Palpitations    Cephalosporins Unknown - Low Severity     Has received cefazolin    Coreg [Carvedilol] Itching    Crestor [Rosuvastatin Calcium] Itching     Itching rash    Iodine Rash     \"pineda skin\"    Lipitor [Atorvastatin] Itching     And Crestor- generalized weakness and chest tightness    Lisinopril Cough     Cough /weakness, nauseas and dirrhea    Livalo [Pitavastatin] Unknown - Low Severity     Chest tightness    Nortriptyline Hcl Other (See Comments)     Arthralgias    Pravastatin Unknown - Low Severity     Chest , neck and arm discomfort    Questran [Cholestyramine] Unknown - Low Severity       Medication:  Current Facility-Administered Medications   Medication Dose Route Frequency Provider Last Rate Last Admin    acetaminophen (TYLENOL) tablet 650 mg  650 mg Oral Q6H PRN Junior Schreiber MD   650 mg at " 09/02/24 0859    aspirin EC tablet 81 mg  81 mg Oral Daily Junior Schreiber MD   81 mg at 09/02/24 1231    b complex-vitamin c-folic acid (NEPHRO-SEVERIANO) tablet 1 tablet  1 tablet Oral Daily Junior Schreiber MD   1 tablet at 09/02/24 1231    sennosides-docusate (PERICOLACE) 8.6-50 MG per tablet 2 tablet  2 tablet Oral BID PRN Junior Schreiber MD        And    polyethylene glycol (MIRALAX) packet 17 g  17 g Oral Daily PRN Junior Schreiber MD        And    bisacodyl (DULCOLAX) EC tablet 5 mg  5 mg Oral Daily PRN Junior Schreiber MD        And    bisacodyl (DULCOLAX) suppository 10 mg  10 mg Rectal Daily PRN Junior Schreiber MD        calcitriol (ROCALTROL) capsule 0.5 mcg  0.5 mcg Oral Daily Junior Schreiber MD   0.5 mcg at 09/02/24 1231    castor oil-balsam peru (VENELEX) ointment 1 Application  1 Application Topical Daily Junior Schreiber MD   1 Application at 09/02/24 1232    dextrose (D50W) (25 g/50 mL) IV injection 25 g  25 g Intravenous Q15 Min PRN Junior Schreiber MD        dextrose (D50W) (25 g/50 mL) IV injection 25 g  25 g Intravenous Q15 Min PRN Brandon Crook MD        dextrose (GLUTOSE) oral gel 15 g  15 g Oral Q15 Min PRN Junior Schreiber MD        diazePAM (VALIUM) tablet 2.5 mg  2.5 mg Oral Q6H PRN Brandon Crook MD        glucagon (GLUCAGEN) injection 1 mg  1 mg Intramuscular Q15 Min PRN Junior Schreiber MD        heparin (porcine) injection 2,000 Units  2,000 Units Intracatheter PRN Junior Schreiber MD   2,000 Units at 09/02/24 0851    HYDROmorphone (DILAUDID) injection 0.5 mg  0.5 mg Intravenous Q4H PRN Brandon Crook MD   0.5 mg at 09/02/24 1411    Insulin Lispro (humaLOG) injection 2-7 Units  2-7 Units Subcutaneous TID With Meals Brandon Crook MD   2 Units at 09/02/24 1730    Magnesium Standard Dose Replacement - Follow Nurse / BPA Driven Protocol   Does not apply PRN Brandon Crook MD        meropenem (MERREM) 500 mg in sodium chloride 0.9 % 100 mL MBP  500 mg  Intravenous Q24H Marlo Heaton MD   500 mg at 09/02/24 1232    nitroglycerin (NITROSTAT) SL tablet 0.4 mg  0.4 mg Sublingual Q5 Min PRN Junior Schreiber MD   0.4 mg at 08/29/24 1529    nitroglycerin (NITROSTAT) SL tablet 0.4 mg  0.4 mg Sublingual Q5 Min PRN Junior Schreiber MD        nitroglycerin (TRIDIL) 200 mcg/ml infusion  5-200 mcg/min Intravenous Titrated Dada Darden IV, MD 1.5 mL/hr at 09/02/24 1412 5 mcg/min at 09/02/24 1412    ondansetron ODT (ZOFRAN-ODT) disintegrating tablet 4 mg  4 mg Oral Q6H PRN Junior Schreiber MD   4 mg at 08/27/24 0938    Or    ondansetron (ZOFRAN) injection 4 mg  4 mg Intravenous Q6H PRN Junior Schreiber MD   4 mg at 09/02/24 0851    oxyCODONE (ROXICODONE) immediate release tablet 7.5 mg  7.5 mg Oral Q4H PRN Brandon Crook MD   7.5 mg at 09/03/24 0538    pantoprazole (PROTONIX) EC tablet 40 mg  40 mg Oral Daily Junior Schreiber MD   40 mg at 09/02/24 1231    Pharmacy Consult - MTM   Does not apply Daily Phillip Escoto [facility]        Pharmacy to dose vancomycin   Does not apply Continuous PRN Marlo Heaton MD        simethicone (MYLICON) chewable tablet 80 mg  80 mg Oral 4x Daily PRN Cassandra Wiseman APRN        sodium chloride 0.9 % flush 10 mL  10 mL Intravenous Q12H Junior Schreiber MD   10 mL at 09/02/24 1228    sodium chloride 0.9 % flush 10 mL  10 mL Intravenous PRN Junior Schreiber MD        sodium chloride 0.9 % infusion 40 mL  40 mL Intravenous PRN Junior Schreiber MD        sodium chloride 0.9 % infusion  9 mL/hr Intravenous Continuous Junior Schreiber  mL/hr at 08/29/24 1020 Rate Change at 08/29/24 1020    vancomycin (dosing per levels)   Does not apply Daily Scar Garzon [facility]           Antibiotics:  Anti-Infectives (From admission, onward)      Ordered     Dose/Rate Route Frequency Start Stop    09/02/24 0920  meropenem (MERREM) 500 mg in sodium chloride 0.9 % 100 mL MBP        Ordering Provider: Marlo Heaton MD    500 mg  over  "3 Hours Intravenous Every 24 Hours 09/02/24 1300 09/05/24 1259    08/31/24 1216  vancomycin 750 mg in sodium chloride 0.9 % 250 mL IVPB-VTB        Ordering Provider: Bear Win PharmD    10 mg/kg × 78.9 kg  333.3 mL/hr over 45 Minutes Intravenous Once 08/31/24 1315 08/31/24 1530    08/30/24 1739  vancomycin (dosing per levels)        Ordering Provider: Scar Garzon RPH     Does not apply Daily 08/30/24 1830 09/05/24 0859    08/30/24 1546  vancomycin (VANCOCIN) 1,000 mg in sodium chloride 0.9 % 250 mL IVPB-VTB        Ordering Provider: Phillip Escoto RPH    1,000 mg  250 mL/hr over 60 Minutes Intravenous Once 08/30/24 1800 08/30/24 1826    08/28/24 1248  Vancomycin HCl 1,250 mg in sodium chloride 0.9 % 250 mL VTB        Ordering Provider: Kyrie Walsh PharmD    1,250 mg  200 mL/hr over 75 Minutes Intravenous Once 08/28/24 1500 08/28/24 1716    08/26/24 1306  vancomycin IVPB 1750 mg in 0.9% Sodium Chloride (premix) 500 mL        Ordering Provider: Kyrie Walsh PharmD    1,750 mg  285.7 mL/hr over 105 Minutes Intravenous Once 08/26/24 1800 08/26/24 1957    08/26/24 1304  meropenem (MERREM) 1,000 mg in sodium chloride 0.9 % 100 mL MBP        Ordering Provider: Lisa Rowe DO    1,000 mg  over 30 Minutes Intravenous Once 08/26/24 1500 08/26/24 1604    08/26/24 1306  vancomycin (dosing per levels)        Ordering Provider: Kyrie Walsh PharmD     Does not apply Daily 08/26/24 1400 08/29/24 0859    08/26/24 1223  Pharmacy to dose vancomycin        Ordering Provider: Marlo Heaton MD     Does not apply Continuous PRN 08/26/24 1223 09/05/24 0559                 Physical Exam:   Vital Signs   /72 (BP Location: Right arm, Patient Position: Lying)   Pulse 90   Temp 98.4 °F (36.9 °C) (Oral)   Resp 18   Ht 180.3 cm (71\")   Wt 87.9 kg (193 lb 11.2 oz)   SpO2 97%   BMI 27.02 kg/m²       GENERAL: sleepy.   chronically debilitated  HEENT: Normocephalic, atraumatic.    No conjunctival injection. " No icterus. Oropharynx clear without evidence of thrush or exudate.   NECK: Supple without nuchal rigidity. No mass.   HEART: RRR; No murmur.  LUNGS: Clear to auscultation bilaterally without wheezing, rales, rhonchi. Normal respiratory effort. Nonlabored.  ABDOMEN: Soft, nontender, nondistended. Positive bowel sounds. No rebound or guarding.  EXT:  No cyanosis, clubbing or edema.  MSK: FROM without joint effusions noted arms/legs.    SKIN: Warm and dry without cutaneous eruptions on Inspection/palpation.    NEURO: sleepy     Left foot first and third toe distally with small black areas,  toes with only minimal erythema, no discrete mass bulge or fluctuance.  No crepitus or bulla     Right LE amp noted;  no new visible redness/purulence.  No discrete mass bulge or fluctuance.  No crepitus or bulla.     No peripheral stigmata/phenomenon of endocarditis    Laboratory Data    Results from last 7 days   Lab Units 09/03/24  0459 09/02/24  0436 09/01/24  0501   WBC 10*3/mm3 7.04 7.68 9.37   HEMOGLOBIN g/dL 8.3* 7.5* 7.3*   HEMATOCRIT % 27.7* 25.5* 24.2*   PLATELETS 10*3/mm3 220 225 208     Results from last 7 days   Lab Units 09/03/24  0459   SODIUM mmol/L 131*   POTASSIUM mmol/L 4.9   CHLORIDE mmol/L 97*   CO2 mmol/L 25.0   BUN mg/dL 23   CREATININE mg/dL 3.35*   GLUCOSE mg/dL 190*   CALCIUM mg/dL 9.0     Results from last 7 days   Lab Units 08/28/24  0523   ALK PHOS U/L 198*   BILIRUBIN mg/dL 0.5   ALT (SGPT) U/L 17   AST (SGOT) U/L 27               Estimated Creatinine Clearance: 24.8 mL/min (A) (by C-G formula based on SCr of 3.35 mg/dL (H)).      Microbiology:      Radiology:  Imaging Results (Last 72 Hours)       Procedure Component Value Units Date/Time    XR Chest 1 View [[id number]] Collected: 09/01/24 0749     Updated: 09/01/24 0753    Narrative:      XR CHEST 1 VW    Date of Exam: 9/1/2024 2:44 AM EDT    Indication: follow up pulm edema/effusions    Comparison: Chest radiograph 8/31/2024.    Findings:  Unchanged  position of right IJ approach central venous catheter. Sternotomy and CABG. Cardiomediastinal silhouette is unchanged. Decreased diffuse mixed interstitial and airspace disease. Similar small/moderate bilateral pleural effusions with adjacent   atelectasis. No pneumothorax. Osseous structures are unchanged.      Impression:      Impression:  Decreased but persistent pulmonary edema and pulmonary vascular congestion. Similar small/moderate bilateral pleural effusions with adjacent atelectasis.      Electronically Signed: Kobe Phoenix MD    9/1/2024 7:50 AM EDT    Workstation ID: [id number]    XR Chest 1 View [[id number]] Collected: 08/31/24 0814     Updated: 08/31/24 0820    Narrative:      XR CHEST 1 VW    Date of Exam: 8/31/2024 2:08 AM EDT    Indication: chest pain    Comparison: 2/24/2024    Findings:  Cardiomediastinal silhouette is stable. There is atherosclerosis of the aorta. There is pulmonary vascular congestion with diffuse interstitial thickening and patchy bibasilar opacities. There are small bilateral pleural effusions. A right IJ tunneled   hemodialysis catheter terminates in the right atrium. Median sternotomy wires are present.      Impression:      Impression:  Cardiomegaly with pulmonary vascular congestion and mixed interstitial/airspace opacities with small bilateral effusions. Findings suggestive of CHF/pulmonary edema.      Electronically Signed: Shama Chairez MD    8/31/2024 8:17 AM EDT    Workstation ID: [id number]              Impression:     --Acute right foot surgical site/wound infection/cellulitis, exposed bone on the medial side consistent with first metatarsal osteomyelitis and likely sequela to ischemia with peripheral vascular disease and other comorbidity as above. High risk for further serious morbidity and other serious sequela. They understand risk for persistent/recurrent or nonhealing wounds, persistent/progressive or recurrent infection and risk for further functional/limb loss,  amputation, chronic pain etc. They know antibiotics and surgery not a guarantee for cure. These risks will persist. Timing/option threshold for surgery per surgical team at their discretion.  Vascular team has recommended amputation which patient has previously refused, left the hospital AGAINST MEDICAL ADVICE on August 20 but subsequently readmitted August 26 and agreeable to surgical recommendation, BKA 8/29    --Peripheral vascular disease.  Prior interventions as above.    --Left foot with small blackened areas at first/third toe likely sequela to ischemia.  Vascular team to help determine options and salvageability as above    --End-stage renal disease on hemodialysis Monday/Wednesday/Friday.    --Diabetes.  Glucose control per medicine team    --Cephalosporin and doxycycline intolerances in the past.  Has tolerated penicillin class    PLAN:        --IV vancomycin, merrem; anticipate stop IV abx by discharge if steadily better     **Cx at [facility] with PSA x 2 (zosyn DD, not ideal EDWIN), enterococcus and corynebac      --check/review labs cultures and scans     --Partial history per nursing staff     --Discussed with microbiology      --Highly complex set of issues with high risk for further serious morbidity and other serious sequela     --Monitor IV and IV antibiotic with risk for systemic complication and potential drug interactions    Copied text in this note has been reviewed and is accurate as of 09/03/24.        Marlo Heaton MD  9/3/2024

## 2024-09-03 NOTE — PROGRESS NOTES
[facility] Medicine Services  PROGRESS NOTE    Patient Name: Erlin Savage  : 1950  MRN: [id number]    Date of Admission: 2024  Primary Care Physician: Joe Diallo MD    Subjective   Subjective     CC: f/u PAD    HPI:  Overall feeling better  Dyspnea improved, now on baseline 3Lnc  No chest pain  Leg post-op pain improved  No n/v/d    Objective   Objective     Vital Signs:   Temp:  [97.7 °F (36.5 °C)-98.4 °F (36.9 °C)] 98.4 °F (36.9 °C)  Heart Rate:  [88-95] 94  Resp:  [18] 18  BP: (127-150)/(46-78) 150/78  Flow (L/min):  [2-3.5] 3.5     Physical Exam:  Constitutional: No acute distress, awake, alert, up in bed; on 3LNc, normal work of breathing at rest  HENT: NCAT, mucous membranes moist  Respiratory: decreased air movement bilateral bases, no crackles today  Cardiovascular: rrr  Gastrointestinal: Positive bowel sounds, soft, nontender  Psychiatric: Appropriate affect, cooperative  Neurologic: Oriented x 3, strength symmetric in all extremities, Cranial Nerves grossly intact to confrontation, speech clear  Skin/msk: right bka site cleanly dressed    Results Reviewed:  LAB RESULTS:      Lab 24  0459 24  0436 24  0501 24  0759 24  0057 24  1821 24  1253 24  0856 24  0046 24  1633 24  0523 24  0523   WBC 7.04 7.68 9.37 9.76  --   --  11.05*  --   --  11.86*  --  10.76   HEMOGLOBIN 8.3* 7.5* 7.3* 7.6*  --   --  7.3*  --   --  8.2*  --  9.8*   HEMATOCRIT 27.7* 25.5* 24.2* 25.1*  --   --  24.9*  --   --  28.7*  --  32.5*   PLATELETS 220 225 208 221  --   --  287  --   --  302  --  271   NEUTROS ABS  --  5.56  --   --   --   --  8.83*  --   --  10.59*  --  8.21*   IMMATURE GRANS (ABS)  --  0.04  --   --   --   --  0.06*  --   --  0.07*  --  0.07*   LYMPHS ABS  --  0.73  --   --   --   --  0.66*  --   --  0.47*  --  0.86   MONOS ABS  --  1.13*  --   --   --   --  1.41*  --   --  0.63  --  1.15*   EOS  ABS  --  0.18  --   --   --   --  0.02  --   --  0.01  --  0.36   MCV 93.9 94.4 92.7 91.9  --   --  92.9  --   --  95.0  --  90.5   PROTIME  --   --   --   --   --   --   --   --   --  18.8*  --   --    APTT  --   --   --   --   --   --   --   --   --  29.0*  --   --    HEPARIN ANTI-XA  --   --   --  0.20* 0.21* 0.24*  --  0.29* 0.22* 0.15*   < >  --     < > = values in this interval not displayed.         Lab 09/03/24  0459 09/02/24  0436 09/01/24  0501 08/31/24  0759 08/30/24  1253 08/29/24  1535 08/29/24  0623 08/28/24  0523   SODIUM 131* 132* 135* 135* 129* 131*   < > 130*   POTASSIUM 4.9 5.1 4.7 5.3* 6.7* 5.8*   < > 6.6*   CHLORIDE 97* 97* 96* 98 94* 96*   < > 95*   CO2 25.0 24.0 24.0 23.0 22.0 21.0*   < > 21.0*   ANION GAP 9.0 11.0 15.0 14.0 13.0 14.0   < > 14.0   BUN 23 29* 25* 30* 43* 32*   < > 40*   CREATININE 3.35* 4.58* 3.51* 4.07* 5.17* 4.26*   < > 5.16*   EGFR 18.7* 12.9* 17.7* 14.8* 11.1* 14.0*   < > 11.2*   GLUCOSE 190* 226* 141* 105* 199* 174*   < > 178*   CALCIUM 9.0 9.1 9.4 9.1 9.3 9.2   < > 9.3   MAGNESIUM  --   --   --  1.9  --  1.9  --  2.0   PHOSPHORUS 4.5 6.4* 6.1* 6.6* 7.9*  --   --   --     < > = values in this interval not displayed.         Lab 09/03/24  0459 09/02/24  0436 09/01/24  0501 08/31/24  0759 08/30/24  1253 08/28/24  0523   TOTAL PROTEIN  --   --   --   --   --  6.6   ALBUMIN 2.8* 2.8* 3.0* 3.2* 3.1* 3.4*   GLOBULIN  --   --   --   --   --  3.2   ALT (SGPT)  --   --   --   --   --  17   AST (SGOT)  --   --   --   --   --  27   BILIRUBIN  --   --   --   --   --  0.5   ALK PHOS  --   --   --   --   --  198*         Lab 08/30/24  1253 08/29/24  1825 08/29/24  1633 08/29/24  1535   HSTROP T 298* 201*  --  203*   PROTIME  --   --  18.8*  --    INR  --   --  1.56*  --              Lab 09/02/24  0848 09/01/24  0501 08/30/24  1359   IRON  --  24*  --    IRON SATURATION (TSAT)  --  13*  --    TIBC  --  180*  --    TRANSFERRIN  --  121*  --    ABO TYPING O  --  O   RH TYPING Positive  --   Positive   ANTIBODY SCREEN Negative  --  Negative         Brief Urine Lab Results  (Last result in the past 365 days)        Color   Clarity   Blood   Leuk Est   Nitrite   Protein   CREAT   Urine HCG        01/01/24 0550 Yellow   Clear     Negative                       Microbiology Results Abnormal       Procedure Component Value - Date/Time    CANDIDA AURIS SCREEN - Swab, Axilla Right, Axilla Left and Groin [[id number]]  (Normal) Collected: 08/26/24 1715    Lab Status: Final result Specimen: Swab from Axilla Right, Axilla Left and Groin Updated: 08/31/24 2231     Candida Auris Screen Culture No Candida auris isolated at 5 days    Blood Culture - Blood, Hand, Right [[id number]]  (Normal) Collected: 08/26/24 1154    Lab Status: Final result Specimen: Blood from Hand, Right Updated: 08/31/24 1215     Blood Culture No growth at 5 days    Narrative:      Less than seven (7) mL's of blood was collected.  Insufficient quantity may yield false negative results.    Blood Culture - Blood, Arm, Right [[id number]]  (Normal) Collected: 08/26/24 1145    Lab Status: Final result Specimen: Blood from Arm, Right Updated: 08/31/24 1215     Blood Culture No growth at 5 days    Narrative:      Less than seven (7) mL's of blood was collected.  Insufficient quantity may yield false negative results.    MRSA Screen, PCR (Inpatient) - Swab, Nares [[id number]]  (Normal) Collected: 08/26/24 1715    Lab Status: Final result Specimen: Swab from Nares Updated: 08/26/24 2124     MRSA PCR Negative    Narrative:      The negative predictive value of this diagnostic test is high and should only be used to consider de-escalating anti-MRSA therapy. A positive result may indicate colonization with MRSA and must be correlated clinically.  MRSA Negative            No radiology results from the last 24 hrs    Results for orders placed during the hospital encounter of 08/26/24    Adult Transthoracic Echo Complete W/ Cont if Necessary Per  Protocol    Interpretation Summary    Left ventricular systolic function is mildly decreased. Calculated left ventricular EF = 40%    Left ventricular wall thickness is consistent with mild concentric hypertrophy.    Moderately reduced right ventricular systolic function noted.    There is bileaflet mitral valve thickening present.    Moderate to severe mitral valve regurgitation is present.    Moderate to severe tricuspid valve regurgitation is present.    Calculated right ventricular systolic pressure from tricuspid regurgitation is 73 mmHg.    There is a small (<1cm) pericardial effusion adjacent to the left ventricle.      Current medications:  Scheduled Meds:apixaban, 2.5 mg, Oral, Q12H  aspirin, 81 mg, Oral, Daily  b complex-vitamin c-folic acid, 1 tablet, Oral, Daily  calcitriol, 0.5 mcg, Oral, Daily  castor oil-balsam peru, 1 Application, Topical, Daily  insulin lispro, 2-7 Units, Subcutaneous, TID With Meals  meropenem, 500 mg, Intravenous, Q24H  pantoprazole, 40 mg, Oral, Daily  pharmacy consult - [place], , Does not apply, Daily  sodium chloride, 10 mL, Intravenous, Q12H  vancomycin (dosing per levels), , Does not apply, Daily      Continuous Infusions:nitroglycerin, 5-200 mcg/min, Last Rate: 5 mcg/min (09/02/24 1412)  Pharmacy to dose vancomycin,   sodium chloride, 9 mL/hr, Last Rate: 250 mL/hr (08/29/24 1020)      PRN Meds:.  acetaminophen    senna-docusate sodium **AND** polyethylene glycol **AND** bisacodyl **AND** bisacodyl    dextrose    dextrose    dextrose    glucagon (human recombinant)    heparin (porcine)    HYDROmorphone    Magnesium Standard Dose Replacement - Follow Nurse / BPA Driven Protocol    nitroglycerin    ondansetron ODT **OR** ondansetron    oxyCODONE    Pharmacy to dose vancomycin    simethicone    sodium chloride    sodium chloride    Assessment & Plan   Assessment & Plan     Active Hospital Problems    Diagnosis  POA    **Dry gangrene [I96]  Yes    Chronic diastolic (congestive)  heart failure [I50.32]  Yes    Chronic systolic (congestive) heart failure [I50.22]  Yes    Severe malnutrition [E43]  Yes    On home oxygen therapy [Z99.81]  Not Applicable    Type 2 diabetes mellitus with diabetic chronic kidney disease [E11.22]  Yes    NSTEMI (non-ST elevated myocardial infarction) [I21.4]  Yes    Secondary pulmonary arterial hypertension [I27.21]  Yes    Severe mitral valve regurgitation [I34.0]  Yes    Iron deficiency anemia [D50.9]  Yes    ESRD (end stage renal disease) [N18.6]  Yes    Stage 5 chronic kidney disease on chronic dialysis [N18.6, Z99.2]  Not Applicable    Coronary artery disease involving native coronary artery of native heart with angina pectoris [I25.119]  Yes    Essential hypertension [I10]  Yes    Type 2 diabetes mellitus with kidney complication, with long-term current use of insulin [E11.29, Z79.4]  Not Applicable    TIA (transient ischemic attack) [G45.9]  Yes    Hyperlipidemia LDL goal <70 [E78.5]  Yes      Resolved Hospital Problems    Diagnosis Date Resolved POA    Ischemic heart disease [I25.9] 08/29/2024 Yes        Brief Hospital Course to date:  Erlin Savage is a 74 y.o. male with a medical history significant for end-stage renal disease on HD MWF, coronary artery disease s/p CABG, A. Fib on OAC, aortic stenosis, diabetes mellitus, COPD on 2 to 4 L baseline, hyperlipidemia, hypertension and known peripheral arterial disease with nonhealing right foot transmetatarsal amputation site with dry gangrene.      Right lower extremity critical limb ischemia, recent non-healing right TMA (s/p right bka 8/29/24)  Recent admission for dry gangrene/cellulitis  L foot with ischemic changes of toes   BLE PVD  -My partner discussed with vascular on arrival.   -**s/p right BKA Dr. Schreiber 8/29/24; Vascular following; ok to restart eliquis; RLE BKA dressing changed by vascular team on 9/3/24, to be changed by nursing every other day (sooner if saturated); recommend inpatient rehab at  discharge  -Dr. Heaton (ID) following: continue iv vanc, merrem (anticipate stopping iv antibiotics soon/when ok w/ Dr. Heaton)  -continue  pain control (oxycodone, prn dilaudid), valium for anxiety (doses were slightly decreased on 8/31/24 due to some over sedation)     NSTEMI  CAD status post CABG  CHFmrEF  Valvular heart disease (mod-severe MR, mod-severe tR)  Parox afib  -Most recent echo in system from 2023 with a EF of 43%, severe diastolic dysfunction, moderate mitral regurgitation, moderate tricuspid regurgitation.  -Echo 8/28/24: LV ef 40%, mod reduced RV fxn; mod-severe MR, mod-severe TR  -evening of 8/29/24 (after right bka), patient developed 10/10 chest pain, relieved w/ sl nitro; EKG w/ lateral ischemic changes; marked elevated troponin.   -eliquis had been on hold for surgery  -Cards followed: was seen post-amputation for NSTEMI; cards reviewed heart cath films from [facility] in march, not likely to have targets for revascularization, recommended medical therapy (heparin drip x 48 hours, nitro gtt, prn) and recommended restart eliquis when ok w/ surgery  -I discussed w/ vascular surgery Maria C Dowd PA-C on 9/3/24; vascular surgery is ok w/ restarting eliquis. Patient is on eliquis 5mg bid at home. Due to recent post-op anemia (which required blood transfusions) I will start with 2.5mg bid and then increase back to 5mg bid if tolerates    Anemia (chronic renal dz, & post-op blood loss)  **s/p 1 unit prbc on 8/30/24 w/ dialysis (had drop in hgb from 8.5 to 7.3 post-op)  -goal hgb >7.5 in light of nstemi  -no overt bleeding overrnight  -hgb 7.3 on 9/1  **s/p 1 unit prbc on 9/2/24 w/ dialysis (in light of cardiac ischemic & anemia)  -monitor hgb , goal hgb >8 or symptoms    End-stage renal disease   Hyponatremia, improved  Hyperkalemia, improved  -s/p extra dialysis session Saturday 8/31 (hyperkalemia & volume overload)  -Nephrology following: M/W/F DIALYSIS     Ascites  Lower extremity edema  -multifactorial  secondary to CHF, end-stage renal disease  -Per patient, he gets monthly paracentesis at hospital in [place].  Denies any prior history of cirrhosis.     DM2  -A1c 6.2 on 8/26  -SSI, titrate prn (patient doesn't allow regular ssi, dictates his own sliding scale)    Chronic hypoxic respiratory failure  COPD on chronic home O2 (on 3Lnc baseline)  -DuoNebs PRN  -currently on his baseline of 3Lnc    Depression  -started zoloft on 8/31, stopped it on 9/1/24 per patient wife request, said it made patient too sleepy       Expected Discharge Location and Transportation: inpatient rehab recommended by vascular surgery  Expected Discharge   Expected Discharge Date: 9/5/2024; Expected Discharge Time:      VTE Prophylaxis:  Pharmacologic & mechanical VTE prophylaxis orders are present.         AM-PAC 6 Clicks Score (PT): 10 (09/03/24 1148)    CODE STATUS:   Code Status and Medical Interventions: CPR (Attempt to Resuscitate); Full Support   Ordered at: 08/26/24 1218     Level Of Support Discussed With:    Patient     Code Status (Patient has no pulse and is not breathing):    CPR (Attempt to Resuscitate)     Medical Interventions (Patient has pulse or is breathing):    Full Support       Brandon Crook MD  09/03/24

## 2024-09-03 NOTE — PLAN OF CARE
Goal Outcome Evaluation:  Plan of Care Reviewed With: patient, significant other        Progress: no change  Outcome Evaluation: PT re-eval: Pt presenting s/p R BKA 8/29.  several attempts STS from EOB, BUE support, LLE knee/ foot blocked, max-A x2, able to partially clear hips.  Recommend IPR at d/c.      Anticipated Discharge Disposition (PT): inpatient rehabilitation facility

## 2024-09-03 NOTE — PROGRESS NOTES
LOS: 8 days   Patient Care Team:  Joe Diallo MD as PCP - General (Family Medicine)  Fadi Garner MD as Consulting Physician (General Surgery)  Amor Celeste MD (Cardiology)  Praveen Noova MD as Consulting Physician (Nephrology)  Kristin Moya MD as Consulting Physician (Pulmonary Disease)    Chief Complaint: ESRD  Right non healing foot wound  Wound infection.    Subjective   Plan for HD in morning.     Objective     Vital Sign Min/Max for last 24 hours  Temp  Min: 97.7 °F (36.5 °C)  Max: 98.4 °F (36.9 °C)   BP  Min: 127/55  Max: 150/78   Pulse  Min: 88  Max: 95   Resp  Min: 18  Max: 18   SpO2  Min: 92 %  Max: 98 %   Flow (L/min)  Min: 2  Max: 3.5   No data recorded         No intake/output data recorded.  I/O last 3 completed shifts:  In: 3107.4 [P.O.:1290; I.V.:1457.4; Blood:360]  Out: 5190   Objective:  General Appearance: Awake alert oriented mild distress due to pain  Eyes: PERR supple JVD  Neck: Supple no JVD.  Lungs: Clear to auscultation, equal chest movement, nonlabored.  Heart: No gallop, murmur, rub, RRR.  Abdomen: Soft, nontender, positive bowel sounds, no organomegaly.  Extremities: 1-2+ bilateral lower extremity edema, right BKA.  Neuro: No focal deficit, moving all extremities, alert oriented X 3  Tunneled dialysis catheter right IJ    Results Review:     I reviewed the patient's new clinical results.    WBC WBC   Date Value Ref Range Status   09/03/2024 7.04 3.40 - 10.80 10*3/mm3 Final   09/02/2024 7.68 3.40 - 10.80 10*3/mm3 Final   09/01/2024 9.37 3.40 - 10.80 10*3/mm3 Final      HGB Hemoglobin   Date Value Ref Range Status   09/03/2024 8.3 (L) 13.0 - 17.7 g/dL Final   09/02/2024 7.5 (L) 13.0 - 17.7 g/dL Final   09/01/2024 7.3 (L) 13.0 - 17.7 g/dL Final      HCT Hematocrit   Date Value Ref Range Status   09/03/2024 27.7 (L) 37.5 - 51.0 % Final   09/02/2024 25.5 (L) 37.5 - 51.0 % Final   09/01/2024 24.2 (L) 37.5 - 51.0 % Final      Platlets No results found for:  "\"LABPLAT\"   MCV MCV   Date Value Ref Range Status   09/03/2024 93.9 79.0 - 97.0 fL Final   09/02/2024 94.4 79.0 - 97.0 fL Final   09/01/2024 92.7 79.0 - 97.0 fL Final          Sodium Sodium   Date Value Ref Range Status   09/03/2024 131 (L) 136 - 145 mmol/L Final   09/02/2024 132 (L) 136 - 145 mmol/L Final   09/01/2024 135 (L) 136 - 145 mmol/L Final      Potassium Potassium   Date Value Ref Range Status   09/03/2024 4.9 3.5 - 5.2 mmol/L Final   09/02/2024 5.1 3.5 - 5.2 mmol/L Final   09/01/2024 4.7 3.5 - 5.2 mmol/L Final      Chloride Chloride   Date Value Ref Range Status   09/03/2024 97 (L) 98 - 107 mmol/L Final   09/02/2024 97 (L) 98 - 107 mmol/L Final   09/01/2024 96 (L) 98 - 107 mmol/L Final      CO2 CO2   Date Value Ref Range Status   09/03/2024 25.0 22.0 - 29.0 mmol/L Final   09/02/2024 24.0 22.0 - 29.0 mmol/L Final   09/01/2024 24.0 22.0 - 29.0 mmol/L Final      BUN BUN   Date Value Ref Range Status   09/03/2024 23 8 - 23 mg/dL Final   09/02/2024 29 (H) 8 - 23 mg/dL Final   09/01/2024 25 (H) 8 - 23 mg/dL Final      Creatinine Creatinine   Date Value Ref Range Status   09/03/2024 3.35 (H) 0.76 - 1.27 mg/dL Final   09/02/2024 4.58 (H) 0.76 - 1.27 mg/dL Final   09/01/2024 3.51 (H) 0.76 - 1.27 mg/dL Final      Calcium Calcium   Date Value Ref Range Status   09/03/2024 9.0 8.6 - 10.5 mg/dL Final   09/02/2024 9.1 8.6 - 10.5 mg/dL Final   09/01/2024 9.4 8.6 - 10.5 mg/dL Final      PO4 No results found for: \"CAPO4\"   Albumin Albumin   Date Value Ref Range Status   09/03/2024 2.8 (L) 3.5 - 5.2 g/dL Final   09/02/2024 2.8 (L) 3.5 - 5.2 g/dL Final   09/01/2024 3.0 (L) 3.5 - 5.2 g/dL Final      Magnesium No results found for: \"MG\"     Uric Acid No results found for: \"URICACID\"     Medication Review: yes    Assessment & Plan      1.  ESRD: On HD MWF.  Follows with [facility] @ [facility].    2.  Hypertension: Blood pressure stable.  Monitor for now.     3.  Hyperkalemia: Recurrent issue.      4.  Hyponatremia: " Continue fluid rate restriction and adjust with HD.     5.  Metabolic acidosis: Adjust with HD.     6.  Volume: Hx of ascites and dependent edema. Requiring serial paracentesis as outpatient.     7.  Peripheral vascular disease: Patient with nonhealing right transmetatarsal amputation site with dry gangrene and wound infection. MRI unable to rule out Osteo. S/p BKA 8/29/24         Praveen Novoa MD  09/03/24  14:14 EDT

## 2024-09-03 NOTE — PROGRESS NOTES
"   LOS: 8 days   Patient Care Team:  Joe Diallo MD as PCP - General (Family Medicine)  Fadi Garner MD as Consulting Physician (General Surgery)  Amor Celeste MD (Cardiology)  Praveen Novoa MD as Consulting Physician (Nephrology)  Kristin Moya MD as Consulting Physician (Pulmonary Disease)      Subjective       Subjective  POD#5 s/p RT BKA (8/29/24, [facility]). Patient and SO report that he is doing much better today than last week. Pain tolerable, 7/10. Required 2units pRBC over the weekend. Post op dressing changed at bedside, tolerated well.     History taken from: patient    Objective     Vital Signs  Temp:  [97.5 °F (36.4 °C)-98.4 °F (36.9 °C)] 98.4 °F (36.9 °C)  Heart Rate:  [74-95] 90  Resp:  [16-18] 18  BP: (127-146)/(53-72) 145/72    Objective:  Vital signs: (most recent): Blood pressure 145/72, pulse 90, temperature 98.4 °F (36.9 °C), temperature source Oral, resp. rate 18, height 180.3 cm (71\"), weight 87.9 kg (193 lb 11.2 oz), SpO2 97%.            Physical Exam:   Nursing and significant other at bedside  NAD, AAOx3, sitting on the edge of the bed  Normal effort, on 3.5L supplemental O2 via NC  RLE: s/p BKA, dressing removed, incision c/d/I with staples, mild erythema just above his central incision, no warmth or drainage noted, xeroform applied along incision, padded with 4x4s, wrapped with kerlix and ace, knee immobilizer applied, mild knee contracture noted   LLE: dressing to the foot c/d/I  Cooperative, positive outlook    Results Review:     I reviewed the patient's new clinical results.  CBC    Results from last 7 days   Lab Units 09/03/24  0459 09/02/24  0436 09/01/24  0501 08/31/24  0759 08/30/24  1253 08/29/24  1633 08/28/24  0523   WBC 10*3/mm3 7.04 7.68 9.37 9.76 11.05* 11.86* 10.76   HEMOGLOBIN g/dL 8.3* 7.5* 7.3* 7.6* 7.3* 8.2* 9.8*   PLATELETS 10*3/mm3 220 225 208 221 287 302 271     BMP   Results from last 7 days   Lab Units 09/03/24  0459 09/02/24  0436 " 09/01/24  0501 08/31/24  0759 08/30/24  1253 08/29/24  1535 08/29/24  0623 08/28/24  0523   SODIUM mmol/L 131* 132* 135* 135* 129* 131* 132* 130*   POTASSIUM mmol/L 4.9 5.1 4.7 5.3* 6.7* 5.8* 5.5* 6.6*   CHLORIDE mmol/L 97* 97* 96* 98 94* 96* 95* 95*   CO2 mmol/L 25.0 24.0 24.0 23.0 22.0 21.0* 23.0 21.0*   BUN mg/dL 23 29* 25* 30* 43* 32* 30* 40*   CREATININE mg/dL 3.35* 4.58* 3.51* 4.07* 5.17* 4.26* 3.93* 5.16*   GLUCOSE mg/dL 190* 226* 141* 105* 199* 174* 148* 178*   MAGNESIUM mg/dL  --   --   --  1.9  --  1.9  --  2.0   PHOSPHORUS mg/dL 4.5 6.4* 6.1* 6.6* 7.9*  --   --   --           Current Facility-Administered Medications:     acetaminophen (TYLENOL) tablet 650 mg, 650 mg, Oral, Q6H PRN, Junior Schreiber MD, 650 mg at 09/02/24 0859    aspirin EC tablet 81 mg, 81 mg, Oral, Daily, Junior Schreiber MD, 81 mg at 09/03/24 0911    b complex-vitamin c-folic acid (NEPHRO-SEVERIANO) tablet 1 tablet, 1 tablet, Oral, Daily, Junior Schreiber MD, 1 tablet at 09/03/24 0912    sennosides-docusate (PERICOLACE) 8.6-50 MG per tablet 2 tablet, 2 tablet, Oral, BID PRN **AND** polyethylene glycol (MIRALAX) packet 17 g, 17 g, Oral, Daily PRN **AND** bisacodyl (DULCOLAX) EC tablet 5 mg, 5 mg, Oral, Daily PRN **AND** bisacodyl (DULCOLAX) suppository 10 mg, 10 mg, Rectal, Daily PRN, Junior Schreiber MD    calcitriol (ROCALTROL) capsule 0.5 mcg, 0.5 mcg, Oral, Daily, Junior Schreiber MD, 0.5 mcg at 09/03/24 0911    castor oil-balsam peru (VENELEX) ointment 1 Application, 1 Application, Topical, Daily, Junior Schreiber MD, 1 Application at 09/03/24 0913    dextrose (D50W) (25 g/50 mL) IV injection 25 g, 25 g, Intravenous, Q15 Min PRN, Junior Schreiber MD    dextrose (D50W) (25 g/50 mL) IV injection 25 g, 25 g, Intravenous, Q15 Min PRN, Brandon Crook MD    dextrose (GLUTOSE) oral gel 15 g, 15 g, Oral, Q15 Min PRN, Junior Schreiber MD    glucagon (GLUCAGEN) injection 1 mg, 1 mg, Intramuscular, Q15 Min PRN, Junior Schreiber MD    heparin  (porcine) injection 2,000 Units, 2,000 Units, Intracatheter, PRN, Junior Schreiber MD, 2,000 Units at 09/02/24 0851    HYDROmorphone (DILAUDID) injection 0.5 mg, 0.5 mg, Intravenous, Q4H PRN, Brandon Crook MD, 0.5 mg at 09/02/24 1411    Insulin Lispro (humaLOG) injection 2-7 Units, 2-7 Units, Subcutaneous, TID With Meals, Brandon Crook MD, 2 Units at 09/02/24 1730    Magnesium Standard Dose Replacement - Follow Nurse / BPA Driven Protocol, , Does not apply, PRN, Brandon Crook MD    meropenem (MERREM) 500 mg in sodium chloride 0.9 % 100 mL MBP, 500 mg, Intravenous, Q24H, Marlo Heaton MD, 500 mg at 09/02/24 1232    nitroglycerin (NITROSTAT) SL tablet 0.4 mg, 0.4 mg, Sublingual, Q5 Min PRN, Junior Schreiber MD    nitroglycerin (TRIDIL) 200 mcg/ml infusion, 5-200 mcg/min, Intravenous, Titrated, Dada Darden IV, MD, Last Rate: 1.5 mL/hr at 09/02/24 1412, 5 mcg/min at 09/02/24 1412    ondansetron ODT (ZOFRAN-ODT) disintegrating tablet 4 mg, 4 mg, Oral, Q6H PRN, 4 mg at 08/27/24 0938 **OR** ondansetron (ZOFRAN) injection 4 mg, 4 mg, Intravenous, Q6H PRN, Junior Schreiber MD, 4 mg at 09/02/24 0851    oxyCODONE (ROXICODONE) immediate release tablet 7.5 mg, 7.5 mg, Oral, Q4H PRN, Brandon Crook MD, 7.5 mg at 09/03/24 1005    pantoprazole (PROTONIX) EC tablet 40 mg, 40 mg, Oral, Daily, Junior Schreiber MD, 40 mg at 09/03/24 0911    Pharmacy Consult - MT, , Does not apply, Daily, Phillip Escoto, [facility]    Pharmacy to dose vancomycin, , Does not apply, Continuous PRN, Marlo Heaton MD    simethicone (MYLICON) chewable tablet 80 mg, 80 mg, Oral, 4x Daily PRN, Cassandra Wiseman APRN    sodium chloride 0.9 % flush 10 mL, 10 mL, Intravenous, Q12H, Junior Schreiber MD, 10 mL at 09/03/24 0912    sodium chloride 0.9 % flush 10 mL, 10 mL, Intravenous, PRN, Junior Schreiber MD    sodium chloride 0.9 % infusion 40 mL, 40 mL, Intravenous, PRN, Junior Schreiber MD    sodium chloride 0.9 %  infusion, 9 mL/hr, Intravenous, Continuous, [facility], MD Osvaldo, Last Rate: 250 mL/hr at 08/29/24 1020, Rate Change at 08/29/24 1020    vancomycin (dosing per levels), , Does not apply, Daily, Scar Garzon, [facility]     Assessment & Plan       Dry gangrene    Coronary artery disease involving native coronary artery of native heart with angina pectoris    Essential hypertension    Hyperlipidemia LDL goal <70    Type 2 diabetes mellitus with kidney complication, with long-term current use of insulin    TIA (transient ischemic attack)    Stage 5 chronic kidney disease on chronic dialysis    Secondary pulmonary arterial hypertension    ESRD (end stage renal disease)    Iron deficiency anemia    Severe mitral valve regurgitation    NSTEMI (non-ST elevated myocardial infarction)    Type 2 diabetes mellitus with diabetic chronic kidney disease    On home oxygen therapy    Severe malnutrition    Chronic diastolic (congestive) heart failure    Chronic systolic (congestive) heart failure      Assessment & Plan  Right Lower Extremity Critical Limb Ischemia  - 4/10/24 ([facility]): RIGHT lower extremity tibial angioplasty   - 5/14/24 ([facility]): RIGHT lower extremity arteriogram with angioplasty and drug coated balloon of RIGHT posterior tibial artery and   RIGHT peroneal artery, anterior tibial artery, proximal superficial femoral artery angioplasty.   - [facility] 8/14/24 XR: Right toe metatarsal osteomyelitis is suspected.  - [facility] 8/15/24 arterial and venous studies with no DVT. LEFT TBI 0.38. Tibial disease noted.   - 8/29/24 ([facility]): RT BKA     - pain control prn  - continue aspirin  - home Eliquis remains held - required 2 units pRBC over the weekend  - PT/OT to mobilize as tolerated, new orders placed today  - RLE BKA dressing changed by vascular team today, to be changed by nursing qod, sooner if saturated, orders placed   - Patient will require inpatient rehab at discharge, discussed with CM who is following    Plan of care  discussed with Dr. Schreiber. Plan also reviewed with patient, nursing and family at bedside who verbalized understanding and agreement.     Asha Dowd PA-C  09/03/24  10:13 EDT

## 2024-09-03 NOTE — CASE MANAGEMENT/SOCIAL WORK
Discharge Planning Assessment  [facility]     Patient Name: Erlin Savage  MRN: [id number]  Today's Date: 9/3/2024    Admit Date: 8/26/2024    Plan: IRF   Discharge Needs Assessment    No documentation.                  Discharge Plan       Row Name 09/03/24 1228       Plan    Plan IRF    Patient/Family in Agreement with Plan yes    Plan Comments Met with pt and s/o at bedside to f/u DCP.  Pt is now agreeable to rehab.  Per request, CM called referral to Ninfa at [facility].  Pt is willing to consider add'l SNF options at [facility] and [facility] if [facility] is not an option (referrals called/faxed).  Pt will require ongoing HD MWF.  CM will cont to follow.    Final Discharge Disposition Code 62 - inpatient rehab facility                  Continued Care and Services - Admitted Since 8/26/2024       Destination       Service Provider Request Status Selected Services Address Phone Fax Patient Preferred    [facility] Pending - Request Sent N/A [address] [address] [phone] --    [facility] Pending - No Request Sent N/A [address] [facility] [id number] [phone] --    [facility] Pending - No Request Sent N/A [address] [facility] [address] [phone] --              Dialysis/Infusion       Service Provider Request Status Selected Services Address Phone Fax Patient Preferred    [facility]  [facility] [address] [facility] [address] [phone] --              Home Medical Care       Service Provider Request Status Selected Services Address Phone Fax Patient Preferred    [facility] Pending - No Request Sent N/A [address], [address] [address] [phone] --                  Expected Discharge Date and Time       Expected Discharge Date  Expected Discharge Time    Sep 3, 2024            Demographic Summary    No documentation.                  Functional Status    No documentation.                  Psychosocial    No documentation.                  Abuse/Neglect    No documentation.                  Legal    No documentation.                  Substance Abuse    No documentation.                  Patient Forms    No documentation.                     Verona Stroud RN

## 2024-09-03 NOTE — THERAPY RE-EVALUATION
Patient Name: Erlin Savage  : 1950    MRN: [id number]                              Today's Date: 9/3/2024       Admit Date: 2024    Visit Dx:     ICD-10-CM ICD-9-CM   1. Dry gangrene  I96 785.4     Patient Active Problem List   Diagnosis    Coronary artery disease involving native coronary artery of native heart with angina pectoris    Essential hypertension    Hyperlipidemia LDL goal <70    Type 2 diabetes mellitus with kidney complication, with long-term current use of insulin    TIA (transient ischemic attack)    Chronic back pain    Anxiety disorder    Stage 5 chronic kidney disease on chronic dialysis    Neuralgia    Secondary pulmonary arterial hypertension    ESRD (end stage renal disease)    Iron deficiency anemia    Severe mitral valve regurgitation    NSTEMI (non-ST elevated myocardial infarction)    Type 2 diabetes mellitus with diabetic chronic kidney disease    Atrial fibrillation and flutter    COPD (chronic obstructive pulmonary disease)    On home oxygen therapy    Macrocytic anemia    Thrombocytopenia    Severe malnutrition    Chronic diastolic (congestive) heart failure    Chronic systolic (congestive) heart failure    Dry gangrene     Past Medical History:   Diagnosis Date    Anxiety     Anxiety disorder     Back pain     Chronic back pain     work related injury    CKD (chronic kidney disease)     most recent creatinine 2.7 on 16    Coronary artery disease     Diabetes mellitus     insulin dependent diabetes Onset     Dyslipidemia     Hyperlipidemia     Hypertension     Ischemic heart disease     Renal failure     Stroke     TIA / transient right vision loss age 42    TIA (transient ischemic attack)     Vertigo      Past Surgical History:   Procedure Laterality Date    APPENDECTOMY      BELOW KNEE AMPUTATION Right 2024    Procedure: below the knee amputation Right;  Surgeon: Junior Schreiber MD;  Location: [facility];  Service: Vascular;  Laterality: Right;    CARDIAC  CATHETERIZATION      CHOLECYSTECTOMY  03/2012    COLONOSCOPY      CORONARY ARTERY BYPASS GRAFT      ENDOSCOPY        General Information       Row Name 09/03/24 1210          OT Time and Intention    Document Type re-evaluation  -JR     Mode of Treatment occupational therapy  -JR       Row Name 09/03/24 1210          General Information    Patient Profile Reviewed yes  -JR     Prior Level of Function --  Please refer to initial eval  -JR     Existing Precautions/Restrictions cardiac;fall;oxygen therapy device and L/min  R BKA 8/29/24, L great toe and 3rd toe wound, WBAT L LE, B LE edema, entensive cardiac history  -JR     Barriers to Rehab medically complex;previous functional deficit  -JR       Row Name 09/03/24 1210          Living Environment    People in Home --  Please refer to initial eval  -JR       Row Name 09/03/24 1210          Cognition    Orientation Status (Cognition) oriented x 3  -JR       Row Name 09/03/24 1210          Safety Issues, Functional Mobility    Safety Issues Affecting Function (Mobility) awareness of need for assistance;insight into deficits/self-awareness;judgment;problem-solving;safety precaution awareness;safety precautions follow-through/compliance;sequencing abilities  -JR     Impairments Affecting Function (Mobility) balance;coordination;endurance/activity tolerance;motor planning;pain;postural/trunk control;range of motion (ROM);sensation/sensory awareness;shortness of breath;strength  -JR               User Key  (r) = Recorded By, (t) = Taken By, (c) = Cosigned By      Initials Name Provider Type    JR Aline Preston OT Occupational Therapist                     Mobility/ADL's       Row Name 09/03/24 1212          Bed Mobility    Scooting/Bridging [place] (Bed Mobility) maximum assist (25% patient effort);2 person assist;verbal cues  -JR     Supine-Sit [place] (Bed Mobility) minimum assist (75% patient effort);verbal cues  -JR     Comment, (Bed Mobility) Pt sitting  EOB upon arrival with R LE propped on table.  -       Row Name 09/03/24 1212          Transfers    Transfers sit-stand transfer  -       Row Name 09/03/24 1212          Sit-Stand Transfer    Sit-Stand [place] (Transfers) maximum assist (25% patient effort);2 person assist;verbal cues  -     Assistive Device (Sit-Stand Transfers) other (see comments)  B UE support  -     Comment, (Sit-Stand Transfer) Pt attempted sit to stand from EOB x2 with minimal hip clearing from EOB despite cues, increased time, attempts and education.  -       Row Name 09/03/24 1212          Activities of Daily Living    BADL Assessment/Intervention lower body dressing  -[facility] Name 09/03/24 1212          Mobility    Extremity Weight-bearing Status left lower extremity;right lower extremity  -     Left Lower Extremity (Weight-bearing Status) weight-bearing as tolerated (WBAT)  -     Right Lower Extremity (Weight-bearing Status) non weight-bearing (NWB)  -[facility] Name 09/03/24 1212          Lower Body Dressing Assessment/Training    [place] Level (Lower Body Dressing) don;socks;dependent (less than 25% patient effort)  -     Position (Lower Body Dressing) edge of bed sitting  -               User Key  (r) = Recorded By, (t) = Taken By, (c) = Cosigned By      Initials Name Provider Type     Aline Preston, OT Occupational Therapist                   Obj/Interventions       Row Name 09/03/24 1215          Sensory Assessment (Somatosensory)    Sensory Assessment (Somatosensory) UE sensation intact  -[facility] Name 09/03/24 1215          Range of Motion Comprehensive    General Range of Motion bilateral upper extremity ROM WFL  -[facility] Name 09/03/24 1215          Strength Comprehensive (MMT)    Comment, General Manual Muscle Testing (MMT) Assessment B UE functionally 4/5  -[facility] Name 09/03/24 1215          Shoulder (Therapeutic Exercise)    Shoulder (Therapeutic Exercise) strengthening  exercise  -JR     Shoulder Strengthening (Therapeutic Exercise) bilateral;flexion;extension;horizontal aBduction/aDduction;10 repetitions;resistance band;red  Initiated red theraband HEP this date  -       Row Name 09/03/24 1215          Elbow/Forearm (Therapeutic Exercise)    Elbow/Forearm (Therapeutic Exercise) strengthening exercise  -JR     Elbow/Forearm Strengthening (Therapeutic Exercise) bilateral;flexion;extension;10 repetitions;resistance band;red  -       Row Name 09/03/24 1215          Motor Skills    Therapeutic Exercise shoulder;elbow/forearm  -       Row Name 09/03/24 1215          Balance    Balance Assessment sitting static balance  -JR     Static Sitting Balance standby assist  -               User Key  (r) = Recorded By, (t) = Taken By, (c) = Cosigned By      Initials Name Provider Type    JR Aline Preston, OT Occupational Therapist                   Goals/Plan       Row Name 09/03/24 1219          Transfer Goal 1 (OT)    Activity/Assistive Device (Transfer Goal 1, OT) sit-to-stand/stand-to-sit;bed-to-chair/chair-to-bed;commode, bedside without drop arms;walker, rolling  -JR     [place] Level/Cues Needed (Transfer Goal 1, OT) moderate assist (50-74% patient effort);verbal cues required;nonverbal cues (demo/gesture) required  -JR     Time Frame (Transfer Goal 1, OT) long term goal (LTG);by discharge  -JR     Strategies/Barriers (Transfers Goal 1, OT) while adhering to NWB at RLE when tolerable to practice toward precautions following planned procedure  -JR     Progress/Outcome (Transfer Goal 1, OT) progress slower than expected;goal ongoing  -       Row Name 09/03/24 1219          Dressing Goal 1 (OT)    Activity/Device (Dressing Goal 1, OT) upper body dressing;lower body dressing;other (see comments)  d/d TB garments with AE PRN w/ close monitoring of LEs d/t skin integrity  -JR     [place]/Cues Needed (Dressing Goal 1, OT) minimum assist (75% or more patient  effort);moderate assist (50-74% patient effort)  -JR     Time Frame (Dressing Goal 1, OT) short term goal (STG);5 days  -JR     Progress/Outcome (Dressing Goal 1, OT) progress slower than expected;goal ongoing  -JR       Row Name 09/03/24 1219          Toileting Goal 1 (OT)    Activity/Device (Toileting Goal 1, OT) adjust/manage clothing;perform perineal hygiene;commode, bedside without drop arms;grab bar/safety frame;raised toilet seat  -JR     [place] Level/Cues Needed (Toileting Goal 1, OT) moderate assist (50-74% patient effort);verbal cues required  -JR     Time Frame (Toileting Goal 1, OT) long term goal (LTG);by discharge  -JR     Progress/Outcome (Toileting Goal 1, OT) progress slower than expected;goal ongoing  -JR       Row Name 09/03/24 1219          Strength Goal 1 (OT)    Strength Goal 1 (OT) Pt to complete seated HEP encompassing BUEs targeting strength and endurance w/ progressive sets/reps/resistance in order to improve integration in ADLs, related t/fs, mobility  -JR     Time Frame (Strength Goal 1, OT) long term goal (LTG);by discharge  -JR     Progress/Outcome (Strength Goal 1, OT) progress slower than expected;goal ongoing  -       Row Name 09/03/24 1219          Therapy Assessment/Plan (OT)    Planned Therapy Interventions (OT) activity tolerance training;adaptive equipment training;BADL retraining;functional balance retraining;occupation/activity based interventions;ROM/therapeutic exercise;strengthening exercise;transfer/mobility retraining;patient/caregiver education/training  -               User Key  (r) = Recorded By, (t) = Taken By, (c) = Cosigned By      Initials Name Provider Type    JR Aline Preston, OT Occupational Therapist                   Clinical Impression       Row Name 09/03/24 1216          Pain Assessment    Pretreatment Pain Rating 8/10  -JR     Posttreatment Pain Rating 8/10  -JR     Pain Location - Side/Orientation Right  -JR     Pain Location lower  -JR      Pain Location - extremity  -JR     Pain Intervention(s) Repositioned;Nursing Notified  -JR     Additional Documentation Pain Scale: Word Pre/Post-Treatment (Group)  -       Row Name 09/03/24 1216          Plan of Care Review    Plan of Care Reviewed With patient;significant other  -     Outcome Evaluation OT initial eval and expanded chart review completed. Pt presents with multiple comorbidities and decreased strength, balance, activity tolerance and increased pain limiting independence with ADL's and mobility from baseline status. Recommend continued skilled OT services and transfer to IRF at d/c. Pt hesitant to consider IRF this date.  -       Row Name 09/03/24 1216          Therapy Assessment/Plan (OT)    Patient/Family Therapy Goal Statement (OT) go home  -     Rehab Potential (OT) good, to achieve stated therapy goals  -     Criteria for Skilled Therapeutic Interventions Met (OT) yes;meets criteria;skilled treatment is necessary  -     Therapy Frequency (OT) daily  -     Predicted Duration of Therapy Intervention (OT) 10 days  -       Row Name 09/03/24 1216          Therapy Plan Review/Discharge Plan (OT)    Anticipated Discharge Disposition (OT) inpatient rehabilitation facility  -       Row Name 09/03/24 1216          Vital Signs    Pre Systolic BP Rehab 149  -JR     Pre Treatment Diastolic BP 63  -JR     Post Systolic BP Rehab 142  -JR     Post Treatment Diastolic BP 82  -JR     Pretreatment Heart Rate (beats/min) 92  -JR     Posttreatment Heart Rate (beats/min) 87  -JR     Post SpO2 (%) 98  -JR     O2 Delivery Post Treatment nasal cannula  -JR     Pre Patient Position Sitting  -JR     Intra Patient Position Sitting  -JR     Post Patient Position Supine  -       Row Name 09/03/24 1216          Positioning and Restraints    Pre-Treatment Position in bed  -JR     Post Treatment Position bed  -JR     In Bed notified nsg;supine;call light within reach;encouraged to call for assist;exit alarm  on;with family/caregiver;RLE elevated;LLE elevated  -JR               User Key  (r) = Recorded By, (t) = Taken By, (c) = Cosigned By      Initials Name Provider Type    Aline Blanca OT Occupational Therapist                   Outcome Measures       Row Name 09/03/24 1220          How much help from another is currently needed...    Putting on and taking off regular lower body clothing? 1  -JR     Bathing (including washing, rinsing, and drying) 2  -JR     Toileting (which includes using toilet bed pan or urinal) 2  -JR     Putting on and taking off regular upper body clothing 3  -JR     Taking care of personal grooming (such as brushing teeth) 3  -JR     Eating meals 4  -JR     AM-PAC 6 Clicks Score (OT) 15  -JR       Row Name 09/03/24 1148 09/03/24 0800       How much help from another person do you currently need...    Turning from your back to your side while in flat bed without using bedrails? 3  -KG 2  -VS    Moving from lying on back to sitting on the side of a flat bed without bedrails? 2  -KG 2  -VS    Moving to and from a bed to a chair (including a wheelchair)? 1  -KG 2  -VS    Standing up from a chair using your arms (e.g., wheelchair, bedside chair)? 2  -KG 2  -VS    Climbing 3-5 steps with a railing? 1  -KG 1  -VS    To walk in hospital room? 1  -KG 1  -VS    AM-PAC 6 Clicks Score (PT) 10  -KG 10  -VS    Highest Level of Mobility Goal 4 --> Transfer to chair/commode  -KG 4 --> Transfer to chair/commode  -VS      Row Name 09/03/24 1220 09/03/24 1148       Functional Assessment    Outcome Measure Options AM-PAC 6 Clicks Daily Activity (OT)  -JR AM-PAC 6 Clicks Basic Mobility (PT)  -KG              User Key  (r) = Recorded By, (t) = Taken By, (c) = Cosigned By      Initials Name Provider Type    Aline Blanca OT Occupational Therapist    KG Valeria Liu Physical Therapist    VS Brook Lamb RN Registered Nurse                    Occupational Therapy Education       Title: PT OT  SLP Therapies (In Progress)       Topic: Occupational Therapy (In Progress)       Point: ADL training (Done)       Description:   Instruct learner(s) on proper safety adaptation and remediation techniques during self care or transfers.   Instruct in proper use of assistive devices.                  Learning Progress Summary             Patient Acceptance, E,TB,D,H, VU,NR by  at 9/3/2024 1052    Comment: theraband HEP    Acceptance, E,D, NR by BAL at 8/27/2024 0955   Family Acceptance, E,TB,D,H, VU,NR by  at 9/3/2024 1052    Comment: theraband HEP                         Point: Home exercise program (Done)       Description:   Instruct learner(s) on appropriate technique for monitoring, assisting and/or progressing therapeutic exercises/activities.                  Learning Progress Summary             Patient Acceptance, E,TB,D,H, VU,NR by  at 9/3/2024 1052    Comment: theraband HEP   Family Acceptance, E,TB,D,H, VU,NR by  at 9/3/2024 1052    Comment: theraband HEP                         Point: Precautions (In Progress)       Description:   Instruct learner(s) on prescribed precautions during self-care and functional transfers.                  Learning Progress Summary             Patient Acceptance, E,D, NR by BAL at 8/27/2024 0955                         Point: Body mechanics (In Progress)       Description:   Instruct learner(s) on proper positioning and spine alignment during self-care, functional mobility activities and/or exercises.                  Learning Progress Summary             Patient Acceptance, E,D, NR by BAL at 8/27/2024 0955                                         User Key       Initials Effective Dates Name Provider Type Discipline     02/03/23 -  Aline Preston OT Occupational Therapist OT    BAL 06/16/21 -  Autumn Boucher OT Occupational Therapist OT                  OT Recommendation and Plan  Planned Therapy Interventions (OT): activity tolerance training, adaptive equipment  training, BADL retraining, functional balance retraining, occupation/activity based interventions, ROM/therapeutic exercise, strengthening exercise, transfer/mobility retraining, patient/caregiver education/training  Therapy Frequency (OT): daily  Plan of Care Review  Plan of Care Reviewed With: patient, significant other  Outcome Evaluation: OT initial eval and expanded chart review completed. Pt presents with multiple comorbidities and decreased strength, balance, activity tolerance and increased pain limiting independence with ADL's and mobility from baseline status. Recommend continued skilled OT services and transfer to IRF at d/c. Pt hesitant to consider IRF this date.     Time Calculation:         Time Calculation- OT       Row Name 09/03/24 1221             Time Calculation- OT    OT Start Time 1054  -JR      OT Received On 09/03/24  -JR      OT Goal Re-Cert Due Date 09/13/24  -JR         Timed Charges    33205 - OT Therapeutic Activity Minutes 15  -JR         Untimed Charges    OT Eval/Re-eval Minutes 30  -JR         Total Minutes    Timed Charges Total Minutes 15  -JR      Untimed Charges Total Minutes 30  -JR       Total Minutes 45  -JR                User Key  (r) = Recorded By, (t) = Taken By, (c) = Cosigned By      Initials Name Provider Type     Aline Preston OT Occupational Therapist                  Therapy Charges for Today       Code Description Service Date Service Provider Modifiers Qty    [id number]  OT THERAPEUTIC ACT EA 15 MIN 9/3/2024 Aline Preston OT GO 1    [id number] HC OT RE-EVAL 2 9/3/2024 Aline Preston OT GO 1                 Aline Preston, OT  9/3/2024

## 2024-09-03 NOTE — DISCHARGE PLACEMENT REQUEST
"NICOLE ELLIS RN    P:  [phone]  F:  [phone]      Requires HD at [facility].               Glenis Dominguez (74 y.o. Male)       Date of Birth   1950    Social Security Number       Address   [address] [facility] [place] [address]    Home Phone   [phone]    MRN   [id number]       Anabaptist   None    Marital Status   Single                            Admission Date   8/26/24    Admission Type   Urgent    Admitting Provider   Brandon Crook MD    Attending Provider   Brandon Crook MD    Department, Room/Bed   [facility] 4G, S446/1       Discharge Date       Discharge Disposition       Discharge Destination                                 Attending Provider: Brandon Crook MD    Allergies: Cefepime, Doxycycline, Metoprolol, Gabapentin, Ranolazine, Hydralazine, Augmentin [Amoxicillin-pot Clavulanate], Biaxin [Clarithromycin], Cephalosporins, Coreg [Carvedilol], Crestor [Rosuvastatin Calcium], Iodine, Lipitor [Atorvastatin], Lisinopril, Livalo [Pitavastatin], Nortriptyline Hcl, Pravastatin, Questran [Cholestyramine]    Isolation: None   Infection: None   Code Status: CPR    Ht: 180.3 cm (71\")   Wt: 87.9 kg (193 lb 11.2 oz)    Admission Cmt: None   Principal Problem: Dry gangrene [I96]                   Active Insurance as of 8/26/2024       Primary Coverage       Payor Plan Insurance Group Employer/Plan Group    MEDICARE MEDICARE A & B        Payor Plan Address Payor Plan Phone Number Payor Plan Fax Number Effective Dates    [address] [phone]  12/1/2004 - None Entered    [place] [address]         Subscriber Name Subscriber Birth Date Member ID       GLENIS DOMINGUEZ 1950 [id number]                     Emergency Contacts        (Rel.) Home Phone Work Phone Mobile Phone    Princess Cardona (Friend) [phone] -- [phone]              Insurance Information                  MEDICARE/MEDICARE A & B Phone: [phone] "    Subscriber: Erlin Savage Subscriber#: [id number]    Group#: -- Precert#: --             History & Physical        JaeLisa DO at 24 [facility]9              [facility] Medicine Services  HISTORY AND PHYSICAL    Patient Name: Erlni Savage  : 1950  MRN: [id number]  Primary Care Physician: Joe Diallo MD  Date of admission: 2024      Subjective  Subjective     Chief Complaint:  Right leg pain    HPI:  Erlin Savage is a 74 y.o. male with a medical history significant for end-stage renal disease on HD MWF, coronary artery disease s/p CABG, A. Fib on OAC, aortic stenosis, diabetes mellitus, COPD on 2 to 4 L baseline, hyperlipidemia, hypertension and known peripheral arterial disease with nonhealing right foot transmetatarsal amputation site with dry gangrene.  Patient is status post multiple attempted bilateral lower extremity revascularization attempts, most recently right posterior tibial artery, peroneal artery, anterior tibial artery and proximal superficial femoral artery angioplasty on 2024 with Dr. Schreiber.  Patient additionally has heart failure and needs a new valve with a history of coding during last operation and remains high risk for any surgical procedure.  Patient was recently admitted to [facility] from  to  at which time he was recommended to undergo right BKA.  Patient at that time was unwilling to undergo procedure but is now agreeable to right below the knee amputation.  He was followed by infectious disease during his admission to [facility], he was followed by Dr. Valiente.  He was receiving vancomycin with his dialysis as well as Merrem.  He did leave [facility] AMA on 2024.  He was seen in nephrology clinic by Dr. Novoa and it was recommended that patient come to the hospital for further evaluation due to worsening appearance of his RLE.       Personal History     Past Medical History:   Diagnosis Date    Anxiety  "    Anxiety disorder     Back pain     Chronic back pain     work related injury    CKD (chronic kidney disease)     most recent creatinine 2.7 on 01/26/16    Coronary artery disease     Diabetes mellitus     insulin dependent diabetes Onset 2010    Dyslipidemia     Hyperlipidemia     Hypertension     Ischemic heart disease     Renal failure     Stroke     TIA / transient right vision loss age 42    TIA (transient ischemic attack)     Vertigo            Past Surgical History:   Procedure Laterality Date    APPENDECTOMY      CARDIAC CATHETERIZATION      CHOLECYSTECTOMY  03/2012    COLONOSCOPY      CORONARY ARTERY BYPASS GRAFT      ENDOSCOPY         Family History: family history is not on file.     Social History:  reports that he quit smoking about 38 years ago. His smoking use included cigarettes. He has never used smokeless tobacco. He reports that he does not drink alcohol and does not use drugs.  Social History     Social History Narrative    Not on file       Medications:  Available home medication information reviewed.  Insulin Pen Needle, Methoxy PEG-Epoetin Beta, Carrol-Do Rx, Sucroferric Oxyhydroxide, apixaban, aspirin, calcitriol, cholecalciferol, clonazePAM, dutasteride, gentamicin, glucose blood, hydrALAZINE, insulin aspart, insulin glargine, metoprolol succinate XL, multivitamin with minerals, mupirocin, naloxone, oxyCODONE, pantoprazole, promethazine, and triamcinolone    Allergies   Allergen Reactions    Cefepime Other (See Comments) and Seizure     myoclonus    Doxycycline Other (See Comments)     Joint pain, tongue swelling    \"Body locks up\"    Gabapentin Confusion    Ranolazine Dizziness and Other (See Comments)     Severe tremors/ shakiness    Cephalosporins Unknown - Low Severity    Hydralazine Unknown - Low Severity    Augmentin [Amoxicillin-Pot Clavulanate] Palpitations    Biaxin [Clarithromycin] Palpitations    Coreg [Carvedilol] Itching    Crestor [Rosuvastatin Calcium] Itching     Itching " "rash    Iodine Rash     \"pineda skin\"    Lipitor [Atorvastatin] Itching     And Crestor- generalized weakness and chest tightness    Lisinopril Cough     Cough /weakness, nauseas and dirrhea    Livalo [Pitavastatin] Unknown - Low Severity     Chest tightness    Nortriptyline Hcl Other (See Comments)     Arthralgias    Pravastatin Unknown - Low Severity     Chest , neck and arm discomfort    Questran [Cholestyramine] Unknown - Low Severity       Objective  Objective     Vital Signs:   Temp:  [98.1 °F (36.7 °C)] 98.1 °F (36.7 °C)  Heart Rate:  [] 87  Resp:  [13-18] 13  BP: (145-158)/(80-89) 149/82  Flow (L/min):  [2-3] 2       Physical Exam   Constitutional: Awake, alert, no acute distress, chronically ill-appearing elderly male  Eyes: PERRLA, sclerae anicteric, no conjunctival injection  HENT: NCAT, mucous membranes moist  Neck: Supple, no thyromegaly, no lymphadenopathy, trachea midline  Respiratory: Decreased in bases bilaterally, nonlabored respirations 2 L nasal cannula  Cardiovascular: RRR, no murmurs, rubs, or gallops, palpable pedal pulses bilaterally  Gastrointestinal: Positive bowel sounds, abdomen distended and firm  Musculoskeletal: 3+ pitting lower extremity edema  Psychiatric: Appropriate affect, cooperative  Neurologic: Oriented x 3, LAMA, speech clear  Skin: Right lower extremity erythema, status post transmetatarsal amputation, ischemic areas also noted to the left foot first and third toe    Result Review:  I have personally reviewed the results from the time of this admission to 8/26/2024 15:10 EDT and agree with these findings:  [x]  Laboratory list / accordion  []  Microbiology  [x]  Radiology  []  EKG/Telemetry   []  Cardiology/Vascular   []  Pathology  [x]  Old records  []  Other:    LAB RESULTS:      Lab 08/26/24  1145 08/26/24  1144   WBC 11.95*  --    HEMOGLOBIN 10.3*  --    HEMATOCRIT 34.4*  --    PLATELETS 251  --    NEUTROS ABS 9.88*  --    IMMATURE GRANS (ABS) 0.09*  --    LYMPHS ABS " 0.63*  --    MONOS ABS 0.93*  --    EOS ABS 0.34  --    MCV 91.2  --    PROCALCITONIN 0.43*  --    LACTATE  --  1.1         Lab 08/26/24  1145   SODIUM 129*   POTASSIUM 5.9*   CHLORIDE 93*   CO2 23.0   ANION GAP 13.0   BUN 43*   CREATININE 5.63*   EGFR 10.1*   GLUCOSE 239*   CALCIUM 9.4   MAGNESIUM 2.3         Lab 08/26/24  1145   TOTAL PROTEIN 6.4   ALBUMIN 3.3*   GLOBULIN 3.1   ALT (SGPT) 14   AST (SGOT) 19   BILIRUBIN 0.5   ALK PHOS 199*                     UA          1/1/2024    05:50   Urinalysis   Squamous Epithelial Cells, UA 0-2       Specific Gravity, UA 1.020       Blood, UA Small       Leukocytes, UA Negative       RBC, UA 11-15       Bacteria, UA Negative          Details          This result is from an external source.               Microbiology Results (last 10 days)       ** No results found for the last 240 hours. **            No radiology results from the last 24 hrs        Assessment & Plan  Assessment & Plan       Dry gangrene    Coronary artery disease involving native coronary artery of native heart without angina pectoris    Essential hypertension    Hyperlipidemia LDL goal <70    Type 2 diabetes mellitus with kidney complication, with long-term current use of insulin    TIA (transient ischemic attack)    Ischemic heart disease    Stage 5 chronic kidney disease on chronic dialysis    Secondary pulmonary arterial hypertension    ESRD (end stage renal disease)    Iron deficiency anemia    Severe mitral valve regurgitation    Type 2 diabetes mellitus with diabetic chronic kidney disease    On home oxygen therapy    Severe malnutrition    Chronic diastolic (congestive) heart failure    Chronic systolic (congestive) heart failure    Right lower extremity critical limb ischemia  Recent admission for dry gangrene  Right lower extremity cellulitis  History of nonhealing right TMA  L foot with ischemic changes of toes   BLE PVD  -4/10/24 ([facility]): RIGHT lower extremity tibial angioplasty   -5/14/24  ([facility]): RIGHT lower extremity arteriogram with angioplasty and drug coated balloon of RIGHT posterior tibial artery and RIGHT peroneal artery, anterior tibial artery, proximal superficial femoral artery angioplasty  -Discussed with vascular today, they will see patient in formal consult in the morning.  Tentatively plan for right BKA on 8/29 with Dr. Schreiber  -Followed by Dr. Heaton while admitted at [facility].  Discussed case with him today.  Continue vancomycin and Merrem.   -Blood cultures x 2 pending  -Previous wound culture at [facility] with Pseudomonas x 2, Enterococcus and corynebacterium  -Patient also noted to have LEFT foot with ischemic changes at first and third toe.  Wound care to see.  Vascular following.    End-stage renal disease  -On hemodialysis Monday/Wednesday/Friday  -Nephrology consulted, patient going for maintenance HD today    Hyponatremia  Hyperkalemia  -Patient going for dialysis today  -Patient has minimal urine output at baseline, volume status adjusted through HD  -repeat BMP in AM     CAD status post CABG  [id number]  Valvular heart disease  -Most recent echo in system from 2023 with a EF of 43%, severe diastolic dysfunction, moderate mitral regurgitation, moderate tricuspid regurgitation    Ascites  Lower extremity edema  -multifactorial secondary to CHF, end-stage renal disease  -Per patient, he gets monthly paracentesis at hospital in [place].  Denies any prior history of cirrhosis.  -Reviewed imaging in system, prior ultrasound with parenchymal liver disease  -Albumin 3.3  -Obtain repeat liver ultrasound with elastography  -PT wound for lower extremity compression wraps    PAF  -Hold Eliquis in anticipation of surgery    DM2  -Basal bolus regimen  -Obtain hemoglobin A1c    Anemia of Chronic Disease     COPD on chronic home O2  -4L NC at baseline   -DuoNebs PRN    VTE Prophylaxis:  Pharmacologic & mechanical VTE prophylaxis orders are present.          CODE STATUS:   "  Code Status and Medical Interventions: CPR (Attempt to Resuscitate); Full Support   Ordered at: 08/26/24 1218     Level Of Support Discussed With:    Patient     Code Status (Patient has no pulse and is not breathing):    CPR (Attempt to Resuscitate)     Medical Interventions (Patient has pulse or is breathing):    Full Support       Expected Discharge   Expected Discharge Date: 9/2/2024; Expected Discharge Time:      Lisa Rowe DO  08/26/24     Electronically signed by Lisa Rowe DO at 08/26/24 1510          Physician Progress Notes (most recent note)        Asha Dowd PA-C at 09/03/24 1013       Summary:Vascular SOAP - [facility]                    LOS: 8 days   Patient Care Team:  Joe Diallo MD as PCP - General (Family Medicine)  Fadi Garner MD as Consulting Physician (General Surgery)  Amor Celeste MD (Cardiology)  Praveen Novoa MD as Consulting Physician (Nephrology)  Kristin Moya MD as Consulting Physician (Pulmonary Disease)      Subjective       Subjective  POD#5 s/p RT BKA (8/29/24, [facility]). Patient and SO report that he is doing much better today than last week. Pain tolerable, 7/10. Required 2units pRBC over the weekend. Post op dressing changed at bedside, tolerated well.     History taken from: patient    Objective     Vital Signs  Temp:  [97.5 °F (36.4 °C)-98.4 °F (36.9 °C)] 98.4 °F (36.9 °C)  Heart Rate:  [74-95] 90  Resp:  [16-18] 18  BP: (127-146)/(53-72) 145/72    Objective:  Vital signs: (most recent): Blood pressure 145/72, pulse 90, temperature 98.4 °F (36.9 °C), temperature source Oral, resp. rate 18, height 180.3 cm (71\"), weight 87.9 kg (193 lb 11.2 oz), SpO2 97%.            Physical Exam:   Nursing and significant other at bedside  NAD, AAOx3, sitting on the edge of the bed  Normal effort, on 3.5L supplemental O2 via NC  RLE: s/p BKA, dressing removed, incision c/d/I with staples, mild erythema just above his central incision, no " warmth or drainage noted, xeroform applied along incision, padded with 4x4s, wrapped with kerlix and ace, knee immobilizer applied, mild knee contracture noted   LLE: dressing to the foot c/d/I  Cooperative, positive outlook    Results Review:     I reviewed the patient's new clinical results.  CBC    Results from last 7 days   Lab Units 09/03/24  0459 09/02/24  0436 09/01/24  0501 08/31/24  0759 08/30/24  1253 08/29/24  1633 08/28/24  0523   WBC 10*3/mm3 7.04 7.68 9.37 9.76 11.05* 11.86* 10.76   HEMOGLOBIN g/dL 8.3* 7.5* 7.3* 7.6* 7.3* 8.2* 9.8*   PLATELETS 10*3/mm3 220 225 208 221 287 302 271     BMP   Results from last 7 days   Lab Units 09/03/24  0459 09/02/24  0436 09/01/24  0501 08/31/24  0759 08/30/24  1253 08/29/24  1535 08/29/24  0623 08/28/24  0523   SODIUM mmol/L 131* 132* 135* 135* 129* 131* 132* 130*   POTASSIUM mmol/L 4.9 5.1 4.7 5.3* 6.7* 5.8* 5.5* 6.6*   CHLORIDE mmol/L 97* 97* 96* 98 94* 96* 95* 95*   CO2 mmol/L 25.0 24.0 24.0 23.0 22.0 21.0* 23.0 21.0*   BUN mg/dL 23 29* 25* 30* 43* 32* 30* 40*   CREATININE mg/dL 3.35* 4.58* 3.51* 4.07* 5.17* 4.26* 3.93* 5.16*   GLUCOSE mg/dL 190* 226* 141* 105* 199* 174* 148* 178*   MAGNESIUM mg/dL  --   --   --  1.9  --  1.9  --  2.0   PHOSPHORUS mg/dL 4.5 6.4* 6.1* 6.6* 7.9*  --   --   --           Current Facility-Administered Medications:     acetaminophen (TYLENOL) tablet 650 mg, 650 mg, Oral, Q6H PRN, Junior Schreiber MD, 650 mg at 09/02/24 0859    aspirin EC tablet 81 mg, 81 mg, Oral, Daily, Junior Schreiber MD, 81 mg at 09/03/24 0911    b complex-vitamin c-folic acid (NEPHRO-SEVERIANO) tablet 1 tablet, 1 tablet, Oral, Daily, Junior Schreiber MD, 1 tablet at 09/03/24 0912    sennosides-docusate (PERICOLACE) 8.6-50 MG per tablet 2 tablet, 2 tablet, Oral, BID PRN **AND** polyethylene glycol (MIRALAX) packet 17 g, 17 g, Oral, Daily PRN **AND** bisacodyl (DULCOLAX) EC tablet 5 mg, 5 mg, Oral, Daily PRN **AND** bisacodyl (DULCOLAX) suppository 10 mg, 10 mg, Rectal,  Daily PRN, Junior Schreiber MD    calcitriol (ROCALTROL) capsule 0.5 mcg, 0.5 mcg, Oral, Daily, Junior Schreiber MD, 0.5 mcg at 09/03/24 0911    castor oil-balsam peru (VENELEX) ointment 1 Application, 1 Application, Topical, Daily, Junior Schreiber MD, 1 Application at 09/03/24 0913    dextrose (D50W) (25 g/50 mL) IV injection 25 g, 25 g, Intravenous, Q15 Min PRN, Junior Schreiber MD    dextrose (D50W) (25 g/50 mL) IV injection 25 g, 25 g, Intravenous, Q15 Min PRN, Brandon Crook MD    dextrose (GLUTOSE) oral gel 15 g, 15 g, Oral, Q15 Min PRN, Junior Schreiber MD    glucagon (GLUCAGEN) injection 1 mg, 1 mg, Intramuscular, Q15 Min PRN, Junior Schreiber MD    heparin (porcine) injection 2,000 Units, 2,000 Units, Intracatheter, PRN, Junior Schreiber MD, 2,000 Units at 09/02/24 0851    HYDROmorphone (DILAUDID) injection 0.5 mg, 0.5 mg, Intravenous, Q4H PRN, Brandon Crook MD, 0.5 mg at 09/02/24 1411    Insulin Lispro (humaLOG) injection 2-7 Units, 2-7 Units, Subcutaneous, TID With Meals, Brandon Crook MD, 2 Units at 09/02/24 1730    Magnesium Standard Dose Replacement - Follow Nurse / BPA Driven Protocol, , Does not apply, PRN, Brandon Crook MD    meropenem (MERREM) 500 mg in sodium chloride 0.9 % 100 mL MBP, 500 mg, Intravenous, Q24H, Marlo Heaton MD, 500 mg at 09/02/24 1232    nitroglycerin (NITROSTAT) SL tablet 0.4 mg, 0.4 mg, Sublingual, Q5 Min PRN, Junior Schreiber MD    nitroglycerin (TRIDIL) 200 mcg/ml infusion, 5-200 mcg/min, Intravenous, Titrated, Dada Darden IV, MD, Last Rate: 1.5 mL/hr at 09/02/24 1412, 5 mcg/min at 09/02/24 1412    ondansetron ODT (ZOFRAN-ODT) disintegrating tablet 4 mg, 4 mg, Oral, Q6H PRN, 4 mg at 08/27/24 0938 **OR** ondansetron (ZOFRAN) injection 4 mg, 4 mg, Intravenous, Q6H PRN, Junior Schreiber MD, 4 mg at 09/02/24 0851    oxyCODONE (ROXICODONE) immediate release tablet 7.5 mg, 7.5 mg, Oral, Q4H PRN, Brandon Croko MD, 7.5 mg at  09/03/24 1005    pantoprazole (PROTONIX) EC tablet 40 mg, 40 mg, Oral, Daily, Junior Schreiber MD, 40 mg at 09/03/24 0911    Pharmacy Consult - [facility], , Does not apply, Daily, Phillip Escoto, [facility]    Pharmacy to dose vancomycin, , Does not apply, Continuous PRN, Marlo Heaton MD    simethicone (MYLICON) chewable tablet 80 mg, 80 mg, Oral, 4x Daily PRN, Cassandra Wiseman APRN    sodium chloride 0.9 % flush 10 mL, 10 mL, Intravenous, Q12H, Junior Schreiber MD, 10 mL at 09/03/24 0912    sodium chloride 0.9 % flush 10 mL, 10 mL, Intravenous, PRN, Junior Schreiber MD    sodium chloride 0.9 % infusion 40 mL, 40 mL, Intravenous, PRN, Junior Schreiber MD    sodium chloride 0.9 % infusion, 9 mL/hr, Intravenous, Continuous, Junior Schreiber MD, Last Rate: 250 mL/hr at 08/29/24 1020, Rate Change at 08/29/24 1020    vancomycin (dosing per levels), , Does not apply, Daily, Scar Garzon, [facility]     Assessment & Plan       Dry gangrene    Coronary artery disease involving native coronary artery of native heart with angina pectoris    Essential hypertension    Hyperlipidemia LDL goal <70    Type 2 diabetes mellitus with kidney complication, with long-term current use of insulin    TIA (transient ischemic attack)    Stage 5 chronic kidney disease on chronic dialysis    Secondary pulmonary arterial hypertension    ESRD (end stage renal disease)    Iron deficiency anemia    Severe mitral valve regurgitation    NSTEMI (non-ST elevated myocardial infarction)    Type 2 diabetes mellitus with diabetic chronic kidney disease    On home oxygen therapy    Severe malnutrition    Chronic diastolic (congestive) heart failure    Chronic systolic (congestive) heart failure      Assessment & Plan  Right Lower Extremity Critical Limb Ischemia  - 4/10/24 ([facility]): RIGHT lower extremity tibial angioplasty   - 5/14/24 ([facility]): RIGHT lower extremity arteriogram with angioplasty and drug coated balloon of RIGHT posterior tibial artery and   RIGHT  peroneal artery, anterior tibial artery, proximal superficial femoral artery angioplasty.   - [facility] 24 XR: Right toe metatarsal osteomyelitis is suspected.  - [facility] 8/15/24 arterial and venous studies with no DVT. LEFT TBI 0.38. Tibial disease noted.   - 24 ([facility]): RT BKA     - pain control prn  - continue aspirin  - home Eliquis remains held - required 2 units pRBC over the weekend  - PT/OT to mobilize as tolerated, new orders placed today  - RLE BKA dressing changed by vascular team today, to be changed by nursing qod, sooner if saturated, orders placed   - Patient will require inpatient rehab at discharge, discussed with CM who is following    Plan of care discussed with Dr. Schreiber. Plan also reviewed with patient, nursing and family at bedside who verbalized understanding and agreement.     Asha Dowd PA-C  24  10:13 EDT            Electronically signed by Asha Dowd PA-C at 24 1024          Physical Therapy Notes (most recent note)        Valeria Liu at 24 1045  Version 1 of 1         Patient Name: Erlin Savage  : 1950    MRN: [id number]                              Today's Date: 9/3/2024       Admit Date: 2024    Visit Dx:     ICD-10-CM ICD-9-CM   1. Dry gangrene  I96 785.4     Patient Active Problem List   Diagnosis    Coronary artery disease involving native coronary artery of native heart with angina pectoris    Essential hypertension    Hyperlipidemia LDL goal <70    Type 2 diabetes mellitus with kidney complication, with long-term current use of insulin    TIA (transient ischemic attack)    Chronic back pain    Anxiety disorder    Stage 5 chronic kidney disease on chronic dialysis    Neuralgia    Secondary pulmonary arterial hypertension    ESRD (end stage renal disease)    Iron deficiency anemia    Severe mitral valve regurgitation    NSTEMI (non-ST elevated myocardial infarction)    Type 2 diabetes mellitus with diabetic chronic kidney  "disease    Atrial fibrillation and flutter    COPD (chronic obstructive pulmonary disease)    On home oxygen therapy    Macrocytic anemia    Thrombocytopenia    Severe malnutrition    Chronic diastolic (congestive) heart failure    Chronic systolic (congestive) heart failure    Dry gangrene     Past Medical History:   Diagnosis Date    Anxiety     Anxiety disorder     Back pain     Chronic back pain     work related injury    CKD (chronic kidney disease)     most recent creatinine 2.7 on 01/26/16    Coronary artery disease     Diabetes mellitus     insulin dependent diabetes Onset 2010    Dyslipidemia     Hyperlipidemia     Hypertension     Ischemic heart disease     Renal failure     Stroke     TIA / transient right vision loss age 42    TIA (transient ischemic attack)     Vertigo      Past Surgical History:   Procedure Laterality Date    APPENDECTOMY      BELOW KNEE AMPUTATION Right 8/29/2024    Procedure: below the knee amputation Right;  Surgeon: Junior Schreiber MD;  [facility]: [facility];  Service: Vascular;  Laterality: Right;    CARDIAC CATHETERIZATION      CHOLECYSTECTOMY  03/2012    COLONOSCOPY      CORONARY ARTERY BYPASS GRAFT      ENDOSCOPY        General Information       Row Name 09/03/24 1136          Physical Therapy Time and Intention    Document Type re-evaluation  -KG     Mode of Treatment physical therapy  -KG       Row Name 09/03/24 1136          General Information    Patient Profile Reviewed yes  -KG     Prior Level of Function independent:;all household mobility;community mobility;transfer;ADL's;dependent:;driving  Reported either uses rollator or \"furniture walks\" for shorter distances in home. Uses manual w/c for longer community distances. Has primarily been using new motorized chair for last ~2 wks. Hx falls, w/ last fall ~2 mon ago. Uses public transportation.  -KG     Existing Precautions/Restrictions cardiac;fall;oxygen therapy device and L/min;other (see comments)  R BKA 8/29/24 [facility]; L " great toe and 3rd toe wound; WBAT LLE per MD; BLE edema; extensive cardiac hx  -KG     Barriers to Rehab medically complex;previous functional deficit  -KG       Row Name 09/03/24 1136          Living Environment    People in Home significant other  -KG       Row Name 09/03/24 1136          Home Main Entrance    Number of Stairs, Main Entrance other (see comments)  has ramp at primary entrance; has 1 step when entering home from garage (pt reported he does not access garage)  -KG       Row Name 09/03/24 1136          Stairs Within Home, Primary    Number of Stairs, Within Home, Primary none  -KG       Row Name 09/03/24 1136          Cognition    Orientation Status (Cognition) oriented x 3  -KG       Row Name 09/03/24 1136          Safety Issues, Functional Mobility    Safety Issues Affecting Function (Mobility) awareness of need for assistance;insight into deficits/self-awareness;safety precautions follow-through/compliance;sequencing abilities;problem-solving;safety precaution awareness  -KG     Impairments Affecting Function (Mobility) balance;coordination;endurance/activity tolerance;motor planning;pain;postural/trunk control;range of motion (ROM);sensation/sensory awareness;shortness of breath;strength  -KG               User Key  (r) = Recorded By, (t) = Taken By, (c) = Cosigned By      Initials Name Provider Type    KG Valeria Liu Physical Therapist                   Mobility       Row Name 09/03/24 1139          Bed Mobility    Bed Mobility scooting/bridging;sit-supine  -KG     Scooting/Bridging [place] (Bed Mobility) maximum assist (25% patient effort);2 person assist  -KG     Sit-Supine [place] (Bed Mobility) minimum assist (75% patient effort);1 person assist  -KG     Assistive Device (Bed Mobility) bed rails;head of bed elevated  -KG       Row Name 09/03/24 1139          Transfers    Comment, (Transfers) several attempts STS from EOB, BUE support, LLE knee/ foot blocked, max-A x2, able to  partially clear hips  -KG       Row Name 09/03/24 1139          Sit-Stand Transfer    Sit-Stand [place] (Transfers) maximum assist (25% patient effort);2 person assist  -KG     Assistive Device (Sit-Stand Transfers) other (see comments)  BUE support  -KG       Row Name 09/03/24 1139          Gait/Stairs (Locomotion)    [place] Level (Gait) unable to assess  -KG       Row Name 09/03/24 1139          Mobility    Extremity Weight-bearing Status left lower extremity;right lower extremity  -KG     Left Lower Extremity (Weight-bearing Status) weight-bearing as tolerated (WBAT)  -KG     Right Lower Extremity (Weight-bearing Status) non weight-bearing (NWB)  -KG               User Key  (r) = Recorded By, (t) = Taken By, (c) = Cosigned By      Initials Name Provider Type    Valeria Ahumada Physical Therapist                   Obj/Interventions       Row Name 09/03/24 1141          Strength Comprehensive (MMT)    General Manual Muscle Testing (MMT) Assessment lower extremity strength deficits identified  -KG     Comment, General Manual Muscle Testing (MMT) Assessment LLE 3+/5, good SLR RLE  -KG       Row Name 09/03/24 1141          Motor Skills    Therapeutic Exercise other (see comments)  LAQ, quad sets,  hip flexion, L heel raises  -KG       Row Name 09/03/24 1141          Balance    Balance Assessment standing static balance  -KG     Static Standing Balance maximum assist;2-person assist  -KG     Balance Interventions standing;sit to stand  -KG               User Key  (r) = Recorded By, (t) = Taken By, (c) = Cosigned By      Initials Name Provider Type    Valeria Ahumada Physical Therapist                   Goals/Plan       Row Name 09/03/24 1146          Bed Mobility Goal 1 (PT)    Activity/Assistive Device (Bed Mobility Goal 1, PT) sit to supine/supine to sit  -KG     [place] Level/Cues Needed (Bed Mobility Goal 1, PT) standby assist  -KG     Time Frame (Bed Mobility Goal 1, PT) short term goal  (STG);5 days  -KG     Progress/Outcomes (Bed Mobility Goal 1, PT) continuing progress toward goal  -KG       Row Name 09/03/24 1146          Transfer Goal 1 (PT)    Activity/Assistive Device (Transfer Goal 1, PT) sit-to-stand/stand-to-sit;bed-to-chair/chair-to-bed;wheelchair transfer;walker, rolling;other (see comments)  -KG     [place] Level/Cues Needed (Transfer Goal 1, PT) moderate assist (50-74% patient effort)  -KG     Time Frame (Transfer Goal 1, PT) long term goal (LTG);10 days  -KG     Progress/Outcome (Transfer Goal 1, PT) continuing progress toward goal  -KG       Row Name 09/03/24 1146          Gait Training Goal 1 (PT)    Activity/Assistive Device (Gait Training Goal 1, PT) gait (walking locomotion);assistive device use;walker, rolling;maintain weight-bearing status;other (see comments)  -KG     [place] Level (Gait Training Goal 1, PT) maximum assist (25-49% patient effort)  -KG     Distance (Gait Training Goal 1, PT) 5ft  -KG     Time Frame (Gait Training Goal 1, PT) long term goal (LTG);10 days  -KG     Progress/Outcome (Gait Training Goal 1, PT) continuing progress toward goal  -KG               User Key  (r) = Recorded By, (t) = Taken By, (c) = Cosigned By      Initials Name Provider Type    DORA Valeria Liu Physical Therapist                   Clinical Impression       Row Name 09/03/24 1143          Pain    Pretreatment Pain Rating 8/10  -KG     Posttreatment Pain Rating 8/10  -KG     Pain Location - Side/Orientation Right  -KG     Pain Location incisional  -KG     Pain Location - extremity  -KG     Pain Intervention(s) Repositioned;Ambulation/increased activity  -KG       Row Name 09/03/24 1143          Plan of Care Review    Plan of Care Reviewed With patient;significant other  -KG     Progress no change  -KG     Outcome Evaluation PT re-eval: Pt presenting s/p R BKA 8/29.  several attempts STS from EOB, BUE support, LLE knee/ foot blocked, max-A x2, able to partially clear hips.   Recommend IPR at d/c.  -KG       [facility] Name 09/03/24 1143          Therapy Assessment/Plan (PT)    Patient/Family Therapy Goals Statement (PT) return PLOF  -KG     Rehab Potential (PT) fair, will monitor progress closely  -KG     Criteria for Skilled Interventions Met (PT) yes;meets criteria;skilled treatment is necessary  -KG     Therapy Frequency (PT) daily  -KG     Predicted Duration of Therapy Intervention (PT) 7 days  -KG       [facility] Name 09/03/24 1143          Vital Signs    Pre Systolic BP Rehab 149  -KG     Pre Treatment Diastolic BP 63  -KG     Pretreatment Heart Rate (beats/min) 91  -KG     Posttreatment Heart Rate (beats/min) 90  -KG       [facility] Name 09/03/24 1143          Positioning and Restraints    Pre-Treatment Position in bed  -KG     Post Treatment Position bed  -KG     In Bed notified nsg;fowlers;call light within reach;encouraged to call for assist;exit alarm on;with family/caregiver;with OT  -KG               User Key  (r) = Recorded By, (t) = Taken By, (c) = Cosigned By      Initials Name Provider Type    Valeria Ahumada Physical Therapist                   Outcome Measures       Row Name 09/03/24 1148 09/03/24 0800       How much help from another person do you currently need...    Turning from your back to your side while in flat bed without using bedrails? 3  -KG 2  -VS    Moving from lying on back to sitting on the side of a flat bed without bedrails? 2  -KG 2  -VS    Moving to and from a bed to a chair (including a wheelchair)? 1  -KG 2  -VS    Standing up from a chair using your arms (e.g., wheelchair, bedside chair)? 2  -KG 2  -VS    Climbing 3-5 steps with a railing? 1  -KG 1  -VS    To walk in hospital room? 1  -KG 1  -VS    AM-PAC 6 Clicks Score (PT) 10  -KG 10  -VS    Highest Level of Mobility Goal 4 --> Transfer to chair/commode  -KG 4 --> Transfer to chair/commode  -VS      Row Name 09/03/24 1148          Functional Assessment    Outcome Measure Options AM-PAC 6 Clicks Basic  Mobility (PT)  -KG               User Key  (r) = Recorded By, (t) = Taken By, (c) = Cosigned By      Initials Name Provider Type    KG Valeria Liu Physical Therapist    VS Brook Lamb RN Registered Nurse                                 Physical Therapy Education       Title: PT OT SLP Therapies (In Progress)       Topic: Physical Therapy (In Progress)       Point: Mobility training (In Progress)       Learning Progress Summary             Patient Acceptance, E, NR by KG at 9/3/2024 1148    Acceptance, E, VU,NR by BA at 8/27/2024 1449                         Point: Home exercise program (In Progress)       Learning Progress Summary             Patient Acceptance, E, NR by KG at 9/3/2024 1148    Acceptance, E, VU,NR by BA at 8/27/2024 1449                         Point: Body mechanics (In Progress)       Learning Progress Summary             Patient Acceptance, E, NR by KG at 9/3/2024 1148    Acceptance, E, VU,NR by BA at 8/27/2024 1449                         Point: Precautions (In Progress)       Learning Progress Summary             Patient Acceptance, E, NR by KG at 9/3/2024 1148    Acceptance, E, VU,NR by BA at 8/27/2024 1449                                         User Key       Initials Effective Dates Name Provider Type Discipline    KG 01/04/23 -  Valeria Liu Physical Therapist PT    BA 09/21/21 -  Peace Berg PT Physical Therapist PT                  PT Recommendation and Plan     Plan of Care Reviewed With: patient, significant other  Progress: no change  Outcome Evaluation: PT re-eval: Pt presenting s/p R BKA 8/29.  several attempts STS from EOB, BUE support, LLE knee/ foot blocked, max-A x2, able to partially clear hips.  Recommend IPR at d/c.     Time Calculation:         PT Charges       Row Name 09/03/24 1156             Time Calculation    Start Time 1045  -KG      PT Received On 09/03/24  -KG      PT Goal Re-Cert Due Date 09/13/24  -KG         Timed Charges    48341 - PT  Therapeutic Exercise Minutes 15  -KG      79167 - PT Therapeutic Activity Minutes 25  -KG         Total Minutes    Timed Charges Total Minutes 40  -KG       Total Minutes 40  -KG                User Key  (r) = Recorded By, (t) = Taken By, (c) = Cosigned By      Initials Name Provider Type    Valeria Ahumada Physical Therapist                  Therapy Charges for Today       Code Description Service Date Service Provider Modifiers Qty    35129681743 HC PT THER PROC EA 15 MIN 9/3/2024 Valeria Liu GP 1    74781823693 HC PT THERAPEUTIC ACT EA 15 MIN 9/3/2024 Valeria Liu GP 2    59203293881 HC PT RE-EVAL ESTABLISHED PLAN 2 9/3/2024 Valeria Liu GP 1            PT G-Codes  Outcome Measure Options: AM-PAC 6 Clicks Basic Mobility (PT)  AM-PAC 6 Clicks Score (PT): 10  AM-PAC 6 Clicks Score (OT): 18  PT Discharge Summary  Anticipated Discharge Disposition (PT): inpatient rehabilitation facility    Valeria Liu  9/3/2024      Electronically signed by Valeria Liu at 24 1158                                Aline Preston OT   Occupational Therapist  Specialty: Occupational Therapy     Therapy Re-Evaluation      Signed     Date of Service: 24 1052  Creation Time: 24 1222     Signed       Expand All Collapse All    Patient Name: Erlin Savage             : 1950                        MRN: [id number]                              Today's Date: 9/3/2024                                     Admit Date: 2024                        Visit Dx:   Visit Diagnosis       ICD-10-CM ICD-9-CM   1. Dry gangrene  I96 785.4         Problem List       Patient Active Problem List   Diagnosis    Coronary artery disease involving native coronary artery of native heart with angina pectoris    Essential hypertension    Hyperlipidemia LDL goal <70    Type 2 diabetes mellitus with kidney complication, with long-term current use of insulin    TIA (transient ischemic attack)    Chronic back pain     Anxiety disorder    Stage 5 chronic kidney disease on chronic dialysis    Neuralgia    Secondary pulmonary arterial hypertension    ESRD (end stage renal disease)    Iron deficiency anemia    Severe mitral valve regurgitation    NSTEMI (non-ST elevated myocardial infarction)    Type 2 diabetes mellitus with diabetic chronic kidney disease    Atrial fibrillation and flutter    COPD (chronic obstructive pulmonary disease)    On home oxygen therapy    Macrocytic anemia    Thrombocytopenia    Severe malnutrition    Chronic diastolic (congestive) heart failure    Chronic systolic (congestive) heart failure    Dry gangrene         Medical History        Past Medical History:   Diagnosis Date    Anxiety      Anxiety disorder      Back pain      Chronic back pain       work related injury    CKD (chronic kidney disease)       most recent creatinine 2.7 on 01/26/16    Coronary artery disease      Diabetes mellitus       insulin dependent diabetes Onset 2010    Dyslipidemia      Hyperlipidemia      Hypertension      Ischemic heart disease      Renal failure      Stroke       TIA / transient right vision loss age 42    TIA (transient ischemic attack)      Vertigo           Surgical History         Past Surgical History:   Procedure Laterality Date    APPENDECTOMY        BELOW KNEE AMPUTATION Right 8/29/2024     Procedure: below the knee amputation Right;  Surgeon: Junior Schreiber MD;  Location: [facility];  Service: Vascular;  Laterality: Right;    CARDIAC CATHETERIZATION        CHOLECYSTECTOMY   03/2012    COLONOSCOPY        CORONARY ARTERY BYPASS GRAFT        ENDOSCOPY               General Information         Row Name 09/03/24 1210                 OT Time and Intention     Document Type re-evaluation  -JR       Mode of Treatment occupational therapy  -JR          Row Name 09/03/24 1210                 General Information     Patient Profile Reviewed yes  -JR       Prior Level of Function --  Please refer to initial eval   -JR       Existing Precautions/Restrictions cardiac;fall;oxygen therapy device and L/min  R BKA 8/29/24, L great toe and 3rd toe wound, WBAT L LE, B LE edema, entensive cardiac history  -       Barriers to Rehab medically complex;previous functional deficit  -          Row Name 09/03/24 1210                 Living Environment     People in Home --  Please refer to initial eval  -JR          Row Name 09/03/24 1210                 Cognition     Orientation Status (Cognition) oriented x 3  -          Row Name 09/03/24 1210                 Safety Issues, Functional Mobility     Safety Issues Affecting Function (Mobility) awareness of need for assistance;insight into deficits/self-awareness;judgment;problem-solving;safety precaution awareness;safety precautions follow-through/compliance;sequencing abilities  -       Impairments Affecting Function (Mobility) balance;coordination;endurance/activity tolerance;motor planning;pain;postural/trunk control;range of motion (ROM);sensation/sensory awareness;shortness of breath;strength  -                       User Key  (r) = Recorded By, (t) = Taken By, (c) = Cosigned By        Initials Name Provider Type     JR Aline Preston OT Occupational Therapist                                     Mobility/ADL's         Row Name 09/03/24 1212                 Bed Mobility     Scooting/Bridging [place] (Bed Mobility) maximum assist (25% patient effort);2 person assist;verbal cues  -JR       Supine-Sit [place] (Bed Mobility) minimum assist (75% patient effort);verbal cues  -JR       Comment, (Bed Mobility) Pt sitting EOB upon arrival with R LE propped on table.  -          Row Name 09/03/24 1212                 Transfers     Transfers sit-stand transfer  -          Row Name 09/03/24 1212                 Sit-Stand Transfer     Sit-Stand [place] (Transfers) maximum assist (25% patient effort);2 person assist;verbal cues  -JR       Assistive Device (Sit-Stand  Transfers) other (see comments)  B UE support  -       Comment, (Sit-Stand Transfer) Pt attempted sit to stand from EOB x2 with minimal hip clearing from EOB despite cues, increased time, attempts and education.  -          Row Name 09/03/24 1212                 Activities of Daily Living     BADL Assessment/Intervention lower body dressing  -          Row Name 09/03/24 1212                 Mobility     Extremity Weight-bearing Status left lower extremity;right lower extremity  -       Left Lower Extremity (Weight-bearing Status) weight-bearing as tolerated (WBAT)  -       Right Lower Extremity (Weight-bearing Status) non weight-bearing (NWB)  -          Row Name 09/03/24 1212                 Lower Body Dressing Assessment/Training     [place] Level (Lower Body Dressing) don;socks;dependent (less than 25% patient effort)  -       Position (Lower Body Dressing) edge of bed sitting  -                       User Key  (r) = Recorded By, (t) = Taken By, (c) = Cosigned By        Initials Name Provider Type      Aline Preston, OT Occupational Therapist                            Obj/Interventions         Row Name 09/03/24 1215                 Sensory Assessment (Somatosensory)     Sensory Assessment (Somatosensory) UE sensation intact  -[facility] Name 09/03/24 1215                 Range of Motion Comprehensive     General Range of Motion bilateral upper extremity ROM WFL  -[facility] Name 09/03/24 1215                 Strength Comprehensive (MMT)     Comment, General Manual Muscle Testing (MMT) Assessment B UE functionally 4/5  -[facility] Name 09/03/24 1215                 Shoulder (Therapeutic Exercise)     Shoulder (Therapeutic Exercise) strengthening exercise  -       Shoulder Strengthening (Therapeutic Exercise) bilateral;flexion;extension;horizontal aBduction/aDduction;10 repetitions;resistance band;red  Initiated red theraband HEP this date  -          Row Name 09/03/24  1215                 Elbow/Forearm (Therapeutic Exercise)     Elbow/Forearm (Therapeutic Exercise) strengthening exercise  -       Elbow/Forearm Strengthening (Therapeutic Exercise) bilateral;flexion;extension;10 repetitions;resistance band;red  -          Row Name 09/03/24 1215                 Motor Skills     Therapeutic Exercise shoulder;elbow/forearm  -          Row Name 09/03/24 1215                 Balance     Balance Assessment sitting static balance  -JR       Static Sitting Balance standby assist  -JR                       User Key  (r) = Recorded By, (t) = Taken By, (c) = Cosigned By        Initials Name Provider Type     JR Aline Preston, OT Occupational Therapist                            Goals/Plan         Row Name 09/03/24 1219                 Transfer Goal 1 (OT)     Activity/Assistive Device (Transfer Goal 1, OT) sit-to-stand/stand-to-sit;bed-to-chair/chair-to-bed;commode, bedside without drop arms;walker, rolling  -JR       [place] Level/Cues Needed (Transfer Goal 1, OT) moderate assist (50-74% patient effort);verbal cues required;nonverbal cues (demo/gesture) required  -JR       Time Frame (Transfer Goal 1, OT) long term goal (LTG);by discharge  -JR       Strategies/Barriers (Transfers Goal 1, OT) while adhering to NWB at RLE when tolerable to practice toward precautions following planned procedure  -JR       Progress/Outcome (Transfer Goal 1, OT) progress slower than expected;goal ongoing  -          Row Name 09/03/24 1219                 Dressing Goal 1 (OT)     Activity/Device (Dressing Goal 1, OT) upper body dressing;lower body dressing;other (see comments)  d/d TB garments with AE PRN w/ close monitoring of LEs d/t skin integrity  -JR       [place]/Cues Needed (Dressing Goal 1, OT) minimum assist (75% or more patient effort);moderate assist (50-74% patient effort)  -JR       Time Frame (Dressing Goal 1, OT) short term goal (STG);5 days  -JR       Progress/Outcome  (Dressing Goal 1, OT) progress slower than expected;goal ongoing  -          Row Name 09/03/24 1219                 Toileting Goal 1 (OT)     Activity/Device (Toileting Goal 1, OT) adjust/manage clothing;perform perineal hygiene;commode, bedside without drop arms;grab bar/safety frame;raised toilet seat  -JR       [place] Level/Cues Needed (Toileting Goal 1, OT) moderate assist (50-74% patient effort);verbal cues required  -JR       Time Frame (Toileting Goal 1, OT) long term goal (LTG);by discharge  -JR       Progress/Outcome (Toileting Goal 1, OT) progress slower than expected;goal ongoing  -          Row Name 09/03/24 1219                 Strength Goal 1 (OT)     Strength Goal 1 (OT) Pt to complete seated HEP encompassing BUEs targeting strength and endurance w/ progressive sets/reps/resistance in order to improve integration in ADLs, related t/fs, mobility  -JR       Time Frame (Strength Goal 1, OT) long term goal (LTG);by discharge  -JR       Progress/Outcome (Strength Goal 1, OT) progress slower than expected;goal ongoing  -          Row Name 09/03/24 1219                 Therapy Assessment/Plan (OT)     Planned Therapy Interventions (OT) activity tolerance training;adaptive equipment training;BADL retraining;functional balance retraining;occupation/activity based interventions;ROM/therapeutic exercise;strengthening exercise;transfer/mobility retraining;patient/caregiver education/training  -                    User Key  (r) = Recorded By, (t) = Taken By, (c) = Cosigned By        Initials Name Provider Type     JR Aline Preston, OT Occupational Therapist                         Clinical Impression         Row Name 09/03/24 1216                 Pain Assessment     Pretreatment Pain Rating 8/10  -JR       Posttreatment Pain Rating 8/10  -JR       Pain Location - Side/Orientation Right  -JR       Pain Location lower  -JR       Pain Location - extremity  -JR       Pain Intervention(s)  Repositioned;Nursing Notified  -JR       Additional Documentation Pain Scale: Word Pre/Post-Treatment (Group)  -          Row Name 09/03/24 1216                 Plan of Care Review     Plan of Care Reviewed With patient;significant other  -       Outcome Evaluation OT initial eval and expanded chart review completed. Pt presents with multiple comorbidities and decreased strength, balance, activity tolerance and increased pain limiting independence with ADL's and mobility from baseline status. Recommend continued skilled OT services and transfer to IRF at d/c. Pt hesitant to consider IRF this date.  -          Row Name 09/03/24 1216                 Therapy Assessment/Plan (OT)     Patient/Family Therapy Goal Statement (OT) go home  -       Rehab Potential (OT) good, to achieve stated therapy goals  -       Criteria for Skilled Therapeutic Interventions Met (OT) yes;meets criteria;skilled treatment is necessary  -       Therapy Frequency (OT) daily  -JR       Predicted Duration of Therapy Intervention (OT) 10 days  -          Row Name 09/03/24 1216                 Therapy Plan Review/Discharge Plan (OT)     Anticipated Discharge Disposition (OT) inpatient rehabilitation facility  -          Row Name 09/03/24 1216                 Vital Signs     Pre Systolic BP Rehab 149  -JR       Pre Treatment Diastolic BP 63  -JR       Post Systolic BP Rehab 142  -JR       Post Treatment Diastolic BP 82  -JR       Pretreatment Heart Rate (beats/min) 92  -JR       Posttreatment Heart Rate (beats/min) 87  -JR       Post SpO2 (%) 98  -JR       O2 Delivery Post Treatment nasal cannula  -JR       Pre Patient Position Sitting  -JR       Intra Patient Position Sitting  -JR       Post Patient Position Supine  -          Row Name 09/03/24 1216                 Positioning and Restraints     Pre-Treatment Position in bed  -JR       Post Treatment Position bed  -JR       In Bed notified nsg;supine;call light within  reach;encouraged to call for assist;exit alarm on;with family/caregiver;RLE elevated;LLE elevated  -JR                       User Key  (r) = Recorded By, (t) = Taken By, (c) = Cosigned By        Initials Name Provider Type     Aline Blanca, OT Occupational Therapist                            Outcome Measures         Row Name 09/03/24 1220                 How much help from another is currently needed...     Putting on and taking off regular lower body clothing? 1  -JR       Bathing (including washing, rinsing, and drying) 2  -JR       Toileting (which includes using toilet bed pan or urinal) 2  -JR       Putting on and taking off regular upper body clothing 3  -JR       Taking care of personal grooming (such as brushing teeth) 3  -JR       Eating meals 4  -JR       AM-PAC 6 Clicks Score (OT) 15  -JR          Row Name 09/03/24 1148 09/03/24 0800            How much help from another person do you currently need...     Turning from your back to your side while in flat bed without using bedrails? 3  -KG 2  -VS     Moving from lying on back to sitting on the side of a flat bed without bedrails? 2  -KG 2  -VS     Moving to and from a bed to a chair (including a wheelchair)? 1  -KG 2  -VS     Standing up from a chair using your arms (e.g., wheelchair, bedside chair)? 2  -KG 2  -VS     Climbing 3-5 steps with a railing? 1  -KG 1  -VS     To walk in hospital room? 1  -KG 1  -VS     AM-PAC 6 Clicks Score (PT) 10  -KG 10  -VS     Highest Level of Mobility Goal 4 --> Transfer to chair/commode  -KG 4 --> Transfer to chair/commode  -VS        Row Name 09/03/24 1220 09/03/24 1148            Functional Assessment     Outcome Measure Options AM-PAC 6 Clicks Daily Activity (OT)  -JR AM-PAC 6 Clicks Basic Mobility (PT)  -KG                     User Key  (r) = Recorded By, (t) = Taken By, (c) = Cosigned By        Initials Name Provider Type     Aline Blanca, OT Occupational Therapist     Valeria Ahumada  Therapist     Brook Waite RN Registered Nurse                             Occupational Therapy Education            Title: PT OT SLP Therapies (In Progress)         Topic: Occupational Therapy (In Progress)         Point: ADL training (Done)         Description:   Instruct learner(s) on proper safety adaptation and remediation techniques during self care or transfers.   Instruct in proper use of assistive devices.                       Learning Progress Summary               Patient Acceptance, E,TB,D,H, VU,NR by  at 9/3/2024 1052     Comment: theraband HEP     Acceptance, E,D, NR by BAL at 8/27/2024 0955   Family Acceptance, E,TB,D,H, VU,NR by  at 9/3/2024 1052     Comment: theraband HEP                               Point: Home exercise program (Done)         Description:   Instruct learner(s) on appropriate technique for monitoring, assisting and/or progressing therapeutic exercises/activities.                       Learning Progress Summary               Patient Acceptance, E,TB,D,H, VU,NR by  at 9/3/2024 1052     Comment: theraband HEP   Family Acceptance, E,TB,D,H, VU,NR by  at 9/3/2024 1052     Comment: theraband HEP                               Point: Precautions (In Progress)         Description:   Instruct learner(s) on prescribed precautions during self-care and functional transfers.                       Learning Progress Summary               Patient Acceptance, E,D, NR by BAL at 8/27/2024 0955                               Point: Body mechanics (In Progress)         Description:   Instruct learner(s) on proper positioning and spine alignment during self-care, functional mobility activities and/or exercises.                       Learning Progress Summary               Patient Acceptance, E,D, NR by BAL at 8/27/2024 0955                                                   User Key         Initials Effective Dates Name Provider Type Discipline      02/03/23 -  Kary, Aline HAQUE OT  Occupational Therapist OT     JFABRIZIO 06/16/21 -  Autumn Boucher OT Occupational Therapist OT                          OT Recommendation and Plan  Planned Therapy Interventions (OT): activity tolerance training, adaptive equipment training, BADL retraining, functional balance retraining, occupation/activity based interventions, ROM/therapeutic exercise, strengthening exercise, transfer/mobility retraining, patient/caregiver education/training  Therapy Frequency (OT): daily  Plan of Care Review  Plan of Care Reviewed With: patient, significant other  Outcome Evaluation: OT initial eval and expanded chart review completed. Pt presents with multiple comorbidities and decreased strength, balance, activity tolerance and increased pain limiting independence with ADL's and mobility from baseline status. Recommend continued skilled OT services and transfer to IRF at d/c. Pt hesitant to consider IRF this date.      Time Calculation:        Time Calculation- OT         Row Name 09/03/24 1221                       Time Calculation- OT     OT Start Time 1054  -JR         OT Received On 09/03/24  -JR         OT Goal Re-Cert Due Date 09/13/24  -JR                 Timed Charges     99667 - OT Therapeutic Activity Minutes 15  -JR                 Untimed Charges     OT Eval/Re-eval Minutes 30  -JR                 Total Minutes     Timed Charges Total Minutes 15  -JR         Untimed Charges Total Minutes 30  -JR          Total Minutes 45  -JR                      User Key  (r) = Recorded By, (t) = Taken By, (c) = Cosigned By        Initials Name Provider Type      Aline Preston OT Occupational Therapist                       Therapy Charges for Today         Code Description Service Date Service Provider Modifiers Qty     10184896529  OT THERAPEUTIC ACT EA 15 MIN 9/3/2024 Aline Preston OT GO 1     31368850844 HC OT RE-EVAL 2 9/3/2024 Aline Preston OT GO 1                   Aline Preston OT             9/3/2024                                                     Occupational Therapy Notes (most recent note)        Aline Preston OT at 09/03/24 1054          Goal Outcome Evaluation:  Plan of Care Reviewed With: patient, significant other           Outcome Evaluation: OT initial eval and expanded chart review completed. Pt presents with multiple comorbidities and decreased strength, balance, activity tolerance and increased pain limiting independence with ADL's and mobility from baseline status. Recommend continued skilled OT services and transfer to IRF at d/c. Pt hesitant to consider IRF this date.      Anticipated Discharge Disposition (OT): inpatient rehabilitation facility                          Electronically signed by Aline Preston OT at 09/03/24 1221

## 2024-09-03 NOTE — PLAN OF CARE
Goal Outcome Evaluation:  Plan of Care Reviewed With: patient, significant other           Outcome Evaluation: OT initial eval and expanded chart review completed. Pt presents with multiple comorbidities and decreased strength, balance, activity tolerance and increased pain limiting independence with ADL's and mobility from baseline status. Recommend continued skilled OT services and transfer to IRF at d/c. Pt hesitant to consider IRF this date.      Anticipated Discharge Disposition (OT): inpatient rehabilitation facility

## 2024-09-03 NOTE — THERAPY RE-EVALUATION
Patient Name: Erlin Savage  : 1950    MRN: [id number]                              Today's Date: 9/3/2024       Admit Date: 2024    Visit Dx:     ICD-10-CM ICD-9-CM   1. Dry gangrene  I96 785.4     Patient Active Problem List   Diagnosis    Coronary artery disease involving native coronary artery of native heart with angina pectoris    Essential hypertension    Hyperlipidemia LDL goal <70    Type 2 diabetes mellitus with kidney complication, with long-term current use of insulin    TIA (transient ischemic attack)    Chronic back pain    Anxiety disorder    Stage 5 chronic kidney disease on chronic dialysis    Neuralgia    Secondary pulmonary arterial hypertension    ESRD (end stage renal disease)    Iron deficiency anemia    Severe mitral valve regurgitation    NSTEMI (non-ST elevated myocardial infarction)    Type 2 diabetes mellitus with diabetic chronic kidney disease    Atrial fibrillation and flutter    COPD (chronic obstructive pulmonary disease)    On home oxygen therapy    Macrocytic anemia    Thrombocytopenia    Severe malnutrition    Chronic diastolic (congestive) heart failure    Chronic systolic (congestive) heart failure    Dry gangrene     Past Medical History:   Diagnosis Date    Anxiety     Anxiety disorder     Back pain     Chronic back pain     work related injury    CKD (chronic kidney disease)     most recent creatinine 2.7 on 16    Coronary artery disease     Diabetes mellitus     insulin dependent diabetes Onset     Dyslipidemia     Hyperlipidemia     Hypertension     Ischemic heart disease     Renal failure     Stroke     TIA / transient right vision loss age 42    TIA (transient ischemic attack)     Vertigo      Past Surgical History:   Procedure Laterality Date    APPENDECTOMY      BELOW KNEE AMPUTATION Right 2024    Procedure: below the knee amputation Right;  Surgeon: Junior Schreiber MD;  Location: [facility];  Service: Vascular;  Laterality: Right;    CARDIAC  "CATHETERIZATION      CHOLECYSTECTOMY  03/2012    COLONOSCOPY      CORONARY ARTERY BYPASS GRAFT      ENDOSCOPY        General Information       Row Name 09/03/24 1136          Physical Therapy Time and Intention    Document Type re-evaluation  -KG     Mode of Treatment physical therapy  -KG       Row Name 09/03/24 1136          General Information    Patient Profile Reviewed yes  -KG     Prior Level of Function independent:;all household mobility;community mobility;transfer;ADL's;dependent:;driving  Reported either uses rollator or \"furniture walks\" for shorter distances in home. Uses manual w/c for longer community distances. Has primarily been using new motorized chair for last ~2 wks. Hx falls, w/ last fall ~2 mon ago. Uses public transportation.  -KG     Existing Precautions/Restrictions cardiac;fall;oxygen therapy device and L/min;other (see comments)  R BKA 8/29/24 NWB; L great toe and 3rd toe wound; WBAT LLE per MD; BLE edema; extensive cardiac hx  -KG     Barriers to Rehab medically complex;previous functional deficit  -KG       Row Name 09/03/24 1136          Living Environment    People in Home significant other  -KG       Row Name 09/03/24 1136          Home Main Entrance    Number of Stairs, Main Entrance other (see comments)  has ramp at primary entrance; has 1 step when entering home from garage (pt reported he does not access garage)  -KG       Row Name 09/03/24 1136          Stairs Within Home, Primary    Number of Stairs, Within Home, Primary none  -KG       Row Name 09/03/24 1136          Cognition    Orientation Status (Cognition) oriented x 3  -KG       Row Name 09/03/24 1136          Safety Issues, Functional Mobility    Safety Issues Affecting Function (Mobility) awareness of need for assistance;insight into deficits/self-awareness;safety precautions follow-through/compliance;sequencing abilities;problem-solving;safety precaution awareness  -KG     Impairments Affecting Function (Mobility) " balance;coordination;endurance/activity tolerance;motor planning;pain;postural/trunk control;range of motion (ROM);sensation/sensory awareness;shortness of breath;strength  -KG               User Key  (r) = Recorded By, (t) = Taken By, (c) = Cosigned By      Initials Name Provider Type    DORA Valeria Liu Physical Therapist                   Mobility       Row Name 09/03/24 1139          Bed Mobility    Bed Mobility scooting/bridging;sit-supine  -KG     Scooting/Bridging [place] (Bed Mobility) maximum assist (25% patient effort);2 person assist  -KG     Sit-Supine [place] (Bed Mobility) minimum assist (75% patient effort);1 person assist  -KG     Assistive Device (Bed Mobility) bed rails;head of bed elevated  -KG       Row Name 09/03/24 1139          Transfers    Comment, (Transfers) several attempts STS from EOB, BUE support, LLE knee/ foot blocked, max-A x2, able to partially clear hips  -KG       Row Name 09/03/24 1139          Sit-Stand Transfer    Sit-Stand [place] (Transfers) maximum assist (25% patient effort);2 person assist  -KG     Assistive Device (Sit-Stand Transfers) other (see comments)  BUE support  -KG       Row Name 09/03/24 1139          Gait/Stairs (Locomotion)    [place] Level (Gait) unable to assess  -KG       Row Name 09/03/24 1139          Mobility    Extremity Weight-bearing Status left lower extremity;right lower extremity  -KG     Left Lower Extremity (Weight-bearing Status) weight-bearing as tolerated (WBAT)  -KG     Right Lower Extremity (Weight-bearing Status) non weight-bearing (NWB)  -KG               User Key  (r) = Recorded By, (t) = Taken By, (c) = Cosigned By      Initials Name Provider Type    DORA Valeria Liu Physical Therapist                   Obj/Interventions       Row Name 09/03/24 1141          Strength Comprehensive (MMT)    General Manual Muscle Testing (MMT) Assessment lower extremity strength deficits identified  -KG     Comment, General  Manual Muscle Testing (MMT) Assessment LLE 3+/5, good SLR RLE  -KG       Row Name 09/03/24 1141          Motor Skills    Therapeutic Exercise other (see comments)  LAQ, quad sets,  hip flexion, L heel raises  -KG       Row Name 09/03/24 1141          Balance    Balance Assessment standing static balance  -KG     Static Standing Balance maximum assist;2-person assist  -KG     Balance Interventions standing;sit to stand  -KG               User Key  (r) = Recorded By, (t) = Taken By, (c) = Cosigned By      Initials Name Provider Type    DORA Valeria Liu Physical Therapist                   Goals/Plan       Row Name 09/03/24 1146          Bed Mobility Goal 1 (PT)    Activity/Assistive Device (Bed Mobility Goal 1, PT) sit to supine/supine to sit  -KG     [place] Level/Cues Needed (Bed Mobility Goal 1, PT) standby assist  -KG     Time Frame (Bed Mobility Goal 1, PT) short term goal (STG);5 days  -KG     Progress/Outcomes (Bed Mobility Goal 1, PT) continuing progress toward goal  -KG       Row Name 09/03/24 1146          Transfer Goal 1 (PT)    Activity/Assistive Device (Transfer Goal 1, PT) sit-to-stand/stand-to-sit;bed-to-chair/chair-to-bed;wheelchair transfer;walker, rolling;other (see comments)  -KG     [place] Level/Cues Needed (Transfer Goal 1, PT) moderate assist (50-74% patient effort)  -KG     Time Frame (Transfer Goal 1, PT) long term goal (LTG);10 days  -KG     Progress/Outcome (Transfer Goal 1, PT) continuing progress toward goal  -KG       Row Name 09/03/24 1146          Gait Training Goal 1 (PT)    Activity/Assistive Device (Gait Training Goal 1, PT) gait (walking locomotion);assistive device use;walker, rolling;maintain weight-bearing status;other (see comments)  -KG     [place] Level (Gait Training Goal 1, PT) maximum assist (25-49% patient effort)  -KG     Distance (Gait Training Goal 1, PT) 5ft  -KG     Time Frame (Gait Training Goal 1, PT) long term goal (LTG);10 days  -KG      Progress/Outcome (Gait Training Goal 1, PT) continuing progress toward goal  -KG               User Key  (r) = Recorded By, (t) = Taken By, (c) = Cosigned By      Initials Name Provider Type    DORA Valeria Liu Physical Therapist                   Clinical Impression       Row Name 09/03/24 1143          Pain    Pretreatment Pain Rating 8/10  -KG     Posttreatment Pain Rating 8/10  -KG     Pain Location - Side/Orientation Right  -KG     Pain Location incisional  -KG     Pain Location - extremity  -KG     Pain Intervention(s) Repositioned;Ambulation/increased activity  -KG       Row Name 09/03/24 1143          Plan of Care Review    Plan of Care Reviewed With patient;significant other  -KG     Progress no change  -KG     Outcome Evaluation PT re-eval: Pt presenting s/p R BKA 8/29.  several attempts STS from EOB, BUE support, LLE knee/ foot blocked, max-A x2, able to partially clear hips.  Recommend IPR at d/c.  -KG       Row Name 09/03/24 1143          Therapy Assessment/Plan (PT)    Patient/Family Therapy Goals Statement (PT) return PLOF  -KG     Rehab Potential (PT) fair, will monitor progress closely  -KG     Criteria for Skilled Interventions Met (PT) yes;meets criteria;skilled treatment is necessary  -KG     Therapy Frequency (PT) daily  -KG     Predicted Duration of Therapy Intervention (PT) 7 days  -KG       Row Name 09/03/24 1143          Vital Signs    Pre Systolic BP Rehab 149  -KG     Pre Treatment Diastolic BP 63  -KG     Pretreatment Heart Rate (beats/min) 91  -KG     Posttreatment Heart Rate (beats/min) 90  -KG       [facility] Name 09/03/24 1143          Positioning and Restraints    Pre-Treatment Position in bed  -KG     Post Treatment Position bed  -KG     In Bed notified nsg;fowlers;call light within reach;encouraged to call for assist;exit alarm on;with family/caregiver;with OT  -KG               User Key  (r) = Recorded By, (t) = Taken By, (c) = Cosigned By      Initials Name Provider Type    DORA  Valeria Liu Physical Therapist                   Outcome Measures       Row Name 09/03/24 1148 09/03/24 0800       How much help from another person do you currently need...    Turning from your back to your side while in flat bed without using bedrails? 3  -KG 2  -VS    Moving from lying on back to sitting on the side of a flat bed without bedrails? 2  -KG 2  -VS    Moving to and from a bed to a chair (including a wheelchair)? 1  -KG 2  -VS    Standing up from a chair using your arms (e.g., wheelchair, bedside chair)? 2  -KG 2  -VS    Climbing 3-5 steps with a railing? 1  -KG 1  -VS    To walk in hospital room? 1  -KG 1  -VS    AM-PAC 6 Clicks Score (PT) 10  -KG 10  -VS    Highest Level of Mobility Goal 4 --> Transfer to chair/commode  -KG 4 --> Transfer to chair/commode  -VS      Row Name 09/03/24 1148          Functional Assessment    Outcome Measure Options AM-PAC 6 Clicks Basic Mobility (PT)  -KG               User Key  (r) = Recorded By, (t) = Taken By, (c) = Cosigned By      Initials Name Provider Type    KG Valeria Liu Physical Therapist    VS Brook Lamb RN Registered Nurse                                 Physical Therapy Education       Title: PT OT SLP Therapies (In Progress)       Topic: Physical Therapy (In Progress)       Point: Mobility training (In Progress)       Learning Progress Summary             Patient Acceptance, E, NR by KG at 9/3/2024 1148    Acceptance, E, VU,NR by BA at 8/27/2024 1449                         Point: Home exercise program (In Progress)       Learning Progress Summary             Patient Acceptance, E, NR by KG at 9/3/2024 1148    Acceptance, E, VU,NR by BA at 8/27/2024 1449                         Point: Body mechanics (In Progress)       Learning Progress Summary             Patient Acceptance, E, NR by KG at 9/3/2024 1148    Acceptance, E, VU,NR by BA at 8/27/2024 1449                         Point: Precautions (In Progress)       Learning Progress  Summary             Patient Acceptance, E, NR by KG at 9/3/2024 1148    Acceptance, E, VU,NR by BA at 8/27/2024 1449                                         User Key       Initials Effective Dates Name Provider Type Discipline    KG 01/04/23 -  Valeria Liu Physical Therapist PT    BA 09/21/21 -  Peace Berg, ADRIAN Physical Therapist PT                  PT Recommendation and Plan     Plan of Care Reviewed With: patient, significant other  Progress: no change  Outcome Evaluation: PT re-eval: Pt presenting s/p R BKA 8/29.  several attempts STS from EOB, BUE support, LLE knee/ foot blocked, max-A x2, able to partially clear hips.  Recommend IPR at d/c.     Time Calculation:         PT Charges       Row Name 09/03/24 1156             Time Calculation    Start Time 1045  -KG      PT Received On 09/03/24  -KG      PT Goal Re-Cert Due Date 09/13/24  -KG         Timed Charges    81275 - PT Therapeutic Exercise Minutes 15  -KG      45839 - PT Therapeutic Activity Minutes 25  -KG         Total Minutes    Timed Charges Total Minutes 40  -KG       Total Minutes 40  -KG                User Key  (r) = Recorded By, (t) = Taken By, (c) = Cosigned By      Initials Name Provider Type    DORA Valeria Liu Physical Therapist                  Therapy Charges for Today       Code Description Service Date Service Provider Modifiers Qty    64124043646 HC PT THER PROC EA 15 MIN 9/3/2024 Valeria Liu GP 1    08870549783 HC PT THERAPEUTIC ACT EA 15 MIN 9/3/2024 Valeria Liu GP 2    11257304533 HC PT RE-EVAL ESTABLISHED PLAN 2 9/3/2024 Valeria Liu GP 1            PT G-Codes  Outcome Measure Options: AM-PAC 6 Clicks Basic Mobility (PT)  AM-PAC 6 Clicks Score (PT): 10  AM-PAC 6 Clicks Score (OT): 18  PT Discharge Summary  Anticipated Discharge Disposition (PT): inpatient rehabilitation facility    Valeriaazul Liu  9/3/2024

## 2024-09-04 ENCOUNTER — APPOINTMENT (OUTPATIENT)
Dept: NEPHROLOGY | Facility: HOSPITAL | Age: 74
End: 2024-09-04
Payer: MEDICARE

## 2024-09-04 LAB
ALBUMIN SERPL-MCNC: 3.1 G/DL (ref 3.5–5.2)
ANION GAP SERPL CALCULATED.3IONS-SCNC: 10 MMOL/L (ref 5–15)
BUN SERPL-MCNC: 37 MG/DL (ref 8–23)
BUN/CREAT SERPL: 8.7 (ref 7–25)
CALCIUM SPEC-SCNC: 9.4 MG/DL (ref 8.6–10.5)
CHLORIDE SERPL-SCNC: 95 MMOL/L (ref 98–107)
CO2 SERPL-SCNC: 25 MMOL/L (ref 22–29)
CREAT SERPL-MCNC: 4.24 MG/DL (ref 0.76–1.27)
DEPRECATED RDW RBC AUTO: 61.9 FL (ref 37–54)
EGFRCR SERPLBLD CKD-EPI 2021: 14.1 ML/MIN/1.73
ERYTHROCYTE [DISTWIDTH] IN BLOOD BY AUTOMATED COUNT: 18.6 % (ref 12.3–15.4)
GLUCOSE BLDC GLUCOMTR-MCNC: 145 MG/DL (ref 70–130)
GLUCOSE BLDC GLUCOMTR-MCNC: 149 MG/DL (ref 70–130)
GLUCOSE BLDC GLUCOMTR-MCNC: 200 MG/DL (ref 70–130)
GLUCOSE SERPL-MCNC: 200 MG/DL (ref 65–99)
HCT VFR BLD AUTO: 29 % (ref 37.5–51)
HGB BLD-MCNC: 8.8 G/DL (ref 13–17.7)
MCH RBC QN AUTO: 28.2 PG (ref 26.6–33)
MCHC RBC AUTO-ENTMCNC: 30.3 G/DL (ref 31.5–35.7)
MCV RBC AUTO: 92.9 FL (ref 79–97)
PHOSPHATE SERPL-MCNC: 5.3 MG/DL (ref 2.5–4.5)
PLATELET # BLD AUTO: 245 10*3/MM3 (ref 140–450)
PMV BLD AUTO: 10.5 FL (ref 6–12)
POTASSIUM SERPL-SCNC: 5.4 MMOL/L (ref 3.5–5.2)
RBC # BLD AUTO: 3.12 10*6/MM3 (ref 4.14–5.8)
SODIUM SERPL-SCNC: 130 MMOL/L (ref 136–145)
VANCOMYCIN SERPL-MCNC: 18.5 MCG/ML (ref 5–40)
WBC NRBC COR # BLD AUTO: 8.06 10*3/MM3 (ref 3.4–10.8)

## 2024-09-04 PROCEDURE — 25010000002 VANCOMYCIN PER 500 MG

## 2024-09-04 PROCEDURE — 25010000002 HYDROMORPHONE PER 4 MG: Performed by: INTERNAL MEDICINE

## 2024-09-04 PROCEDURE — 99232 SBSQ HOSP IP/OBS MODERATE 35: CPT | Performed by: FAMILY MEDICINE

## 2024-09-04 PROCEDURE — 80069 RENAL FUNCTION PANEL: CPT | Performed by: INTERNAL MEDICINE

## 2024-09-04 PROCEDURE — 25010000002 HEPARIN (PORCINE) PER 1000 UNITS: Performed by: SURGERY

## 2024-09-04 PROCEDURE — 25010000002 ONDANSETRON PER 1 MG: Performed by: SURGERY

## 2024-09-04 PROCEDURE — 82948 REAGENT STRIP/BLOOD GLUCOSE: CPT

## 2024-09-04 PROCEDURE — 80202 ASSAY OF VANCOMYCIN: CPT

## 2024-09-04 PROCEDURE — 85027 COMPLETE CBC AUTOMATED: CPT | Performed by: INTERNAL MEDICINE

## 2024-09-04 RX ORDER — ALBUMIN (HUMAN) 12.5 G/50ML
12.5 SOLUTION INTRAVENOUS AS NEEDED
Status: ACTIVE | OUTPATIENT
Start: 2024-09-04 | End: 2024-09-04

## 2024-09-04 RX ADMIN — OXYCODONE HYDROCHLORIDE 10 MG: 10 TABLET ORAL at 11:23

## 2024-09-04 RX ADMIN — ASPIRIN 81 MG: 81 TABLET, COATED ORAL at 11:13

## 2024-09-04 RX ADMIN — APIXABAN 2.5 MG: 2.5 TABLET, FILM COATED ORAL at 20:27

## 2024-09-04 RX ADMIN — APIXABAN 2.5 MG: 2.5 TABLET, FILM COATED ORAL at 11:13

## 2024-09-04 RX ADMIN — ONDANSETRON 4 MG: 2 INJECTION INTRAMUSCULAR; INTRAVENOUS at 12:49

## 2024-09-04 RX ADMIN — Medication 1 APPLICATION: at 16:04

## 2024-09-04 RX ADMIN — Medication 10 ML: at 12:50

## 2024-09-04 RX ADMIN — OXYCODONE HYDROCHLORIDE 10 MG: 10 TABLET ORAL at 02:28

## 2024-09-04 RX ADMIN — HYDROMORPHONE HYDROCHLORIDE 0.5 MG: 1 INJECTION, SOLUTION INTRAMUSCULAR; INTRAVENOUS; SUBCUTANEOUS at 08:49

## 2024-09-04 RX ADMIN — PANTOPRAZOLE SODIUM 40 MG: 40 TABLET, DELAYED RELEASE ORAL at 11:14

## 2024-09-04 RX ADMIN — HEPARIN SODIUM 2000 UNITS: 1000 INJECTION INTRAVENOUS; SUBCUTANEOUS at 10:16

## 2024-09-04 RX ADMIN — Medication 1 TABLET: at 11:14

## 2024-09-04 RX ADMIN — OXYCODONE HYDROCHLORIDE 10 MG: 10 TABLET ORAL at 16:04

## 2024-09-04 RX ADMIN — Medication 10 ML: at 20:31

## 2024-09-04 RX ADMIN — CALCITRIOL CAPSULES 0.25 MCG 0.5 MCG: 0.25 CAPSULE ORAL at 11:13

## 2024-09-04 RX ADMIN — VANCOMYCIN HYDROCHLORIDE 500 MG: 500 INJECTION, POWDER, LYOPHILIZED, FOR SOLUTION INTRAVENOUS at 16:04

## 2024-09-04 NOTE — PLAN OF CARE
Goal Outcome Evaluation: Scheduled HD completed  Pt tolerated well. Goal reached. Blood re infused to pt. Called report to AMOS Sepulveda      Problem: Device-Related Complication Risk (Hemodialysis)  Goal: Safe, Effective Therapy Delivery  Outcome: Ongoing, Progressing  Intervention: Optimize Device Care and Function  Recent Flowsheet Documentation  Taken 9/4/2024 0721 by Lexi Sharif RN  Medication Review/Management: medications reviewed     Problem: Hemodynamic Instability (Hemodialysis)  Goal: Effective Tissue Perfusion  Outcome: Ongoing, Progressing     Problem: Infection (Hemodialysis)  Goal: Absence of Infection Signs and Symptoms  Outcome: Ongoing, Progressing

## 2024-09-04 NOTE — CASE MANAGEMENT/SOCIAL WORK
Case Management Discharge Note      Final Note: FERMIN spoke with Ninfa at [facility].  They have offered a bed on Med Unit on Thurs, 9/5/24.  RN to call report to [phone] and fax DC Summary to [phone].  [facility] to transport at 1030.  Pt updated/agreeable at bedside.         Selected Continued Care - Admitted Since 8/26/2024       Destination Coordination complete.      Service Provider Selected Services Address Phone Fax Patient Preferred    [facility] [address] [facility] [address] [phone] --              Durable Medical Equipment    No services have been selected for the patient.                Dialysis/Infusion       Service Provider Selected Services Address Phone Fax Patient Preferred    [facility] [address] [facility] [address] [phone] --              Home Medical Care    No services have been selected for the patient.                Therapy    No services have been selected for the patient.                Community Resources    No services have been selected for the patient.                Community & DME    No services have been selected for the patient.                    Transportation Services  Ambulance: [facility]    Final Discharge Disposition Code: 62 - inpatient rehab facility

## 2024-09-04 NOTE — PROGRESS NOTES
LOS: 9 days   Patient Care Team:  Joe Diallo MD as PCP - General (Family Medicine)  Fadi Garner MD as Consulting Physician (General Surgery)  Amor Celeste MD (Cardiology)  Praveen Novoa MD as Consulting Physician (Nephrology)  Kristin Moya MD as Consulting Physician (Pulmonary Disease)    Chief Complaint: ESRD  Right non healing foot wound  Wound infection.    Subjective   Seen on HD tolerating well. UF ~ 3 liter.     Objective     Vital Sign Min/Max for last 24 hours  Temp  Min: 97.6 °F (36.4 °C)  Max: 98.4 °F (36.9 °C)   BP  Min: 118/55  Max: 162/82   Pulse  Min: 81  Max: 94   Resp  Min: 14  Max: 18   SpO2  Min: 93 %  Max: 100 %   Flow (L/min)  Min: 2  Max: 3.5   Weight  Min: 84.8 kg (186 lb 14.4 oz)  Max: 84.8 kg (186 lb 14.4 oz)         I/O this shift:  In: -   Out: 2700   I/O last 3 completed shifts:  In: 451 [P.O.:240; I.V.:211]  Out: -   Objective:  General Appearance: Awake alert oriented mild distress due to pain  Eyes: PERR supple JVD  Neck: Supple no JVD.  Lungs: Clear to auscultation, equal chest movement, nonlabored.  Heart: No gallop, murmur, rub, RRR.  Abdomen: Soft, nontender, positive bowel sounds, no organomegaly.  Extremities: 1-2+ lower extremity edema, right BKA.  Neuro: No focal deficit, moving all extremities, alert oriented X 3  Tunneled dialysis catheter right IJ    Results Review:     I reviewed the patient's new clinical results.    WBC WBC   Date Value Ref Range Status   09/04/2024 8.06 3.40 - 10.80 10*3/mm3 Final   09/03/2024 7.04 3.40 - 10.80 10*3/mm3 Final   09/02/2024 7.68 3.40 - 10.80 10*3/mm3 Final      HGB Hemoglobin   Date Value Ref Range Status   09/04/2024 8.8 (L) 13.0 - 17.7 g/dL Final   09/03/2024 8.3 (L) 13.0 - 17.7 g/dL Final   09/02/2024 7.5 (L) 13.0 - 17.7 g/dL Final      HCT Hematocrit   Date Value Ref Range Status   09/04/2024 29.0 (L) 37.5 - 51.0 % Final   09/03/2024 27.7 (L) 37.5 - 51.0 % Final   09/02/2024 25.5 (L) 37.5 - 51.0 % Final  "     Platlets No results found for: \"LABPLAT\"   MCV MCV   Date Value Ref Range Status   09/04/2024 92.9 79.0 - 97.0 fL Final   09/03/2024 93.9 79.0 - 97.0 fL Final   09/02/2024 94.4 79.0 - 97.0 fL Final          Sodium Sodium   Date Value Ref Range Status   09/04/2024 130 (L) 136 - 145 mmol/L Final   09/03/2024 131 (L) 136 - 145 mmol/L Final   09/02/2024 132 (L) 136 - 145 mmol/L Final      Potassium Potassium   Date Value Ref Range Status   09/04/2024 5.4 (H) 3.5 - 5.2 mmol/L Final   09/03/2024 4.9 3.5 - 5.2 mmol/L Final   09/02/2024 5.1 3.5 - 5.2 mmol/L Final      Chloride Chloride   Date Value Ref Range Status   09/04/2024 95 (L) 98 - 107 mmol/L Final   09/03/2024 97 (L) 98 - 107 mmol/L Final   09/02/2024 97 (L) 98 - 107 mmol/L Final      CO2 CO2   Date Value Ref Range Status   09/04/2024 25.0 22.0 - 29.0 mmol/L Final   09/03/2024 25.0 22.0 - 29.0 mmol/L Final   09/02/2024 24.0 22.0 - 29.0 mmol/L Final      BUN BUN   Date Value Ref Range Status   09/04/2024 37 (H) 8 - 23 mg/dL Final   09/03/2024 23 8 - 23 mg/dL Final   09/02/2024 29 (H) 8 - 23 mg/dL Final      Creatinine Creatinine   Date Value Ref Range Status   09/04/2024 4.24 (H) 0.76 - 1.27 mg/dL Final   09/03/2024 3.35 (H) 0.76 - 1.27 mg/dL Final   09/02/2024 4.58 (H) 0.76 - 1.27 mg/dL Final      Calcium Calcium   Date Value Ref Range Status   09/04/2024 9.4 8.6 - 10.5 mg/dL Final   09/03/2024 9.0 8.6 - 10.5 mg/dL Final   09/02/2024 9.1 8.6 - 10.5 mg/dL Final      PO4 No results found for: \"CAPO4\"   Albumin Albumin   Date Value Ref Range Status   09/04/2024 3.1 (L) 3.5 - 5.2 g/dL Final   09/03/2024 2.8 (L) 3.5 - 5.2 g/dL Final   09/02/2024 2.8 (L) 3.5 - 5.2 g/dL Final      Magnesium No results found for: \"MG\"     Uric Acid No results found for: \"URICACID\"     Medication Review: yes    Assessment & Plan      1.  ESRD: On HD MWF.  Follows with [facility] @ [facility].    2.  Hypertension: Blood pressure stable.  Monitor for now.     3.  Hyperkalemia: " Recurrent issue.      4.  Hyponatremia: Continue fluid rate restriction and adjust with HD.     5.  Metabolic acidosis: Adjust with HD.     6.  Volume: Hx of ascites and dependent edema. Requiring serial paracentesis as outpatient.     7.  Peripheral vascular disease: Patient with nonhealing right transmetatarsal amputation site with dry gangrene and wound infection. MRI unable to rule out Osteo. S/p UZAIR 8/29/24         Praveen Novoa MD  09/04/24  10:30 EDT

## 2024-09-04 NOTE — PLAN OF CARE
Problem: Adult Inpatient Plan of Care  Goal: Plan of Care Review  Outcome: Ongoing, Progressing  Goal: Patient-Specific Goal (Individualized)  Outcome: Ongoing, Progressing  Goal: Absence of Hospital-Acquired Illness or Injury  Outcome: Ongoing, Progressing  Intervention: Identify and Manage Fall Risk  Recent Flowsheet Documentation  Taken 9/4/2024 1800 by Gina Dee RN  Safety Promotion/Fall Prevention:   activity supervised   clutter free environment maintained   lighting adjusted   nonskid shoes/slippers when out of bed   safety round/check completed   room organization consistent   toileting scheduled  Taken 9/4/2024 1600 by Gina Dee RN  Safety Promotion/Fall Prevention:   activity supervised   clutter free environment maintained   lighting adjusted   nonskid shoes/slippers when out of bed   safety round/check completed   room organization consistent   toileting scheduled  Taken 9/4/2024 1400 by Gina Dee RN  Safety Promotion/Fall Prevention:   activity supervised   clutter free environment maintained   lighting adjusted   nonskid shoes/slippers when out of bed   room organization consistent   safety round/check completed   toileting scheduled  Taken 9/4/2024 1123 by Gina Dee RN  Safety Promotion/Fall Prevention:   activity supervised   clutter free environment maintained   lighting adjusted   nonskid shoes/slippers when out of bed   room organization consistent   safety round/check completed   toileting scheduled  Taken 9/4/2024 1025 by Gina Dee RN  Safety Promotion/Fall Prevention:   activity supervised   clutter free environment maintained   lighting adjusted   nonskid shoes/slippers when out of bed   room organization consistent   safety round/check completed   toileting scheduled  Taken 9/4/2024 1000 by Bassett, Gina A, RN  Safety Promotion/Fall Prevention: patient off unit  Taken 9/4/2024 0800 by Gina Dee RN  Safety Promotion/Fall  Prevention: patient off unit  Intervention: Prevent Skin Injury  Recent Flowsheet Documentation  Taken 9/4/2024 1025 by Gina Dee RN  Skin Protection:   adhesive use limited   incontinence pads utilized   skin-to-skin areas padded   transparent dressing maintained   tubing/devices free from skin contact  Intervention: Prevent and Manage VTE (Venous Thromboembolism) Risk  Recent Flowsheet Documentation  Taken 9/4/2024 1600 by Gina Dee, RN  Activity Management: sitting, edge of bed  Taken 9/4/2024 1123 by Gina Dee RN  Activity Management: sitting, edge of bed  Taken 9/4/2024 1025 by Gina Dee RN  Activity Management: sitting, edge of bed  Goal: Optimal Comfort and Wellbeing  Outcome: Ongoing, Progressing  Intervention: Monitor Pain and Promote Comfort  Recent Flowsheet Documentation  Taken 9/4/2024 1123 by Gina Dee RN  Pain Management Interventions: see MAR  Goal: Readiness for Transition of Care  Outcome: Ongoing, Progressing     Problem: Device-Related Complication Risk (Hemodialysis)  Goal: Safe, Effective Therapy Delivery  Outcome: Ongoing, Progressing     Problem: Hemodynamic Instability (Hemodialysis)  Goal: Effective Tissue Perfusion  Outcome: Ongoing, Progressing     Problem: Infection (Hemodialysis)  Goal: Absence of Infection Signs and Symptoms  Outcome: Ongoing, Progressing     Problem: Fall Injury Risk  Goal: Absence of Fall and Fall-Related Injury  Outcome: Ongoing, Progressing  Intervention: Promote Injury-Free Environment  Recent Flowsheet Documentation  Taken 9/4/2024 1800 by Gina Dee RN  Safety Promotion/Fall Prevention:   activity supervised   clutter free environment maintained   lighting adjusted   nonskid shoes/slippers when out of bed   safety round/check completed   room organization consistent   toileting scheduled  Taken 9/4/2024 1600 by Gina Dee RN  Safety Promotion/Fall Prevention:   activity supervised    clutter free environment maintained   lighting adjusted   nonskid shoes/slippers when out of bed   safety round/check completed   room organization consistent   toileting scheduled  Taken 9/4/2024 1400 by Gina Dee RN  Safety Promotion/Fall Prevention:   activity supervised   clutter free environment maintained   lighting adjusted   nonskid shoes/slippers when out of bed   room organization consistent   safety round/check completed   toileting scheduled  Taken 9/4/2024 1123 by Gina Dee RN  Safety Promotion/Fall Prevention:   activity supervised   clutter free environment maintained   lighting adjusted   nonskid shoes/slippers when out of bed   room organization consistent   safety round/check completed   toileting scheduled  Taken 9/4/2024 1025 by Gina Dee RN  Safety Promotion/Fall Prevention:   activity supervised   clutter free environment maintained   lighting adjusted   nonskid shoes/slippers when out of bed   room organization consistent   safety round/check completed   toileting scheduled  Taken 9/4/2024 1000 by Luiz, Gina A, RN  Safety Promotion/Fall Prevention: patient off unit  Taken 9/4/2024 0800 by Gina Dee RN  Safety Promotion/Fall Prevention: patient off unit     Problem: Skin Injury Risk Increased  Goal: Skin Health and Integrity  Outcome: Ongoing, Progressing  Intervention: Optimize Skin Protection  Recent Flowsheet Documentation  Taken 9/4/2024 1025 by Gina Dee RN  Pressure Reduction Techniques:   frequent weight shift encouraged   weight shift assistance provided  Pressure Reduction Devices: pressure-redistributing mattress utilized  Skin Protection:   adhesive use limited   incontinence pads utilized   skin-to-skin areas padded   transparent dressing maintained   tubing/devices free from skin contact   Goal Outcome Evaluation:

## 2024-09-04 NOTE — PLAN OF CARE
Patient seen at bedside in dialysis. Pain controlled, 7/10 but patient just received medication. Dressing remains clean and dry. Nursing orders to change dressing qod, sooner if saturated.  Home Eliquis resumed, hemoglobin stable at 8.8.  Patient is agreeable to rehab at [facility], case management is following. Follow up 9/30/24 at 10:45am for wound check and to see [facility].     No further vascular intervention necessary at this time. Vascular will sign off.  Please reach out to on-call physician with any questions or concerns.

## 2024-09-04 NOTE — PROGRESS NOTES
"Erlin Savage  1950  [id number]      Evaluating Physician: Marlo Heaton MD    Chief Complaint: right foot infection    Reason for Consultation: right foot infection    History of present illness:     Patient is a 74 y.o.  Yr old male noncompliant previously, with prior history of  end-stage renal disease with intermittent hemodialysis via right chest central line.  He has known peripheral arterial disease with right leg angiogram on April 10 with recanalization of SFA/PTA; post procedure with continued worsening pain at the right distal leg, gangrene at his right second/third and fourth toes with second worse than third worse than fourth.   MRI right foot was soft tissue edema, no loculated collection with abnormality at the posterior calcaneus difficult to completely exclude osteomyelitis at that site as per radiology.     4/19/24 Dr Santos   \"Procedures: Procedure(s):  RIGHT Foot  Second toe amputation\"     4/23/24  Dr Santos .  \"Procedures: Procedure(s):  RIGHT Foot     Amputation hallux  Amputation third toe  Amputation fourth toe  Amputation fifth toe\"     Pathology from April 23, 2024 with no osteomyelitis.  Pathology had noted cellulitis and gangrenous necrosis.  Transitioned to IV vancomycin and oral Augmentin at discharge.     Revascularization on May 14, 2024 by Dr. Schreiber  \"PROCEDURES WITH LATERALITY:   Ultrasound guided access of left common femoral artery with micropuncture needle and images stored  Abd Aorotgram  Right lower extremity arteriogram  Balloon angioplasty of right posterior tibial artery with a 3 mm balloon  Balloon angioplasty of right posterior tibial artery origin4 mm balloon drug-coated  Balloon angioplasty of right peroneal artery with 3 mm balloon  Balloon angioplasty of right anterior tibial artery 3 mm balloon  Balloon angioplasty of right proximal superficial femoral artery with a 5 mm balloon\"     Readmitted August 14 with deterioration at the right foot over unspecified " "period of time per patient, at least weeks if not longer with large wound, exposed bone on the medial side.     8/20/24 he  refused recommendation of amputation from surgical team and left [facility].     8/26/24 readmitted on August 26 with reports to me that patient now agreeable to right side amputation by Dr. Schreiber    8/29/24   \"Procedure(s):  Right below the knee amputation and all other indicated procedures\"    9/4/24 Sleepy ; neph following; no fever; no new pain; Right stump pain is improved, worse with palpation, better with pain meds and 1-3 /10     No headache photophobia or neck stiffness. No shortness of breath cough or hemoptysis. No nausea vomiting diarrhea or abdominal pain. No dysuria hematuria or pyuria. No other new skin rash.     Review of Systems     9/4/24  no adr to abx     Constitutional-- No Fever, chills or sweats.  Appetite good. No malaise.  Heent-- No new vision, hearing or throat complaints.  No epistaxis or oral sores.  Denies odynophagia or dysphagia.  No headache, photophobia or neck stiffness.  CV-- No chest pain, palpitation or syncope  Resp-- No SOB, cough, or hemoptysis  GI- No nausea, vomiting, or diarrhea.   -- No dysuria, hematuria, or flank pain.  Denies hesitancy, urgency or flank pain.  Lymph- no swollen lymph nodes in neck, axilla or groin.   Heme- No active bruising or bleeding  MS-- no swelling or pain in the bones or joints of arms or legs.  No new back pain.  Neuro-- No acute focal weakness or numbness in the arms or legs.  Skin-- No rash    Past Medical History:   Diagnosis Date    Anxiety     Anxiety disorder     Back pain     Chronic back pain     work related injury    CKD (chronic kidney disease)     most recent creatinine 2.7 on 01/26/16    Coronary artery disease     Diabetes mellitus     insulin dependent diabetes Onset 2010    Dyslipidemia     Hyperlipidemia     Hypertension     Ischemic heart disease     Renal failure     Stroke     TIA / transient right " "vision loss age 42    TIA (transient ischemic attack)     Vertigo        Past Surgical History:   Procedure Laterality Date    APPENDECTOMY      BELOW KNEE AMPUTATION Right 8/29/2024    Procedure: below the knee amputation Right;  Surgeon: Junior Schreiber MD;  Location: [facility];  Service: Vascular;  Laterality: Right;    CARDIAC CATHETERIZATION      CHOLECYSTECTOMY  03/2012    COLONOSCOPY      CORONARY ARTERY BYPASS GRAFT      ENDOSCOPY         Pediatric History   Patient Parents    Not on file     Other Topics Concern    Not on file   Social History Narrative    Not on file       family history is not on file.    Allergies   Allergen Reactions    Cefepime Other (See Comments) and Seizure     Myoclonus - Has received cefazolin    Doxycycline Other (See Comments)     Joint pain, tongue swelling    \"Body locks up\"    Metoprolol Hives, Shortness Of Breath and Itching    Gabapentin Confusion    Ranolazine Dizziness and Other (See Comments)     Severe tremors/ shakiness    Hydralazine Unknown - Low Severity    Augmentin [Amoxicillin-Pot Clavulanate] Palpitations    Biaxin [Clarithromycin] Palpitations    Cephalosporins Unknown - Low Severity     Has received cefazolin    Coreg [Carvedilol] Itching    Crestor [Rosuvastatin Calcium] Itching     Itching rash    Iodine Rash     \"pineda skin\"    Lipitor [Atorvastatin] Itching     And Crestor- generalized weakness and chest tightness    Lisinopril Cough     Cough /weakness, nauseas and dirrhea    Livalo [Pitavastatin] Unknown - Low Severity     Chest tightness    Nortriptyline Hcl Other (See Comments)     Arthralgias    Pravastatin Unknown - Low Severity     Chest , neck and arm discomfort    Questran [Cholestyramine] Unknown - Low Severity       Medication:  Current Facility-Administered Medications   Medication Dose Route Frequency Provider Last Rate Last Admin    acetaminophen (TYLENOL) tablet 650 mg  650 mg Oral Q6H PRN Junior Schreiber MD   650 mg at 09/03/24 1233    " albumin human 25 % IV SOLN 12.5 g  12.5 g Intravenous PRN Praveen Novoa MD        apixaban (ELIQUIS) tablet 2.5 mg  2.5 mg Oral Q12H Brandon Crook MD   2.5 mg at 09/03/24 2042    aspirin EC tablet 81 mg  81 mg Oral Daily Junior Schreiber MD   81 mg at 09/03/24 0911    b complex-vitamin c-folic acid (NEPHRO-SEVERIANO) tablet 1 tablet  1 tablet Oral Daily Junior Schreiber MD   1 tablet at 09/03/24 0912    sennosides-docusate (PERICOLACE) 8.6-50 MG per tablet 2 tablet  2 tablet Oral BID PRN Junior Schreiber MD        And    polyethylene glycol (MIRALAX) packet 17 g  17 g Oral Daily PRN Junior Schreiber MD        And    bisacodyl (DULCOLAX) EC tablet 5 mg  5 mg Oral Daily PRN Junior Schreiber MD        And    bisacodyl (DULCOLAX) suppository 10 mg  10 mg Rectal Daily PRN Junior Schreiber MD        calcitriol (ROCALTROL) capsule 0.5 mcg  0.5 mcg Oral Daily Junior Schreiber MD   0.5 mcg at 09/03/24 0911    castor oil-balsam peru (VENELEX) ointment 1 Application  1 Application Topical Daily Junior Schreiber MD   1 Application at 09/03/24 2042    dextrose (D50W) (25 g/50 mL) IV injection 25 g  25 g Intravenous Q15 Min PRN Junior Schreiber MD        dextrose (D50W) (25 g/50 mL) IV injection 25 g  25 g Intravenous Q15 Min PRN Brandon Crook MD        dextrose (GLUTOSE) oral gel 15 g  15 g Oral Q15 Min PRN Junior Schreiber MD        glucagon (GLUCAGEN) injection 1 mg  1 mg Intramuscular Q15 Min PRN Junior Schreiber MD        heparin (porcine) injection 2,000 Units  2,000 Units Intracatheter PRN Junior Schreiber MD   2,000 Units at 09/02/24 0851    HYDROmorphone (DILAUDID) injection 0.5 mg  0.5 mg Intravenous Q4H PRN Brandon Crook MD   0.5 mg at 09/02/24 1411    Insulin Lispro (humaLOG) injection 1-5 Units  1-5 Units Subcutaneous TID With Meals Brandon Crook MD        Magnesium Standard Dose Replacement - Follow Nurse / BPA Driven Protocol   Does not apply PRN Brandon Crook MD        nitroglycerin  (NITROSTAT) SL tablet 0.4 mg  0.4 mg Sublingual Q5 Min PRN Junior Schreiber MD        nitroglycerin (TRIDIL) 200 mcg/ml infusion  5-200 mcg/min Intravenous Titrated Dada Darden IV, MD 1.5 mL/hr at 09/02/24 1412 5 mcg/min at 09/02/24 1412    ondansetron ODT (ZOFRAN-ODT) disintegrating tablet 4 mg  4 mg Oral Q6H PRN Junior Schreiber MD   4 mg at 08/27/24 0938    Or    ondansetron (ZOFRAN) injection 4 mg  4 mg Intravenous Q6H PRN Junior Schreiber MD   4 mg at 09/02/24 0851    oxyCODONE (ROXICODONE) immediate release tablet 10 mg  10 mg Oral Q4H PRN Brandon Crook MD   10 mg at 09/04/24 0228    pantoprazole (PROTONIX) EC tablet 40 mg  40 mg Oral Daily Junior Schreiber MD   40 mg at 09/03/24 0911    Pharmacy Consult - MTM   Does not apply Daily Phillip Escoto [facility]        Pharmacy to dose vancomycin   Does not apply Continuous PRN Marlo Heaton MD        simethicone (MYLICON) chewable tablet 80 mg  80 mg Oral 4x Daily PRN Cassandra Wiseman APRN        sodium chloride 0.9 % flush 10 mL  10 mL Intravenous Q12H Junior Schreiber MD   10 mL at 09/03/24 0912    sodium chloride 0.9 % flush 10 mL  10 mL Intravenous PRN Junior Schreiber MD        sodium chloride 0.9 % infusion 40 mL  40 mL Intravenous PRN Junior Schreiber MD        sodium chloride 0.9 % infusion  9 mL/hr Intravenous Continuous Junior Schreiber  mL/hr at 08/29/24 1020 Rate Change at 08/29/24 1020    vancomycin (dosing per levels)   Does not apply Daily Scar Garzon, [facility]           Antibiotics:  Anti-Infectives (From admission, onward)      Ordered     Dose/Rate Route Frequency Start Stop    08/31/24 1216  vancomycin 750 mg in sodium chloride 0.9 % 250 mL IVPB-VTB        Ordering Provider: Bear Win, PharmD    10 mg/kg × 78.9 kg  333.3 mL/hr over 45 Minutes Intravenous Once 08/31/24 1315 08/31/24 1530    08/30/24 1739  vancomycin (dosing per levels)        Ordering Provider: Scar Garzon RPH     Does not apply Daily  "08/30/24 1830 09/05/24 0859    08/30/24 1546  vancomycin (VANCOCIN) 1,000 mg in sodium chloride 0.9 % 250 mL IVPB-VTB        Ordering Provider: Phillip Escoto RPH    1,000 mg  250 mL/hr over 60 Minutes Intravenous Once 08/30/24 1800 08/30/24 1826    08/28/24 1248  Vancomycin HCl 1,250 mg in sodium chloride 0.9 % 250 mL VTB        Ordering Provider: Kyrie Walsh, ShannaD    1,250 mg  200 mL/hr over 75 Minutes Intravenous Once 08/28/24 1500 08/28/24 1716    08/26/24 1306  vancomycin IVPB 1750 mg in 0.9% Sodium Chloride (premix) 500 mL        Ordering Provider: Kyrie Walsh PharmD    1,750 mg  285.7 mL/hr over 105 Minutes Intravenous Once 08/26/24 1800 08/26/24 1957    08/26/24 1304  meropenem (MERREM) 1,000 mg in sodium chloride 0.9 % 100 mL MBP        Ordering Provider: Lisa Rowe DO    1,000 mg  over 30 Minutes Intravenous Once 08/26/24 1500 08/26/24 1604    08/26/24 1306  vancomycin (dosing per levels)        Ordering Provider: Kyrie Walsh PharmD     Does not apply Daily 08/26/24 1400 08/29/24 0859    08/26/24 1223  Pharmacy to dose vancomycin        Ordering Provider: Marlo Heaton MD     Does not apply Continuous PRN 08/26/24 1223 09/05/24 0559                 Physical Exam:   Vital Signs   /81 (BP Location: Right arm, Patient Position: Lying)   Pulse 86   Temp 98 °F (36.7 °C) (Axillary)   Resp 17   Ht 180.3 cm (71\")   Wt 84.8 kg (186 lb 14.4 oz)   SpO2 100%   BMI 26.07 kg/m²       GENERAL: sleepy.   chronically debilitated  HEENT: Normocephalic, atraumatic.    No conjunctival injection. No icterus. Oropharynx clear without evidence of thrush or exudate.   NECK: Supple without nuchal rigidity. No mass.   HEART: RRR; No murmur.  LUNGS: Clear to auscultation bilaterally without wheezing, rales, rhonchi. Normal respiratory effort. Nonlabored.  ABDOMEN: Soft, nontender, nondistended. Positive bowel sounds. No rebound or guarding.  EXT:  No cyanosis, clubbing or edema.  MSK: FROM without " joint effusions noted arms/legs.    SKIN: Warm and dry without cutaneous eruptions on Inspection/palpation.    NEURO: sleepy     Left foot first and third toe distally with small black areas,  toes with only no new erythema, no discrete mass bulge or fluctuance.  No crepitus or bulla     Right LE amp noted;  no new visible redness/purulence.  No discrete mass bulge or fluctuance.  No crepitus or bulla.     No peripheral stigmata/phenomenon of endocarditis    Laboratory Data    Results from last 7 days   Lab Units 09/04/24  0526 09/03/24  0459 09/02/24  0436   WBC 10*3/mm3 8.06 7.04 7.68   HEMOGLOBIN g/dL 8.8* 8.3* 7.5*   HEMATOCRIT % 29.0* 27.7* 25.5*   PLATELETS 10*3/mm3 245 220 225     Results from last 7 days   Lab Units 09/04/24  0526   SODIUM mmol/L 130*   POTASSIUM mmol/L 5.4*   CHLORIDE mmol/L 95*   CO2 mmol/L 25.0   BUN mg/dL 37*   CREATININE mg/dL 4.24*   GLUCOSE mg/dL 200*   CALCIUM mg/dL 9.4                     Estimated Creatinine Clearance: 18.9 mL/min (A) (by C-G formula based on SCr of 4.24 mg/dL (H)).      Microbiology:      Radiology:  Imaging Results (Last 72 Hours)       Procedure Component Value Units Date/Time    XR Chest 1 View [[id number]] Collected: 09/01/24 0749     Updated: 09/01/24 0753    Narrative:      XR CHEST 1 VW    Date of Exam: 9/1/2024 2:44 AM EDT    Indication: follow up pulm edema/effusions    Comparison: Chest radiograph 8/31/2024.    Findings:  Unchanged position of right IJ approach central venous catheter. Sternotomy and CABG. Cardiomediastinal silhouette is unchanged. Decreased diffuse mixed interstitial and airspace disease. Similar small/moderate bilateral pleural effusions with adjacent   atelectasis. No pneumothorax. Osseous structures are unchanged.      Impression:      Impression:  Decreased but persistent pulmonary edema and pulmonary vascular congestion. Similar small/moderate bilateral pleural effusions with adjacent atelectasis.      Electronically Signed: Kobe  MD Alban    9/1/2024 7:50 AM EDT    Workstation ID: [id number]              Impression:     --Acute right foot surgical site/wound infection/cellulitis, exposed bone on the medial side consistent with first metatarsal osteomyelitis and likely sequela to ischemia with peripheral vascular disease and other comorbidity as above. High risk for further serious morbidity and other serious sequela. They understand risk for persistent/recurrent or nonhealing wounds, persistent/progressive or recurrent infection and risk for further functional/limb loss, amputation, chronic pain etc. They know antibiotics and surgery not a guarantee for cure. These risks will persist. Timing/option threshold for surgery per surgical team at their discretion.  Vascular team has recommended amputation which patient has previously refused, left the hospital AGAINST MEDICAL ADVICE on August 20 but subsequently readmitted August 26 and agreeable to surgical recommendation, UZAIR 8/29    --Peripheral vascular disease.  Prior interventions as above.    --Left foot with small blackened areas at first/third toe likely sequela to ischemia.  Vascular team to help determine options and salvageability as above    --End-stage renal disease on hemodialysis Monday/Wednesday/Friday.    --Diabetes.  Glucose control per medicine team    --Cephalosporin and doxycycline intolerances in the past.  Has tolerated penicillin class    PLAN:        --IV vancomycin, anticipate stop at discharge    --s/p merrem     **Cx at [facility] with PSA x 2 (zosyn DD, not ideal EDWIN), enterococcus and corynebac      --check/review labs cultures and scans     --Partial history per nursing staff     --Discussed with microbiology      --Highly complex set of issues with high risk for further serious morbidity and other serious sequela     --Monitor IV and IV antibiotic with risk for systemic complication and potential drug interactions    Copied text in this note has been reviewed and is  accurate as of 09/04/24.        Marlo Heaton MD  9/4/2024

## 2024-09-04 NOTE — PROGRESS NOTES
"Pharmacy Consult - Vancomycin Dosing and Monitoring (Renal Dysfunction / Dialysis)    Erlin Savage is a 74 y.o. male receiving vancomycin therapy.     Indication: SSTI - R foot infection   Consulting Provider: Hospitalist  ID Consult: Yes - Dr. Heaton     Goal Trough: 15-20 mcg/mL    Allergies  Allergies as of 08/26/2024 - Reviewed 08/26/2024   Allergen Reaction Noted    Cefepime Other (See Comments) and Seizure 12/31/2023    Doxycycline Other (See Comments) 06/08/2023    Gabapentin Confusion 11/16/2023    Ranolazine Dizziness and Other (See Comments) 12/11/2023    Cephalosporins Unknown - Low Severity 02/10/2024    Hydralazine Unknown - Low Severity 03/05/2024    Augmentin [amoxicillin-pot clavulanate] Palpitations 08/16/2016    Biaxin [clarithromycin] Palpitations 08/16/2016    Coreg [carvedilol] Itching 08/16/2016    Crestor [rosuvastatin calcium] Itching 01/09/2017    Iodine Rash 10/02/2023    Lipitor [atorvastatin] Itching 08/16/2016    Lisinopril Cough 08/16/2016    Livalo [pitavastatin] Unknown - Low Severity 08/16/2016    Nortriptyline hcl Other (See Comments) 12/05/2023    Pravastatin Unknown - Low Severity 08/16/2016    Questran [cholestyramine] Unknown - Low Severity 08/16/2016     Labs  Results from last 7 days   Lab Units 09/04/24  0526 09/03/24  0459 09/02/24  0436   BUN mg/dL 37* 23 29*   CREATININE mg/dL 4.24* 3.35* 4.58*     Results from last 7 days   Lab Units 09/04/24  0526 09/03/24  0459 09/02/24  0436   WBC 10*3/mm3 8.06 7.04 7.68     Evaluation of Dosing     Last Dose Received in the ED/Outside Facility: N/A  Is Patient on Dialysis or Renal Replacement: HD, MWF outpatient    Height - 180.3 cm (71\")  Weight - 84.8 kg (186 lb 14.4 oz)    Estimated Creatinine Clearance: 18.9 mL/min (A) (by C-G formula based on SCr of 4.24 mg/dL (H)).    Intake & Output (last 3 days)       None          Microbiology  Microbiology Results (last 10 days)       Procedure Component Value - Date/Time    MARK AURIS " SCREEN - Swab, Axilla Right, Axilla Left and Groin [[id number]]  (Normal) Collected: 08/26/24 1715    Lab Status: Final result Specimen: Swab from Axilla Right, Axilla Left and Groin Updated: 08/31/24 2231     Candida Auris Screen Culture No Candida auris isolated at 5 days    MRSA Screen, PCR (Inpatient) - Swab, Nares [[id number]]  (Normal) Collected: 08/26/24 1715    Lab Status: Final result Specimen: Swab from Nares Updated: 08/26/24 2124     MRSA PCR Negative    Narrative:      The negative predictive value of this diagnostic test is high and should only be used to consider de-escalating anti-MRSA therapy. A positive result may indicate colonization with MRSA and must be correlated clinically.  MRSA Negative    Blood Culture - Blood, Hand, Right [[id number]]  (Normal) Collected: 08/26/24 1154    Lab Status: Final result Specimen: Blood from Hand, Right Updated: 08/31/24 1215     Blood Culture No growth at 5 days    Narrative:      Less than seven (7) mL's of blood was collected.  Insufficient quantity may yield false negative results.    Blood Culture - Blood, Arm, Right [[id number]]  (Normal) Collected: 08/26/24 1145    Lab Status: Final result Specimen: Blood from Arm, Right Updated: 08/31/24 1215     Blood Culture No growth at 5 days    Narrative:      Less than seven (7) mL's of blood was collected.  Insufficient quantity may yield false negative results.          Vancomycin Levels  Results from last 7 days   Lab Units 09/04/24  0526 09/02/24  0436   VANCOMYCIN RM mcg/mL 18.50 25.30       Results from last 7 days   Lab Units 08/30/24  1253   VANCOMYCIN TR mcg/mL 20.80*     Vancomycin levels/doses:  8/26: Vancomycin 1750 mg  8/28: Random level 14.7 mcg/mL; Vancomycin 1250 mg  8/30: Random level 20.8 mcg/mL; Vancomycin 1,000 mg  8/31: Vancomycin 750 mg (supplemental HD session)  9/2: Random level 25.3 mcg/mL; No vancomycin dose given  9/4: Random level 18.5 mcg/mL; Vancomycin 500 mg     Assessment/Plan:  Pharmacy  to dose vancomycin for SSTI. Patient with ESRD on HD MWF, nephrology adding extra HD sessions PRN (last HD 9/2). Goal trough 15-20mcg/mL.   Random pre-HD level  this morning resulted in 18.5 mcg/mL. Will order a supplemental dose of vancomycin 500 mg IV to be given today @ 1600 (after HD).  Will order pre-HD level on 9/6 to determine appropriateness of repeat dosing. Plan to stop vancomycin at discharge.   Pharmacy will continue to monitor renal function, cultures and sensitivities, and clinical status to adjust regimen as necessary.    Asha Irby, PharmD  9/4/2024   13:08 EDT

## 2024-09-04 NOTE — PROGRESS NOTES
[facility] Medicine Services  PROGRESS NOTE    Patient Name: Erlin Savage  : 1950  MRN: [id number]    Date of Admission: 2024  Primary Care Physician: Joe Diallo MD    Subjective   Subjective     CC: f/u PAD    HPI:  Hg stable. Tolerated dialysis. Working on pre-cert /bed availability at      Objective   Objective     Vital Signs:   Temp:  [98.3 °F (36.8 °C)-98.4 °F (36.9 °C)] 98.4 °F (36.9 °C)  Heart Rate:  [86-94] 88  Resp:  [18] 18  BP: (133-162)/(46-82) 160/80  Flow (L/min):  [3.5] 3.5     Physical Exam:  Constitutional: No acute distress, awake, alert, up in bed; on 3LNc, normal work of breathing at rest  HENT: NCAT, mucous membranes moist  Respiratory: decreased air movement bilateral bases, no respiratory distress  Cardiovascular: rrr  Gastrointestinal: Positive bowel sounds, soft, nontender  Psychiatric: Appropriate affect, cooperative  Neurologic: Oriented x 3, speech clear  MSK: R BKA    Results Reviewed:  LAB RESULTS:      Lab 24  0526 24  0459 24  0436 24  0501 24  0759 24  0057 24  1821 24  1253 24  0856 24  0046 24  1633   WBC 8.06 7.04 7.68 9.37 9.76  --   --  11.05*  --   --  11.86*   HEMOGLOBIN 8.8* 8.3* 7.5* 7.3* 7.6*  --   --  7.3*  --   --  8.2*   HEMATOCRIT 29.0* 27.7* 25.5* 24.2* 25.1*  --   --  24.9*  --   --  28.7*   PLATELETS 245 220 225 208 221  --   --  287  --   --  302   NEUTROS ABS  --   --  5.56  --   --   --   --  8.83*  --   --  10.59*   IMMATURE GRANS (ABS)  --   --  0.04  --   --   --   --  0.06*  --   --  0.07*   LYMPHS ABS  --   --  0.73  --   --   --   --  0.66*  --   --  0.47*   MONOS ABS  --   --  1.13*  --   --   --   --  1.41*  --   --  0.63   EOS ABS  --   --  0.18  --   --   --   --  0.02  --   --  0.01   MCV 92.9 93.9 94.4 92.7 91.9  --   --  92.9  --   --  95.0   PROTIME  --   --   --   --   --   --   --   --   --   --  18.8*   APTT  --   --   --   --    --   --   --   --   --   --  29.0*   HEPARIN ANTI-XA  --   --   --   --  0.20* 0.21* 0.24*  --  0.29* 0.22* 0.15*         Lab 09/04/24  0526 09/03/24 0459 09/02/24 0436 09/01/24  0501 08/31/24  0759 08/30/24  1253 08/29/24  1535   SODIUM 130* 131* 132* 135* 135*   < > 131*   POTASSIUM 5.4* 4.9 5.1 4.7 5.3*   < > 5.8*   CHLORIDE 95* 97* 97* 96* 98   < > 96*   CO2 25.0 25.0 24.0 24.0 23.0   < > 21.0*   ANION GAP 10.0 9.0 11.0 15.0 14.0   < > 14.0   BUN 37* 23 29* 25* 30*   < > 32*   CREATININE 4.24* 3.35* 4.58* 3.51* 4.07*   < > 4.26*   EGFR 14.1* 18.7* 12.9* 17.7* 14.8*   < > 14.0*   GLUCOSE 200* 190* 226* 141* 105*   < > 174*   CALCIUM 9.4 9.0 9.1 9.4 9.1   < > 9.2   MAGNESIUM  --   --   --   --  1.9  --  1.9   PHOSPHORUS 5.3* 4.5 6.4* 6.1* 6.6*   < >  --     < > = values in this interval not displayed.         Lab 09/04/24 0526 09/03/24 0459 09/02/24 0436 09/01/24 0501 08/31/24  0759   ALBUMIN 3.1* 2.8* 2.8* 3.0* 3.2*         Lab 08/30/24  1253 08/29/24  1825 08/29/24  1633 08/29/24  1535   HSTROP T 298* 201*  --  203*   PROTIME  --   --  18.8*  --    INR  --   --  1.56*  --              Lab 09/02/24  0848 09/01/24 0501 08/30/24  1359   IRON  --  24*  --    IRON SATURATION (TSAT)  --  13*  --    TIBC  --  180*  --    TRANSFERRIN  --  121*  --    ABO TYPING O  --  O   RH TYPING Positive  --  Positive   ANTIBODY SCREEN Negative  --  Negative         Brief Urine Lab Results  (Last result in the past 365 days)        Color   Clarity   Blood   Leuk Est   Nitrite   Protein   CREAT   Urine HCG        01/01/24 0550 Yellow   Clear     Negative                       Microbiology Results Abnormal       Procedure Component Value - Date/Time    CANDIDA AURIS SCREEN - Swab, Axilla Right, Axilla Left and Groin [[id number]]  (Normal) Collected: 08/26/24 1715    Lab Status: Final result Specimen: Swab from Axilla Right, Axilla Left and Groin Updated: 08/31/24 2231     Candida Auris Screen Culture No Candida auris isolated at  5 days    Blood Culture - Blood, Hand, Right [[id number]]  (Normal) Collected: 08/26/24 1154    Lab Status: Final result Specimen: Blood from Hand, Right Updated: 08/31/24 1215     Blood Culture No growth at 5 days    Narrative:      Less than seven (7) mL's of blood was collected.  Insufficient quantity may yield false negative results.    Blood Culture - Blood, Arm, Right [[id number]]  (Normal) Collected: 08/26/24 1145    Lab Status: Final result Specimen: Blood from Arm, Right Updated: 08/31/24 1215     Blood Culture No growth at 5 days    Narrative:      Less than seven (7) mL's of blood was collected.  Insufficient quantity may yield false negative results.    MRSA Screen, PCR (Inpatient) - Swab, Nares [[id number]]  (Normal) Collected: 08/26/24 1715    Lab Status: Final result Specimen: Swab from Nares Updated: 08/26/24 2124     MRSA PCR Negative    Narrative:      The negative predictive value of this diagnostic test is high and should only be used to consider de-escalating anti-MRSA therapy. A positive result may indicate colonization with MRSA and must be correlated clinically.  MRSA Negative            No radiology results from the last 24 hrs    Results for orders placed during the hospital encounter of 08/26/24    Adult Transthoracic Echo Complete W/ Cont if Necessary Per Protocol    Interpretation Summary    Left ventricular systolic function is mildly decreased. Calculated left ventricular EF = 40%    Left ventricular wall thickness is consistent with mild concentric hypertrophy.    Moderately reduced right ventricular systolic function noted.    There is bileaflet mitral valve thickening present.    Moderate to severe mitral valve regurgitation is present.    Moderate to severe tricuspid valve regurgitation is present.    Calculated right ventricular systolic pressure from tricuspid regurgitation is 73 mmHg.    There is a small (<1cm) pericardial effusion adjacent to the left ventricle.      Current  medications:  Scheduled Meds:apixaban, 2.5 mg, Oral, Q12H  aspirin, 81 mg, Oral, Daily  b complex-vitamin c-folic acid, 1 tablet, Oral, Daily  calcitriol, 0.5 mcg, Oral, Daily  castor oil-balsam peru, 1 Application, Topical, Daily  insulin lispro, 1-5 Units, Subcutaneous, TID With Meals  meropenem, 500 mg, Intravenous, Q24H  pantoprazole, 40 mg, Oral, Daily  pharmacy consult - MTM, , Does not apply, Daily  sodium chloride, 10 mL, Intravenous, Q12H  vancomycin (dosing per levels), , Does not apply, Daily      Continuous Infusions:nitroglycerin, 5-200 mcg/min, Last Rate: 5 mcg/min (09/02/24 1412)  Pharmacy to dose vancomycin,   sodium chloride, 9 mL/hr, Last Rate: 250 mL/hr (08/29/24 1020)      PRN Meds:.  acetaminophen    albumin human    senna-docusate sodium **AND** polyethylene glycol **AND** bisacodyl **AND** bisacodyl    dextrose    dextrose    dextrose    glucagon (human recombinant)    heparin (porcine)    HYDROmorphone    Magnesium Standard Dose Replacement - Follow Nurse / BPA Driven Protocol    nitroglycerin    ondansetron ODT **OR** ondansetron    oxyCODONE    Pharmacy to dose vancomycin    simethicone    sodium chloride    sodium chloride    Assessment & Plan   Assessment & Plan     Active Hospital Problems    Diagnosis  POA    **Dry gangrene [I96]  Yes    Chronic diastolic (congestive) heart failure [I50.32]  Yes    Chronic systolic (congestive) heart failure [I50.22]  Yes    Severe malnutrition [E43]  Yes    On home oxygen therapy [Z99.81]  Not Applicable    Type 2 diabetes mellitus with diabetic chronic kidney disease [E11.22]  Yes    NSTEMI (non-ST elevated myocardial infarction) [I21.4]  Yes    Secondary pulmonary arterial hypertension [I27.21]  Yes    Severe mitral valve regurgitation [I34.0]  Yes    Iron deficiency anemia [D50.9]  Yes    ESRD (end stage renal disease) [N18.6]  Yes    Stage 5 chronic kidney disease on chronic dialysis [N18.6, Z99.2]  Not Applicable    Coronary artery disease  involving native coronary artery of native heart with angina pectoris [I25.119]  Yes    Essential hypertension [I10]  Yes    Type 2 diabetes mellitus with kidney complication, with long-term current use of insulin [E11.29, Z79.4]  Not Applicable    TIA (transient ischemic attack) [G45.9]  Yes    Hyperlipidemia LDL goal <70 [E78.5]  Yes      Resolved Hospital Problems    Diagnosis Date Resolved POA    Ischemic heart disease [I25.9] 08/29/2024 Yes        Brief Hospital Course to date:  Erlin Savage is a 74 y.o. male with a medical history significant for end-stage renal disease on HD MWF, coronary artery disease s/p CABG, A. Fib on OAC, aortic stenosis, diabetes mellitus, COPD on 2 to 4 L baseline, hyperlipidemia, hypertension and known peripheral arterial disease with nonhealing right foot transmetatarsal amputation site with dry gangrene.      Right lower extremity critical limb ischemia, recent non-healing right TMA (s/p right bka 8/29/24)  Recent admission for dry gangrene/cellulitis  L foot with ischemic changes of toes   BLE PVD  -My partner discussed with vascular on arrival.   -**s/p right BKA Dr. Schreiber 8/29/24; Vascular following; ok to restart eliquis; RLE BKA dressing changed by vascular team on 9/3/24, to be changed by nursing every other day (sooner if saturated); recommend inpatient rehab at discharge  -Dr. Heaton (ID) following: Completed meropenem and IV vancomycin continued and most likely will be discontinued at discharge.  -continue  pain control     NSTEMI  CAD status post CABG  CHFmrEF  Valvular heart disease (mod-severe MR, mod-severe tR)  Parox afib  -Most recent echo in system from 2023 with a EF of 43%, severe diastolic dysfunction, moderate mitral regurgitation, moderate tricuspid regurgitation.  -Echo 8/28/24: LV ef 40%, mod reduced RV fxn; mod-severe MR, mod-severe TR  -evening of 8/29/24 (after right bka), patient developed 10/10 chest pain, relieved w/ sl nitro; EKG w/ lateral ischemic  changes; marked elevated troponin.   -eliquis had been on hold for surgery  -Cards followed: was seen post-amputation for NSTEMI; cards reviewed heart cath films from [facility] in march, not likely to have targets for revascularization, recommended medical therapy (heparin drip x 48 hours, nitro gtt, prn) and recommended restart eliquis when ok w/ surgery  -I discussed w/ vascular surgery Maria C Dowd PA-C on 9/3/24; vascular surgery is ok w/ restarting eliquis. Patient is on eliquis 5mg bid at home. Due to recent post-op anemia (which required blood transfusions) I will start with 2.5mg bid and then increase back to 5mg bid if tolerates    Anemia (chronic renal dz, & post-op blood loss)  **s/p 1 unit prbc on 8/30/24 w/ dialysis (had drop in hgb from 8.5 to 7.3 post-op)  -goal hgb >7.5 in light of nstemi  -no overt bleeding overrnight  -hgb 7.3 on 9/1  **s/p 1 unit prbc on 9/2/24 w/ dialysis (in light of cardiac ischemic & anemia)  -monitor hgb , goal hgb >8 or symptoms    End-stage renal disease   Hyponatremia, improved  Hyperkalemia, improved  -s/p extra dialysis session Saturday 8/31 (hyperkalemia & volume overload)  -Nephrology following: M/W/F DIALYSIS     Ascites  Lower extremity edema  -multifactorial secondary to CHF, end-stage renal disease  -Per patient, he gets monthly paracentesis at hospital in [place].  Denies any prior history of cirrhosis.     DM2  -A1c 6.2 on 8/26  -SSI, titrate prn (patient doesn't allow regular ssi, dictates his own sliding scale)    Chronic hypoxic respiratory failure  COPD on chronic home O2 (on 3Lnc baseline)  -DuoNebs PRN  -currently on his baseline of 3Lnc    Depression  -started zoloft on 8/31, stopped it on 9/1/24 per patient wife request, said it made patient too sleepy       Expected Discharge Location and Transportation: inpatient rehab recommended by vascular surgery  Expected Discharge   Expected Discharge Date: 9/5/2024; Expected Discharge Time:      VTE  Prophylaxis:  Pharmacologic & mechanical VTE prophylaxis orders are present.         AM-PAC 6 Clicks Score (PT): 8 (09/03/24 2042)    CODE STATUS:   Code Status and Medical Interventions: CPR (Attempt to Resuscitate); Full Support   Ordered at: 08/26/24 1218     Level Of Support Discussed With:    Patient     Code Status (Patient has no pulse and is not breathing):    CPR (Attempt to Resuscitate)     Medical Interventions (Patient has pulse or is breathing):    Full Support       Kalee Zavala MD  09/04/24

## 2024-09-05 VITALS
OXYGEN SATURATION: 96 % | TEMPERATURE: 98.3 F | HEART RATE: 89 BPM | WEIGHT: 186.88 LBS | BODY MASS INDEX: 26.16 KG/M2 | SYSTOLIC BLOOD PRESSURE: 147 MMHG | RESPIRATION RATE: 16 BRPM | HEIGHT: 71 IN | DIASTOLIC BLOOD PRESSURE: 83 MMHG

## 2024-09-05 PROBLEM — I50.32 CHRONIC DIASTOLIC (CONGESTIVE) HEART FAILURE: Status: RESOLVED | Noted: 2024-03-05 | Resolved: 2024-09-05

## 2024-09-05 PROBLEM — I21.A1 TYPE 2 MYOCARDIAL INFARCTION: Status: ACTIVE | Noted: 2024-09-05

## 2024-09-05 PROBLEM — I50.22 CHRONIC SYSTOLIC HEART FAILURE: Status: ACTIVE | Noted: 2024-09-05

## 2024-09-05 LAB
BASOPHILS # BLD AUTO: 0.05 10*3/MM3 (ref 0–0.2)
BASOPHILS NFR BLD AUTO: 0.7 % (ref 0–1.5)
DEPRECATED RDW RBC AUTO: 63.1 FL (ref 37–54)
EOSINOPHIL # BLD AUTO: 0.4 10*3/MM3 (ref 0–0.4)
EOSINOPHIL NFR BLD AUTO: 5.4 % (ref 0.3–6.2)
ERYTHROCYTE [DISTWIDTH] IN BLOOD BY AUTOMATED COUNT: 18.8 % (ref 12.3–15.4)
HBV E AB SERPL QL IA: NON REACTIVE
HCT VFR BLD AUTO: 28.4 % (ref 37.5–51)
HGB BLD-MCNC: 8.6 G/DL (ref 13–17.7)
IMM GRANULOCYTES # BLD AUTO: 0.06 10*3/MM3 (ref 0–0.05)
IMM GRANULOCYTES NFR BLD AUTO: 0.8 % (ref 0–0.5)
LYMPHOCYTES # BLD AUTO: 0.74 10*3/MM3 (ref 0.7–3.1)
LYMPHOCYTES NFR BLD AUTO: 10 % (ref 19.6–45.3)
MCH RBC QN AUTO: 28.5 PG (ref 26.6–33)
MCHC RBC AUTO-ENTMCNC: 30.3 G/DL (ref 31.5–35.7)
MCV RBC AUTO: 94 FL (ref 79–97)
MONOCYTES # BLD AUTO: 1.06 10*3/MM3 (ref 0.1–0.9)
MONOCYTES NFR BLD AUTO: 14.3 % (ref 5–12)
NEUTROPHILS NFR BLD AUTO: 5.08 10*3/MM3 (ref 1.7–7)
NEUTROPHILS NFR BLD AUTO: 68.8 % (ref 42.7–76)
NRBC BLD AUTO-RTO: 0 /100 WBC (ref 0–0.2)
PLATELET # BLD AUTO: 230 10*3/MM3 (ref 140–450)
PMV BLD AUTO: 9.6 FL (ref 6–12)
RBC # BLD AUTO: 3.02 10*6/MM3 (ref 4.14–5.8)
WBC NRBC COR # BLD AUTO: 7.39 10*3/MM3 (ref 3.4–10.8)

## 2024-09-05 PROCEDURE — 85025 COMPLETE CBC W/AUTO DIFF WBC: CPT | Performed by: INTERNAL MEDICINE

## 2024-09-05 PROCEDURE — 99239 HOSP IP/OBS DSCHRG MGMT >30: CPT | Performed by: FAMILY MEDICINE

## 2024-09-05 RX ORDER — ASPIRIN 81 MG/1
81 TABLET ORAL DAILY
Qty: 30 TABLET | Refills: 0 | Status: SHIPPED | OUTPATIENT
Start: 2024-09-05

## 2024-09-05 RX ORDER — INSULIN LISPRO 100 [IU]/ML
INJECTION, SOLUTION INTRAVENOUS; SUBCUTANEOUS
Start: 2024-09-05

## 2024-09-05 RX ADMIN — ASPIRIN 81 MG: 81 TABLET, COATED ORAL at 08:35

## 2024-09-05 RX ADMIN — CALCITRIOL CAPSULES 0.25 MCG 0.5 MCG: 0.25 CAPSULE ORAL at 08:35

## 2024-09-05 RX ADMIN — PANTOPRAZOLE SODIUM 40 MG: 40 TABLET, DELAYED RELEASE ORAL at 08:35

## 2024-09-05 RX ADMIN — APIXABAN 2.5 MG: 2.5 TABLET, FILM COATED ORAL at 08:35

## 2024-09-05 RX ADMIN — OXYCODONE HYDROCHLORIDE 10 MG: 10 TABLET ORAL at 10:35

## 2024-09-05 RX ADMIN — ACETAMINOPHEN 650 MG: 325 TABLET, FILM COATED ORAL at 02:53

## 2024-09-05 RX ADMIN — Medication 10 ML: at 08:36

## 2024-09-05 RX ADMIN — Medication 1 TABLET: at 08:35

## 2024-09-05 RX ADMIN — OXYCODONE HYDROCHLORIDE 10 MG: 10 TABLET ORAL at 05:17

## 2024-09-05 RX ADMIN — OXYCODONE HYDROCHLORIDE 10 MG: 10 TABLET ORAL at 00:38

## 2024-09-05 RX ADMIN — Medication 1 APPLICATION: at 08:36

## 2024-09-05 NOTE — DISCHARGE PLACEMENT REQUEST
"Erlin Dominguez (74 y.o. Male)       Date of Birth   1950    Social Security Number       Address   [address] [id number]    Home Phone   [phone]    MRN   [id number]       Sikhism   None    Marital Status   Single                            Admission Date   8/26/24    Admission Type   Urgent    Admitting Provider   Kalee Zavala MD    Attending Provider   Kalee Zavala MD    Department, Room/Bed   [facility] 4G, S446/1       Discharge Date       Discharge Disposition   Rehab Facility or Unit (DC - External)    Discharge Destination                                 Attending Provider: Kalee Zavala MD    Allergies: Cefepime, Doxycycline, Metoprolol, Gabapentin, Ranolazine, Hydralazine, Augmentin [Amoxicillin-pot Clavulanate], Biaxin [Clarithromycin], Cephalosporins, Coreg [Carvedilol], Crestor [Rosuvastatin Calcium], Iodine, Lipitor [Atorvastatin], Lisinopril, Livalo [Pitavastatin], Nortriptyline Hcl, Pravastatin, Questran [Cholestyramine]    Isolation: None   Infection: None   Code Status: CPR    Ht: 180.3 cm (71\")   Wt: 84.8 kg (186 lb 14.1 oz)    Admission Cmt: None   Principal Problem: Dry gangrene [I96]                   Active Insurance as of 8/26/2024       Primary Coverage       Payor Plan Insurance Group Employer/Plan Group    MEDICARE MEDICARE A & B        Payor Plan Address Payor Plan Phone Number Payor Plan Fax Number Effective Dates    [address] [phone]  12/1/2004 - None Entered    [place] [address]         Subscriber Name Subscriber Birth Date Member ID       ERLIN DOMINGUEZ 1950 [id number]                     Emergency Contacts        (Rel.) Home Phone Work Phone Mobile Phone    Princess Cardona (Friend) [phone] -- [phone]                 Discharge Summary        Kalee Zavala MD at 09/05/24 0738              [facility] Medicine Services  DISCHARGE SUMMARY    Patient Name: Erlin" Hussein  : 1950  MRN: [id number]    Date of Admission: 2024 10:59 AM  Date of Discharge:  2024  Primary Care Physician: Joe Diallo MD    Consults       Date and Time Order Name Status Description    2024  9:08 AM Inpatient Cardiology Consult Completed     2024 12:23 PM Inpatient Infectious Diseases Consult Completed     2024 11:28 AM Inpatient Nephrology Consult Completed     2024 11:28 AM Inpatient Vascular Surgery Consult Completed             Hospital Course     Presenting Problem: f/u PAD    Active Hospital Problems    Diagnosis  POA    **Dry gangrene [I96]  Yes    Type 2 myocardial infarction [I21.A1]  Yes    Chronic systolic heart failure [I50.22]  Yes    Chronic systolic (congestive) heart failure [I50.22]  Yes    Severe malnutrition [E43]  Yes    On home oxygen therapy [Z99.81]  Not Applicable    Type 2 diabetes mellitus with diabetic chronic kidney disease [E11.22]  Yes    NSTEMI (non-ST elevated myocardial infarction) [I21.4]  Yes    Secondary pulmonary arterial hypertension [I27.21]  Yes    Severe mitral valve regurgitation [I34.0]  Yes    Iron deficiency anemia [D50.9]  Yes    ESRD (end stage renal disease) [N18.6]  Yes    Stage 5 chronic kidney disease on chronic dialysis [N18.6, Z99.2]  Not Applicable    Coronary artery disease involving native coronary artery of native heart with angina pectoris [I25.119]  Yes    Essential hypertension [I10]  Yes    Type 2 diabetes mellitus with kidney complication, with long-term current use of insulin [E11.29, Z79.4]  Not Applicable    Hyperlipidemia LDL goal <70 [E78.5]  Yes      Resolved Hospital Problems    Diagnosis Date Resolved POA    Chronic diastolic (congestive) heart failure [I50.32] 2024 Yes    Ischemic heart disease [I25.9] 2024 Yes    TIA (transient ischemic attack) [G45.9] 2024 Yes          Hospital Course:  Erlin Savage is a 74 y.o. male with a medical history significant for  end-stage renal disease on HD MWF, coronary artery disease s/p CABG, A. Fib on OAC, aortic stenosis, diabetes mellitus, COPD on 2 to 4 L baseline, hyperlipidemia, hypertension and known peripheral arterial disease with nonhealing right foot transmetatarsal amputation site with dry gangrene.      Right lower extremity critical limb ischemia, recent non-healing right TMA (s/p right bka 8/29/24)  Recent admission for dry gangrene/cellulitis  L foot with ischemic changes of toes   BLE PVD  -My partner discussed with vascular on arrival.   -**s/p right BKA Dr. Schreiber 8/29/24; Vascular following; ok to restart eliquis; RLE BKA dressing changed by vascular team on 9/3/24, to be changed by nursing every other day (sooner if saturated); recommend inpatient rehab at discharge  -Dr. Heaton (ID) following: Completed meropenem and IV vancomycin discontinued at Discharge. Per Dr. Heaton, he can follow patient at   -continue  pain control     NSTEMI  CAD status post CABG  CHFmrEF  Valvular heart disease (mod-severe MR, mod-severe tR)  Parox afib  -Most recent echo in system from 2023 with a EF of 43%, severe diastolic dysfunction, moderate mitral regurgitation, moderate tricuspid regurgitation.  -Echo 8/28/24: LV ef 40%, mod reduced RV fxn; mod-severe MR, mod-severe TR  -evening of 8/29/24 (after right bka), patient developed 10/10 chest pain, relieved w/ sl nitro; EKG w/ lateral ischemic changes; marked elevated troponin.   -Cards followed  -ACE/ARB not ordered due to recurrent hyperkalemia   -I discussed w/ vascular surgery Maria C Dowd PA-C on 9/3/24; vascular surgery is ok w/ restarting eliquis. Patient is on eliquis 5mg bid at home. Due to recent post-op anemia (which required blood transfusions) Eliquis was started back at 2.5 mg BID.   -09/05: restart Eliquis at 5 mg BID. Hg stable. Recommend CBC in 2 days at  for stability of Hg      Anemia (chronic renal dz, & post-op blood loss)  **s/p 1 unit prbc on 8/30/24 w/  dialysis (had drop in hgb from 8.5 to 7.3 post-op)  -goal hgb >7.5 in light of nstemi  -no overt bleeding overrnight  -hgb 7.3 on 9/1  **s/p 1 unit prbc on 9/2/24 w/ dialysis (in light of cardiac ischemic & anemia)  -monitor hgb , goal hgb >8 or symptoms     End-stage renal disease   Hyponatremia, improved  Hyperkalemia, improved  -s/p extra dialysis session Saturday 8/31 (hyperkalemia & volume overload)  -Nephrology following: M/W/F DIALYSIS     Ascites  Lower extremity edema  -multifactorial secondary to CHF, end-stage renal disease  -Per patient, he gets monthly paracentesis at hospital in [place].  Denies any prior history of cirrhosis.     DM2  -A1c 6.2 on 8/26  -SSI, titrate prn (patient doesn't allow regular ssi, dictates his own sliding scale)    HTN  - Hydralazine was held during hospitalization due to liable blood pressures.   -Med rec records show he has been on hydralazine 10 mg TID.  to follow up on appropriateness of restarting blood pressure medications     Chronic hypoxic respiratory failure  COPD on chronic home O2 (on 3Lnc baseline)  -DuoNebs PRN     Depression  -started zoloft on 8/31, stopped it on 9/1/24 per patient wife request, said it made patient too sleepy      Discharge Follow Up Recommendations for outpatient labs/diagnostics:  Follow-up with PCP in 1 week.  Follow-up with Dr. Amaral, cardiology in 4 weeks.  Follow-up with dialysis as scheduled Monday Wednesday Friday. Recommend CBC in 2 days.     Day of Discharge     HPI:   Patient resting comfortably in bed.  Discussed with Dr. Novoa, nephrology and he is cleared for discharge from nephrology standpoint. Hg stable. Pt did not eat breakfast as he does not like the food. He wants to go to  for rehab.       Vital Signs:   Temp:  [97.2 °F (36.2 °C)-98.4 °F (36.9 °C)] 98.3 °F (36.8 °C)  Heart Rate:  [81-94] 92  Resp:  [14-16] 16  BP: (118-147)/(44-85) 147/83  Flow (L/min):  [2] 2      Physical Exam:  Constitutional: No acute  distress, awake, alert, up in bed; on 3LNc, normal work of breathing at rest  HENT: NCAT, mucous membranes moist  Respiratory: decreased air movement bilateral bases, no respiratory distress  Cardiovascular: rrr  Gastrointestinal: Positive bowel sounds, soft, nontender  Psychiatric: Appropriate affect, cooperative  Neurologic: Oriented x 3, speech clear  MSK: R BKA    Pertinent  and/or Most Recent Results     LAB RESULTS:      Lab 09/05/24  0755 09/04/24  0526 09/03/24  0459 09/02/24  0436 09/01/24  0501 08/31/24  0759 08/31/24  0057 08/30/24  1821 08/30/24  1253 08/30/24  0856 08/30/24  0046 08/29/24  1633   WBC 7.39 8.06 7.04 7.68 9.37 9.76  --   --  11.05*  --   --  11.86*   HEMOGLOBIN 8.6* 8.8* 8.3* 7.5* 7.3* 7.6*  --   --  7.3*  --   --  8.2*   HEMATOCRIT 28.4* 29.0* 27.7* 25.5* 24.2* 25.1*  --   --  24.9*  --   --  28.7*   PLATELETS 230 245 220 225 208 221  --   --  287  --   --  302   NEUTROS ABS 5.08  --   --  5.56  --   --   --   --  8.83*  --   --  10.59*   IMMATURE GRANS (ABS) 0.06*  --   --  0.04  --   --   --   --  0.06*  --   --  0.07*   LYMPHS ABS 0.74  --   --  0.73  --   --   --   --  0.66*  --   --  0.47*   MONOS ABS 1.06*  --   --  1.13*  --   --   --   --  1.41*  --   --  0.63   EOS ABS 0.40  --   --  0.18  --   --   --   --  0.02  --   --  0.01   MCV 94.0 92.9 93.9 94.4 92.7 91.9  --   --  92.9  --   --  95.0   PROTIME  --   --   --   --   --   --   --   --   --   --   --  18.8*   APTT  --   --   --   --   --   --   --   --   --   --   --  29.0*   HEPARIN ANTI-XA  --   --   --   --   --  0.20* 0.21* 0.24*  --  0.29* 0.22* 0.15*         Lab 09/04/24  0526 09/03/24  0459 09/02/24  0436 09/01/24  0501 08/31/24  0759 08/30/24  1253 08/29/24  1535   SODIUM 130* 131* 132* 135* 135*   < > 131*   POTASSIUM 5.4* 4.9 5.1 4.7 5.3*   < > 5.8*   CHLORIDE 95* 97* 97* 96* 98   < > 96*   CO2 25.0 25.0 24.0 24.0 23.0   < > 21.0*   ANION GAP 10.0 9.0 11.0 15.0 14.0   < > 14.0   BUN 37* 23 29* 25* 30*   < > 32*    CREATININE 4.24* 3.35* 4.58* 3.51* 4.07*   < > 4.26*   EGFR 14.1* 18.7* 12.9* 17.7* 14.8*   < > 14.0*   GLUCOSE 200* 190* 226* 141* 105*   < > 174*   CALCIUM 9.4 9.0 9.1 9.4 9.1   < > 9.2   MAGNESIUM  --   --   --   --  1.9  --  1.9   PHOSPHORUS 5.3* 4.5 6.4* 6.1* 6.6*   < >  --     < > = values in this interval not displayed.         Lab 09/04/24  0526 09/03/24  0459 09/02/24  0436 09/01/24  0501 08/31/24  0759   ALBUMIN 3.1* 2.8* 2.8* 3.0* 3.2*         Lab 08/30/24  1253 08/29/24  1825 08/29/24  1633 08/29/24  1535   HSTROP T 298* 201*  --  203*   PROTIME  --   --  18.8*  --    INR  --   --  1.56*  --              Lab 09/02/24  0848 09/01/24  0501 08/30/24  1359   IRON  --  24*  --    IRON SATURATION (TSAT)  --  13*  --    TIBC  --  180*  --    TRANSFERRIN  --  121*  --    ABO TYPING O  --  O   RH TYPING Positive  --  Positive   ANTIBODY SCREEN Negative  --  Negative         Brief Urine Lab Results  (Last result in the past 365 days)        Color   Clarity   Blood   Leuk Est   Nitrite   Protein   CREAT   Urine HCG        01/01/24 0550 Yellow   Clear     Negative                     Microbiology Results (last 10 days)       Procedure Component Value - Date/Time    CANDIDA AURIS SCREEN - Swab, Axilla Right, Axilla Left and Groin [[id number]]  (Normal) Collected: 08/26/24 1715    Lab Status: Final result Specimen: Swab from Axilla Right, Axilla Left and Groin Updated: 08/31/24 2231     Candida Auris Screen Culture No Candida auris isolated at 5 days    MRSA Screen, PCR (Inpatient) - Swab, Nares [[id number]]  (Normal) Collected: 08/26/24 1715    Lab Status: Final result Specimen: Swab from Nares Updated: 08/26/24 2124     MRSA PCR Negative    Narrative:      The negative predictive value of this diagnostic test is high and should only be used to consider de-escalating anti-MRSA therapy. A positive result may indicate colonization with MRSA and must be correlated clinically.  MRSA Negative    Blood Culture - Blood,  Hand, Right [[id number]]  (Normal) Collected: 08/26/24 1154    Lab Status: Final result Specimen: Blood from Hand, Right Updated: 08/31/24 1215     Blood Culture No growth at 5 days    Narrative:      Less than seven (7) mL's of blood was collected.  Insufficient quantity may yield false negative results.    Blood Culture - Blood, Arm, Right [[id number]]  (Normal) Collected: 08/26/24 1145    Lab Status: Final result Specimen: Blood from Arm, Right Updated: 08/31/24 1215     Blood Culture No growth at 5 days    Narrative:      Less than seven (7) mL's of blood was collected.  Insufficient quantity may yield false negative results.            XR Chest 1 View    Result Date: 9/1/2024  XR CHEST 1 VW Date of Exam: 9/1/2024 2:44 AM EDT Indication: follow up pulm edema/effusions Comparison: Chest radiograph 8/31/2024. Findings: Unchanged position of right IJ approach central venous catheter. Sternotomy and CABG. Cardiomediastinal silhouette is unchanged. Decreased diffuse mixed interstitial and airspace disease. Similar small/moderate bilateral pleural effusions with adjacent atelectasis. No pneumothorax. Osseous structures are unchanged.     Impression: Decreased but persistent pulmonary edema and pulmonary vascular congestion. Similar small/moderate bilateral pleural effusions with adjacent atelectasis. Electronically Signed: Kobe Phoenix MD  9/1/2024 7:50 AM EDT  Workstation ID: [id number]    XR Chest 1 View    Result Date: 8/31/2024  XR CHEST 1 VW Date of Exam: 8/31/2024 2:08 AM EDT Indication: chest pain Comparison: 2/24/2024 Findings: Cardiomediastinal silhouette is stable. There is atherosclerosis of the aorta. There is pulmonary vascular congestion with diffuse interstitial thickening and patchy bibasilar opacities. There are small bilateral pleural effusions. A right IJ tunneled hemodialysis catheter terminates in the right atrium. Median sternotomy wires are present.     Impression: Cardiomegaly with pulmonary  vascular congestion and mixed interstitial/airspace opacities with small bilateral effusions. Findings suggestive of CHF/pulmonary edema. Electronically Signed: Shama Chairez MD  8/31/2024 8:17 AM EDT  Workstation ID: [id number]    Popliteal cath    Result Date: 8/29/2024  Beck Nieves CRNA     8/29/2024 10:13 AM Popliteal cath Patient reassessed immediately prior to procedure Patient location during procedure: pre-op Start time: 8/29/2024 9:55 AM Stop time: 8/29/2024 10:05 AM Reason for block: at surgeon's request and post-op pain management Performed by CRNA/CAA: Beck Nieves CRNA Assisted by: Beverly Pardo RN Preanesthetic Checklist Completed: patient identified, IV checked, site marked, risks and benefits discussed, surgical consent, monitors and equipment checked, pre-op evaluation and timeout performed Prep: Pt Position: left lateral decubitus Sterile barriers:cap, gloves, mask and washed/disinfected hands Prep: ChloraPrep Patient monitoring: blood pressure monitoring, continuous pulse oximetry and EKG Procedure Sedation: yes Performed under: local infiltration Guidance:ultrasound guided ULTRASOUND INTERPRETATION.  Using ultrasound guidance a 20 G gauge needle was placed in close proximity to the nerve, at which point, under ultrasound guidance anesthetic was injected in the area of the nerve and spread of the anesthesia was seen on ultrasound in close proximity thereto.  There were no abnormalities seen on ultrasound; a digital image was taken; and the patient tolerated the procedure with no complications. Images:still images obtained, printed/placed on chart Laterality:right Block Type:popliteal Injection Technique:catheter Needle Type:echogenic and Tuohy Needle Gauge:18 G Resistance on Injection: none Catheter Size:20 G Cath Depth at skin: 8 cm Medications Used: ropivacaine (NAROPIN) 0.2 % injection - Peripheral Nerve  12 mL - 8/29/2024 10:05:00 AM ropivacaine (NAROPIN) 0.5 % injection - Injection   "12 mL - 8/29/2024 10:05:00 AM Post Assessment Injection Assessment: negative aspiration for heme, no paresthesia on injection and incremental injection Patient Tolerance:comfortable throughout block Complications:no Additional Notes CATHETER                             A high-frequency linear transducer, with sterile cover, was placed in the popliteal fossa to identify the popliteal artery and vein, Tibial nerve (TN) and Common Peroneal nerve (CP). The transducer was then moved in a cephalad fashion to observe the TN and CP nerve bifurcation to form the Sciatic Nerve. The insertion site was prepped and draped in sterile fashion. Skin and cutaneous tissue was infiltrated with 2-5 ml of 1% Lidocaine. Using ultrasound-guidance, an 18-gauge Contiplex Ultra 360 Touhy needle was advanced in plane from lateral to medial. Preservative-free normal saline was utilized for hydro-dissection of tissue, advancement of Touhy needle, and to confirm final needle placement posterior to the nerves. Local anesthetic injection spread, in incremental 3-5 ml injections, to surround both nerve structures. Aspiration every 5 ml to prevent intravascular injection. Injection was completed with negative aspiration of blood and negative intravascular injection. Injection pressures were normal with minimal resistance. A 20-gauge Contiplex Echo catheter was placed through the needle and advance out the tip of the Touhy 1-3 cm. The Touhy needle was then removed, and final catheter position verified (Below/above or Anterior/Posterior) the nerve structures. The catheter was secured in the usual fashion with skin glue, benzoin, steri-strips, CHG tegaderm and Label noting \"Nerve Block Catheter\". Jerk tape applied at yellow connector and catheter connection. Performed by: Beck Nieves CRNA     Femoral block    Result Date: 8/29/2024  Beck Nieves CRNA     8/29/2024 10:12 AM Femoral block Patient reassessed immediately prior to procedure Start " time: 8/29/2024 9:48 AM Stop time: 8/29/2024 9:54 AM Reason for block: at surgeon's request and post-op pain management Performed by CRNA/CAA: Beck Nieves CRNA Assisted by: Beverly Pardo RN Preanesthetic Checklist Completed: patient identified, IV checked, site marked, risks and benefits discussed, surgical consent, monitors and equipment checked, pre-op evaluation and timeout performed Prep: Pt Position: supine Sterile barriers:gloves, cap, sterile barriers, mask and washed/disinfected hands Prep: ChloraPrep Patient monitoring: blood pressure monitoring, continuous pulse oximetry and EKG Procedure Guidance:ultrasound guided ULTRASOUND INTERPRETATION.  Using ultrasound guidance a 20 G gauge needle was placed in close proximity to the femoral nerve, at which point, under ultrasound guidance anesthetic was injected in the area of the nerve and spread of the anesthesia was seen on ultrasound in close proximity thereto.  There were no abnormalities seen on ultrasound; a digital image was taken; and the patient tolerated the procedure with no complications. Images:still images obtained, printed/placed on chart Laterality:right Block Type:femoral Injection Technique:single-shot Needle Type:short-bevel Needle Gauge:20 G Resistance on Injection: none Medications Used: fentaNYL citrate (PF) (SUBLIMAZE) injection - Intravenous  100 mcg - 8/29/2024 9:54:00 AM ropivacaine (NAROPIN) 0.2 % injection - Peripheral Nerve  12 mL - 8/29/2024 9:54:00 AM ropivacaine (NAROPIN) 0.5 % injection - Injection  12 mL - 8/29/2024 9:54:00 AM dexamethasone sodium phosphate injection - Injection  2 mg - 8/29/2024 9:54:00 AM Post Assessment Injection Assessment: negative aspiration for heme, no paresthesia on injection and incremental injection Patient Tolerance:comfortable throughout block Complications:no Additional Notes SINGLE Shot A high-frequency linear transducer, with sterile cover, was placed in the inguinal crease to visualize the  "Femoral Vein, Artery, and Nerve (medial to lateral). The insertion site was prepped in sterile fashion. Skin and cutaneous tissue was infiltrated with 2-5 ml of 1% Lidocaine. Using ultrasound-guidance, a 20-gauge B-Keller 4\" Ultraplex 360 non-stimulating echogenic needle was then inserted and advanced in-plane from lateral to medial with ultrasound guidance. The needle was directed below Fascia Iliacus towards the Femoral nerve. Preservative-free normal saline was utilized for hydro-dissection of tissue. Local anesthetic injection spread, in incremental 3-5 ml injections, was visualized lateral to the artery to surround the femoral nerve. Aspiration every 5 ml to prevent intravascular injection. Injection was completed with negative aspiration of blood and negative intravascular injection. Injection pressures were normal with minimal resistance. Performed by: Beck Nieves CRNA     Adult Transthoracic Echo Complete W/ Cont if Necessary Per Protocol    Result Date: 8/29/2024    Left ventricular systolic function is mildly decreased. Calculated left ventricular EF = 40%   Left ventricular wall thickness is consistent with mild concentric hypertrophy.   Moderately reduced right ventricular systolic function noted.   There is bileaflet mitral valve thickening present.   Moderate to severe mitral valve regurgitation is present.   Moderate to severe tricuspid valve regurgitation is present.   Calculated right ventricular systolic pressure from tricuspid regurgitation is 73 mmHg.   There is a small (<1cm) pericardial effusion adjacent to the left ventricle.     OUTSIDE INVASIVE CARDIOLOGY STUDY    Result Date: 8/27/2024  This procedure was auto-finalized with no dictation required.    US Liver    Result Date: 8/27/2024  US LIVER Date of Exam: 8/27/2024 5:00 AM EDT Indication: possible cirrhosis. Comparison: No comparisons available. Technique: Grayscale and color Doppler ultrasound evaluation of the right upper quadrant " was performed. Elastography performed. Findings: Liver and hepatic vasculature: Echogenicity is within normal limits. No discrete contour nodularity. No focal liver lesions are displayed on this ultrasound. The imaged portal vein and hepatic veins demonstrate normal direction of flow and normal waveform on spectral imaging. Elastography values below. Gallbladder and biliary: Cholecystectomy. The common bile duct measures 9 mm, within normal limits given age and surgically absent gallbladder. Right kidney: The right kidney measures 9 cm. No hydronephrosis. Pancreas: Not well evaluated due to overlying bowel gas. Free fluid: There is small volume ascites. Liver elastography: Measurement quality: Adequate Median SWV: 0.73 m/s (see note below) Standard deviation: 0.12 Interquartile range/median (IQR/M): 0.23 (quality metric; <0.3 supports good precision) Note: SWV is a measure of liver stiffness, a surrogate marker for fibrosis. SWV <1.37 m/s indicates a low probability of clinically significant fibrosis (METAVIR Stage F2 or below) SWV >2.2 m/s indicates a high probability of clinically significant fibrosis (METAVIR Stage F3 or above) Intermediate values are indeterminant and correlation with clinical risk factors, laboratory markers, or liver biopsy may be needed to determine appropriate follow-up.      Impression: SWV <1.37 m/s indicates a low probability of clinically significant fibrosis (METAVIR Stage F2 or below). No overt morphologic changes of cirrhosis by ultrasound. Small volume ascites. Cholecystectomy. Electronically Signed: Kobe Phoenix MD  8/27/2024 7:41 AM EDT  Workstation ID: [id number]             Results for orders placed during the hospital encounter of 08/26/24    Adult Transthoracic Echo Complete W/ Cont if Necessary Per Protocol    Interpretation Summary    Left ventricular systolic function is mildly decreased. Calculated left ventricular EF = 40%    Left ventricular wall thickness is consistent  with mild concentric hypertrophy.    Moderately reduced right ventricular systolic function noted.    There is bileaflet mitral valve thickening present.    Moderate to severe mitral valve regurgitation is present.    Moderate to severe tricuspid valve regurgitation is present.    Calculated right ventricular systolic pressure from tricuspid regurgitation is 73 mmHg.    There is a small (<1cm) pericardial effusion adjacent to the left ventricle.      Discharge Details        Discharge Medications        New Medications        Instructions Start Date   Insulin Lispro 100 UNIT/ML injection  Commonly known as: humaLOG   Blood Glucose 150-199 mg/dL - 0 units Blood Glucose 200-249 mg/dL - 1 units Blood Glucose 250-299 mg/dL - 2 units Blood Glucose 300-349 mg/dL - 3 units Blood Glucose 350-400 mg/dL - 4 units Blood Glucose Greater Than 400 mg/dL - 5 units & Call Provider             Continue These Medications        Instructions Start Date   apixaban 5 MG tablet tablet  Commonly known as: ELIQUIS   5 mg, Oral, 2 Times Daily      aspirin 81 MG EC tablet   81 mg, Oral, Daily      calcitriol 0.5 MCG capsule  Commonly known as: ROCALTROL   0.5 mcg, Oral, Daily PRN      cholecalciferol 25 MCG (1000 UT) tablet  Commonly known as: VITAMIN D3   1,000 Units, Oral, Daily      Mircera 200 MCG/0.3ML solution prefilled syringe  Generic drug: Methoxy PEG-Epoetin Beta   200 mcg, Subcutaneous, As Needed, Dialysis determines when this is given.      multivitamin with minerals tablet tablet   1 tablet, Oral, Daily      oxyCODONE 10 MG tablet  Commonly known as: ROXICODONE   10 mg, Oral, Every 4 Hours PRN      pantoprazole 40 MG EC tablet  Commonly known as: PROTONIX   40 mg, Oral, Daily      Carrol-Do Rx 1 MG tablet   1 tablet, Oral, Daily             Stop These Medications      gentamicin 0.1 % cream  Commonly known as: GARAMYCIN     hydrALAZINE 10 MG tablet  Commonly known as: APRESOLINE     lactulose 10 GM/15ML solution  Commonly known  "as: CHRONULAC     lidocaine 5 %  Commonly known as: LIDODERM     mupirocin 2 % ointment  Commonly known as: BACTROBAN     naloxone 4 MG/0.1ML nasal spray  Commonly known as: NARCAN     NovoLOG FlexPen 100 UNIT/ML solution pen-injector sc pen  Generic drug: insulin aspart     promethazine 25 MG tablet  Commonly known as: PHENERGAN     triamcinolone 0.1 % cream  Commonly known as: KENALOG     Velphoro 500 MG chewable tablet  Generic drug: Sucroferric Oxyhydroxide              Allergies   Allergen Reactions    Cefepime Other (See Comments) and Seizure     Myoclonus - Has received cefazolin    Doxycycline Other (See Comments)     Joint pain, tongue swelling    \"Body locks up\"    Metoprolol Hives, Shortness Of Breath and Itching    Gabapentin Confusion    Ranolazine Dizziness and Other (See Comments)     Severe tremors/ shakiness    Hydralazine Unknown - Low Severity    Augmentin [Amoxicillin-Pot Clavulanate] Palpitations    Biaxin [Clarithromycin] Palpitations    Cephalosporins Unknown - Low Severity     Has received cefazolin    Coreg [Carvedilol] Itching    Crestor [Rosuvastatin Calcium] Itching     Itching rash    Iodine Rash     \"pineda skin\"    Lipitor [Atorvastatin] Itching     And Crestor- generalized weakness and chest tightness    Lisinopril Cough     Cough /weakness, nauseas and dirrhea    Livalo [Pitavastatin] Unknown - Low Severity     Chest tightness    Nortriptyline Hcl Other (See Comments)     Arthralgias    Pravastatin Unknown - Low Severity     Chest , neck and arm discomfort    Questran [Cholestyramine] Unknown - Low Severity         Discharge Disposition:      Diet:  Hospital:  Diet Order   Procedures    Diet: Cardiac, Renal; Healthy Heart (2-3 Na+); Low Potassium, Low Sodium (2-3g); Fluid Consistency: Thin (IDDSI 0)            Restrictions or Other Recommendations:  Up with assistance       CODE STATUS:    Code Status and Medical Interventions: CPR (Attempt to Resuscitate); Full Support   Ordered at: " 08/26/24 1218     Level Of Support Discussed With:    Patient     Code Status (Patient has no pulse and is not breathing):    CPR (Attempt to Resuscitate)     Medical Interventions (Patient has pulse or is breathing):    Full Support       Future Appointments   Date Time Provider Department Center   9/5/2024 10:30 AM MED 8 [facility]   10/2/2024  3:00 PM Laya Amaral MD [facility]       Additional Instructions for the Follow-ups that You Need to Schedule       Ambulatory Referral to Cardiac Rehab   As directed                    Kalee Zavala MD  09/05/24      Time Spent on Discharge:  I spent  35  minutes on this discharge activity which included: face-to-face encounter with the patient, reviewing the data in the system, coordination of the care with the nursing staff as well as consultants, documentation, and entering orders.            Electronically signed by Kalee Zavala MD at 09/05/24 [id number]

## 2024-09-05 NOTE — PLAN OF CARE
Problem: Adult Inpatient Plan of Care  Goal: Plan of Care Review  Outcome: Met  Goal: Patient-Specific Goal (Individualized)  Outcome: Met  Goal: Absence of Hospital-Acquired Illness or Injury  Outcome: Met  Intervention: Identify and Manage Fall Risk  Description: Perform standard risk assessment on admission using a validated tool or comprehensive approach appropriate to the patient; reassess fall risk frequently, with change in status or transfer to another level of care.  Communicate fall injury risk to interprofessional healthcare team.  Determine need for increased observation, equipment and environmental modification, such as low bed, signage and supportive, nonskid footwear.  Adjust safety measures to individual developmental age, stage and identified risk factors.  Reinforce the importance of safety and physical activity with patient and family.  Perform regular intentional rounding to assess need for position change, pain assessment and personal needs, including assistance with toileting.  Recent Flowsheet Documentation  Taken 9/5/2024 0600 by Lisa Saenz RN  Safety Promotion/Fall Prevention:   activity supervised   clutter free environment maintained   fall prevention program maintained   safety round/check completed   room organization consistent  Taken 9/5/2024 0400 by Lisa Saenz RN  Safety Promotion/Fall Prevention:   activity supervised   clutter free environment maintained   fall prevention program maintained   safety round/check completed   room organization consistent  Taken 9/5/2024 0200 by Lisa Saenz RN  Safety Promotion/Fall Prevention:   activity supervised   clutter free environment maintained   fall prevention program maintained   safety round/check completed   room organization consistent  Taken 9/5/2024 0000 by Lisa Saenz RN  Safety Promotion/Fall Prevention:   activity supervised   clutter free environment maintained   fall prevention program maintained   safety  round/check completed   room organization consistent  Taken 9/4/2024 2200 by Lisa Saenz RN  Safety Promotion/Fall Prevention:   activity supervised   clutter free environment maintained   fall prevention program maintained   safety round/check completed   room organization consistent  Taken 9/4/2024 2000 by Lisa Saenz RN  Safety Promotion/Fall Prevention:   activity supervised   clutter free environment maintained   fall prevention program maintained   safety round/check completed   room organization consistent  Intervention: Prevent Skin Injury  Description: Perform a screening for skin injury risk, such as pressure or moisture associated skin damage on admission and at regular intervals throughout hospital stay.  Keep all areas of skin (especially folds) clean and dry.  Maintain adequate skin hydration.  Relieve and redistribute pressure and protect bony prominences; implement measures based on patient-specific risk factors.  Match turning and repositioning schedule to clinical condition.  Encourage weight shift frequently; assist with reposition if unable to complete independently.  Float heels off bed; avoid pressure on the Achilles tendon.  Keep skin free from extended contact with medical devices.  Encourage functional activity and mobility, as early as tolerated.  Use aids (e.g., slide boards, mechanical lift) during transfer.  Recent Flowsheet Documentation  Taken 9/5/2024 0600 by Lisa Saenz RN  Body Position: legs elevated  Skin Protection:   adhesive use limited   incontinence pads utilized   tubing/devices free from skin contact   skin-to-device areas padded   skin-to-skin areas padded  Taken 9/5/2024 0400 by Lisa Saenz RN  Body Position:   position changed independently   legs elevated  Skin Protection:   adhesive use limited   incontinence pads utilized   tubing/devices free from skin contact   skin-to-device areas padded   skin-to-skin areas padded  Taken 9/5/2024 0200 by  Pelphrey, Lisa, RN  Body Position: legs elevated  Skin Protection:   adhesive use limited   incontinence pads utilized   tubing/devices free from skin contact   skin-to-device areas padded   skin-to-skin areas padded  Taken 9/5/2024 0000 by Lisa Saenz RN  Body Position: position changed independently  Skin Protection:   adhesive use limited   incontinence pads utilized   tubing/devices free from skin contact   skin-to-device areas padded   skin-to-skin areas padded  Taken 9/4/2024 2200 by Lisa Saenz RN  Body Position: legs elevated  Skin Protection:   adhesive use limited   incontinence pads utilized   tubing/devices free from skin contact   skin-to-device areas padded   skin-to-skin areas padded  Taken 9/4/2024 2000 by Lisa Saenz RN  Body Position: legs elevated  Skin Protection:   adhesive use limited   incontinence pads utilized   tubing/devices free from skin contact   skin-to-device areas padded   skin-to-skin areas padded  Intervention: Prevent and Manage VTE (Venous Thromboembolism) Risk  Description: Assess for VTE (venous thromboembolism) risk.  Encourage and assist with early ambulation.  Initiate and maintain compression or other therapy, as indicated, based on identified risk in accordance with organizational protocol and provider order.  Encourage both active and passive leg exercises while in bed, if unable to ambulate.  Recent Flowsheet Documentation  Taken 9/5/2024 0600 by Lisa Saenz RN  Activity Management: activity encouraged  Taken 9/5/2024 0400 by Lisa Saenz RN  Activity Management: activity encouraged  Taken 9/5/2024 0200 by Lisa Saenz RN  Activity Management: sitting, edge of bed  Taken 9/5/2024 0000 by Lisa Saenz RN  Activity Management: activity encouraged  Taken 9/4/2024 2200 by Lisa Saenz RN  Activity Management: activity encouraged  Taken 9/4/2024 2000 by Lisa Saenz RN  Activity Management: sitting, edge of bed  Intervention: Prevent  Infection  Description: Maintain skin and mucous membrane integrity; promote hand, oral and pulmonary hygiene.  Optimize fluid balance, nutrition, sleep and glycemic control to maximize infection resistance.  Identify potential sources of infection early to prevent or mitigate progression of infection (e.g., wound, lines, devices).  Evaluate ongoing need for invasive devices; remove promptly when no longer indicated.  Recent Flowsheet Documentation  Taken 9/5/2024 0600 by Lisa Saenz RN  Infection Prevention:   environmental surveillance performed   rest/sleep promoted  Taken 9/5/2024 0400 by Lisa Saenz RN  Infection Prevention:   environmental surveillance performed   rest/sleep promoted  Taken 9/5/2024 0200 by Lisa Saenz RN  Infection Prevention:   environmental surveillance performed   rest/sleep promoted  Taken 9/5/2024 0000 by Lisa Saenz RN  Infection Prevention:   environmental surveillance performed   rest/sleep promoted  Taken 9/4/2024 2200 by Lisa Saenz RN  Infection Prevention:   environmental surveillance performed   rest/sleep promoted  Taken 9/4/2024 2000 by Lisa Saenz RN  Infection Prevention:   environmental surveillance performed   rest/sleep promoted  Goal: Optimal Comfort and Wellbeing  Outcome: Met  Intervention: Monitor Pain and Promote Comfort  Description: Assess pain level, treatment efficacy and patient response at regular intervals using a consistent pain scale.  Consider the presence and impact of preexisting chronic pain.  Encourage patient and caregiver involvement in pain assessment, interventions and safety measures.  Recent Flowsheet Documentation  Taken 9/5/2024 0517 by Lisa Saenz RN  Pain Management Interventions:   quiet environment facilitated   see MAR  Taken 9/5/2024 0038 by Lisa Saenz RN  Pain Management Interventions: see MAR  Intervention: Provide Person-Centered Care  Description: Use a family-focused approach to care.  Develop  trust and rapport by proactively providing information, encouraging questions, addressing concerns and offering reassurance.  Acknowledge emotional response to hospitalization.  Recognize and utilize personal coping strategies.  Honor spiritual and cultural preferences.  Recent Flowsheet Documentation  Taken 9/4/2024 2000 by Lisa Saenz RN  Trust Relationship/Rapport: care explained  Goal: Readiness for Transition of Care  Outcome: Met     Problem: Device-Related Complication Risk (Hemodialysis)  Goal: Safe, Effective Therapy Delivery  Outcome: Met  Intervention: Optimize Device Care and Function  Description: Maintain flow rate, anticoagulation parameters, pressure ranges and prescribed fluid balance with gradual adjustments to maintain hemodynamic stability and achieve therapy goals.  Monitor laboratory results (e.g., electrolytes, glucose, albumin, coagulation studies, hemoglobin, hematocrit) and clinical status (e.g., cramping; nausea, vomiting, blood pressure fluctuations) for response to therapy; advocate for treatment or adju  Assess vascular access site for patency, securement and position to meet flow demands. Assess perfusion distal to access site to ensure adequate tissue oxygenation.  For arteriovenous fistula, assess presence of thrill to ensure presence of blood flow. Avoid blood pressure readings, lab draws, tight clothing or jewelry on the AV (arteriovenous) fistula extremity to prevent trauma.  Maintain circuit monitoring (e.g., arterial, venous and transmembrane pressure; ultrafiltrate removal; dialysate flow, conductivity and temperature); address alarms promptly to decrease risk to patient and preserve circuit function.  Evaluate circuit for disconnections, cracks, clotting, malfunction, leaks or rupture of hemofilter; intervene promptly to prevent risk to patient.  Consider distal vascular access for antibiotic or vasoactive medication administration to prevent filtration of medication  effect prior to being delivered systemically to the patient.  Maintain infusion of anticoagulation; adjust to keep laboratory values within ordered range.  Provide emergency equipment, such as clamps, replacement devices and resuscitative supplies, if malfunction, tubing rupture, clot formation or migration occur.  Recent Flowsheet Documentation  Taken 9/5/2024 0600 by Lisa Saenz RN  Medication Review/Management: medications reviewed  Taken 9/5/2024 0400 by Lisa Saenz RN  Medication Review/Management: medications reviewed  Taken 9/5/2024 0200 by Lisa Saenz RN  Medication Review/Management: medications reviewed  Taken 9/5/2024 0000 by Lisa Saenz RN  Medication Review/Management: medications reviewed  Taken 9/4/2024 2200 by Lisa Saenz RN  Medication Review/Management: medications reviewed  Taken 9/4/2024 2000 by Lisa Saenz RN  Medication Review/Management: medications reviewed     Problem: Hemodynamic Instability (Hemodialysis)  Goal: Effective Tissue Perfusion  Outcome: Met     Problem: Infection (Hemodialysis)  Goal: Absence of Infection Signs and Symptoms  Outcome: Met     Problem: Fall Injury Risk  Goal: Absence of Fall and Fall-Related Injury  Outcome: Met  Intervention: Identify and Manage Contributors  Description: Develop a fall prevention plan with the patient and caregiver/family.  Provide reorientation, appropriate sensory stimulation and routines with changes in mental status to decrease risk of fall.  Promote use of personal vision and auditory aids.  Assess assistance level required for safe and effective self-care; provide support as needed, such as toileting, mobilization. For age 65 and older, implement timed toileting with assistance.  Encourage physical activity, such as performance of mobility and self-care at highest level of patient ability, multicomponent exercise program and provision of appropriate assistive devices.  If fall occurs, assess the severity of  injury; implement fall injury protocol. Determine the cause and revise fall injury prevention plan.  Regularly review medication contribution to fall risk; adjust medication administration times to minimize risk of falling.  Consider risk related to polypharmacy and age.  Balance adequate pain management with potential for oversedation.  Recent Flowsheet Documentation  Taken 9/5/2024 0600 by Lisa Saenz RN  Medication Review/Management: medications reviewed  Self-Care Promotion:   independence encouraged   BADL personal objects within reach  Taken 9/5/2024 0400 by Lisa Saenz RN  Medication Review/Management: medications reviewed  Self-Care Promotion:   independence encouraged   BADL personal objects within reach  Taken 9/5/2024 0200 by Lisa Saenz RN  Medication Review/Management: medications reviewed  Self-Care Promotion:   independence encouraged   BADL personal objects within reach  Taken 9/5/2024 0000 by Lisa Saenz RN  Medication Review/Management: medications reviewed  Self-Care Promotion:   independence encouraged   BADL personal objects within reach  Taken 9/4/2024 2200 by Lisa Saenz RN  Medication Review/Management: medications reviewed  Self-Care Promotion:   independence encouraged   BADL personal objects within reach  Taken 9/4/2024 2000 by Lisa Saenz RN  Medication Review/Management: medications reviewed  Self-Care Promotion:   independence encouraged   BADL personal objects within reach  Intervention: Promote Injury-Free Environment  Description: Provide a safe, barrier-free environment that encourages independent activity.  Keep care area uncluttered and well-lighted.  Determine need for increased observation or monitoring.  Avoid use of devices that minimize mobility, such as restraints or indwelling urinary catheter.  Recent Flowsheet Documentation  Taken 9/5/2024 0600 by Lisa Saenz RN  Safety Promotion/Fall Prevention:   activity supervised   clutter free  environment maintained   fall prevention program maintained   safety round/check completed   room organization consistent  Taken 9/5/2024 0400 by Lisa Saenz RN  Safety Promotion/Fall Prevention:   activity supervised   clutter free environment maintained   fall prevention program maintained   safety round/check completed   room organization consistent  Taken 9/5/2024 0200 by Lisa Saenz RN  Safety Promotion/Fall Prevention:   activity supervised   clutter free environment maintained   fall prevention program maintained   safety round/check completed   room organization consistent  Taken 9/5/2024 0000 by Lisa Saenz RN  Safety Promotion/Fall Prevention:   activity supervised   clutter free environment maintained   fall prevention program maintained   safety round/check completed   room organization consistent  Taken 9/4/2024 2200 by Lisa Saenz RN  Safety Promotion/Fall Prevention:   activity supervised   clutter free environment maintained   fall prevention program maintained   safety round/check completed   room organization consistent  Taken 9/4/2024 2000 by Lisa Saenz RN  Safety Promotion/Fall Prevention:   activity supervised   clutter free environment maintained   fall prevention program maintained   safety round/check completed   room organization consistent     Problem: Skin Injury Risk Increased  Goal: Skin Health and Integrity  Outcome: Met  Intervention: Optimize Skin Protection  Description: Perform a full pressure injury risk assessment, as indicated by screening, upon admission to care unit.  Reassess skin (injury risk, full inspection) frequently (e.g., scheduled interval, with change in condition) to provide optimal early detection and prevention.  Maintain adequate tissue perfusion (e.g., encourage fluid balance; avoid crossing legs, constrictive clothing or devices) to promote tissue oxygenation.  Maintain head of bed at lowest degree of elevation tolerated, considering  medical condition and other restrictions.  Avoid positioning onto an area that remains reddened.  Minimize incontinence and moisture (e.g., toileting schedule; moisture-wicking pad, diaper or incontinence collection device; skin moisture barrier).  Cleanse skin promptly and gently when soiled utilizing a pH-balanced cleanser.  Relieve and redistribute pressure (e.g., scheduled position changes, weight shifts, use of support surface, medical device repositioning, protective dressing application, use of positioning device, microclimate control, use of pressure-injury-monitor  Encourage increased activity, such as sitting in a chair at the bedside or early mobilization, when able to tolerate.  Recent Flowsheet Documentation  Taken 9/5/2024 0600 by Lisa Saenz RN  Pressure Reduction Techniques:   frequent weight shift encouraged   heels elevated off bed   positioned off wounds   pressure points protected  Pressure Reduction Devices:   pressure-redistributing mattress utilized   positioning supports utilized   heel offloading device utilized  Skin Protection:   adhesive use limited   incontinence pads utilized   tubing/devices free from skin contact   skin-to-device areas padded   skin-to-skin areas padded  Taken 9/5/2024 0400 by Lisa Saenz RN  Pressure Reduction Techniques:   frequent weight shift encouraged   heels elevated off bed   positioned off wounds   pressure points protected  Head of Bed (HOB) Positioning: HOB elevated  Pressure Reduction Devices:   pressure-redistributing mattress utilized   positioning supports utilized   heel offloading device utilized  Skin Protection:   adhesive use limited   incontinence pads utilized   tubing/devices free from skin contact   skin-to-device areas padded   skin-to-skin areas padded  Taken 9/5/2024 0200 by Lisa Saenz RN  Pressure Reduction Techniques:   frequent weight shift encouraged   heels elevated off bed   positioned off wounds   pressure points  protected  Pressure Reduction Devices:   pressure-redistributing mattress utilized   positioning supports utilized   heel offloading device utilized  Skin Protection:   adhesive use limited   incontinence pads utilized   tubing/devices free from skin contact   skin-to-device areas padded   skin-to-skin areas padded  Taken 9/5/2024 0000 by Lisa Saenz RN  Pressure Reduction Techniques:   frequent weight shift encouraged   heels elevated off bed   positioned off wounds   pressure points protected  Head of Bed (HOB) Positioning: HOB elevated  Pressure Reduction Devices:   pressure-redistributing mattress utilized   positioning supports utilized   heel offloading device utilized  Skin Protection:   adhesive use limited   incontinence pads utilized   tubing/devices free from skin contact   skin-to-device areas padded   skin-to-skin areas padded  Taken 9/4/2024 2200 by Lisa Saenz RN  Pressure Reduction Techniques:   frequent weight shift encouraged   heels elevated off bed   positioned off wounds   pressure points protected  Pressure Reduction Devices:   pressure-redistributing mattress utilized   positioning supports utilized   heel offloading device utilized  Skin Protection:   adhesive use limited   incontinence pads utilized   tubing/devices free from skin contact   skin-to-device areas padded   skin-to-skin areas padded  Taken 9/4/2024 2000 by Lisa Saenz RN  Pressure Reduction Techniques:   frequent weight shift encouraged   heels elevated off bed   positioned off wounds   pressure points protected  Pressure Reduction Devices:   pressure-redistributing mattress utilized   positioning supports utilized   heel offloading device utilized  Skin Protection:   adhesive use limited   incontinence pads utilized   tubing/devices free from skin contact   skin-to-device areas padded   skin-to-skin areas padded   Goal Outcome Evaluation:

## 2024-09-05 NOTE — DISCHARGE SUMMARY
[facility] Medicine Services  DISCHARGE SUMMARY    Patient Name: Erlin Savage  : 1950  MRN: [id number]    Date of Admission: 2024 10:59 AM  Date of Discharge:  2024  Primary Care Physician: Joe Diallo MD    Consults       Date and Time Order Name Status Description    2024  9:08 AM Inpatient Cardiology Consult Completed     2024 12:23 PM Inpatient Infectious Diseases Consult Completed     2024 11:28 AM Inpatient Nephrology Consult Completed     2024 11:28 AM Inpatient Vascular Surgery Consult Completed             Hospital Course     Presenting Problem: f/u PAD    Active Hospital Problems    Diagnosis  POA    **Dry gangrene [I96]  Yes    Type 2 myocardial infarction [I21.A1]  Yes    Chronic systolic heart failure [I50.22]  Yes    Chronic systolic (congestive) heart failure [I50.22]  Yes    Severe malnutrition [E43]  Yes    On home oxygen therapy [Z99.81]  Not Applicable    Type 2 diabetes mellitus with diabetic chronic kidney disease [E11.22]  Yes    NSTEMI (non-ST elevated myocardial infarction) [I21.4]  Yes    Secondary pulmonary arterial hypertension [I27.21]  Yes    Severe mitral valve regurgitation [I34.0]  Yes    Iron deficiency anemia [D50.9]  Yes    ESRD (end stage renal disease) [N18.6]  Yes    Stage 5 chronic kidney disease on chronic dialysis [N18.6, Z99.2]  Not Applicable    Coronary artery disease involving native coronary artery of native heart with angina pectoris [I25.119]  Yes    Essential hypertension [I10]  Yes    Type 2 diabetes mellitus with kidney complication, with long-term current use of insulin [E11.29, Z79.4]  Not Applicable    Hyperlipidemia LDL goal <70 [E78.5]  Yes      Resolved Hospital Problems    Diagnosis Date Resolved POA    Chronic diastolic (congestive) heart failure [I50.32] 2024 Yes    Ischemic heart disease [I25.9] 2024 Yes    TIA (transient ischemic attack) [G45.9] 2024 Yes           Hospital Course:  Erlin Savage is a 74 y.o. male with a medical history significant for end-stage renal disease on HD MWF, coronary artery disease s/p CABG, A. Fib on OAC, aortic stenosis, diabetes mellitus, COPD on 2 to 4 L baseline, hyperlipidemia, hypertension and known peripheral arterial disease with nonhealing right foot transmetatarsal amputation site with dry gangrene.      Right lower extremity critical limb ischemia, recent non-healing right TMA (s/p right bka 8/29/24)  Recent admission for dry gangrene/cellulitis  L foot with ischemic changes of toes   BLE PVD  -My partner discussed with vascular on arrival.   -**s/p right BKA Dr. Schreiber 8/29/24; Vascular following; ok to restart eliquis; RLE BKA dressing changed by vascular team on 9/3/24, to be changed by nursing every other day (sooner if saturated); recommend inpatient rehab at discharge  -Dr. Heaton (ID) following: Completed meropenem and IV vancomycin discontinued at Discharge. Per Dr. Heaton, he can follow patient at   -continue  pain control     NSTEMI  CAD status post CABG  CHFmrEF  Valvular heart disease (mod-severe MR, mod-severe tR)  Parox afib  -Most recent echo in system from 2023 with a EF of 43%, severe diastolic dysfunction, moderate mitral regurgitation, moderate tricuspid regurgitation.  -Echo 8/28/24: LV ef 40%, mod reduced RV fxn; mod-severe MR, mod-severe TR  -evening of 8/29/24 (after right bka), patient developed 10/10 chest pain, relieved w/ sl nitro; EKG w/ lateral ischemic changes; marked elevated troponin.   -Cards followed  -ACE/ARB not ordered due to recurrent hyperkalemia   -I discussed w/ vascular surgery Maria C Dowd PA-C on 9/3/24; vascular surgery is ok w/ restarting eliquis. Patient is on eliquis 5mg bid at home. Due to recent post-op anemia (which required blood transfusions) Eliquis was started back at 2.5 mg BID.   -09/05: restart Eliquis at 5 mg BID. Hg stable. Recommend CBC in 2 days at  for stability of  Hg      Anemia (chronic renal dz, & post-op blood loss)  **s/p 1 unit prbc on 8/30/24 w/ dialysis (had drop in hgb from 8.5 to 7.3 post-op)  -goal hgb >7.5 in light of nstemi  -no overt bleeding overrnight  -hgb 7.3 on 9/1  **s/p 1 unit prbc on 9/2/24 w/ dialysis (in light of cardiac ischemic & anemia)  -monitor hgb , goal hgb >8 or symptoms     End-stage renal disease   Hyponatremia, improved  Hyperkalemia, improved  -s/p extra dialysis session Saturday 8/31 (hyperkalemia & volume overload)  -Nephrology following: M/W/F DIALYSIS     Ascites  Lower extremity edema  -multifactorial secondary to CHF, end-stage renal disease  -Per patient, he gets monthly paracentesis at hospital in [place].  Denies any prior history of cirrhosis.     DM2  -A1c 6.2 on 8/26  -SSI, titrate prn (patient doesn't allow regular ssi, dictates his own sliding scale)    HTN  ESRD  - Hydralazine was held during hospitalization due to liable blood pressures.   -Med rec records show he has been on hydralazine 10 mg TID.  to follow up on appropriateness of restarting blood pressure medications  -[facility] cannot get Micera. Per Dr. Novoa, this can be substituted with Epogen    31 mins  FB  Praveen Novoa MD       Chronic hypoxic respiratory failure  COPD on chronic home O2 (on 3Lnc baseline)  -DuoNebs PRN     Depression  -started zoloft on 8/31, stopped it on 9/1/24 per patient wife request, said it made patient too sleepy      Discharge Follow Up Recommendations for outpatient labs/diagnostics:  Follow-up with PCP in 1 week.  Follow-up with Dr. Amaral, cardiology in 4 weeks.  Follow-up with dialysis as scheduled Monday Wednesday Friday. Recommend CBC in 2 days.     Day of Discharge     HPI:   Patient resting comfortably in bed.  Discussed with Dr. Novoa, nephrology and he is cleared for discharge from nephrology standpoint. Hg stable. Pt did not eat breakfast as he does not like the food. He wants to go to  for rehab.       Vital  Signs:   Temp:  [97.2 °F (36.2 °C)-98.4 °F (36.9 °C)] 98.3 °F (36.8 °C)  Heart Rate:  [81-94] 92  Resp:  [14-16] 16  BP: (118-147)/(44-85) 147/83  Flow (L/min):  [2] 2      Physical Exam:  Constitutional: No acute distress, awake, alert, up in bed; on 3LNc, normal work of breathing at rest  HENT: NCAT, mucous membranes moist  Respiratory: decreased air movement bilateral bases, no respiratory distress  Cardiovascular: rrr  Gastrointestinal: Positive bowel sounds, soft, nontender  Psychiatric: Appropriate affect, cooperative  Neurologic: Oriented x 3, speech clear  MSK: R BKA    Pertinent  and/or Most Recent Results     LAB RESULTS:      Lab 09/05/24  0755 09/04/24  0526 09/03/24  0459 09/02/24  0436 09/01/24  0501 08/31/24  0759 08/31/24  0057 08/30/24  1821 08/30/24  1253 08/30/24  0856 08/30/24  0046 08/29/24  1633   WBC 7.39 8.06 7.04 7.68 9.37 9.76  --   --  11.05*  --   --  11.86*   HEMOGLOBIN 8.6* 8.8* 8.3* 7.5* 7.3* 7.6*  --   --  7.3*  --   --  8.2*   HEMATOCRIT 28.4* 29.0* 27.7* 25.5* 24.2* 25.1*  --   --  24.9*  --   --  28.7*   PLATELETS 230 245 220 225 208 221  --   --  287  --   --  302   NEUTROS ABS 5.08  --   --  5.56  --   --   --   --  8.83*  --   --  10.59*   IMMATURE GRANS (ABS) 0.06*  --   --  0.04  --   --   --   --  0.06*  --   --  0.07*   LYMPHS ABS 0.74  --   --  0.73  --   --   --   --  0.66*  --   --  0.47*   MONOS ABS 1.06*  --   --  1.13*  --   --   --   --  1.41*  --   --  0.63   EOS ABS 0.40  --   --  0.18  --   --   --   --  0.02  --   --  0.01   MCV 94.0 92.9 93.9 94.4 92.7 91.9  --   --  92.9  --   --  95.0   PROTIME  --   --   --   --   --   --   --   --   --   --   --  18.8*   APTT  --   --   --   --   --   --   --   --   --   --   --  29.0*   HEPARIN ANTI-XA  --   --   --   --   --  0.20* 0.21* 0.24*  --  0.29* 0.22* 0.15*         Lab 09/04/24  0526 09/03/24  0459 09/02/24  0436 09/01/24  0501 08/31/24  0759 08/30/24  1253 08/29/24  1535   SODIUM 130* 131* 132* 135* 135*   < >  131*   POTASSIUM 5.4* 4.9 5.1 4.7 5.3*   < > 5.8*   CHLORIDE 95* 97* 97* 96* 98   < > 96*   CO2 25.0 25.0 24.0 24.0 23.0   < > 21.0*   ANION GAP 10.0 9.0 11.0 15.0 14.0   < > 14.0   BUN 37* 23 29* 25* 30*   < > 32*   CREATININE 4.24* 3.35* 4.58* 3.51* 4.07*   < > 4.26*   EGFR 14.1* 18.7* 12.9* 17.7* 14.8*   < > 14.0*   GLUCOSE 200* 190* 226* 141* 105*   < > 174*   CALCIUM 9.4 9.0 9.1 9.4 9.1   < > 9.2   MAGNESIUM  --   --   --   --  1.9  --  1.9   PHOSPHORUS 5.3* 4.5 6.4* 6.1* 6.6*   < >  --     < > = values in this interval not displayed.         Lab 09/04/24  0526 09/03/24  0459 09/02/24  0436 09/01/24  0501 08/31/24  0759   ALBUMIN 3.1* 2.8* 2.8* 3.0* 3.2*         Lab 08/30/24  1253 08/29/24  1825 08/29/24  1633 08/29/24  1535   HSTROP T 298* 201*  --  203*   PROTIME  --   --  18.8*  --    INR  --   --  1.56*  --              Lab 09/02/24  0848 09/01/24  0501 08/30/24  1359   IRON  --  24*  --    IRON SATURATION (TSAT)  --  13*  --    TIBC  --  180*  --    TRANSFERRIN  --  121*  --    ABO TYPING O  --  O   RH TYPING Positive  --  Positive   ANTIBODY SCREEN Negative  --  Negative         Brief Urine Lab Results  (Last result in the past 365 days)        Color   Clarity   Blood   Leuk Est   Nitrite   Protein   CREAT   Urine HCG        01/01/24 0550 Yellow   Clear     Negative                     Microbiology Results (last 10 days)       Procedure Component Value - Date/Time    CANDIDA AURIS SCREEN - Swab, Axilla Right, Axilla Left and Groin [[id number]]  (Normal) Collected: 08/26/24 1715    Lab Status: Final result Specimen: Swab from Axilla Right, Axilla Left and Groin Updated: 08/31/24 2231     Candida Auris Screen Culture No Candida auris isolated at 5 days    MRSA Screen, PCR (Inpatient) - Swab, Nares [[id number]]  (Normal) Collected: 08/26/24 1715    Lab Status: Final result Specimen: Swab from Nares Updated: 08/26/24 2124     MRSA PCR Negative    Narrative:      The negative predictive value of this diagnostic  test is high and should only be used to consider de-escalating anti-MRSA therapy. A positive result may indicate colonization with MRSA and must be correlated clinically.  MRSA Negative    Blood Culture - Blood, Hand, Right [[id number]]  (Normal) Collected: 08/26/24 1154    Lab Status: Final result Specimen: Blood from Hand, Right Updated: 08/31/24 1215     Blood Culture No growth at 5 days    Narrative:      Less than seven (7) mL's of blood was collected.  Insufficient quantity may yield false negative results.    Blood Culture - Blood, Arm, Right [[id number]]  (Normal) Collected: 08/26/24 1145    Lab Status: Final result Specimen: Blood from Arm, Right Updated: 08/31/24 1215     Blood Culture No growth at 5 days    Narrative:      Less than seven (7) mL's of blood was collected.  Insufficient quantity may yield false negative results.            XR Chest 1 View    Result Date: 9/1/2024  XR CHEST 1 VW Date of Exam: 9/1/2024 2:44 AM EDT Indication: follow up pulm edema/effusions Comparison: Chest radiograph 8/31/2024. Findings: Unchanged position of right IJ approach central venous catheter. Sternotomy and CABG. Cardiomediastinal silhouette is unchanged. Decreased diffuse mixed interstitial and airspace disease. Similar small/moderate bilateral pleural effusions with adjacent atelectasis. No pneumothorax. Osseous structures are unchanged.     Impression: Decreased but persistent pulmonary edema and pulmonary vascular congestion. Similar small/moderate bilateral pleural effusions with adjacent atelectasis. Electronically Signed: Kobe Phoenix MD  9/1/2024 7:50 AM EDT  Workstation ID: [id number]    XR Chest 1 View    Result Date: 8/31/2024  XR CHEST 1 VW Date of Exam: 8/31/2024 2:08 AM EDT Indication: chest pain Comparison: 2/24/2024 Findings: Cardiomediastinal silhouette is stable. There is atherosclerosis of the aorta. There is pulmonary vascular congestion with diffuse interstitial thickening and patchy bibasilar  opacities. There are small bilateral pleural effusions. A right IJ tunneled hemodialysis catheter terminates in the right atrium. Median sternotomy wires are present.     Impression: Cardiomegaly with pulmonary vascular congestion and mixed interstitial/airspace opacities with small bilateral effusions. Findings suggestive of CHF/pulmonary edema. Electronically Signed: Shama Chairez MD  8/31/2024 8:17 AM EDT  Workstation ID: [id number]    Popliteal cath    Result Date: 8/29/2024  Beck Nieves CRNA     8/29/2024 10:13 AM Popliteal cath Patient reassessed immediately prior to procedure Patient location during procedure: pre-op Start time: 8/29/2024 9:55 AM Stop time: 8/29/2024 10:05 AM Reason for block: at surgeon's request and post-op pain management Performed by CRNA/CAA: Beck Nieves CRNA Assisted by: Beverly Pardo RN Preanesthetic Checklist Completed: patient identified, IV checked, site marked, risks and benefits discussed, surgical consent, monitors and equipment checked, pre-op evaluation and timeout performed Prep: Pt Position: left lateral decubitus Sterile barriers:cap, gloves, mask and washed/disinfected hands Prep: ChloraPrep Patient monitoring: blood pressure monitoring, continuous pulse oximetry and EKG Procedure Sedation: yes Performed under: local infiltration Guidance:ultrasound guided ULTRASOUND INTERPRETATION.  Using ultrasound guidance a 20 G gauge needle was placed in close proximity to the nerve, at which point, under ultrasound guidance anesthetic was injected in the area of the nerve and spread of the anesthesia was seen on ultrasound in close proximity thereto.  There were no abnormalities seen on ultrasound; a digital image was taken; and the patient tolerated the procedure with no complications. Images:still images obtained, printed/placed on chart Laterality:right Block Type:popliteal Injection Technique:catheter Needle Type:echogenic and Tuohy Needle Gauge:18 G Resistance on  "Injection: none Catheter Size:20 G Cath Depth at skin: 8 cm Medications Used: ropivacaine (NAROPIN) 0.2 % injection - Peripheral Nerve  12 mL - 8/29/2024 10:05:00 AM ropivacaine (NAROPIN) 0.5 % injection - Injection  12 mL - 8/29/2024 10:05:00 AM Post Assessment Injection Assessment: negative aspiration for heme, no paresthesia on injection and incremental injection Patient Tolerance:comfortable throughout block Complications:no Additional Notes CATHETER                             A high-frequency linear transducer, with sterile cover, was placed in the popliteal fossa to identify the popliteal artery and vein, Tibial nerve (TN) and Common Peroneal nerve (CP). The transducer was then moved in a cephalad fashion to observe the TN and CP nerve bifurcation to form the Sciatic Nerve. The insertion site was prepped and draped in sterile fashion. Skin and cutaneous tissue was infiltrated with 2-5 ml of 1% Lidocaine. Using ultrasound-guidance, an 18-gauge Contiplex Ultra 360 Touhy needle was advanced in plane from lateral to medial. Preservative-free normal saline was utilized for hydro-dissection of tissue, advancement of Touhy needle, and to confirm final needle placement posterior to the nerves. Local anesthetic injection spread, in incremental 3-5 ml injections, to surround both nerve structures. Aspiration every 5 ml to prevent intravascular injection. Injection was completed with negative aspiration of blood and negative intravascular injection. Injection pressures were normal with minimal resistance. A 20-gauge Contiplex Echo catheter was placed through the needle and advance out the tip of the Touhy 1-3 cm. The Touhy needle was then removed, and final catheter position verified (Below/above or Anterior/Posterior) the nerve structures. The catheter was secured in the usual fashion with skin glue, benzoin, steri-strips, CHG tegaderm and Label noting \"Nerve Block Catheter\". Jerk tape applied at yellow connector and " catheter connection. Performed by: Beck Nieves CRNA     Femoral block    Result Date: 8/29/2024  Beck Nieves CRNA     8/29/2024 10:12 AM Femoral block Patient reassessed immediately prior to procedure Start time: 8/29/2024 9:48 AM Stop time: 8/29/2024 9:54 AM Reason for block: at surgeon's request and post-op pain management Performed by CRNA/CAA: Beck Nieves CRNA Assisted by: Beverly Pardo RN Preanesthetic Checklist Completed: patient identified, IV checked, site marked, risks and benefits discussed, surgical consent, monitors and equipment checked, pre-op evaluation and timeout performed Prep: Pt Position: supine Sterile barriers:gloves, cap, sterile barriers, mask and washed/disinfected hands Prep: ChloraPrep Patient monitoring: blood pressure monitoring, continuous pulse oximetry and EKG Procedure Guidance:ultrasound guided ULTRASOUND INTERPRETATION.  Using ultrasound guidance a 20 G gauge needle was placed in close proximity to the femoral nerve, at which point, under ultrasound guidance anesthetic was injected in the area of the nerve and spread of the anesthesia was seen on ultrasound in close proximity thereto.  There were no abnormalities seen on ultrasound; a digital image was taken; and the patient tolerated the procedure with no complications. Images:still images obtained, printed/placed on chart Laterality:right Block Type:femoral Injection Technique:single-shot Needle Type:short-bevel Needle Gauge:20 G Resistance on Injection: none Medications Used: fentaNYL citrate (PF) (SUBLIMAZE) injection - Intravenous  100 mcg - 8/29/2024 9:54:00 AM ropivacaine (NAROPIN) 0.2 % injection - Peripheral Nerve  12 mL - 8/29/2024 9:54:00 AM ropivacaine (NAROPIN) 0.5 % injection - Injection  12 mL - 8/29/2024 9:54:00 AM dexamethasone sodium phosphate injection - Injection  2 mg - 8/29/2024 9:54:00 AM Post Assessment Injection Assessment: negative aspiration for heme, no paresthesia on injection and  "incremental injection Patient Tolerance:comfortable throughout block Complications:no Additional Notes SINGLE Shot A high-frequency linear transducer, with sterile cover, was placed in the inguinal crease to visualize the Femoral Vein, Artery, and Nerve (medial to lateral). The insertion site was prepped in sterile fashion. Skin and cutaneous tissue was infiltrated with 2-5 ml of 1% Lidocaine. Using ultrasound-guidance, a 20-gauge B-Keller 4\" Ultraplex 360 non-stimulating echogenic needle was then inserted and advanced in-plane from lateral to medial with ultrasound guidance. The needle was directed below Fascia Iliacus towards the Femoral nerve. Preservative-free normal saline was utilized for hydro-dissection of tissue. Local anesthetic injection spread, in incremental 3-5 ml injections, was visualized lateral to the artery to surround the femoral nerve. Aspiration every 5 ml to prevent intravascular injection. Injection was completed with negative aspiration of blood and negative intravascular injection. Injection pressures were normal with minimal resistance. Performed by: Beck Nieves CRNA     Adult Transthoracic Echo Complete W/ Cont if Necessary Per Protocol    Result Date: 8/29/2024    Left ventricular systolic function is mildly decreased. Calculated left ventricular EF = 40%   Left ventricular wall thickness is consistent with mild concentric hypertrophy.   Moderately reduced right ventricular systolic function noted.   There is bileaflet mitral valve thickening present.   Moderate to severe mitral valve regurgitation is present.   Moderate to severe tricuspid valve regurgitation is present.   Calculated right ventricular systolic pressure from tricuspid regurgitation is 73 mmHg.   There is a small (<1cm) pericardial effusion adjacent to the left ventricle.     OUTSIDE INVASIVE CARDIOLOGY STUDY    Result Date: 8/27/2024  This procedure was auto-finalized with no dictation required.    US Liver    Result " Date: 8/27/2024  US LIVER Date of Exam: 8/27/2024 5:00 AM EDT Indication: possible cirrhosis. Comparison: No comparisons available. Technique: Grayscale and color Doppler ultrasound evaluation of the right upper quadrant was performed. Elastography performed. Findings: Liver and hepatic vasculature: Echogenicity is within normal limits. No discrete contour nodularity. No focal liver lesions are displayed on this ultrasound. The imaged portal vein and hepatic veins demonstrate normal direction of flow and normal waveform on spectral imaging. Elastography values below. Gallbladder and biliary: Cholecystectomy. The common bile duct measures 9 mm, within normal limits given age and surgically absent gallbladder. Right kidney: The right kidney measures 9 cm. No hydronephrosis. Pancreas: Not well evaluated due to overlying bowel gas. Free fluid: There is small volume ascites. Liver elastography: Measurement quality: Adequate Median SWV: 0.73 m/s (see note below) Standard deviation: 0.12 Interquartile range/median (IQR/M): 0.23 (quality metric; <0.3 supports good precision) Note: SWV is a measure of liver stiffness, a surrogate marker for fibrosis. SWV <1.37 m/s indicates a low probability of clinically significant fibrosis (METAVIR Stage F2 or below) SWV >2.2 m/s indicates a high probability of clinically significant fibrosis (METAVIR Stage F3 or above) Intermediate values are indeterminant and correlation with clinical risk factors, laboratory markers, or liver biopsy may be needed to determine appropriate follow-up.      Impression: SWV <1.37 m/s indicates a low probability of clinically significant fibrosis (METAVIR Stage F2 or below). No overt morphologic changes of cirrhosis by ultrasound. Small volume ascites. Cholecystectomy. Electronically Signed: Kobe Phoenix MD  8/27/2024 7:41 AM EDT  Workstation ID: [id number]             Results for orders placed during the hospital encounter of 08/26/24    Adult Transthoracic  Echo Complete W/ Cont if Necessary Per Protocol    Interpretation Summary    Left ventricular systolic function is mildly decreased. Calculated left ventricular EF = 40%    Left ventricular wall thickness is consistent with mild concentric hypertrophy.    Moderately reduced right ventricular systolic function noted.    There is bileaflet mitral valve thickening present.    Moderate to severe mitral valve regurgitation is present.    Moderate to severe tricuspid valve regurgitation is present.    Calculated right ventricular systolic pressure from tricuspid regurgitation is 73 mmHg.    There is a small (<1cm) pericardial effusion adjacent to the left ventricle.      Discharge Details        Discharge Medications        New Medications        Instructions Start Date   Insulin Lispro 100 UNIT/ML injection  Commonly known as: humaLOG   Blood Glucose 150-199 mg/dL - 0 units Blood Glucose 200-249 mg/dL - 1 units Blood Glucose 250-299 mg/dL - 2 units Blood Glucose 300-349 mg/dL - 3 units Blood Glucose 350-400 mg/dL - 4 units Blood Glucose Greater Than 400 mg/dL - 5 units & Call Provider             Continue These Medications        Instructions Start Date   apixaban 5 MG tablet tablet  Commonly known as: ELIQUIS   5 mg, Oral, 2 Times Daily      aspirin 81 MG EC tablet   81 mg, Oral, Daily      calcitriol 0.5 MCG capsule  Commonly known as: ROCALTROL   0.5 mcg, Oral, Daily PRN      cholecalciferol 25 MCG (1000 UT) tablet  Commonly known as: VITAMIN D3   1,000 Units, Oral, Daily      Mircera 200 MCG/0.3ML solution prefilled syringe  Generic drug: Methoxy PEG-Epoetin Beta   200 mcg, Subcutaneous, As Needed, Dialysis determines when this is given.      multivitamin with minerals tablet tablet   1 tablet, Oral, Daily      oxyCODONE 10 MG tablet  Commonly known as: ROXICODONE   10 mg, Oral, Every 4 Hours PRN      pantoprazole 40 MG EC tablet  Commonly known as: PROTONIX   40 mg, Oral, Daily      Carrol-Do Rx 1 MG tablet   1  "tablet, Oral, Daily             Stop These Medications      gentamicin 0.1 % cream  Commonly known as: GARAMYCIN     hydrALAZINE 10 MG tablet  Commonly known as: APRESOLINE     lactulose 10 GM/15ML solution  Commonly known as: CHRONULAC     lidocaine 5 %  Commonly known as: LIDODERM     mupirocin 2 % ointment  Commonly known as: BACTROBAN     naloxone 4 MG/0.1ML nasal spray  Commonly known as: NARCAN     NovoLOG FlexPen 100 UNIT/ML solution pen-injector sc pen  Generic drug: insulin aspart     promethazine 25 MG tablet  Commonly known as: PHENERGAN     triamcinolone 0.1 % cream  Commonly known as: KENALOG     Velphoro 500 MG chewable tablet  Generic drug: Sucroferric Oxyhydroxide              Allergies   Allergen Reactions    Cefepime Other (See Comments) and Seizure     Myoclonus - Has received cefazolin    Doxycycline Other (See Comments)     Joint pain, tongue swelling    \"Body locks up\"    Metoprolol Hives, Shortness Of Breath and Itching    Gabapentin Confusion    Ranolazine Dizziness and Other (See Comments)     Severe tremors/ shakiness    Hydralazine Unknown - Low Severity    Augmentin [Amoxicillin-Pot Clavulanate] Palpitations    Biaxin [Clarithromycin] Palpitations    Cephalosporins Unknown - Low Severity     Has received cefazolin    Coreg [Carvedilol] Itching    Crestor [Rosuvastatin Calcium] Itching     Itching rash    Iodine Rash     \"pineda skin\"    Lipitor [Atorvastatin] Itching     And Crestor- generalized weakness and chest tightness    Lisinopril Cough     Cough /weakness, nauseas and dirrhea    Livalo [Pitavastatin] Unknown - Low Severity     Chest tightness    Nortriptyline Hcl Other (See Comments)     Arthralgias    Pravastatin Unknown - Low Severity     Chest , neck and arm discomfort    Questran [Cholestyramine] Unknown - Low Severity         Discharge Disposition:      Diet:  Hospital:  Diet Order   Procedures    Diet: Cardiac, Renal; Healthy Heart (2-3 Na+); Low Potassium, Low Sodium (2-3g); " Fluid Consistency: Thin (IDDSI 0)            Restrictions or Other Recommendations:  Up with assistance       CODE STATUS:    Code Status and Medical Interventions: CPR (Attempt to Resuscitate); Full Support   Ordered at: 08/26/24 1218     Level Of Support Discussed With:    Patient     Code Status (Patient has no pulse and is not breathing):    CPR (Attempt to Resuscitate)     Medical Interventions (Patient has pulse or is breathing):    Full Support       Future Appointments   Date Time Provider Department Center   9/5/2024 10:30 AM MED 8  [facility]   10/2/2024  3:00 PM Laya Amaral MD [facility]       Additional Instructions for the Follow-ups that You Need to Schedule       Ambulatory Referral to Cardiac Rehab   As directed                    Kalee Zavala MD  09/05/24      Time Spent on Discharge:  I spent  35  minutes on this discharge activity which included: face-to-face encounter with the patient, reviewing the data in the system, coordination of the care with the nursing staff as well as consultants, documentation, and entering orders.

## 2024-09-05 NOTE — PROGRESS NOTES
last night when I visited the patient was sleeping.  He has an appointment to see me in 4 weeks.  We will discuss transesophageal echo at that time to evaluate his mitral regurgitation depending on his progress following his BKA.  Laya Amaral MD, FACC

## 2024-09-05 NOTE — PROGRESS NOTES
"Erlin Savage  1950  [id number]      Evaluating Physician: Marlo Heaton MD    Chief Complaint: right foot infection    Reason for Consultation: right foot infection    History of present illness:     Patient is a 74 y.o.  Yr old male noncompliant previously, with prior history of  end-stage renal disease with intermittent hemodialysis via right chest central line.  He has known peripheral arterial disease with right leg angiogram on April 10 with recanalization of SFA/PTA; post procedure with continued worsening pain at the right distal leg, gangrene at his right second/third and fourth toes with second worse than third worse than fourth.   MRI right foot was soft tissue edema, no loculated collection with abnormality at the posterior calcaneus difficult to completely exclude osteomyelitis at that site as per radiology.     4/19/24 Dr Santos   \"Procedures: Procedure(s):  RIGHT Foot  Second toe amputation\"     4/23/24  Dr Santos .  \"Procedures: Procedure(s):  RIGHT Foot     Amputation hallux  Amputation third toe  Amputation fourth toe  Amputation fifth toe\"     Pathology from April 23, 2024 with no osteomyelitis.  Pathology had noted cellulitis and gangrenous necrosis.  Transitioned to IV vancomycin and oral Augmentin at discharge.     Revascularization on May 14, 2024 by Dr. Schreiber  \"PROCEDURES WITH LATERALITY:   Ultrasound guided access of left common femoral artery with micropuncture needle and images stored  Abd Aorotgram  Right lower extremity arteriogram  Balloon angioplasty of right posterior tibial artery with a 3 mm balloon  Balloon angioplasty of right posterior tibial artery origin4 mm balloon drug-coated  Balloon angioplasty of right peroneal artery with 3 mm balloon  Balloon angioplasty of right anterior tibial artery 3 mm balloon  Balloon angioplasty of right proximal superficial femoral artery with a 5 mm balloon\"     Readmitted August 14 with deterioration at the right foot over unspecified " "period of time per patient, at least weeks if not longer with large wound, exposed bone on the medial side.     8/20/24 he  refused recommendation of amputation from surgical team and left [facility].     8/26/24 readmitted on August 26 with reports to me that patient now agreeable to right side amputation by Dr. Schreiber    8/29/24   \"Procedure(s):  Right below the knee amputation and all other indicated procedures\"    9/5/24 Sleepy ; no new decline with tapering abx per nursing; neph following; no fever; no new pain; Right stump pain is improved, worse with palpation, better with pain meds and 1-3 /10     No headache photophobia or neck stiffness. No shortness of breath cough or hemoptysis. No nausea vomiting diarrhea or abdominal pain. No dysuria hematuria or pyuria. No other new skin rash.     Review of Systems     9/5/24  no adr to abx     Constitutional-- No Fever, chills or sweats.  Appetite good. No malaise.  Heent-- No new vision, hearing or throat complaints.  No epistaxis or oral sores.  Denies odynophagia or dysphagia.  No headache, photophobia or neck stiffness.  CV-- No chest pain, palpitation or syncope  Resp-- No SOB, cough, or hemoptysis  GI- No nausea, vomiting, or diarrhea.   -- No dysuria, hematuria, or flank pain.  Denies hesitancy, urgency or flank pain.  Lymph- no swollen lymph nodes in neck, axilla or groin.   Heme- No active bruising or bleeding  MS-- no swelling or pain in the bones or joints of arms or legs.  No new back pain.  Neuro-- No acute focal weakness or numbness in the arms or legs.  Skin-- No rash    Past Medical History:   Diagnosis Date    Anxiety     Anxiety disorder     Back pain     Chronic back pain     work related injury    CKD (chronic kidney disease)     most recent creatinine 2.7 on 01/26/16    Coronary artery disease     Diabetes mellitus     insulin dependent diabetes Onset 2010    Dyslipidemia     Hyperlipidemia     Hypertension     Ischemic heart disease     Renal " "failure     Stroke     TIA / transient right vision loss age 42    TIA (transient ischemic attack)     Vertigo        Past Surgical History:   Procedure Laterality Date    APPENDECTOMY      BELOW KNEE AMPUTATION Right 8/29/2024    Procedure: below the knee amputation Right;  Surgeon: Candie, Osvaldo, MD;  Location: [facility];  Service: Vascular;  Laterality: Right;    CARDIAC CATHETERIZATION      CHOLECYSTECTOMY  03/2012    COLONOSCOPY      CORONARY ARTERY BYPASS GRAFT      ENDOSCOPY         Pediatric History   Patient Parents    Not on file     Other Topics Concern    Not on file   Social History Narrative    Not on file       family history is not on file.    Allergies   Allergen Reactions    Cefepime Other (See Comments) and Seizure     Myoclonus - Has received cefazolin    Doxycycline Other (See Comments)     Joint pain, tongue swelling    \"Body locks up\"    Metoprolol Hives, Shortness Of Breath and Itching    Gabapentin Confusion    Ranolazine Dizziness and Other (See Comments)     Severe tremors/ shakiness    Hydralazine Unknown - Low Severity    Augmentin [Amoxicillin-Pot Clavulanate] Palpitations    Biaxin [Clarithromycin] Palpitations    Cephalosporins Unknown - Low Severity     Has received cefazolin    Coreg [Carvedilol] Itching    Crestor [Rosuvastatin Calcium] Itching     Itching rash    Iodine Rash     \"pineda skin\"    Lipitor [Atorvastatin] Itching     And Crestor- generalized weakness and chest tightness    Lisinopril Cough     Cough /weakness, nauseas and dirrhea    Livalo [Pitavastatin] Unknown - Low Severity     Chest tightness    Nortriptyline Hcl Other (See Comments)     Arthralgias    Pravastatin Unknown - Low Severity     Chest , neck and arm discomfort    Questran [Cholestyramine] Unknown - Low Severity       Medication:  Current Facility-Administered Medications   Medication Dose Route Frequency Provider Last Rate Last Admin    acetaminophen (TYLENOL) tablet 650 mg  650 mg Oral Q6H PRN " Junior Schreiber MD   650 mg at 09/05/24 0253    apixaban (ELIQUIS) tablet 2.5 mg  2.5 mg Oral Q12H Brandon Crook MD   2.5 mg at 09/04/24 2027    aspirin EC tablet 81 mg  81 mg Oral Daily Junior Schreiber MD   81 mg at 09/04/24 1113    b complex-vitamin c-folic acid (NEPHRO-SEVERIANO) tablet 1 tablet  1 tablet Oral Daily Junior Schreiber MD   1 tablet at 09/04/24 1114    sennosides-docusate (PERICOLACE) 8.6-50 MG per tablet 2 tablet  2 tablet Oral BID PRN Junior Schreiber MD        And    polyethylene glycol (MIRALAX) packet 17 g  17 g Oral Daily PRN Junior Schreiber MD        And    bisacodyl (DULCOLAX) EC tablet 5 mg  5 mg Oral Daily PRN Junior Schreiber MD        And    bisacodyl (DULCOLAX) suppository 10 mg  10 mg Rectal Daily PRN Junior Schreiber MD        calcitriol (ROCALTROL) capsule 0.5 mcg  0.5 mcg Oral Daily Junior Schreiber MD   0.5 mcg at 09/04/24 1113    castor oil-balsam peru (VENELEX) ointment 1 Application  1 Application Topical Daily Junior Schreiber MD   1 Application at 09/04/24 1604    dextrose (D50W) (25 g/50 mL) IV injection 25 g  25 g Intravenous Q15 Min PRN Junior Schreiber MD        dextrose (D50W) (25 g/50 mL) IV injection 25 g  25 g Intravenous Q15 Min PRN Brandon Crook MD        dextrose (GLUTOSE) oral gel 15 g  15 g Oral Q15 Min PRN Junior Schreiber MD        glucagon (GLUCAGEN) injection 1 mg  1 mg Intramuscular Q15 Min PRN Junior Schreiber MD        heparin (porcine) injection 2,000 Units  2,000 Units Intracatheter PRN Junior Schreiber MD   2,000 Units at 09/04/24 1016    Insulin Lispro (humaLOG) injection 1-5 Units  1-5 Units Subcutaneous TID With Meals Brandon Crook MD        Magnesium Standard Dose Replacement - Follow Nurse / BPA Driven Protocol   Does not apply PRN Brandon Crook MD        nitroglycerin (NITROSTAT) SL tablet 0.4 mg  0.4 mg Sublingual Q5 Min PRN Junior Schreiber MD        nitroglycerin (TRIDIL) 200 mcg/ml infusion  5-200 mcg/min Intravenous  Titrated Dada Dardne IV, MD 1.5 mL/hr at 09/02/24 1412 5 mcg/min at 09/02/24 1412    ondansetron ODT (ZOFRAN-ODT) disintegrating tablet 4 mg  4 mg Oral Q6H PRN Junior Schreiber MD   4 mg at 08/27/24 0938    Or    ondansetron (ZOFRAN) injection 4 mg  4 mg Intravenous Q6H PRN Junior Schreiber MD   4 mg at 09/04/24 1249    oxyCODONE (ROXICODONE) immediate release tablet 10 mg  10 mg Oral Q4H PRN Brandon Crook MD   10 mg at 09/05/24 0517    pantoprazole (PROTONIX) EC tablet 40 mg  40 mg Oral Daily Junior Schreiber MD   40 mg at 09/04/24 1114    Pharmacy Consult - MTM   Does not apply Daily Phillip Escoto RPH        Pharmacy to dose vancomycin   Does not apply Continuous PRN Asha Irby RPH        simethicone (MYLICON) chewable tablet 80 mg  80 mg Oral 4x Daily PRN Cassandra Wiseman APRN        sodium chloride 0.9 % flush 10 mL  10 mL Intravenous Q12H Junior Schreiber MD   10 mL at 09/04/24 2031    sodium chloride 0.9 % flush 10 mL  10 mL Intravenous PRN Junior Schreiber MD        sodium chloride 0.9 % infusion 40 mL  40 mL Intravenous PRN Junior Schreiber MD        sodium chloride 0.9 % infusion  9 mL/hr Intravenous Continuous Junior Schreiber  mL/hr at 08/29/24 1020 Rate Change at 08/29/24 1020    vancomycin (dosing per levels)   Does not apply Daily Scar Garzon RPH        vancomycin (dosing per levels)   Does not apply Daily Asha Irby RPH           Antibiotics:  Anti-Infectives (From admission, onward)      Ordered     Dose/Rate Route Frequency Start Stop    09/04/24 1314  vancomycin (dosing per levels)        Ordering Provider: Asha Irby RPH     Does not apply Daily 09/05/24 0900 09/07/24 0859    09/04/24 1259  vancomycin (VANCOCIN) 500 mg IVPB in 100 mL NS (MBP)        Ordering Provider: Surekha, Asha L, RPH    500 mg  200 mL/hr over 30 Minutes Intravenous Once 09/04/24 1600 09/04/24 1634    08/31/24 1216  vancomycin 750 mg in sodium chloride 0.9 % 250 mL  "IVPB-VTB        Ordering Provider: Bear Win, PharmD    10 mg/kg × 78.9 kg  333.3 mL/hr over 45 Minutes Intravenous Once 08/31/24 1315 08/31/24 1530    08/30/24 1739  vancomycin (dosing per levels)        Ordering Provider: Scar Garzon RP     Does not apply Daily 08/30/24 1830 09/05/24 0859    08/30/24 1546  vancomycin (VANCOCIN) 1,000 mg in sodium chloride 0.9 % 250 mL IVPB-VTB        Ordering Provider: Phillip Escoto RPH    1,000 mg  250 mL/hr over 60 Minutes Intravenous Once 08/30/24 1800 08/30/24 1826    08/28/24 1248  Vancomycin HCl 1,250 mg in sodium chloride 0.9 % 250 mL VTB        Ordering Provider: Kyrie Walsh PharmD    1,250 mg  200 mL/hr over 75 Minutes Intravenous Once 08/28/24 1500 08/28/24 1716    08/26/24 1306  vancomycin IVPB 1750 mg in 0.9% Sodium Chloride (premix) 500 mL        Ordering Provider: Kyrie Walsh PharmD    1,750 mg  285.7 mL/hr over 105 Minutes Intravenous Once 08/26/24 1800 08/26/24 1957    08/26/24 1304  meropenem (MERREM) 1,000 mg in sodium chloride 0.9 % 100 mL MBP        Ordering Provider: Lisa Rowe DO    1,000 mg  over 30 Minutes Intravenous Once 08/26/24 1500 08/26/24 1604    08/26/24 1306  vancomycin (dosing per levels)        Ordering Provider: Kyrie Walsh PharmD     Does not apply Daily 08/26/24 1400 08/29/24 0859    08/26/24 1223  Pharmacy to dose vancomycin        Ordering Provider: Asha Irby RPH     Does not apply Continuous PRN 08/26/24 1223 09/06/24 3433                 Physical Exam:   Vital Signs   /83 (BP Location: Right arm, Patient Position: Lying)   Pulse 92   Temp 98.3 °F (36.8 °C) (Oral)   Resp 16   Ht 180.3 cm (71\")   Wt 84.8 kg (186 lb 14.1 oz)   SpO2 96%   BMI 26.06 kg/m²       GENERAL: sleepy.   chronically debilitated  HEENT: Normocephalic, atraumatic.    No conjunctival injection. No icterus. Oropharynx clear without evidence of thrush or exudate.   NECK: Supple without nuchal rigidity. No mass.   HEART: " RRR; No murmur.  LUNGS: Clear to auscultation bilaterally without wheezing, rales, rhonchi. Normal respiratory effort. Nonlabored.  ABDOMEN: Soft, nontender, nondistended. Positive bowel sounds. No rebound or guarding.  EXT:  No cyanosis, clubbing or edema.  MSK: FROM without joint effusions noted arms/legs.    SKIN: Warm and dry without cutaneous eruptions on Inspection/palpation.    NEURO: sleepy     Left foot first and third toe distally with small black areas,  toes with only no new erythema, no discrete mass bulge or fluctuance.  No crepitus or bulla     Right LE amp noted;  no new visible redness/purulence.  No discrete mass bulge or fluctuance.  No crepitus or bulla.     No peripheral stigmata/phenomenon of endocarditis    Laboratory Data    Results from last 7 days   Lab Units 09/04/24  0526 09/03/24  0459 09/02/24  0436   WBC 10*3/mm3 8.06 7.04 7.68   HEMOGLOBIN g/dL 8.8* 8.3* 7.5*   HEMATOCRIT % 29.0* 27.7* 25.5*   PLATELETS 10*3/mm3 245 220 225     Results from last 7 days   Lab Units 09/04/24  0526   SODIUM mmol/L 130*   POTASSIUM mmol/L 5.4*   CHLORIDE mmol/L 95*   CO2 mmol/L 25.0   BUN mg/dL 37*   CREATININE mg/dL 4.24*   GLUCOSE mg/dL 200*   CALCIUM mg/dL 9.4                     Estimated Creatinine Clearance: 18.9 mL/min (A) (by C-G formula based on SCr of 4.24 mg/dL (H)).      Microbiology:      Radiology:  Imaging Results (Last 72 Hours)       ** No results found for the last 72 hours. **              Impression:     --Acute right foot surgical site/wound infection/cellulitis, exposed bone on the medial side consistent with first metatarsal osteomyelitis and likely sequela to ischemia with peripheral vascular disease and other comorbidity as above. High risk for further serious morbidity and other serious sequela. They understand risk for persistent/recurrent or nonhealing wounds, persistent/progressive or recurrent infection and risk for further functional/limb loss, amputation, chronic pain etc.  They know antibiotics and surgery not a guarantee for cure. These risks will persist. Timing/option threshold for surgery per surgical team at their discretion.  Vascular team has recommended amputation which patient has previously refused, left the hospital AGAINST MEDICAL ADVICE on August 20 but subsequently readmitted August 26 and agreeable to surgical recommendation, UZAIR 8/29    --Peripheral vascular disease.  Prior interventions as above.    --Left foot with small blackened areas at first/third toe likely sequela to ischemia.  Vascular team to help determine options and salvageability as above    --End-stage renal disease on hemodialysis Monday/Wednesday/Friday.    --Diabetes.  Glucose control per medicine team    --Cephalosporin and doxycycline intolerances in the past.  Has tolerated penicillin class    PLAN:        --IV vancomycin, anticipate stop at discharge    --s/p merrem     **Cx at [facility] with PSA x 2 (zosyn DD, not ideal EDWIN), enterococcus and corynebac      --check/review labs cultures and scans     --Partial history per nursing staff     --Discussed with microbiology      --Highly complex set of issues with high risk for further serious morbidity and other serious sequela     --Monitor IV and IV antibiotic with risk for systemic complication and potential drug interactions    Copied text in this note has been reviewed and is accurate as of 09/05/24.        Marlo Heaton MD  9/5/2024

## 2024-09-06 ENCOUNTER — DOCUMENTATION (OUTPATIENT)
Dept: CARDIAC REHAB | Facility: HOSPITAL | Age: 74
End: 2024-09-06
Payer: MEDICARE

## 2024-09-19 ENCOUNTER — APPOINTMENT (OUTPATIENT)
Dept: GENERAL RADIOLOGY | Facility: HOSPITAL | Age: 74
End: 2024-09-19
Payer: MEDICARE

## 2024-09-19 ENCOUNTER — HOSPITAL ENCOUNTER (EMERGENCY)
Facility: HOSPITAL | Age: 74
Discharge: HOME | End: 2024-09-19
Attending: EMERGENCY MEDICINE
Payer: MEDICARE

## 2024-09-19 VITALS
RESPIRATION RATE: 17 BRPM | OXYGEN SATURATION: 98 % | HEART RATE: 96 BPM | HEIGHT: 71 IN | WEIGHT: 190 LBS | SYSTOLIC BLOOD PRESSURE: 132 MMHG | TEMPERATURE: 98.1 F | DIASTOLIC BLOOD PRESSURE: 85 MMHG | BODY MASS INDEX: 26.6 KG/M2

## 2024-09-19 DIAGNOSIS — N18.9 CHRONIC KIDNEY DISEASE, UNSPECIFIED CKD STAGE: ICD-10-CM

## 2024-09-19 DIAGNOSIS — R06.02 SHORTNESS OF BREATH: Primary | ICD-10-CM

## 2024-09-19 DIAGNOSIS — Z86.79 HISTORY OF CHF (CONGESTIVE HEART FAILURE): ICD-10-CM

## 2024-09-19 DIAGNOSIS — Z87.09 HISTORY OF COPD: ICD-10-CM

## 2024-09-19 LAB
ALBUMIN SERPL-MCNC: 3 G/DL (ref 3.5–5.2)
ALBUMIN/GLOB SERPL: 0.9 G/DL
ALP SERPL-CCNC: 168 U/L (ref 39–117)
ALT SERPL W P-5'-P-CCNC: 12 U/L (ref 1–41)
ANION GAP SERPL CALCULATED.3IONS-SCNC: 14 MMOL/L (ref 5–15)
AST SERPL-CCNC: 15 U/L (ref 1–40)
BASOPHILS # BLD AUTO: 0.07 10*3/MM3 (ref 0–0.2)
BASOPHILS NFR BLD AUTO: 1 % (ref 0–1.5)
BILIRUB SERPL-MCNC: 0.4 MG/DL (ref 0–1.2)
BUN SERPL-MCNC: 30 MG/DL (ref 8–23)
BUN/CREAT SERPL: 7.8 (ref 7–25)
CALCIUM SPEC-SCNC: 9 MG/DL (ref 8.6–10.5)
CHLORIDE SERPL-SCNC: 95 MMOL/L (ref 98–107)
CO2 SERPL-SCNC: 25 MMOL/L (ref 22–29)
CREAT SERPL-MCNC: 3.83 MG/DL (ref 0.76–1.27)
DEPRECATED RDW RBC AUTO: 64.9 FL (ref 37–54)
EGFRCR SERPLBLD CKD-EPI 2021: 16 ML/MIN/1.73
EOSINOPHIL # BLD AUTO: 0.42 10*3/MM3 (ref 0–0.4)
EOSINOPHIL NFR BLD AUTO: 5.8 % (ref 0.3–6.2)
ERYTHROCYTE [DISTWIDTH] IN BLOOD BY AUTOMATED COUNT: 18.5 % (ref 12.3–15.4)
FLUAV RNA RESP QL NAA+PROBE: NOT DETECTED
FLUBV RNA RESP QL NAA+PROBE: NOT DETECTED
GLOBULIN UR ELPH-MCNC: 3.5 GM/DL
GLUCOSE SERPL-MCNC: 240 MG/DL (ref 65–99)
HCT VFR BLD AUTO: 25.6 % (ref 37.5–51)
HGB BLD-MCNC: 7.6 G/DL (ref 13–17.7)
HOLD SPECIMEN: NORMAL
IMM GRANULOCYTES # BLD AUTO: 0.05 10*3/MM3 (ref 0–0.05)
IMM GRANULOCYTES NFR BLD AUTO: 0.7 % (ref 0–0.5)
LYMPHOCYTES # BLD AUTO: 0.81 10*3/MM3 (ref 0.7–3.1)
LYMPHOCYTES NFR BLD AUTO: 11.2 % (ref 19.6–45.3)
MCH RBC QN AUTO: 28.3 PG (ref 26.6–33)
MCHC RBC AUTO-ENTMCNC: 29.7 G/DL (ref 31.5–35.7)
MCV RBC AUTO: 95.2 FL (ref 79–97)
MONOCYTES # BLD AUTO: 0.76 10*3/MM3 (ref 0.1–0.9)
MONOCYTES NFR BLD AUTO: 10.5 % (ref 5–12)
NEUTROPHILS NFR BLD AUTO: 5.13 10*3/MM3 (ref 1.7–7)
NEUTROPHILS NFR BLD AUTO: 70.8 % (ref 42.7–76)
NRBC BLD AUTO-RTO: 0 /100 WBC (ref 0–0.2)
NT-PROBNP SERPL-MCNC: ABNORMAL PG/ML (ref 0–900)
PLATELET # BLD AUTO: 229 10*3/MM3 (ref 140–450)
PMV BLD AUTO: 10.9 FL (ref 6–12)
POTASSIUM SERPL-SCNC: 5 MMOL/L (ref 3.5–5.2)
PROT SERPL-MCNC: 6.5 G/DL (ref 6–8.5)
RBC # BLD AUTO: 2.69 10*6/MM3 (ref 4.14–5.8)
SARS-COV-2 RNA RESP QL NAA+PROBE: NOT DETECTED
SODIUM SERPL-SCNC: 134 MMOL/L (ref 136–145)
TROPONIN T SERPL HS-MCNC: 214 NG/L
WBC NRBC COR # BLD AUTO: 7.24 10*3/MM3 (ref 3.4–10.8)
WHOLE BLOOD HOLD COAG: NORMAL
WHOLE BLOOD HOLD SPECIMEN: NORMAL

## 2024-09-19 PROCEDURE — 87636 SARSCOV2 & INF A&B AMP PRB: CPT | Performed by: EMERGENCY MEDICINE

## 2024-09-19 PROCEDURE — 85025 COMPLETE CBC W/AUTO DIFF WBC: CPT | Performed by: EMERGENCY MEDICINE

## 2024-09-19 PROCEDURE — 80053 COMPREHEN METABOLIC PANEL: CPT | Performed by: EMERGENCY MEDICINE

## 2024-09-19 PROCEDURE — 99284 EMERGENCY DEPT VISIT MOD MDM: CPT

## 2024-09-19 PROCEDURE — 83880 ASSAY OF NATRIURETIC PEPTIDE: CPT | Performed by: EMERGENCY MEDICINE

## 2024-09-19 PROCEDURE — 71045 X-RAY EXAM CHEST 1 VIEW: CPT

## 2024-09-19 PROCEDURE — 93005 ELECTROCARDIOGRAM TRACING: CPT | Performed by: EMERGENCY MEDICINE

## 2024-09-19 PROCEDURE — 84484 ASSAY OF TROPONIN QUANT: CPT | Performed by: EMERGENCY MEDICINE

## 2024-09-19 RX ORDER — ZOLPIDEM TARTRATE 5 MG/1
10 TABLET ORAL NIGHTLY PRN
COMMUNITY

## 2024-09-19 RX ORDER — SODIUM CHLORIDE 0.9 % (FLUSH) 0.9 %
10 SYRINGE (ML) INJECTION AS NEEDED
Status: DISCONTINUED | OUTPATIENT
Start: 2024-09-19 | End: 2024-09-19 | Stop reason: HOSPADM

## 2024-09-19 RX ORDER — HYDROXYZINE HYDROCHLORIDE 10 MG/1
10 TABLET, FILM COATED ORAL EVERY 6 HOURS PRN
COMMUNITY
Start: 2024-09-17

## 2024-09-19 RX ORDER — ONDANSETRON 4 MG/1
4 TABLET, ORALLY DISINTEGRATING ORAL EVERY 8 HOURS PRN
COMMUNITY

## 2024-09-19 RX ORDER — EPOETIN ALFA-EPBX 4000 [IU]/ML
INJECTION, SOLUTION INTRAVENOUS; SUBCUTANEOUS
COMMUNITY

## 2024-09-19 RX ORDER — HEPARIN SODIUM 1000 [USP'U]/ML
2100 INJECTION, SOLUTION INTRAVENOUS; SUBCUTANEOUS AS NEEDED
COMMUNITY

## 2024-09-26 LAB
QT INTERVAL: 356 MS
QTC INTERVAL: 437 MS

## 2024-09-27 ENCOUNTER — TELEPHONE (OUTPATIENT)
Dept: FAMILY MEDICINE CLINIC | Facility: CLINIC | Age: 74
End: 2024-09-27
Payer: MEDICARE

## 2024-09-27 ENCOUNTER — TELEPHONE (OUTPATIENT)
Dept: CARDIOLOGY | Facility: CLINIC | Age: 74
End: 2024-09-27

## 2024-09-27 NOTE — TELEPHONE ENCOUNTER
KARENA FROM [place] [facility] WAS SUPPOSED TO START OT WITH PATIENT TODAY, UNFORTUNATELY THE PATIENT HAS DIALYSIS ALL DAY SO THEY'RE GOING TO MOVE THE START DAY TO SUNDAY

## 2024-09-27 NOTE — TELEPHONE ENCOUNTER
Caller: DulcePrincess    Relationship: Emergency Contact    Best call back number: [phone]    What is the best time to reach you: ANYTIME    Who are you requesting to speak with (clinical staff, provider,  specific staff member): ANYONE    What was the call regarding: PATIENT AND WIFE WERE INFORMED BY  THAT IF THEY COULD NOT TRAVEL TO [place] TO SEE HER THAT HE COULD FOLLOW UP WITH ONE OF THE PROVIDERS IN [place]. PATIENT IS STILL HAVING ISSUES WITH HIS LEG AND CANNOT TRAVEL FAR. WOULD LIKE TO KNOW IF APPT ON 10.2.24 COULD BE RESCHEDULED TO THE [place] OFFICE.

## 2024-10-04 ENCOUNTER — READMISSION MANAGEMENT (OUTPATIENT)
Dept: CALL CENTER | Facility: HOSPITAL | Age: 74
End: 2024-10-04
Payer: MEDICARE

## 2024-10-04 NOTE — OUTREACH NOTE
Prep Survey      Flowsheet Row Responses   [facility] facility patient discharged from? Non-BH   Is LACE score < 7 ? Non- Discharge   Eligibility [facility]   Date of Admission 09/27/24   Date of Discharge 10/04/24   Discharge Disposition Home or Self Care   Discharge diagnosis Symptomatic anemia (Primary Dx),  ESRD (end stage renal disease) on dialysis (HCC),  Cellulitis of left lower extremity,  Amputation stump infection   Does the patient have one of the following disease processes/diagnoses(primary or secondary)? Other   Prep survey completed? Yes            Paloma MILLER - Registered Nurse

## 2024-10-07 ENCOUNTER — TRANSITIONAL CARE MANAGEMENT TELEPHONE ENCOUNTER (OUTPATIENT)
Dept: CALL CENTER | Facility: HOSPITAL | Age: 74
End: 2024-10-07
Payer: MEDICARE

## 2024-10-07 NOTE — OUTREACH NOTE
Call Center TCM Note      Flowsheet Row Responses   [facility] [place] patient discharged from? Non-BH  [[facility]]   Does the patient have one of the following disease processes/diagnoses(primary or secondary)? Other   TCM attempt successful? Yes   Call start time 1123   Call end time 1124   Discharge diagnosis Symptomatic anemia (Primary Dx),  ESRD (end stage renal disease) on dialysis (HCC),  Cellulitis of left lower extremity,  Amputation stump infection   Person spoke with today (if not patient) and relationship So- Princess Loo reviewed with patient/caregiver? Yes   Comments 10/15/2024 11:15 AM  HOSPITAL FOLLOW UP 30 min [facility] Joe Diallo MD   Does the patient have an appointment with their PCP within 7-14 days of discharge? Yes   Has home health visited the patient within 72 hours of discharge? N/A   Psychosocial issues? No   What is the patient's perception of their health status since discharge? Improving   Is the patient/caregiver able to teach back signs and symptoms related to disease process for when to call PCP? Yes   Is the patient/caregiver able to teach back signs and symptoms related to disease process for when to call 911? Yes   Is the patient/caregiver able to teach back the hierarchy of who to call/visit for symptoms/problems? PCP, Specialist, Home health nurse, Urgent Care, ED, 911 Yes   TCM call completed? Yes   Wrap up additional comments SO princess states patient is doing well. No concerns or questions.   Call end time 1124   Would this patient benefit from a Referral to Amb Social Work? No   Is the patient interested in additional calls from an ambulatory ? No            Luanne Cornejo RN    10/7/2024, 11:25 EDT

## 2024-10-09 ENCOUNTER — TELEPHONE (OUTPATIENT)
Dept: FAMILY MEDICINE CLINIC | Facility: CLINIC | Age: 74
End: 2024-10-09
Payer: MEDICARE

## 2024-10-09 NOTE — TELEPHONE ENCOUNTER
Caller: Princess Cardona    Relationship: Emergency Contact    Best call back number: [phone]     What medication are you requesting: OXYGEN CONCENTRATOR MACHINE    Have you had these symptoms before:    [x] Yes  [] No    Have you been treated for these symptoms before:   [x] Yes  [] No    If a prescription is needed, what is your preferred pharmacy and phone number:    [facility]     Additional notes: THE PATIENT WANTS TO SWITCH BACK TO [facility] FOR HIS OXYGEN SUPPLIES.

## 2024-10-09 NOTE — TELEPHONE ENCOUNTER
Informed pt's partner that anything pertaining to O2 is going to have to come from his pulmonologist and he may actually have to be seen by them since it has been so long since they have been there.

## 2024-10-15 ENCOUNTER — OFFICE VISIT (OUTPATIENT)
Dept: FAMILY MEDICINE CLINIC | Facility: CLINIC | Age: 74
End: 2024-10-15
Payer: MEDICARE

## 2024-10-15 VITALS
RESPIRATION RATE: 18 BRPM | BODY MASS INDEX: 26.5 KG/M2 | HEIGHT: 71 IN | HEART RATE: 88 BPM | DIASTOLIC BLOOD PRESSURE: 84 MMHG | TEMPERATURE: 98.2 F | SYSTOLIC BLOOD PRESSURE: 162 MMHG

## 2024-10-15 DIAGNOSIS — I73.9 PERIPHERAL ARTERY DISEASE: ICD-10-CM

## 2024-10-15 DIAGNOSIS — Z89.511 HISTORY OF RIGHT BELOW KNEE AMPUTATION: ICD-10-CM

## 2024-10-15 DIAGNOSIS — J06.9 ACUTE URI: Primary | ICD-10-CM

## 2024-10-15 DIAGNOSIS — U07.1 COVID-19 VIRUS INFECTION: ICD-10-CM

## 2024-10-15 LAB
EXPIRATION DATE: ABNORMAL
FLUAV AG UPPER RESP QL IA.RAPID: NOT DETECTED
FLUBV AG UPPER RESP QL IA.RAPID: NOT DETECTED
INTERNAL CONTROL: ABNORMAL
Lab: ABNORMAL
SARS-COV-2 AG UPPER RESP QL IA.RAPID: DETECTED

## 2024-10-15 PROCEDURE — 1111F DSCHRG MED/CURRENT MED MERGE: CPT | Performed by: FAMILY MEDICINE

## 2024-10-15 PROCEDURE — 1126F AMNT PAIN NOTED NONE PRSNT: CPT | Performed by: FAMILY MEDICINE

## 2024-10-15 PROCEDURE — 87428 SARSCOV & INF VIR A&B AG IA: CPT | Performed by: FAMILY MEDICINE

## 2024-10-15 PROCEDURE — 3079F DIAST BP 80-89 MM HG: CPT | Performed by: FAMILY MEDICINE

## 2024-10-15 PROCEDURE — 3044F HG A1C LEVEL LT 7.0%: CPT | Performed by: FAMILY MEDICINE

## 2024-10-15 PROCEDURE — 99213 OFFICE O/P EST LOW 20 MIN: CPT | Performed by: FAMILY MEDICINE

## 2024-10-15 PROCEDURE — 3077F SYST BP >= 140 MM HG: CPT | Performed by: FAMILY MEDICINE

## 2024-10-15 RX ORDER — ROSUVASTATIN CALCIUM 20 MG/1
20 TABLET, COATED ORAL NIGHTLY
COMMUNITY
Start: 2024-10-07

## 2024-10-15 RX ORDER — INSULIN GLARGINE-YFGN 100 [IU]/ML
12 INJECTION, SOLUTION SUBCUTANEOUS DAILY
COMMUNITY
Start: 2024-10-04

## 2024-10-15 NOTE — PROGRESS NOTES
"Transitional Care Follow Up Visit  Subjective     Erlin Savage is a 74 y.o. male who presents for a transitional care management visit.    Within 48 business hours after discharge our office contacted him via telephone to coordinate his care and needs.      I reviewed and discussed the details of that call along with the discharge summary, hospital problems, inpatient lab results, inpatient diagnostic studies, and consultation reports with Erlin.     Current outpatient and discharge medications have been reconciled for the patient.  Reviewed by: Joe Diallo MD          10/4/2024     7:20 PM   Date of TCM Phone Call   [facility] [place]   Date of Admission 9/27/2024   Date of Discharge 10/4/2024   Discharge Disposition Home or Self Care     Risk for Readmission (LACE) No data recorded    Cough       Course During Hospital Stay: Patient was hospitalized in September for wound infection of the left foot.  He underwent stenting of the posterior tibial artery for his PAD.  Cares by Dr. Scheriber.  During hospitalization he continued dialysis.  After hospitalization he was discharged to [facility].  Patient had a rehabilitation stay at [facility] and was discharged from there on October 7.  He reports 1 week ago he was exposed to someone with COVID at his dialysis clinic.  He has developed weakness, mild increase in dyspnea, nasal and chest congestion.     The following portions of the patient's history were reviewed and updated as appropriate: allergies, current medications, past family history, past medical history, past social history, past surgical history, and problem list.    Review of Systems   Respiratory:  Positive for cough.        Objective   /84   Pulse 88   Temp 98.2 °F (36.8 °C)   Resp 18   Ht 180.3 cm (71\")   BMI 26.50 kg/m²   Physical Exam  Vitals reviewed.   Constitutional:       Appearance: He is ill-appearing.   HENT:      Head: Normocephalic.      Right Ear: " External ear normal.      Left Ear: External ear normal.      Nose: Congestion and rhinorrhea present.      Mouth/Throat:      Mouth: Mucous membranes are moist.      Pharynx: Oropharynx is clear.   Eyes:      Conjunctiva/sclera: Conjunctivae normal.      Pupils: Pupils are equal, round, and reactive to light.   Cardiovascular:      Rate and Rhythm: Normal rate.   Pulmonary:      Effort: Pulmonary effort is normal. No respiratory distress.      Breath sounds: No wheezing or rales.   Musculoskeletal:      Cervical back: Normal range of motion and neck supple.      Comments: Right BKA   Lymphadenopathy:      Cervical: No cervical adenopathy.   Neurological:      Mental Status: He is alert.         Assessment & Plan   Diagnoses and all orders for this visit:    1. Acute URI (Primary)  -     POCT SARS-CoV-2 Antigen JUAN PABLO + Flu    2. COVID-19 virus infection    3. History of right below knee amputation    4. Peripheral artery disease    Plan  1.  For patient's COVID infection he is outside of the treatment window but has been vaccinated.  Monitor for symptoms of decline and should dyspnea worsen he should present to the emergency room.  2.  Follow-up with vascular surgery as scheduled

## 2024-10-17 ENCOUNTER — TELEPHONE (OUTPATIENT)
Dept: FAMILY MEDICINE CLINIC | Facility: CLINIC | Age: 74
End: 2024-10-17
Payer: MEDICARE

## 2024-10-17 NOTE — TELEPHONE ENCOUNTER
Home Health called for verbal orders on pt for general nursing orders (wound care they will contact pt's other provider), nursing visits for once this week, 2x a wk for the next 2 wks, and every week for the next 6 wks.  Also verbal for PT, OT, and Speech.  Callback # [phone]

## 2024-10-29 ENCOUNTER — READMISSION MANAGEMENT (OUTPATIENT)
Dept: CALL CENTER | Facility: HOSPITAL | Age: 74
End: 2024-10-29
Payer: MEDICARE

## (undated) DEVICE — SOL IRRIG H2O 1000ML STRL

## (undated) DEVICE — PENCL ES MEGADINE EZ/CLEAN BUTN W/HOLSTR 10FT

## (undated) DEVICE — SPNG GZ WOVN 4X4IN 12PLY 10/BX STRL

## (undated) DEVICE — GLV SURG SENSICARE W/ALOE PF LF 7.5 STRL

## (undated) DEVICE — BNDG ELAS CO-FLEX SLF ADHR 6IN 5YD LF STRL

## (undated) DEVICE — SNAP KOVER: Brand: UNBRANDED

## (undated) DEVICE — SUT VIC 2/0 SH 27IN

## (undated) DEVICE — ENDOPATH XCEL BLADELESS TROCARS WITH STABILITY SLEEVES: Brand: ENDOPATH XCEL

## (undated) DEVICE — GLV SURG BIOGEL LTX PF 7 1/2

## (undated) DEVICE — SYR CONTRL LUERLOK 10CC

## (undated) DEVICE — STRIP,CLOSURE,WOUND,MEDI-STRIP,1/2X4: Brand: MEDLINE

## (undated) DEVICE — HYPODERMIC SAFETY NEEDLE: Brand: MONOJECT

## (undated) DEVICE — PENCL ROCKRSWCH MEGADYNE W/HOLSTR 10FT SS

## (undated) DEVICE — UNDERGLV SURG BIOGEL INDICAT PI SZ8 BLU

## (undated) DEVICE — CVR PROB ULTRASND/TRANSD W/GEL 18X120CM STRL

## (undated) DEVICE — PATIENT RETURN ELECTRODE, SINGLE-USE, CONTACT QUALITY MONITORING, ADULT, WITH 9FT CORD, FOR PATIENTS WEIGING OVER 33LBS. (15KG): Brand: MEGADYNE

## (undated) DEVICE — UNDYED MONOFILAMENT (POLYDIOXANONE), ABSORBABLE SYNTHETIC SURGICAL SUTURE: Brand: PDS

## (undated) DEVICE — DRSNG SURESITE WNDW 4X4.5

## (undated) DEVICE — PK EXTREM LOWR 10

## (undated) DEVICE — 3M™ IOBAN™ 2 ANTIMICROBIAL INCISE DRAPE 6651EZ: Brand: IOBAN™ 2

## (undated) DEVICE — SUT ETHLN 3-0 FS118IN 663H

## (undated) DEVICE — ENDOPATH XCEL UNIVERSAL TROCAR STABLILITY SLEEVES: Brand: ENDOPATH XCEL

## (undated) DEVICE — ANTIBACTERIAL UNDYED BRAIDED (POLYGLACTIN 910), SYNTHETIC ABSORBABLE SUTURE: Brand: COATED VICRYL

## (undated) DEVICE — RICH GENERAL LAPAROSCOPY: Brand: MEDLINE INDUSTRIES, INC.

## (undated) DEVICE — BLD SAW GIGLI 51CM

## (undated) DEVICE — SUT VIC 4/0 SH 27IN J415H

## (undated) DEVICE — APPL CHLORAPREP TINTED 26ML TEAL